# Patient Record
Sex: FEMALE | Race: WHITE | Employment: FULL TIME | URBAN - METROPOLITAN AREA
[De-identification: names, ages, dates, MRNs, and addresses within clinical notes are randomized per-mention and may not be internally consistent; named-entity substitution may affect disease eponyms.]

---

## 2018-04-10 ENCOUNTER — OFFICE VISIT (OUTPATIENT)
Dept: FAMILY MEDICINE CLINIC | Age: 33
End: 2018-04-10
Payer: COMMERCIAL

## 2018-04-10 ENCOUNTER — HOSPITAL ENCOUNTER (OUTPATIENT)
Age: 33
Discharge: HOME | End: 2018-04-12
Payer: COMMERCIAL

## 2018-04-10 VITALS
HEIGHT: 67 IN | DIASTOLIC BLOOD PRESSURE: 82 MMHG | SYSTOLIC BLOOD PRESSURE: 126 MMHG | BODY MASS INDEX: 45.99 KG/M2 | RESPIRATION RATE: 18 BRPM | OXYGEN SATURATION: 97 % | HEART RATE: 88 BPM | WEIGHT: 293 LBS

## 2018-04-10 DIAGNOSIS — Z00.00 PREVENTATIVE HEALTH CARE: ICD-10-CM

## 2018-04-10 DIAGNOSIS — R53.82 CHRONIC FATIGUE: ICD-10-CM

## 2018-04-10 DIAGNOSIS — Z00.00 PREVENTATIVE HEALTH CARE: Primary | ICD-10-CM

## 2018-04-10 DIAGNOSIS — E66.01 MORBID OBESITY (HCC): ICD-10-CM

## 2018-04-10 LAB
ALBUMIN SERPL-MCNC: 3.8 G/DL (ref 3.5–5.2)
ALP BLD-CCNC: 73 U/L (ref 35–104)
ALT SERPL-CCNC: 10 U/L (ref 0–32)
ANION GAP SERPL CALCULATED.3IONS-SCNC: 15 MMOL/L (ref 7–16)
AST SERPL-CCNC: 11 U/L (ref 0–31)
BILIRUB SERPL-MCNC: 0.2 MG/DL (ref 0–1.2)
BUN BLDV-MCNC: 11 MG/DL (ref 6–20)
CALCIUM SERPL-MCNC: 9 MG/DL (ref 8.6–10.2)
CHLORIDE BLD-SCNC: 98 MMOL/L (ref 98–107)
CO2: 26 MMOL/L (ref 22–29)
CREAT SERPL-MCNC: 0.7 MG/DL (ref 0.5–1)
FOLATE: 15.8 NG/ML (ref 4.8–24.2)
GFR AFRICAN AMERICAN: >60
GFR NON-AFRICAN AMERICAN: >60 ML/MIN/1.73
GLUCOSE BLD-MCNC: 86 MG/DL (ref 74–109)
HBA1C MFR BLD: 5.5 % (ref 4.8–5.9)
HCT VFR BLD CALC: 38.1 % (ref 34–48)
HEMOGLOBIN: 11.5 G/DL (ref 11.5–15.5)
MCH RBC QN AUTO: 25.1 PG (ref 26–35)
MCHC RBC AUTO-ENTMCNC: 30.2 % (ref 32–34.5)
MCV RBC AUTO: 83 FL (ref 80–99.9)
PDW BLD-RTO: 16.8 FL (ref 11.5–15)
PLATELET # BLD: 297 E9/L (ref 130–450)
PMV BLD AUTO: 12.1 FL (ref 7–12)
POTASSIUM SERPL-SCNC: 4 MMOL/L (ref 3.5–5)
RBC # BLD: 4.59 E12/L (ref 3.5–5.5)
SODIUM BLD-SCNC: 139 MMOL/L (ref 132–146)
TOTAL PROTEIN: 6.8 G/DL (ref 6.4–8.3)
TSH SERPL DL<=0.05 MIU/L-ACNC: 2.88 UIU/ML (ref 0.27–4.2)
VITAMIN B-12: 196 PG/ML (ref 211–946)
VITAMIN D 25-HYDROXY: 26 NG/ML (ref 30–100)
WBC # BLD: 9.1 E9/L (ref 4.5–11.5)

## 2018-04-10 PROCEDURE — 84443 ASSAY THYROID STIM HORMONE: CPT

## 2018-04-10 PROCEDURE — 83036 HEMOGLOBIN GLYCOSYLATED A1C: CPT

## 2018-04-10 PROCEDURE — 36415 COLL VENOUS BLD VENIPUNCTURE: CPT

## 2018-04-10 PROCEDURE — 82306 VITAMIN D 25 HYDROXY: CPT

## 2018-04-10 PROCEDURE — 82607 VITAMIN B-12: CPT

## 2018-04-10 PROCEDURE — 99385 PREV VISIT NEW AGE 18-39: CPT | Performed by: FAMILY MEDICINE

## 2018-04-10 PROCEDURE — 82746 ASSAY OF FOLIC ACID SERUM: CPT

## 2018-04-10 PROCEDURE — 85027 COMPLETE CBC AUTOMATED: CPT

## 2018-04-10 PROCEDURE — 80053 COMPREHEN METABOLIC PANEL: CPT

## 2018-04-10 ASSESSMENT — ENCOUNTER SYMPTOMS
SORE THROAT: 0
SHORTNESS OF BREATH: 0
ABDOMINAL PAIN: 0
EYE REDNESS: 0
VOMITING: 0
COUGH: 0
DIARRHEA: 0
CONSTIPATION: 0
RHINORRHEA: 0
COLOR CHANGE: 0
EYE PAIN: 0
NAUSEA: 0
WHEEZING: 0

## 2018-04-10 ASSESSMENT — PATIENT HEALTH QUESTIONNAIRE - PHQ9
SUM OF ALL RESPONSES TO PHQ9 QUESTIONS 1 & 2: 0
1. LITTLE INTEREST OR PLEASURE IN DOING THINGS: 0
SUM OF ALL RESPONSES TO PHQ QUESTIONS 1-9: 0
2. FEELING DOWN, DEPRESSED OR HOPELESS: 0

## 2018-04-12 ENCOUNTER — PREP FOR PROCEDURE (OUTPATIENT)
Dept: BARIATRICS/WEIGHT MGMT | Age: 33
End: 2018-04-12

## 2018-04-12 ENCOUNTER — INITIAL CONSULT (OUTPATIENT)
Dept: BARIATRICS/WEIGHT MGMT | Age: 33
End: 2018-04-12
Payer: COMMERCIAL

## 2018-04-12 VITALS
RESPIRATION RATE: 20 BRPM | TEMPERATURE: 97.2 F | HEIGHT: 68 IN | BODY MASS INDEX: 44.41 KG/M2 | DIASTOLIC BLOOD PRESSURE: 80 MMHG | WEIGHT: 293 LBS | HEART RATE: 78 BPM | SYSTOLIC BLOOD PRESSURE: 140 MMHG

## 2018-04-12 DIAGNOSIS — E46 MALNUTRITION, UNSPECIFIED TYPE (HCC): ICD-10-CM

## 2018-04-12 DIAGNOSIS — K21.9 GASTROESOPHAGEAL REFLUX DISEASE WITHOUT ESOPHAGITIS: ICD-10-CM

## 2018-04-12 DIAGNOSIS — E66.01 MORBID OBESITY DUE TO EXCESS CALORIES (HCC): Primary | ICD-10-CM

## 2018-04-12 PROCEDURE — 99203 OFFICE O/P NEW LOW 30 MIN: CPT

## 2018-04-12 PROCEDURE — G8417 CALC BMI ABV UP PARAM F/U: HCPCS | Performed by: SURGERY

## 2018-04-12 PROCEDURE — 99244 OFF/OP CNSLTJ NEW/EST MOD 40: CPT | Performed by: SURGERY

## 2018-04-12 PROCEDURE — G8427 DOCREV CUR MEDS BY ELIG CLIN: HCPCS | Performed by: SURGERY

## 2018-04-12 RX ORDER — SODIUM CHLORIDE, SODIUM LACTATE, POTASSIUM CHLORIDE, CALCIUM CHLORIDE 600; 310; 30; 20 MG/100ML; MG/100ML; MG/100ML; MG/100ML
INJECTION, SOLUTION INTRAVENOUS CONTINUOUS
Status: CANCELLED | OUTPATIENT
Start: 2018-04-12

## 2018-04-12 RX ORDER — M-VIT,TX,IRON,MINS/CALC/FOLIC 27MG-0.4MG
1 TABLET ORAL DAILY
COMMUNITY

## 2018-04-27 ENCOUNTER — TELEPHONE (OUTPATIENT)
Dept: FAMILY MEDICINE CLINIC | Age: 33
End: 2018-04-27

## 2018-04-27 RX ORDER — CHOLECALCIFEROL (VITAMIN D3) 50 MCG
2000 TABLET ORAL DAILY
Qty: 30 TABLET | Refills: 3 | Status: SHIPPED | OUTPATIENT
Start: 2018-04-27 | End: 2018-06-18 | Stop reason: SDUPTHER

## 2018-04-27 RX ORDER — LANOLIN ALCOHOL/MO/W.PET/CERES
1000 CREAM (GRAM) TOPICAL DAILY
Qty: 30 TABLET | Refills: 3 | Status: SHIPPED | OUTPATIENT
Start: 2018-04-27 | End: 2018-06-18 | Stop reason: SDUPTHER

## 2018-05-02 ENCOUNTER — ANESTHESIA EVENT (OUTPATIENT)
Dept: ENDOSCOPY | Age: 33
End: 2018-05-02
Payer: COMMERCIAL

## 2018-05-02 ENCOUNTER — HOSPITAL ENCOUNTER (OUTPATIENT)
Age: 33
Setting detail: OUTPATIENT SURGERY
Discharge: HOME | End: 2018-05-02
Attending: SURGERY | Admitting: SURGERY
Payer: COMMERCIAL

## 2018-05-02 ENCOUNTER — ANESTHESIA (OUTPATIENT)
Dept: ENDOSCOPY | Age: 33
End: 2018-05-02
Payer: COMMERCIAL

## 2018-05-02 VITALS
DIASTOLIC BLOOD PRESSURE: 70 MMHG | BODY MASS INDEX: 44.41 KG/M2 | OXYGEN SATURATION: 98 % | SYSTOLIC BLOOD PRESSURE: 140 MMHG | HEIGHT: 68 IN | HEART RATE: 87 BPM | TEMPERATURE: 97.3 F | WEIGHT: 293 LBS | RESPIRATION RATE: 16 BRPM

## 2018-05-02 VITALS
DIASTOLIC BLOOD PRESSURE: 101 MMHG | SYSTOLIC BLOOD PRESSURE: 152 MMHG | OXYGEN SATURATION: 100 % | RESPIRATION RATE: 15 BRPM

## 2018-05-02 DIAGNOSIS — E46 MALNUTRITION, UNSPECIFIED TYPE (HCC): ICD-10-CM

## 2018-05-02 DIAGNOSIS — E66.01 MORBID OBESITY DUE TO EXCESS CALORIES (HCC): ICD-10-CM

## 2018-05-02 LAB
CHOLESTEROL, TOTAL: 178 MG/DL (ref 0–199)
FERRITIN: 16 NG/ML
FOLATE: 15.8 NG/ML (ref 4.8–24.2)
HCG(URINE) PREGNANCY TEST: NEGATIVE
TRIGL SERPL-MCNC: 120 MG/DL (ref 0–149)
VITAMIN D 25-HYDROXY: 24 NG/ML (ref 30–100)

## 2018-05-02 PROCEDURE — C1773 RET DEV, INSERTABLE: HCPCS | Performed by: SURGERY

## 2018-05-02 PROCEDURE — 36415 COLL VENOUS BLD VENIPUNCTURE: CPT

## 2018-05-02 PROCEDURE — 3700000000 HC ANESTHESIA ATTENDED CARE: Performed by: SURGERY

## 2018-05-02 PROCEDURE — 2709999900 HC NON-CHARGEABLE SUPPLY: Performed by: SURGERY

## 2018-05-02 PROCEDURE — 7100000011 HC PHASE II RECOVERY - ADDTL 15 MIN: Performed by: SURGERY

## 2018-05-02 PROCEDURE — 88342 IMHCHEM/IMCYTCHM 1ST ANTB: CPT

## 2018-05-02 PROCEDURE — 2580000003 HC RX 258: Performed by: SURGERY

## 2018-05-02 PROCEDURE — 3609012400 HC EGD TRANSORAL BIOPSY SINGLE/MULTIPLE: Performed by: SURGERY

## 2018-05-02 PROCEDURE — 82728 ASSAY OF FERRITIN: CPT

## 2018-05-02 PROCEDURE — 82746 ASSAY OF FOLIC ACID SERUM: CPT

## 2018-05-02 PROCEDURE — 43239 EGD BIOPSY SINGLE/MULTIPLE: CPT | Performed by: SURGERY

## 2018-05-02 PROCEDURE — 84425 ASSAY OF VITAMIN B-1: CPT

## 2018-05-02 PROCEDURE — 3700000001 HC ADD 15 MINUTES (ANESTHESIA): Performed by: SURGERY

## 2018-05-02 PROCEDURE — 84478 ASSAY OF TRIGLYCERIDES: CPT

## 2018-05-02 PROCEDURE — 82465 ASSAY BLD/SERUM CHOLESTEROL: CPT

## 2018-05-02 PROCEDURE — 7100000010 HC PHASE II RECOVERY - FIRST 15 MIN: Performed by: SURGERY

## 2018-05-02 PROCEDURE — 88305 TISSUE EXAM BY PATHOLOGIST: CPT

## 2018-05-02 PROCEDURE — 81025 URINE PREGNANCY TEST: CPT

## 2018-05-02 PROCEDURE — 6360000002 HC RX W HCPCS: Performed by: NURSE ANESTHETIST, CERTIFIED REGISTERED

## 2018-05-02 PROCEDURE — 82306 VITAMIN D 25 HYDROXY: CPT

## 2018-05-02 RX ORDER — MIDAZOLAM HYDROCHLORIDE 1 MG/ML
INJECTION INTRAMUSCULAR; INTRAVENOUS PRN
Status: DISCONTINUED | OUTPATIENT
Start: 2018-05-02 | End: 2018-05-02 | Stop reason: SDUPTHER

## 2018-05-02 RX ORDER — PROPOFOL 10 MG/ML
INJECTION, EMULSION INTRAVENOUS PRN
Status: DISCONTINUED | OUTPATIENT
Start: 2018-05-02 | End: 2018-05-02 | Stop reason: SDUPTHER

## 2018-05-02 RX ORDER — SODIUM CHLORIDE, SODIUM LACTATE, POTASSIUM CHLORIDE, CALCIUM CHLORIDE 600; 310; 30; 20 MG/100ML; MG/100ML; MG/100ML; MG/100ML
INJECTION, SOLUTION INTRAVENOUS CONTINUOUS
Status: DISCONTINUED | OUTPATIENT
Start: 2018-05-02 | End: 2018-05-02 | Stop reason: HOSPADM

## 2018-05-02 RX ADMIN — PROPOFOL 100 MG: 10 INJECTION, EMULSION INTRAVENOUS at 14:29

## 2018-05-02 RX ADMIN — MIDAZOLAM HYDROCHLORIDE 2 MG: 1 INJECTION INTRAMUSCULAR; INTRAVENOUS at 14:28

## 2018-05-02 RX ADMIN — SODIUM CHLORIDE, POTASSIUM CHLORIDE, SODIUM LACTATE AND CALCIUM CHLORIDE: 600; 310; 30; 20 INJECTION, SOLUTION INTRAVENOUS at 12:43

## 2018-05-02 RX ADMIN — PROPOFOL 50 MG: 10 INJECTION, EMULSION INTRAVENOUS at 14:30

## 2018-05-02 RX ADMIN — SODIUM CHLORIDE, POTASSIUM CHLORIDE, SODIUM LACTATE AND CALCIUM CHLORIDE: 600; 310; 30; 20 INJECTION, SOLUTION INTRAVENOUS at 14:23

## 2018-05-02 ASSESSMENT — PAIN - FUNCTIONAL ASSESSMENT: PAIN_FUNCTIONAL_ASSESSMENT: 0-10

## 2018-05-07 LAB — VITAMIN B1 WHOLE BLOOD: 58 NMOL/L (ref 70–180)

## 2018-05-10 ENCOUNTER — OFFICE VISIT (OUTPATIENT)
Dept: BARIATRICS/WEIGHT MGMT | Age: 33
End: 2018-05-10
Payer: COMMERCIAL

## 2018-05-10 VITALS
TEMPERATURE: 97.8 F | BODY MASS INDEX: 44.41 KG/M2 | HEIGHT: 68 IN | WEIGHT: 293 LBS | DIASTOLIC BLOOD PRESSURE: 83 MMHG | SYSTOLIC BLOOD PRESSURE: 173 MMHG | HEART RATE: 79 BPM

## 2018-05-10 DIAGNOSIS — K21.9 GASTROESOPHAGEAL REFLUX DISEASE WITHOUT ESOPHAGITIS: ICD-10-CM

## 2018-05-10 DIAGNOSIS — E66.01 MORBID OBESITY (HCC): Primary | ICD-10-CM

## 2018-05-10 PROCEDURE — 4004F PT TOBACCO SCREEN RCVD TLK: CPT | Performed by: SURGERY

## 2018-05-10 PROCEDURE — 99213 OFFICE O/P EST LOW 20 MIN: CPT | Performed by: SURGERY

## 2018-05-10 PROCEDURE — G8427 DOCREV CUR MEDS BY ELIG CLIN: HCPCS | Performed by: SURGERY

## 2018-05-10 PROCEDURE — 99211 OFF/OP EST MAY X REQ PHY/QHP: CPT

## 2018-05-10 PROCEDURE — G8417 CALC BMI ABV UP PARAM F/U: HCPCS | Performed by: SURGERY

## 2018-05-16 ENCOUNTER — TELEPHONE (OUTPATIENT)
Dept: BARIATRICS/WEIGHT MGMT | Age: 33
End: 2018-05-16

## 2018-05-16 ENCOUNTER — INITIAL CONSULT (OUTPATIENT)
Dept: BARIATRICS/WEIGHT MGMT | Age: 33
End: 2018-05-16
Payer: COMMERCIAL

## 2018-05-16 VITALS — WEIGHT: 293 LBS | BODY MASS INDEX: 44.41 KG/M2 | HEIGHT: 68 IN

## 2018-05-16 DIAGNOSIS — E66.01 MORBID OBESITY WITH BMI OF 50.0-59.9, ADULT (HCC): ICD-10-CM

## 2018-05-16 DIAGNOSIS — Z71.3 DIETARY COUNSELING: Primary | ICD-10-CM

## 2018-05-16 DIAGNOSIS — E53.8 VITAMIN B 12 DEFICIENCY: ICD-10-CM

## 2018-05-16 DIAGNOSIS — E55.9 VITAMIN D DEFICIENCY: ICD-10-CM

## 2018-05-16 PROCEDURE — 99999 PR OFFICE/OUTPT VISIT,PROCEDURE ONLY: CPT | Performed by: SURGERY

## 2018-05-30 ENCOUNTER — OFFICE VISIT (OUTPATIENT)
Dept: FAMILY MEDICINE CLINIC | Age: 33
End: 2018-05-30
Payer: COMMERCIAL

## 2018-05-30 ENCOUNTER — HOSPITAL ENCOUNTER (OUTPATIENT)
Age: 33
Discharge: HOME | End: 2018-06-01
Payer: COMMERCIAL

## 2018-05-30 VITALS
TEMPERATURE: 97.9 F | WEIGHT: 293 LBS | DIASTOLIC BLOOD PRESSURE: 70 MMHG | HEIGHT: 68 IN | OXYGEN SATURATION: 98 % | BODY MASS INDEX: 44.41 KG/M2 | HEART RATE: 86 BPM | SYSTOLIC BLOOD PRESSURE: 130 MMHG

## 2018-05-30 DIAGNOSIS — J20.9 ACUTE BRONCHITIS WITH ASTHMA WITH ACUTE EXACERBATION: Primary | ICD-10-CM

## 2018-05-30 DIAGNOSIS — Z71.3 DIETARY COUNSELING: ICD-10-CM

## 2018-05-30 DIAGNOSIS — J45.901 ACUTE BRONCHITIS WITH ASTHMA WITH ACUTE EXACERBATION: Primary | ICD-10-CM

## 2018-05-30 DIAGNOSIS — E66.01 MORBID OBESITY WITH BMI OF 50.0-59.9, ADULT (HCC): ICD-10-CM

## 2018-05-30 PROCEDURE — 84630 ASSAY OF ZINC: CPT

## 2018-05-30 PROCEDURE — G8417 CALC BMI ABV UP PARAM F/U: HCPCS | Performed by: PHYSICIAN ASSISTANT

## 2018-05-30 PROCEDURE — 99213 OFFICE O/P EST LOW 20 MIN: CPT | Performed by: PHYSICIAN ASSISTANT

## 2018-05-30 PROCEDURE — G8427 DOCREV CUR MEDS BY ELIG CLIN: HCPCS | Performed by: PHYSICIAN ASSISTANT

## 2018-05-30 PROCEDURE — 4004F PT TOBACCO SCREEN RCVD TLK: CPT | Performed by: PHYSICIAN ASSISTANT

## 2018-05-30 RX ORDER — FLUTICASONE PROPIONATE 50 MCG
2 SPRAY, SUSPENSION (ML) NASAL DAILY
Qty: 1 BOTTLE | Refills: 0 | Status: SHIPPED | OUTPATIENT
Start: 2018-05-30 | End: 2018-12-20 | Stop reason: ALTCHOICE

## 2018-05-30 RX ORDER — DOXYCYCLINE HYCLATE 100 MG
100 TABLET ORAL 2 TIMES DAILY
Qty: 20 TABLET | Refills: 0 | Status: SHIPPED | OUTPATIENT
Start: 2018-05-30 | End: 2018-06-09

## 2018-05-30 RX ORDER — PREDNISONE 20 MG/1
20 TABLET ORAL 2 TIMES DAILY
Qty: 10 TABLET | Refills: 0 | Status: SHIPPED | OUTPATIENT
Start: 2018-05-30 | End: 2018-06-04

## 2018-05-30 RX ORDER — ALBUTEROL SULFATE 90 UG/1
2 AEROSOL, METERED RESPIRATORY (INHALATION) EVERY 6 HOURS PRN
Qty: 1 INHALER | Refills: 1 | Status: SHIPPED | OUTPATIENT
Start: 2018-05-30 | End: 2018-12-20 | Stop reason: SDUPTHER

## 2018-05-30 RX ORDER — BENZONATATE 100 MG/1
100 CAPSULE ORAL 3 TIMES DAILY PRN
Qty: 30 CAPSULE | Refills: 0 | Status: SHIPPED | OUTPATIENT
Start: 2018-05-30 | End: 2018-06-09

## 2018-06-02 LAB — ZINC: 59 UG/DL (ref 60–120)

## 2018-06-18 ENCOUNTER — OFFICE VISIT (OUTPATIENT)
Dept: FAMILY MEDICINE CLINIC | Age: 33
End: 2018-06-18
Payer: COMMERCIAL

## 2018-06-18 VITALS
DIASTOLIC BLOOD PRESSURE: 82 MMHG | HEART RATE: 88 BPM | HEIGHT: 68 IN | SYSTOLIC BLOOD PRESSURE: 130 MMHG | WEIGHT: 293 LBS | BODY MASS INDEX: 44.41 KG/M2 | OXYGEN SATURATION: 97 % | RESPIRATION RATE: 18 BRPM

## 2018-06-18 DIAGNOSIS — R10.11 RUQ PAIN: ICD-10-CM

## 2018-06-18 DIAGNOSIS — E51.9 VITAMIN B1 DEFICIENCY: ICD-10-CM

## 2018-06-18 DIAGNOSIS — F41.9 ANXIETY: Primary | ICD-10-CM

## 2018-06-18 DIAGNOSIS — E66.01 MORBID OBESITY (HCC): ICD-10-CM

## 2018-06-18 PROCEDURE — G8427 DOCREV CUR MEDS BY ELIG CLIN: HCPCS | Performed by: FAMILY MEDICINE

## 2018-06-18 PROCEDURE — G8417 CALC BMI ABV UP PARAM F/U: HCPCS | Performed by: FAMILY MEDICINE

## 2018-06-18 PROCEDURE — 99214 OFFICE O/P EST MOD 30 MIN: CPT | Performed by: FAMILY MEDICINE

## 2018-06-18 PROCEDURE — 4004F PT TOBACCO SCREEN RCVD TLK: CPT | Performed by: FAMILY MEDICINE

## 2018-06-18 RX ORDER — LANOLIN ALCOHOL/MO/W.PET/CERES
1000 CREAM (GRAM) TOPICAL DAILY
Qty: 30 TABLET | Refills: 3 | Status: SHIPPED | OUTPATIENT
Start: 2018-06-18 | End: 2018-12-21 | Stop reason: SDUPTHER

## 2018-06-18 RX ORDER — THIAMINE MONONITRATE (VIT B1) 100 MG
100 TABLET ORAL DAILY
Qty: 30 TABLET | Refills: 3 | Status: SHIPPED | OUTPATIENT
Start: 2018-06-18 | End: 2018-12-21 | Stop reason: SDUPTHER

## 2018-06-18 RX ORDER — CHOLECALCIFEROL (VITAMIN D3) 50 MCG
2000 TABLET ORAL DAILY
Qty: 30 TABLET | Refills: 3 | Status: SHIPPED | OUTPATIENT
Start: 2018-06-18 | End: 2018-12-21 | Stop reason: DRUGHIGH

## 2018-06-18 ASSESSMENT — ENCOUNTER SYMPTOMS
WHEEZING: 0
CONSTIPATION: 0
SHORTNESS OF BREATH: 0
NAUSEA: 0
DIARRHEA: 0
VOMITING: 0
ABDOMINAL PAIN: 0
COUGH: 1

## 2018-06-19 ENCOUNTER — INITIAL CONSULT (OUTPATIENT)
Dept: BARIATRICS/WEIGHT MGMT | Age: 33
End: 2018-06-19
Payer: COMMERCIAL

## 2018-06-19 DIAGNOSIS — Z71.3 DIETARY COUNSELING: Primary | ICD-10-CM

## 2018-06-19 PROCEDURE — 99999 PR OFFICE/OUTPT VISIT,PROCEDURE ONLY: CPT | Performed by: SURGERY

## 2018-06-22 VITALS — BODY MASS INDEX: 53.37 KG/M2 | WEIGHT: 293 LBS

## 2018-07-20 ENCOUNTER — TELEPHONE (OUTPATIENT)
Dept: BARIATRICS/WEIGHT MGMT | Age: 33
End: 2018-07-20

## 2018-07-31 ENCOUNTER — INITIAL CONSULT (OUTPATIENT)
Dept: BARIATRICS/WEIGHT MGMT | Age: 33
End: 2018-07-31
Payer: COMMERCIAL

## 2018-07-31 DIAGNOSIS — Z71.3 NUTRITIONAL COUNSELING: Primary | ICD-10-CM

## 2018-07-31 PROCEDURE — 99999 PR OFFICE/OUTPT VISIT,PROCEDURE ONLY: CPT | Performed by: SURGERY

## 2018-08-06 VITALS — WEIGHT: 293 LBS | BODY MASS INDEX: 52.15 KG/M2

## 2018-08-17 ENCOUNTER — OFFICE VISIT (OUTPATIENT)
Dept: BARIATRICS/WEIGHT MGMT | Age: 33
End: 2018-08-17
Payer: COMMERCIAL

## 2018-08-17 DIAGNOSIS — E55.9 VITAMIN D DEFICIENCY: ICD-10-CM

## 2018-08-17 DIAGNOSIS — E60 ZINC DEFICIENCY: Primary | ICD-10-CM

## 2018-08-17 DIAGNOSIS — E51.9 VITAMIN B1 DEFICIENCY: ICD-10-CM

## 2018-08-17 DIAGNOSIS — E53.8 VITAMIN B12 DEFICIENCY: ICD-10-CM

## 2018-08-17 PROCEDURE — 99201 PR OFFICE OUTPATIENT NEW 10 MINUTES: CPT | Performed by: INTERNAL MEDICINE

## 2018-08-17 PROCEDURE — G8417 CALC BMI ABV UP PARAM F/U: HCPCS | Performed by: INTERNAL MEDICINE

## 2018-08-17 PROCEDURE — 4004F PT TOBACCO SCREEN RCVD TLK: CPT | Performed by: INTERNAL MEDICINE

## 2018-08-17 PROCEDURE — G8428 CUR MEDS NOT DOCUMENT: HCPCS | Performed by: INTERNAL MEDICINE

## 2018-08-17 PROCEDURE — 99211 OFF/OP EST MAY X REQ PHY/QHP: CPT

## 2018-08-17 RX ORDER — CHOLECALCIFEROL (VITAMIN D3) 1250 MCG
CAPSULE ORAL
Qty: 8 CAPSULE | Refills: 0 | Status: SHIPPED | OUTPATIENT
Start: 2018-08-17 | End: 2018-12-21 | Stop reason: DRUGHIGH

## 2018-08-28 ENCOUNTER — INITIAL CONSULT (OUTPATIENT)
Dept: BARIATRICS/WEIGHT MGMT | Age: 33
End: 2018-08-28
Payer: COMMERCIAL

## 2018-08-28 VITALS — BODY MASS INDEX: 51.85 KG/M2 | WEIGHT: 293 LBS

## 2018-08-28 DIAGNOSIS — Z71.3 NUTRITIONAL COUNSELING: Primary | ICD-10-CM

## 2018-08-28 PROCEDURE — 99999 PR OFFICE/OUTPT VISIT,PROCEDURE ONLY: CPT | Performed by: SURGERY

## 2018-08-28 NOTE — PROGRESS NOTES
WEIGHT:  Weight: (!) 341 lb (154.7 kg)      Pt was in th office for his/her 4th weight Loss appointment. Pt is aware he/she must meet his/her pre-op weight loss goal in order to proceed with weight loss surgery. James / Aleksandr faxed a copy of what was reviewed with the pt to her PCP. Pt verbalized understanding. Rd// Ld enc the following at today's visit for successful pre/post surgical weight loss. Education:  Pt has been educated on the importance of weighing and measuring food for adequate portion control and to avoid extra calorie consumption from eating large portions. James / Ld instructed the pt on the use and the importance of using a scale and measuring cups in order to achieve adequate measurements of common portion sizes both pre-op and post-op. James Brandon used actual food model replica's to show what an actual portion and serving size would look like 3 month's post-surgical after weight loss sx. James Brandon completed an exercise in which they had to weigh and measure foods for adequate portion control. James / Aleksandr also reviewed the use of a portion controlled plate after weight loss surgery. 1. Weigh yourself daily and record it. 2. Keep documented food records daily. 3. Just be more active in day to day routine. 4. Higher protein intake and a higher fiber intake. Not a high protein or a high fiber diet     just a higher intake. 5.Eliminate empty calorie consumption. James Brandon addressed the following with the pt:  - James Brandon encouraged pt to comply with nutrition recommendations.  - James / Aleksandr encouraged participation in support group meetings.  - James Brandon encouraged pt to go back to maintenance of food records and weight monitoring   records. - James Brandon reviewed the importance of adequate sleep and stress management.  - James Brandon reviewed non-food strategies to cope with emotions and stress.  - James Brandno encouraged pt to practice the following: Mindful eating: Eating slowly:  Focusing     on the eating experience without distraction.  - James Brandon encouraged pt to pay attention to hunger and fullness cues. - Rd / Ld encouraged meal planning.  - Rd / Ld  encouraged pt to chose nutrient dense whole foods instead of soft, high     calorie foods.  - Rd / Ld encouraged not drinking large amounts of fluids with or immediately after      meals and avoiding high caloric empty beverage consumption. Portion control ,meal planning and avoiding empty calorie consumption was reviewed. Pt was encouraged to practice this daily for weight loss success. James / Aleksandr reviewed insurance company weight loss requirement on 8/28/18. Pt verbalized understanding. Please be aware at each visit you have been instructed that in order for your insurance company to approve your surgery you must show a consistent weight loss of 2 lbs or greater at each visit. We can not guarantee an approval by your insurance company we can only provide the information given to us it is up to you the patient to show compliancy to your insurance company. If you do gain weight during your supervised weight loss counseling sessions insurance companies starting in 2018 are denying patients for not showing consistent weight loss results when part of a supervised weight loss counseling program.     Pt spent 120 minutes with the James Brandon today preparing the patient for pre/post surgical dietary changes.

## 2018-09-14 ENCOUNTER — HOSPITAL ENCOUNTER (OUTPATIENT)
Age: 33
Discharge: HOME | End: 2018-09-16
Payer: COMMERCIAL

## 2018-09-14 DIAGNOSIS — E55.9 VITAMIN D DEFICIENCY: ICD-10-CM

## 2018-09-14 DIAGNOSIS — E60 ZINC DEFICIENCY: ICD-10-CM

## 2018-09-14 DIAGNOSIS — E51.9 VITAMIN B1 DEFICIENCY: ICD-10-CM

## 2018-09-14 DIAGNOSIS — E53.8 VITAMIN B12 DEFICIENCY: ICD-10-CM

## 2018-09-14 LAB
VITAMIN B-12: 219 PG/ML (ref 211–946)
VITAMIN D 25-HYDROXY: 35 NG/ML (ref 30–100)

## 2018-09-14 PROCEDURE — 84630 ASSAY OF ZINC: CPT

## 2018-09-14 PROCEDURE — 84425 ASSAY OF VITAMIN B-1: CPT

## 2018-09-14 PROCEDURE — 82607 VITAMIN B-12: CPT

## 2018-09-14 PROCEDURE — 82306 VITAMIN D 25 HYDROXY: CPT

## 2018-09-18 LAB — ZINC: 78 UG/DL (ref 60–120)

## 2018-09-20 LAB — VITAMIN B1 WHOLE BLOOD: 83 NMOL/L (ref 70–180)

## 2018-09-21 ENCOUNTER — OFFICE VISIT (OUTPATIENT)
Dept: BARIATRICS/WEIGHT MGMT | Age: 33
End: 2018-09-21
Payer: COMMERCIAL

## 2018-09-21 DIAGNOSIS — E60 ZINC DEFICIENCY: ICD-10-CM

## 2018-09-21 DIAGNOSIS — E53.8 B12 DEFICIENCY: ICD-10-CM

## 2018-09-21 DIAGNOSIS — E51.9 THIAMINE DEFICIENCY: ICD-10-CM

## 2018-09-21 DIAGNOSIS — E55.9 VITAMIN D DEFICIENCY: Primary | ICD-10-CM

## 2018-09-21 PROCEDURE — G8417 CALC BMI ABV UP PARAM F/U: HCPCS | Performed by: INTERNAL MEDICINE

## 2018-09-21 PROCEDURE — 99211 OFF/OP EST MAY X REQ PHY/QHP: CPT

## 2018-09-21 PROCEDURE — 99213 OFFICE O/P EST LOW 20 MIN: CPT | Performed by: INTERNAL MEDICINE

## 2018-09-21 PROCEDURE — G8427 DOCREV CUR MEDS BY ELIG CLIN: HCPCS | Performed by: INTERNAL MEDICINE

## 2018-09-21 PROCEDURE — 4004F PT TOBACCO SCREEN RCVD TLK: CPT | Performed by: INTERNAL MEDICINE

## 2018-11-13 ENCOUNTER — INITIAL CONSULT (OUTPATIENT)
Dept: BARIATRICS/WEIGHT MGMT | Age: 33
End: 2018-11-13
Payer: COMMERCIAL

## 2018-11-13 VITALS — BODY MASS INDEX: 53.98 KG/M2 | WEIGHT: 293 LBS

## 2018-11-13 DIAGNOSIS — Z71.3 DIETARY COUNSELING: Primary | ICD-10-CM

## 2018-11-13 PROCEDURE — 99999 PR OFFICE/OUTPT VISIT,PROCEDURE ONLY: CPT

## 2018-12-14 ENCOUNTER — HOSPITAL ENCOUNTER (OUTPATIENT)
Age: 33
Discharge: HOME | End: 2018-12-14
Payer: COMMERCIAL

## 2018-12-14 DIAGNOSIS — E53.8 B12 DEFICIENCY: ICD-10-CM

## 2018-12-14 DIAGNOSIS — E60 ZINC DEFICIENCY: ICD-10-CM

## 2018-12-14 DIAGNOSIS — E55.9 VITAMIN D DEFICIENCY: ICD-10-CM

## 2018-12-14 DIAGNOSIS — E51.9 THIAMINE DEFICIENCY: ICD-10-CM

## 2018-12-14 LAB
VITAMIN B-12: 288 PG/ML (ref 211–946)
VITAMIN D 25-HYDROXY: 33 NG/ML (ref 30–100)

## 2018-12-14 PROCEDURE — 82607 VITAMIN B-12: CPT

## 2018-12-14 PROCEDURE — 36415 COLL VENOUS BLD VENIPUNCTURE: CPT

## 2018-12-14 PROCEDURE — 82306 VITAMIN D 25 HYDROXY: CPT

## 2018-12-14 PROCEDURE — 84425 ASSAY OF VITAMIN B-1: CPT

## 2018-12-14 PROCEDURE — 84630 ASSAY OF ZINC: CPT

## 2018-12-17 LAB — ZINC: 66 UG/DL (ref 60–120)

## 2018-12-20 ENCOUNTER — OFFICE VISIT (OUTPATIENT)
Dept: FAMILY MEDICINE CLINIC | Age: 33
End: 2018-12-20
Payer: COMMERCIAL

## 2018-12-20 VITALS
TEMPERATURE: 98.3 F | SYSTOLIC BLOOD PRESSURE: 124 MMHG | HEART RATE: 81 BPM | OXYGEN SATURATION: 94 % | BODY MASS INDEX: 44.41 KG/M2 | RESPIRATION RATE: 18 BRPM | DIASTOLIC BLOOD PRESSURE: 80 MMHG | WEIGHT: 293 LBS | HEIGHT: 68 IN

## 2018-12-20 DIAGNOSIS — J18.9 COMMUNITY ACQUIRED PNEUMONIA OF RIGHT LOWER LOBE OF LUNG: Primary | ICD-10-CM

## 2018-12-20 DIAGNOSIS — E66.01 MORBID OBESITY (HCC): ICD-10-CM

## 2018-12-20 DIAGNOSIS — J45.20 MILD INTERMITTENT ASTHMA, UNSPECIFIED WHETHER COMPLICATED: ICD-10-CM

## 2018-12-20 DIAGNOSIS — F41.9 ANXIETY: ICD-10-CM

## 2018-12-20 LAB — VITAMIN B1 WHOLE BLOOD: 113 NMOL/L (ref 70–180)

## 2018-12-20 PROCEDURE — 99213 OFFICE O/P EST LOW 20 MIN: CPT | Performed by: FAMILY MEDICINE

## 2018-12-20 PROCEDURE — G8427 DOCREV CUR MEDS BY ELIG CLIN: HCPCS | Performed by: FAMILY MEDICINE

## 2018-12-20 PROCEDURE — 4004F PT TOBACCO SCREEN RCVD TLK: CPT | Performed by: FAMILY MEDICINE

## 2018-12-20 PROCEDURE — G8417 CALC BMI ABV UP PARAM F/U: HCPCS | Performed by: FAMILY MEDICINE

## 2018-12-20 PROCEDURE — G8484 FLU IMMUNIZE NO ADMIN: HCPCS | Performed by: FAMILY MEDICINE

## 2018-12-20 RX ORDER — ALBUTEROL SULFATE 90 UG/1
2 AEROSOL, METERED RESPIRATORY (INHALATION) EVERY 6 HOURS PRN
Qty: 1 INHALER | Refills: 1 | Status: SHIPPED
Start: 2018-12-20 | End: 2021-08-06 | Stop reason: SDUPTHER

## 2018-12-20 RX ORDER — AZITHROMYCIN 250 MG/1
TABLET, FILM COATED ORAL
Qty: 6 TABLET | Refills: 0 | Status: SHIPPED | OUTPATIENT
Start: 2018-12-20 | End: 2019-06-24 | Stop reason: CLARIF

## 2018-12-20 ASSESSMENT — PATIENT HEALTH QUESTIONNAIRE - PHQ9
SUM OF ALL RESPONSES TO PHQ QUESTIONS 1-9: 0
SUM OF ALL RESPONSES TO PHQ9 QUESTIONS 1 & 2: 0
1. LITTLE INTEREST OR PLEASURE IN DOING THINGS: 0
SUM OF ALL RESPONSES TO PHQ QUESTIONS 1-9: 0
2. FEELING DOWN, DEPRESSED OR HOPELESS: 0

## 2018-12-20 ASSESSMENT — ENCOUNTER SYMPTOMS
COUGH: 1
SORE THROAT: 0
DIARRHEA: 0
VOMITING: 0
NAUSEA: 0
CONSTIPATION: 0
SINUS PRESSURE: 0
SINUS PAIN: 0
WHEEZING: 0
ABDOMINAL PAIN: 0
SHORTNESS OF BREATH: 1

## 2018-12-20 NOTE — PATIENT INSTRUCTIONS
Get your xray done   Take the azithromycin 2 tablets on day 1 and and one tablet days 2-5. Follow up in 6 months or sooner as needed. Follow up if not improved in 1 weeks.

## 2018-12-20 NOTE — PROGRESS NOTES
tablet Take 1 tablet by mouth daily 30 tablet 3    vitamin B-12 (CYANOCOBALAMIN) 1000 MCG tablet Take 1 tablet by mouth daily 30 tablet 3    Cholecalciferol (VITAMIN D) 2000 units TABS tablet Take 1 tablet by mouth daily 30 tablet 3    Aspirin-Acetaminophen-Caffeine (EXCEDRIN MIGRAINE PO) Take by mouth daily      Multiple Vitamins-Minerals (THERAPEUTIC MULTIVITAMIN-MINERALS) tablet Take 1 tablet by mouth daily       No current facility-administered medications on file prior to visit. No Known Allergies    Past medical, surgical, socialand/or family history reviewed, updated as needed, and are non-contributory (unless otherwise stated). Medications, allergies, and problem list also reviewed and updated as needed in patient's record. Wt Readings from Last 3 Encounters:   12/20/18 (!) 362 lb (164.2 kg)   11/13/18 (!) 355 lb (161 kg)   08/28/18 (!) 341 lb (154.7 kg)                   /80 (Site: Left Upper Arm, Position: Sitting)   Pulse 81   Temp 98.3 °F (36.8 °C) (Oral)   Resp 18   Ht 5' 8\" (1.727 m)   Wt (!) 362 lb (164.2 kg)   SpO2 94%   BMI 55.04 kg/m²       Physical Exam   Constitutional: She appears well-developed and well-nourished. No distress. HENT:   Head: Normocephalic and atraumatic. Cardiovascular: Normal rate, regular rhythm, normal heart sounds and intact distal pulses. Exam reveals no friction rub. No murmur heard. Pulmonary/Chest: Effort normal. No respiratory distress. She has no wheezes. Decreased breath sounds in rll otherwise clear    Abdominal: Soft. Bowel sounds are normal. She exhibits no distension and no mass. There is no tenderness. There is no guarding. Musculoskeletal: Normal range of motion. She exhibits edema (trace pitting edema bilaterally ). Skin: She is not diaphoretic.      Results for orders placed or performed during the hospital encounter of 12/14/18   VITAMIN B12   Result Value Ref Range    Vitamin B-12 288 211 - 946 pg/mL   ZINC   Result

## 2018-12-21 ENCOUNTER — OFFICE VISIT (OUTPATIENT)
Dept: BARIATRICS/WEIGHT MGMT | Age: 33
End: 2018-12-21
Payer: COMMERCIAL

## 2018-12-21 DIAGNOSIS — E60 ZINC DEFICIENCY: Primary | ICD-10-CM

## 2018-12-21 DIAGNOSIS — E51.9 VITAMIN B1 DEFICIENCY: ICD-10-CM

## 2018-12-21 DIAGNOSIS — E53.8 B12 DEFICIENCY: ICD-10-CM

## 2018-12-21 DIAGNOSIS — E55.9 VITAMIN D DEFICIENCY: ICD-10-CM

## 2018-12-21 PROCEDURE — G8428 CUR MEDS NOT DOCUMENT: HCPCS | Performed by: INTERNAL MEDICINE

## 2018-12-21 PROCEDURE — G8484 FLU IMMUNIZE NO ADMIN: HCPCS | Performed by: INTERNAL MEDICINE

## 2018-12-21 PROCEDURE — 4004F PT TOBACCO SCREEN RCVD TLK: CPT | Performed by: INTERNAL MEDICINE

## 2018-12-21 PROCEDURE — 99213 OFFICE O/P EST LOW 20 MIN: CPT | Performed by: INTERNAL MEDICINE

## 2018-12-21 PROCEDURE — 99211 OFF/OP EST MAY X REQ PHY/QHP: CPT

## 2018-12-21 PROCEDURE — G8417 CALC BMI ABV UP PARAM F/U: HCPCS | Performed by: INTERNAL MEDICINE

## 2018-12-21 RX ORDER — THIAMINE MONONITRATE (VIT B1) 100 MG
100 TABLET ORAL DAILY
Qty: 30 TABLET | Refills: 3 | Status: SHIPPED | OUTPATIENT
Start: 2018-12-21 | End: 2019-04-01

## 2019-01-14 ENCOUNTER — TELEPHONE (OUTPATIENT)
Dept: BARIATRICS/WEIGHT MGMT | Age: 34
End: 2019-01-14

## 2019-02-19 ENCOUNTER — INITIAL CONSULT (OUTPATIENT)
Dept: BARIATRICS/WEIGHT MGMT | Age: 34
End: 2019-02-19
Payer: COMMERCIAL

## 2019-02-19 ENCOUNTER — TELEPHONE (OUTPATIENT)
Dept: BARIATRICS/WEIGHT MGMT | Age: 34
End: 2019-02-19

## 2019-02-19 VITALS — WEIGHT: 293 LBS | BODY MASS INDEX: 44.41 KG/M2 | HEIGHT: 68 IN

## 2019-02-19 DIAGNOSIS — Z71.3 DIETARY COUNSELING: Primary | ICD-10-CM

## 2019-02-19 PROCEDURE — 99999 PR OFFICE/OUTPT VISIT,PROCEDURE ONLY: CPT | Performed by: SURGERY

## 2019-02-20 ENCOUNTER — TELEPHONE (OUTPATIENT)
Dept: BARIATRICS/WEIGHT MGMT | Age: 34
End: 2019-02-20

## 2019-03-01 ENCOUNTER — TELEPHONE (OUTPATIENT)
Dept: FAMILY MEDICINE CLINIC | Age: 34
End: 2019-03-01

## 2019-03-19 ENCOUNTER — TELEPHONE (OUTPATIENT)
Dept: BARIATRICS/WEIGHT MGMT | Age: 34
End: 2019-03-19

## 2019-03-25 ENCOUNTER — TELEPHONE (OUTPATIENT)
Dept: BARIATRICS/WEIGHT MGMT | Age: 34
End: 2019-03-25

## 2019-03-25 ENCOUNTER — HOSPITAL ENCOUNTER (OUTPATIENT)
Age: 34
Discharge: HOME | End: 2019-03-27
Payer: COMMERCIAL

## 2019-03-25 DIAGNOSIS — E53.8 B12 DEFICIENCY: ICD-10-CM

## 2019-03-25 DIAGNOSIS — E51.9 VITAMIN B1 DEFICIENCY: ICD-10-CM

## 2019-03-25 DIAGNOSIS — E55.9 VITAMIN D DEFICIENCY: ICD-10-CM

## 2019-03-25 DIAGNOSIS — E60 ZINC DEFICIENCY: ICD-10-CM

## 2019-03-25 LAB
FOLATE: 11 NG/ML (ref 4.8–24.2)
VITAMIN B-12: 275 PG/ML (ref 211–946)
VITAMIN D 25-HYDROXY: 26 NG/ML (ref 30–100)

## 2019-03-25 PROCEDURE — 82746 ASSAY OF FOLIC ACID SERUM: CPT

## 2019-03-25 PROCEDURE — 82306 VITAMIN D 25 HYDROXY: CPT

## 2019-03-25 PROCEDURE — 36415 COLL VENOUS BLD VENIPUNCTURE: CPT

## 2019-03-25 PROCEDURE — 84630 ASSAY OF ZINC: CPT

## 2019-03-25 PROCEDURE — 84425 ASSAY OF VITAMIN B-1: CPT

## 2019-03-25 PROCEDURE — 82607 VITAMIN B-12: CPT

## 2019-03-26 ENCOUNTER — TELEPHONE (OUTPATIENT)
Dept: BARIATRICS/WEIGHT MGMT | Age: 34
End: 2019-03-26

## 2019-03-27 ENCOUNTER — TELEPHONE (OUTPATIENT)
Dept: BARIATRICS/WEIGHT MGMT | Age: 34
End: 2019-03-27

## 2019-03-29 ENCOUNTER — INITIAL CONSULT (OUTPATIENT)
Dept: BARIATRICS/WEIGHT MGMT | Age: 34
End: 2019-03-29
Payer: COMMERCIAL

## 2019-03-29 ENCOUNTER — OFFICE VISIT (OUTPATIENT)
Dept: BARIATRICS/WEIGHT MGMT | Age: 34
End: 2019-03-29
Payer: COMMERCIAL

## 2019-03-29 VITALS — HEIGHT: 68 IN | BODY MASS INDEX: 44.41 KG/M2 | WEIGHT: 293 LBS

## 2019-03-29 DIAGNOSIS — E53.8 B12 DEFICIENCY: ICD-10-CM

## 2019-03-29 DIAGNOSIS — Z71.3 NUTRITIONAL COUNSELING: Primary | ICD-10-CM

## 2019-03-29 DIAGNOSIS — E55.9 VITAMIN D DEFICIENCY: Primary | ICD-10-CM

## 2019-03-29 LAB — ZINC: 66 UG/DL (ref 60–120)

## 2019-03-29 PROCEDURE — G8417 CALC BMI ABV UP PARAM F/U: HCPCS | Performed by: INTERNAL MEDICINE

## 2019-03-29 PROCEDURE — 4004F PT TOBACCO SCREEN RCVD TLK: CPT | Performed by: INTERNAL MEDICINE

## 2019-03-29 PROCEDURE — 99212 OFFICE O/P EST SF 10 MIN: CPT

## 2019-03-29 PROCEDURE — G8428 CUR MEDS NOT DOCUMENT: HCPCS | Performed by: INTERNAL MEDICINE

## 2019-03-29 PROCEDURE — 99999 PR OFFICE/OUTPT VISIT,PROCEDURE ONLY: CPT | Performed by: SURGERY

## 2019-03-29 PROCEDURE — G8484 FLU IMMUNIZE NO ADMIN: HCPCS | Performed by: INTERNAL MEDICINE

## 2019-03-29 PROCEDURE — 99213 OFFICE O/P EST LOW 20 MIN: CPT | Performed by: INTERNAL MEDICINE

## 2019-03-29 RX ORDER — CHOLECALCIFEROL (VITAMIN D3) 1250 MCG
CAPSULE ORAL
Qty: 12 CAPSULE | Refills: 0 | Status: SHIPPED | OUTPATIENT
Start: 2019-03-29 | End: 2019-07-05 | Stop reason: DRUGHIGH

## 2019-03-30 DIAGNOSIS — E51.9 VITAMIN B1 DEFICIENCY: ICD-10-CM

## 2019-04-01 ENCOUNTER — HOSPITAL ENCOUNTER (EMERGENCY)
Age: 34
Discharge: HOME | End: 2019-04-01
Payer: COMMERCIAL

## 2019-04-01 ENCOUNTER — TELEPHONE (OUTPATIENT)
Dept: FAMILY MEDICINE CLINIC | Age: 34
End: 2019-04-01

## 2019-04-01 VITALS
DIASTOLIC BLOOD PRESSURE: 98 MMHG | WEIGHT: 293 LBS | TEMPERATURE: 98 F | SYSTOLIC BLOOD PRESSURE: 149 MMHG | OXYGEN SATURATION: 97 % | RESPIRATION RATE: 20 BRPM | BODY MASS INDEX: 54.74 KG/M2 | HEART RATE: 79 BPM

## 2019-04-01 DIAGNOSIS — J01.90 ACUTE SINUSITIS, RECURRENCE NOT SPECIFIED, UNSPECIFIED LOCATION: Primary | ICD-10-CM

## 2019-04-01 DIAGNOSIS — R93.89 ABNORMAL CHEST X-RAY: Primary | ICD-10-CM

## 2019-04-01 LAB — VITAMIN B1 WHOLE BLOOD: 61 NMOL/L (ref 70–180)

## 2019-04-01 PROCEDURE — 99212 OFFICE O/P EST SF 10 MIN: CPT

## 2019-04-01 RX ORDER — GUAIFENESIN, PSEUDOEPHEDRINE HYDROCHLORIDE 600; 60 MG/1; MG/1
1 TABLET, EXTENDED RELEASE ORAL EVERY 12 HOURS
Qty: 20 TABLET | Refills: 1 | Status: SHIPPED | OUTPATIENT
Start: 2019-04-01 | End: 2019-04-11

## 2019-04-01 RX ORDER — AMOXICILLIN AND CLAVULANATE POTASSIUM 875; 125 MG/1; MG/1
1 TABLET, FILM COATED ORAL 2 TIMES DAILY
Qty: 20 TABLET | Refills: 0 | Status: SHIPPED | OUTPATIENT
Start: 2019-04-01 | End: 2019-04-11

## 2019-04-01 RX ORDER — THIAMINE MONONITRATE (VIT B1) 100 MG
100 TABLET ORAL DAILY
Qty: 30 TABLET | Refills: 0 | Status: SHIPPED
Start: 2019-04-01 | End: 2020-10-14 | Stop reason: CLARIF

## 2019-04-01 NOTE — ED PROVIDER NOTES
Department of Emergency Medicine   [facility]  Provider Note  Admit Date/RoomTime: 4/1/2019 11:10 AM  Room: 04/04  Chief Complaint   URI (started  saturday  with sinus  pressure   sore throat earache    nasal  congestion    prod  cough)    History of Present Illness   Source of history provided by:  patient. History/Exam Limitations: none. Henrique Menjivar is a 29 y.o. old female who has a past medical history of:   Past Medical History:   Diagnosis Date    Asthma     B12 deficiency 9/21/2018    GERD without esophagitis     Migraine     Morbid obesity due to excess calories ([address]     Vitamin D deficiency 9/21/2018    Zinc deficiency 9/21/2018   presents to the urgent care center by private vehicle, for runny nose, congestion, sore throat and sinus pain , which began a few day(s) prior to arrival.  Since onset the symptoms have been persistent and mild in severity. She has pressure in her sinuses over her cheeks of her forehead she said that she has a lot of thick drainage she has a sore throat does not have any chest pain or shortness of breath her systolic her sinuses. Not taken anything for her symptoms and something for the headache such as Tylenol or Advil. ROS    Pertinent positives and negatives are stated within HPI, all other systems reviewed and are negative. Past Surgical History:   Procedure Laterality Date    CHOLECYSTECTOMY, LAPAROSCOPIC  3/2014 Alejandra Comfort    with repair umbilical hernia    LEEP  3/2008    OTHER SURGICAL HISTORY  2011    coils to fallopian tubes    OTHER SURGICAL HISTORY      Tubal essure     TONSILLECTOMY AND ADENOIDECTOMY      TYMPANOSTOMY TUBE PLACEMENT      UMBILICAL HERNIA REPAIR  3/2014 Carmella Wiggins UPPER GASTROINTESTINAL ENDOSCOPY N/A 5/2/2018    EGD BIOPSY performed by Darien Pyle MD at [address] [place] [address] History:  reports that she has quit smoking. She has a 7.50 pack-year smoking history.  She has never used smokeless tobacco. She reports that she drinks about 1.2 - 1.8 oz of alcohol per week. She reports that she does not use drugs. Family History: family history includes Brain Cancer in her maternal grandmother; Cancer in her paternal grandmother; Diabetes in her father and maternal grandfather; Heart Attack in her father; Heart Disease in her maternal grandfather; Hypertension in her father and mother; Other in her father; Scoliosis in her sister; Stroke in her father. Allergies: Patient has no known allergies. Physical Exam            ED Triage Vitals [04/01/19 1113]   BP Temp Temp Source Pulse Resp SpO2 Height Weight   (!) 149/98 98 °F (36.7 °C) Oral 79 20 97 % -- (!) 360 lb (163.3 kg)      Oxygen Saturation Interpretation: Normal.    Constitutional:  Alert, development consistent with age. Ears:  External Ears: Bilateral normal.               TM's & External Canals: normal appearance, normal TMs bilaterally. Nose:   There is no discharge, swelling or lesions noted. Sinuses: mild Bilateral maxillary sinus tenderness. no Bilateral frontal sinus tenderness. Mouth:  normal tongue and buccal mucosa. Throat: no erythema or exudates noted. Teeth and gums normal..  Airway Patent. Neck/Lymphatics:  Neck Supple. There is no  preauricular, anterior cervical and posterior cervical node tenderness. Respiratory:   Breath sounds: Bilateral normal.  Lung sounds: normal.   CV:  Regular rate and rhythm, normal heart sounds, without pathological murmurs, ectopy, gallops, or rubs. GI:  Abdomen Soft, nontender, good bowel sounds. No firm or pulsatile mass. Integument:  Normal turgor. Warm, dry, without visible rash. Neurological:  Oriented. Motor functions intact. Lab / Imaging Results   (All laboratory and radiology results have been personally reviewed by myself)  Labs:  No results found for this visit on 04/01/19. Imaging:   All Radiology results interpreted by Radiologist unless otherwise noted. No orders to display     ED Course / Medical Decision Making   Medications - No data to display       MDM:    With sinusitis has been sick for a few days now pain pressure especially over the cheeks bilaterally. I did start her on Augmentin and also Mucinex D she should follow-up with her doctor she does not improve or worsens she should get reevaluated Patient is well appearing, non toxic and appropriate for outpatient management. Plan of Care: Normal progression of disease discussed. All questions answered. Explained the rationale for symptomatic treatment  Instruction provided in the use of fluids, vaporizer, acetaminophen, and other OTC medication for symptom control. Extra fluids  Analgesics as needed, dose reviewed. Follow up as needed should symptoms fail to improve. Counseling:   I have  spoken with the patient and discussed todays results, in addition to providing specific details for the plan of care and counseling regarding the diagnosis and prognosis. Questions are answered at this time and they are agreeable with the plan. Assessment      1. Acute sinusitis, recurrence not specified, unspecified location      Plan   Discharge to home and advised to contact Dajuan Obrien, [address] [place] [address] [phone]      As needed   Patient condition is good    New Medications     New Prescriptions    AMOXICILLIN-CLAVULANATE (AUGMENTIN) 875-125 MG PER TABLET    Take 1 tablet by mouth 2 times daily for 10 days    PSEUDOEPHEDRINE-GUAIFENESIN (MUCINEX D)  MG PER EXTENDED RELEASE TABLET    Take 1 tablet by mouth every 12 hours for 20 doses PRN/ cough or congestion     Electronically signed by SHARI Bhat CNP   DD: 4/1/19  **This report was transcribed using voice recognition software. Every effort was made to ensure accuracy; however, inadvertent computerized transcription errors may be present.   END OF ED PROVIDER NOTE     Pia Rodriguez SHARI - CNP  04/01/19 3136

## 2019-04-05 ENCOUNTER — INITIAL CONSULT (OUTPATIENT)
Dept: BARIATRICS/WEIGHT MGMT | Age: 34
End: 2019-04-05
Payer: COMMERCIAL

## 2019-04-05 VITALS — BODY MASS INDEX: 44.41 KG/M2 | HEIGHT: 68 IN | WEIGHT: 293 LBS

## 2019-04-05 DIAGNOSIS — Z71.3 NUTRITIONAL COUNSELING: Primary | ICD-10-CM

## 2019-04-05 PROCEDURE — 99999 PR OFFICE/OUTPT VISIT,PROCEDURE ONLY: CPT | Performed by: SURGERY

## 2019-04-05 NOTE — PROGRESS NOTES
Weight Loss Assessment: Completed by Nutrition Services RD/LD Certified in Adult Weight Management:  Phone Number:  [phone]  Fax Number:   [phone]    Charly Green   4/5/19  Weight Loss Appointment: 8th Weight Loss Appointment      Wt Readings from Last 3 Encounters:   04/05/19 (!) 378 lb (171.5 kg)   04/01/19 (!) 360 lb (163.3 kg)   03/29/19 (!) 378 lb (171.5 kg)        (!) 378 lb (171.5 kg)  IBW: 155 lbs         % IBW: 244%       % EBWL: 0%           ABW: 211 lbs  % ABW: 179%       BMI: Body mass index is 57.47 kg/m². Patient's 24 Hour Recall:  Breakfast: Hard Boiled Egg and String Cheese  Snack: UNS CIB mixed with [employer] Milk  Lunch: Grilled chicken with corn and cheese burrito  Snack: None  Dinner: Soup - Wedding Soup - 1/2 can  Snack: [place] Intake: 2 - 2 Litre's // 3  - 16.9 oz bottles  Other Beverages: UNS CIB  Exercise: ADL's  Does the patient feel he/she achieved last week's weight loss goals:No   Pt does not feel well. Pt was seen in urgent care on Monday for a sinus infection. So James Ivory recommends diet as tolerated once pt feels better enc pt to go back to the low fat, low calorie diet, portion controlled diet with daily exercise and physical activity. Pt currently is not following the recommended diet plan at this time. Pt is aware in order to proceed with weight loss sx she has to achieve her pre-op weight loss goal.  Pts goal weight is 343 lbs. James ivory also enc pt to keep daily food records. Education:   1. Weigh yourself daily and record it. 2. Keep documented food records daily   3. 175 minutes a week of physical activity   4. Just be more active in day to day routine   5. Higher protein intake and a higher fiber intake. Not a high protein or a high fiber diet just a higher intake. Weight loss goal for next follow-up appointment: 2 to 3 lbs    Patient has established the following three goals for the next follow-up appointment.   1.Pt wants to eliminate

## 2019-04-15 ENCOUNTER — INITIAL CONSULT (OUTPATIENT)
Dept: BARIATRICS/WEIGHT MGMT | Age: 34
End: 2019-04-15
Payer: COMMERCIAL

## 2019-04-15 VITALS — HEIGHT: 68 IN | BODY MASS INDEX: 44.41 KG/M2 | WEIGHT: 293 LBS

## 2019-04-15 DIAGNOSIS — Z71.3 NUTRITIONAL COUNSELING: Primary | ICD-10-CM

## 2019-04-15 PROCEDURE — 99999 PR OFFICE/OUTPT VISIT,PROCEDURE ONLY: CPT | Performed by: SURGERY

## 2019-04-16 ENCOUNTER — TELEPHONE (OUTPATIENT)
Dept: ENDOCRINOLOGY | Age: 34
End: 2019-04-16

## 2019-04-16 DIAGNOSIS — E66.01 MORBID OBESITY (HCC): Primary | ICD-10-CM

## 2019-05-20 ENCOUNTER — INITIAL CONSULT (OUTPATIENT)
Dept: BARIATRICS/WEIGHT MGMT | Age: 34
End: 2019-05-20
Payer: COMMERCIAL

## 2019-05-20 VITALS — WEIGHT: 293 LBS | HEIGHT: 68 IN | BODY MASS INDEX: 44.41 KG/M2

## 2019-05-20 DIAGNOSIS — Z71.3 NUTRITIONAL COUNSELING: Primary | ICD-10-CM

## 2019-05-20 PROCEDURE — 99999 PR OFFICE/OUTPT VISIT,PROCEDURE ONLY: CPT | Performed by: SURGERY

## 2019-05-20 NOTE — PROGRESS NOTES
Eating slowly: Focusing on the eating experience without distraction  - James Ld enc. pt to pay attention to hunger and fullness  - Rd / Ld enc meal planning  - James / Evaristo Anderson pt to chose nutrient dense whole foods instead of soft, high calorie foods  - James / Ld enc not dr Braden Messing large amounts of fluids with or immediately after meals    Portion control ,meal planning and avoiding empty calorie consumption. Weight loss goal for next follow-up appointment: 3 - 5 lbs    Patient has established the following three goals for the next follow-up appointment. 1. Pt wants to start to follow the all liquid protein fast.   2. Pt is going to use Slim Fast in place of UNS CIB. 3. Pt is going to plan small frequent meal consumption. Patient has established the following exercise goal for next follow-up appointment:  ADL's - GYM 2 times a week - Duration:60 minutes // [employer]    Did the patient keep food records: No    Pt. is aware if they do not comply with The [address] and [facility] Guidelines that this can lead to the patient being dismissed from the program.    The registered dietitian spent the following time 30 minutes educating the patient and providing the patient with nutritional handouts to follow. __________________________________________________________________________________  Primary Care Physician Follow-up:  Pt. was seen by Mark Coreas RD/THEO regarding weight loss education and follow-up on 5/20/19. This was the patients 10th appointment with the registered dietitian. The registered dietitian spent the following amount of time with the patient 30 minutes. Please [place] the following: The Primary Care Physician reviewed the above nutrition assessment and patient education and agrees with current diet plan.     The Primary Care Physician wants the current diet plan changed to the

## 2019-06-21 ENCOUNTER — INITIAL CONSULT (OUTPATIENT)
Dept: BARIATRICS/WEIGHT MGMT | Age: 34
End: 2019-06-21
Payer: COMMERCIAL

## 2019-06-21 VITALS — HEIGHT: 68 IN | WEIGHT: 293 LBS | BODY MASS INDEX: 44.41 KG/M2

## 2019-06-21 DIAGNOSIS — Z71.3 NUTRITIONAL COUNSELING: Primary | ICD-10-CM

## 2019-06-21 PROCEDURE — 99999 PR OFFICE/OUTPT VISIT,PROCEDURE ONLY: CPT | Performed by: SURGERY

## 2019-06-21 NOTE — PROGRESS NOTES
Weight Loss Assessment: Completed by Nutrition Services RD/LD Certified in Adult Weight Management:  Phone Number:  [phone]  Fax Number:   [phone]    Manuel Austin   6/21/19  Weight Loss Appointment: 11th Weight Loss Appointment - Pt is also scheduled with Dr. Annalise Herrera to help achieve pre-op weight loss goal      Wt Readings from Last 3 Encounters:   06/21/19 (!) 390 lb (176.9 kg)   05/20/19 (!) 388 lb (176 kg)   04/15/19 (!) 380 lb (172.4 kg)        (!) 390 lb (176.9 kg)  IBW: 155 lbs         % IBW: 252%       % EBWL: 0%          ABW: 252 lbs  % ABW: 155%       BMI: Body mass index is 59.3 kg/m². Patient's 24 Hour Recall:  Breakfast: Slim-Fast Shake  Snack: None  Lunch: Chicken Salad Wrapped In A Piece of Lettuce  Snack: Slim Fast [employer]: None  Snack: None  Water Intake: 1 Litre  Other Beverages: Sometimes Gatorade Zero  Exercise: ADL's -   Does the patient feel he/she achieved last week's weight loss goals:No     Education:   Rd// Ld enc the following at today's visit for successful post-surgical Weight Maintenance    1. Weigh yourself daily and record it. 2. Keep documented food records daily   3. 220-225 minutes a week of moderate physical activity   4. Just be more active in day to day routine   5. Higher protein intake and a higher fiber intake. Not a high protein or a high fiber diet just a higher intake.     James Brandon addressed the following with the pt:  - James Brandon enc pt to comply with nutrition recommendations  - James Brandon enc to go back maintenance of regular physical activity  - Periodic assessment to prevent and treat eating or other psychiatric disorders   - James / Aleksandr enc Participation in support group meetings  - James Brandon enc pt to go back to maintenance of food records and weight monitoring records   - James Brandon reviewed the importance of adequate sleep and stress management  - James Brandon reviewed nonfood strategies to cope with emotions and stress  - James Brandon encouraged pt to practice the following: Mindful eating: Eating slowly: Focusing on the eating experience without distraction  - James Brandon enc. pt to pay attention to hunger and fullness  - James / Theo enc meal planning  - Jamse / Evaristo Anderson pt to chose nutrient dense whole foods instead of soft, high calorie foods  - James / Ld enc not dr Braden Messing large amounts of fluids with or immediately after meals    Portion control, meal planning and avoiding empty calorie consumption. Weight loss goal for next follow-up appointment: 5 lbs    Patient has established the following three goals for the next follow-up appointment. 1. Pt needs to consume 6 small meals a day. 2. Pt needs to increase water intake. Patient was instructed on the importance of increasing water intake to 48 - 64 oz. of water total daily. Pt. was also instructed he / she is allowed an additional 30 oz. of sugar free caffeine free clear liquid beverages for a total of 90 oz. of fluid total daily. Pt. was able to verbalize how he / she can get more water and fluids within the diet. Pt. verbalized understanding. 3. Pt wants to get the entire family on the entire healthy meal plan. Pt wants to stop having cheat days. Patient has established the following exercise goal for next follow-up appointment:  ADL's - Gym - 3 day's a week - Duration: 90 Minutes working out at [facility]. Did the patient keep food records:No    Pt. is aware if they do not comply with The [facility] Guidelines that this can lead to the patient being dismissed from the program.    The registered dietitian spent the following time 45 minutes educating the patient and providing the patient with nutritional handouts to follow. __________________________________________________________________________________  Primary Care Physician Follow-up:  Pt. was seen by Mark Coreas RD/THEO regarding weight loss education and follow-up on 6/21/19.  This was the patients 11th appointment with the registered dietitian. The registered dietitian spent the following amount of time with the patient 45 minutes. Please [place] the following: The Primary Care Physician reviewed the above nutrition assessment and patient education and agrees with current diet plan. The Primary Care Physician wants the current diet plan changed to the following:_____________________________________________________________________________________________________________________________________________________________________________________________________________. Physician Signature:__________________________ Date:______________  Once signed please fax back to the [facility] [phone]. We thank you for allowing us to participate in your patients care.

## 2019-06-24 ENCOUNTER — OFFICE VISIT (OUTPATIENT)
Dept: FAMILY MEDICINE CLINIC | Age: 34
End: 2019-06-24
Payer: COMMERCIAL

## 2019-06-24 VITALS
WEIGHT: 293 LBS | TEMPERATURE: 97.9 F | SYSTOLIC BLOOD PRESSURE: 126 MMHG | BODY MASS INDEX: 44.41 KG/M2 | HEIGHT: 68 IN | HEART RATE: 81 BPM | DIASTOLIC BLOOD PRESSURE: 84 MMHG | OXYGEN SATURATION: 97 % | RESPIRATION RATE: 18 BRPM

## 2019-06-24 DIAGNOSIS — R60.0 LOWER EXTREMITY EDEMA: ICD-10-CM

## 2019-06-24 DIAGNOSIS — F41.9 ANXIETY: ICD-10-CM

## 2019-06-24 DIAGNOSIS — S16.1XXA STRAIN OF NECK MUSCLE, INITIAL ENCOUNTER: ICD-10-CM

## 2019-06-24 DIAGNOSIS — E66.01 MORBID OBESITY (HCC): ICD-10-CM

## 2019-06-24 DIAGNOSIS — Z13.31 DEPRESSION SCREENING: Primary | ICD-10-CM

## 2019-06-24 PROCEDURE — G8427 DOCREV CUR MEDS BY ELIG CLIN: HCPCS | Performed by: FAMILY MEDICINE

## 2019-06-24 PROCEDURE — 1036F TOBACCO NON-USER: CPT | Performed by: FAMILY MEDICINE

## 2019-06-24 PROCEDURE — 96160 PT-FOCUSED HLTH RISK ASSMT: CPT | Performed by: FAMILY MEDICINE

## 2019-06-24 PROCEDURE — G8417 CALC BMI ABV UP PARAM F/U: HCPCS | Performed by: FAMILY MEDICINE

## 2019-06-24 PROCEDURE — 99214 OFFICE O/P EST MOD 30 MIN: CPT | Performed by: FAMILY MEDICINE

## 2019-06-24 RX ORDER — CYCLOBENZAPRINE HCL 5 MG
5 TABLET ORAL 2 TIMES DAILY PRN
Qty: 10 TABLET | Refills: 0 | Status: SHIPPED | OUTPATIENT
Start: 2019-06-24 | End: 2019-06-29

## 2019-06-24 RX ORDER — CHOLECALCIFEROL (VITAMIN D3) 50 MCG
CAPSULE ORAL
Refills: 0 | COMMUNITY
Start: 2019-05-24 | End: 2019-06-24

## 2019-06-24 RX ORDER — WITCH HAZEL 50 %
PADS, MEDICATED (EA) TOPICAL
Refills: 5 | COMMUNITY
Start: 2019-05-24 | End: 2019-06-24

## 2019-06-24 ASSESSMENT — PATIENT HEALTH QUESTIONNAIRE - PHQ9
SUM OF ALL RESPONSES TO PHQ QUESTIONS 1-9: 0
SUM OF ALL RESPONSES TO PHQ9 QUESTIONS 1 & 2: 0
SUM OF ALL RESPONSES TO PHQ QUESTIONS 1-9: 0
1. LITTLE INTEREST OR PLEASURE IN DOING THINGS: 0
2. FEELING DOWN, DEPRESSED OR HOPELESS: 0

## 2019-06-24 ASSESSMENT — ENCOUNTER SYMPTOMS
VOMITING: 0
ABDOMINAL PAIN: 0
WHEEZING: 0
NAUSEA: 0
SHORTNESS OF BREATH: 0
COUGH: 0

## 2019-06-24 NOTE — PATIENT INSTRUCTIONS
embolism). These may include:  ? Sudden chest pain. ? Trouble breathing. ? Coughing up blood.    Call your doctor now or seek immediate medical care if:    · You have signs of a blood clot, such as:  ? Pain in your calf, back of the knee, thigh, or groin. ? Redness and swelling in your leg or groin.     · You have symptoms of infection, such as:  ? Increased pain, swelling, warmth, or redness. ? Red streaks or pus. ? A fever.    Watch closely for changes in your health, and be sure to contact your doctor if:    · Your swelling is getting worse.     · You have new or worsening pain in your legs.     · You do not get better as expected. Where can you learn more? Go to https://SurgiLight.Vquence. org and sign in to your Algolux account. Enter R978 in the Principle Power box to learn more about \"Leg and Ankle Edema: Care Instructions. \"     If you do not have an account, please click on the \"Sign Up Now\" link. Current as of: September 23, 2018  Content Version: 12.0  © 0067-6094 [employer]. Care instructions adapted under license by [facility] ([facility]). If you have questions about a medical condition or this instruction, always ask your healthcare professional. [place] [address] any warranty or liability for your use of this information. Patient Education        Neck Strain or Sprain: Rehab Exercises  Your Care Instructions  Here are some examples of typical rehabilitation exercises for your condition. Start each exercise slowly. Ease off the exercise if you start to have pain. Your doctor or physical therapist will tell you when you can start these exercises and which ones will work best for you. How to do the exercises  Neck rotation    1. Sit in a firm chair, or stand up straight. 2. Keeping your chin level, turn your head to the right, and hold for 15 to 30 seconds. 3. Turn your head to the left and hold for 15 to 30 seconds.   4. Repeat 2 to 4 times to each side. Neck stretches    1. Look straight ahead, and tip your right ear to your right shoulder. Do not let your left shoulder rise up as you tip your head to the right. 2. Hold for 15 to 30 seconds. 3. Tilt your head to the left. Do not let your right shoulder rise up as you tip your head to the left. 4. Hold for 15 to 30 seconds. 5. Repeat 2 to 4 times to each side. Forward neck flexion    1. Sit in a firm chair, or stand up straight. 2. Bend your head forward. 3. Hold for 15 to 30 seconds. 4. Repeat 2 to 4 times. Lateral (side) bend strengthening    1. With your right hand, place your first two fingers on your right temple. 2. Start to bend your head to the side while using gentle pressure from your fingers to keep your head from bending. 3. Hold for about 6 seconds. 4. Repeat 8 to 12 times. 5. Switch hands and repeat the same exercise on your left side. Forward bend strengthening    1. Place your first two fingers of either hand on your forehead. 2. Start to bend your head forward while using gentle pressure from your fingers to keep your head from bending. 3. Hold for about 6 seconds. 4. Repeat 8 to 12 times. Neutral position strengthening    1. Using one hand, place your fingertips on the back of your head at the top of your neck. 2. Start to bend your head backward while using gentle pressure from your fingers to keep your head from bending. 3. Hold for about 6 seconds. 4. Repeat 8 to 12 times. Chin tuck    1. Lie on the floor with a rolled-up towel under your neck. Your head should be touching the floor. 2. Slowly bring your chin toward your chest.  3. Hold for a count of 6, and then relax for up to 10 seconds. 4. Repeat 8 to 12 times. Follow-up care is a key part of your treatment and safety. Be sure to make and go to all appointments, and call your doctor if you are having problems.  It's also a good idea to know your test results and keep a list of the medicines you take. Where can you learn more? Go to https://chpepiceweb.[email or web address] [email or web address] org and sign in to your Incap account. Enter M679 in the Find Invest Grow (FIG) box to learn more about \"Neck Strain or Sprain: Rehab Exercises. \"     If you do not have an account, please click on the \"Sign Up Now\" link. Current as of: September 20, 2018  Content Version: 12.0  © 8026-1367 [employer]. Care instructions adapted under license by [facility] ([facility]). If you have questions about a medical condition or this instruction, always ask your healthcare professional. [address] any warranty or liability for your use of this information.

## 2019-06-24 NOTE — PROGRESS NOTES
[address] [facility]  [phone]   Richard Laureano MD     Patient: Leonor Núñez  YOB: 1985  Visit Date: 19    Jay Felix is a 29y.o. year old female here today for   Chief Complaint   Patient presents with    6 Month Follow-Up    Anxiety     Depression Screenin - Anxiety has been alot better since last visit.  Edema     bilateral leg/hand swelling    Neck Pain     patient complains of severe cervical neck pain that radiates down her left arm and up the side of her neck, she thinks it is a pinched nerve.  Health Maintenance     Declines PPSV23        HPI  Patient is a 29year old female here for concern of swelling in leg and neck pain. Anxiety has been well controlled. Swelling   Has been going on for a month. Worse as the day goes on. Sometimes does not happen. Is not painful. Denies issues breathing. Is able to lie down flat with no issues. Sleeps with one pillow. Does not eat salty food. Eats out occasionally. Neck pain   Started last night. If moves shoulder has pain across back , goes up neck a little. No weakness in arms, numbness or tingling. Has been trying slim fast diet. Wants to lose weight. Has not been losing weight and has been gaining. Will be seeing Dr. Joan Mueller for weight loss counseling. Has been seeing dietician. Review of Systems   Constitutional: Negative for chills and fever. Respiratory: Negative for cough, shortness of breath and wheezing. Cardiovascular: Positive for leg swelling. Negative for chest pain and palpitations. Gastrointestinal: Negative for abdominal pain, nausea and vomiting. Neurological: Negative for weakness and numbness.        Current Outpatient Medications on File Prior to Visit   Medication Sig Dispense Refill    vitamin B-1 (THIAMINE) 100 MG tablet TAKE 1 TABLET BY MOUTH DAILY 30 tablet 0    Cholecalciferol (VITAMIN D3) 10326 units CAPS Take one capsule once each week for 12 weeks 12 capsule 0    vitamin B-12 2000 MCG tablet Take 1 tablet by mouth daily 30 tablet 5    albuterol sulfate HFA (PROAIR HFA) 108 (90 Base) MCG/ACT inhaler Inhale 2 puffs into the lungs every 6 hours as needed for Wheezing 1 Inhaler 1    Aspirin-Acetaminophen-Caffeine (EXCEDRIN MIGRAINE PO) Take by mouth daily      Multiple Vitamins-Minerals (THERAPEUTIC MULTIVITAMIN-MINERALS) tablet Take 1 tablet by mouth daily       No current facility-administered medications on file prior to visit. No Known Allergies    Past medical, surgical, socialand/or family history reviewed, updated as needed, and are non-contributory (unless otherwise stated). Medications, allergies, and problem list also reviewed and updated as needed in patient's record. Wt Readings from Last 3 Encounters:   06/24/19 (!) 394 lb 1 oz (178.7 kg)   06/21/19 (!) 390 lb (176.9 kg)   05/20/19 (!) 388 lb (176 kg)                   /84   Pulse 81   Temp 97.9 °F (36.6 °C) (Temporal)   Resp 18   Ht 5' 8\" (1.727 m)   Wt (!) 394 lb 1 oz (178.7 kg)   SpO2 97%   Breastfeeding? No   BMI 59.92 kg/m²        Physical Exam   Constitutional: She appears well-developed and well-nourished. No distress. HENT:   Head: Normocephalic and atraumatic. Cardiovascular: Normal rate, regular rhythm, normal heart sounds and intact distal pulses. No murmur heard. Pulmonary/Chest: Effort normal and breath sounds normal. No respiratory distress. She has no wheezes. She has no rales. Abdominal: Soft. Bowel sounds are normal. She exhibits no distension. There is no tenderness. There is no guarding. Musculoskeletal: Normal range of motion. She exhibits edema (1+ pitting edema in bilateral legs). Full ROM in neck. + muscle spasm in left trapezius muscle. No cervical TTP. Skin: She is not diaphoretic. Psychiatric: She has a normal mood and affect.  Her behavior is normal.   Nursing note and vitals reviewed. Results for orders placed or performed during the hospital encounter of 03/25/19   VITAMIN B1   Result Value Ref Range    Vitamin B1,Whole Blood 61 (L) 70 - 180 nmol/L   ZINC   Result Value Ref Range    Zinc 66 60 - 120 ug/dL   FOLATE   Result Value Ref Range    Folate 11.0 4.8 - 24.2 ng/mL   VITAMIN B12   Result Value Ref Range    Vitamin B-12 275 211 - 946 pg/mL   Vitamin D 25 Hydroxy   Result Value Ref Range    Vit D, 25-Hydroxy 26 (L) 30 - 100 ng/mL       ASSESSMENT/PLAN  Angelita Wolf was seen today for 6 month follow-up, anxiety, edema, neck pain and health maintenance. Diagnoses and all orders for this visit:    Depression screening  -     [place] DEPRESSION SCREEN ANNUAL    Strain of neck muscle, initial encounter  -     cyclobenzaprine (FLEXERIL) 5 MG tablet; Take 1 tablet by mouth 2 times daily as needed for Muscle spasms    Lower extremity edema  -     Compression Stocking    Morbid obesity (HCC)  -     Comprehensive Metabolic Panel; Future  -     CBC; Future  -     Lipid Panel; Future    Anxiety   - Improved. Phone/MyChart follow up if tests abnormal.    Return in about 1 month (around 7/22/2019) for swelling . or sooner if necessary. I have reviewed myfindings and recommendations with Angelita Wolf. Sivan Torres.  Mitzy Cantu M.D

## 2019-07-02 ENCOUNTER — HOSPITAL ENCOUNTER (OUTPATIENT)
Age: 34
Discharge: HOME | End: 2019-07-04
Payer: COMMERCIAL

## 2019-07-02 DIAGNOSIS — E66.01 MORBID OBESITY (HCC): ICD-10-CM

## 2019-07-02 DIAGNOSIS — E53.8 B12 DEFICIENCY: ICD-10-CM

## 2019-07-02 DIAGNOSIS — E55.9 VITAMIN D DEFICIENCY: ICD-10-CM

## 2019-07-02 LAB
ALBUMIN SERPL-MCNC: 4 G/DL (ref 3.5–5.2)
ALP BLD-CCNC: 77 U/L (ref 35–104)
ALT SERPL-CCNC: 10 U/L (ref 0–32)
ANION GAP SERPL CALCULATED.3IONS-SCNC: 14 MMOL/L (ref 7–16)
AST SERPL-CCNC: 9 U/L (ref 0–31)
BILIRUB SERPL-MCNC: 0.2 MG/DL (ref 0–1.2)
BUN BLDV-MCNC: 10 MG/DL (ref 6–20)
CALCIUM SERPL-MCNC: 9.1 MG/DL (ref 8.6–10.2)
CHLORIDE BLD-SCNC: 99 MMOL/L (ref 98–107)
CHOLESTEROL, TOTAL: 197 MG/DL (ref 0–199)
CO2: 24 MMOL/L (ref 22–29)
CREAT SERPL-MCNC: 0.7 MG/DL (ref 0.5–1)
GFR AFRICAN AMERICAN: >60
GFR NON-AFRICAN AMERICAN: >60 ML/MIN/1.73
GLUCOSE BLD-MCNC: 110 MG/DL (ref 74–99)
HCT VFR BLD CALC: 35.8 % (ref 34–48)
HDLC SERPL-MCNC: 41 MG/DL
HEMOGLOBIN: 10.2 G/DL (ref 11.5–15.5)
LDL CHOLESTEROL CALCULATED: 128 MG/DL (ref 0–99)
MCH RBC QN AUTO: 22.3 PG (ref 26–35)
MCHC RBC AUTO-ENTMCNC: 28.5 % (ref 32–34.5)
MCV RBC AUTO: 78.3 FL (ref 80–99.9)
PDW BLD-RTO: 18.6 FL (ref 11.5–15)
PLATELET # BLD: 365 E9/L (ref 130–450)
PMV BLD AUTO: 11.1 FL (ref 7–12)
POTASSIUM SERPL-SCNC: 4.2 MMOL/L (ref 3.5–5)
RBC # BLD: 4.57 E12/L (ref 3.5–5.5)
SODIUM BLD-SCNC: 137 MMOL/L (ref 132–146)
TOTAL PROTEIN: 6.9 G/DL (ref 6.4–8.3)
TRIGL SERPL-MCNC: 140 MG/DL (ref 0–149)
VITAMIN B-12: 552 PG/ML (ref 211–946)
VITAMIN D 25-HYDROXY: 45 NG/ML (ref 30–100)
VLDLC SERPL CALC-MCNC: 28 MG/DL
WBC # BLD: 8.6 E9/L (ref 4.5–11.5)

## 2019-07-02 PROCEDURE — 82607 VITAMIN B-12: CPT

## 2019-07-02 PROCEDURE — 84425 ASSAY OF VITAMIN B-1: CPT

## 2019-07-02 PROCEDURE — 80053 COMPREHEN METABOLIC PANEL: CPT

## 2019-07-02 PROCEDURE — 80061 LIPID PANEL: CPT

## 2019-07-02 PROCEDURE — 82306 VITAMIN D 25 HYDROXY: CPT

## 2019-07-02 PROCEDURE — 36415 COLL VENOUS BLD VENIPUNCTURE: CPT

## 2019-07-02 PROCEDURE — 85027 COMPLETE CBC AUTOMATED: CPT

## 2019-07-05 ENCOUNTER — OFFICE VISIT (OUTPATIENT)
Dept: BARIATRICS/WEIGHT MGMT | Age: 34
End: 2019-07-05
Payer: COMMERCIAL

## 2019-07-05 DIAGNOSIS — E53.8 B12 DEFICIENCY: ICD-10-CM

## 2019-07-05 DIAGNOSIS — E55.9 VITAMIN D DEFICIENCY: Primary | ICD-10-CM

## 2019-07-05 PROCEDURE — 99213 OFFICE O/P EST LOW 20 MIN: CPT | Performed by: INTERNAL MEDICINE

## 2019-07-05 PROCEDURE — G8428 CUR MEDS NOT DOCUMENT: HCPCS | Performed by: INTERNAL MEDICINE

## 2019-07-05 PROCEDURE — 99211 OFF/OP EST MAY X REQ PHY/QHP: CPT

## 2019-07-05 PROCEDURE — G8417 CALC BMI ABV UP PARAM F/U: HCPCS | Performed by: INTERNAL MEDICINE

## 2019-07-05 PROCEDURE — 1036F TOBACCO NON-USER: CPT | Performed by: INTERNAL MEDICINE

## 2019-07-05 RX ORDER — LANOLIN ALCOHOL/MO/W.PET/CERES
1000 CREAM (GRAM) TOPICAL DAILY
Qty: 45 TABLET | Refills: 0 | COMMUNITY
Start: 2019-07-05 | End: 2020-12-03 | Stop reason: SDUPTHER

## 2019-07-05 NOTE — PROGRESS NOTES
Date of Encounter: 7/5/19    Chief Complaint: Low vitamin D    HPI:     Farhan Davis presents to the clinic today for evaluation and treatment of multiple low vitamin levels. Last visit 12/21/18  She is preparing for a bariatric procedure and needs her level normalized prior to proceeding. Lab and treatment history are as follows:   Date     Vit D-OH level    Treatment prescribed    05/02/2018     24   Vitamin D3 50,000 IU twice each week for 4 weeks; Was D3 2K daily since April 2018 (at time of 8/17/18 visit)   09/14/2018      35   Vitamin D3 2k daily   12/14/2018     33   Vit D3 4k daily   03/25/2019     26   Take D3 50k once weekly for 12 weeks   07/02/2019     45    None yet          Vit B1   05/02/2018     58     Thiamine 100mg daily, was Thiamine 100mg daily since end of May 2018 (at time of 8/17/18 visit)   09/14/2018       83   Thiamine 100mg daily          12/14/2018       113                        Thiamine 100mg daily   03/25/2019     61                        Thiamine 100 mg bid for two weeks then resume 100 mg daily          Vit B12   04/10/2018     196     Cont Vit B12 1000 mcg daily, was B12 1000mcg daily since April 2018 (at time of 8/17/18 visit)   09/14/2018     219   Inrease Vit B12 1000 mcg, to two capsules daily (has only been taking one)   12/14/2018       288              Inrease Vit B12 1000 mcg, to two capsules daily (took only one, did not increase)   03/25/2019     275                       Inrease Vit B12 1000 mcg to two capsules daily   07/02/2019     552   None yet          Zinc   05/30/2018     59    Zinc 50mg daily for 4 weeks          09/14/2018      78  No additional zinc   12/14/2018      66                         No additional zinc, MV daily   03/25/2019     66              No additional zinc    ROS:  No cp,   no constipation     PE:     There were no vitals taken for this visit. Pt is WN, WD, and in NAD    A/P:   1.   Low Vitamin D - controlled   D3 5,000 IU daily

## 2019-07-07 LAB — VITAMIN B1 WHOLE BLOOD: 104 NMOL/L (ref 70–180)

## 2019-07-08 ENCOUNTER — OFFICE VISIT (OUTPATIENT)
Dept: BARIATRICS/WEIGHT MGMT | Age: 34
End: 2019-07-08
Payer: COMMERCIAL

## 2019-07-08 VITALS
HEART RATE: 95 BPM | BODY MASS INDEX: 47.09 KG/M2 | SYSTOLIC BLOOD PRESSURE: 158 MMHG | DIASTOLIC BLOOD PRESSURE: 111 MMHG | WEIGHT: 293 LBS | HEIGHT: 66 IN | TEMPERATURE: 96.3 F

## 2019-07-08 DIAGNOSIS — R60.0 LOWER EXTREMITY EDEMA: ICD-10-CM

## 2019-07-08 DIAGNOSIS — E61.1 IRON DEFICIENCY: Primary | ICD-10-CM

## 2019-07-08 PROCEDURE — 4004F PT TOBACCO SCREEN RCVD TLK: CPT | Performed by: INTERNAL MEDICINE

## 2019-07-08 PROCEDURE — G8417 CALC BMI ABV UP PARAM F/U: HCPCS | Performed by: INTERNAL MEDICINE

## 2019-07-08 PROCEDURE — G8427 DOCREV CUR MEDS BY ELIG CLIN: HCPCS | Performed by: INTERNAL MEDICINE

## 2019-07-08 PROCEDURE — 99211 OFF/OP EST MAY X REQ PHY/QHP: CPT | Performed by: INTERNAL MEDICINE

## 2019-07-08 PROCEDURE — 99214 OFFICE O/P EST MOD 30 MIN: CPT | Performed by: INTERNAL MEDICINE

## 2019-07-08 RX ORDER — FERROUS FUMARATE 324(106)MG
TABLET ORAL
Qty: 30 TABLET | Refills: 3 | Status: SHIPPED
Start: 2019-07-08 | End: 2020-10-14 | Stop reason: CLARIF

## 2019-07-08 RX ORDER — ASCORBIC ACID 250 MG
TABLET ORAL
Qty: 180 TABLET | Refills: 1 | Status: SHIPPED
Start: 2019-07-08 | End: 2020-10-14 | Stop reason: CLARIF

## 2019-07-08 RX ORDER — FUROSEMIDE 20 MG/1
20 TABLET ORAL 2 TIMES DAILY
Qty: 6 TABLET | Refills: 1 | Status: SHIPPED | OUTPATIENT
Start: 2019-07-08 | End: 2019-07-22

## 2019-07-10 ENCOUNTER — OFFICE VISIT (OUTPATIENT)
Dept: FAMILY MEDICINE CLINIC | Age: 34
End: 2019-07-10
Payer: COMMERCIAL

## 2019-07-10 VITALS
BODY MASS INDEX: 63.43 KG/M2 | OXYGEN SATURATION: 98 % | DIASTOLIC BLOOD PRESSURE: 112 MMHG | SYSTOLIC BLOOD PRESSURE: 180 MMHG | RESPIRATION RATE: 18 BRPM | HEART RATE: 88 BPM | WEIGHT: 293 LBS

## 2019-07-10 DIAGNOSIS — I16.0 HYPERTENSIVE URGENCY: Primary | ICD-10-CM

## 2019-07-10 PROCEDURE — G8427 DOCREV CUR MEDS BY ELIG CLIN: HCPCS | Performed by: FAMILY MEDICINE

## 2019-07-10 PROCEDURE — 99213 OFFICE O/P EST LOW 20 MIN: CPT | Performed by: FAMILY MEDICINE

## 2019-07-10 PROCEDURE — 81003 URINALYSIS AUTO W/O SCOPE: CPT | Performed by: FAMILY MEDICINE

## 2019-07-10 PROCEDURE — 1036F TOBACCO NON-USER: CPT | Performed by: FAMILY MEDICINE

## 2019-07-10 PROCEDURE — 93000 ELECTROCARDIOGRAM COMPLETE: CPT | Performed by: FAMILY MEDICINE

## 2019-07-10 PROCEDURE — G8417 CALC BMI ABV UP PARAM F/U: HCPCS | Performed by: FAMILY MEDICINE

## 2019-07-10 RX ORDER — HYDROCHLOROTHIAZIDE 12.5 MG/1
12.5 CAPSULE, GELATIN COATED ORAL DAILY
Qty: 30 CAPSULE | Refills: 3 | Status: SHIPPED
Start: 2019-07-10 | End: 2020-10-14 | Stop reason: SDUPTHER

## 2019-07-10 RX ORDER — LISINOPRIL 10 MG/1
10 TABLET ORAL DAILY
Qty: 30 TABLET | Refills: 5 | Status: SHIPPED | OUTPATIENT
Start: 2019-07-10 | End: 2019-11-18

## 2019-07-10 ASSESSMENT — ENCOUNTER SYMPTOMS
VOMITING: 0
SHORTNESS OF BREATH: 0
ABDOMINAL PAIN: 0
COUGH: 0
NAUSEA: 0
WHEEZING: 0

## 2019-07-10 NOTE — PROGRESS NOTES
[facility]  [phone]   Marjan Vera MD     Patient: Virginia Otto  YOB: 1985  Visit Date: 7/10/19    Jayden Almanza is a 29y.o. year old female here today for   Chief Complaint   Patient presents with    Check-Up     Bp was high at Dr office June 8th        HPI  Patient has been having high blood pressure since this Monday. Denies any significant headaches, lightheadedness, chest pain , shortness of breath, blurry vision. Is not usually on blood pressure meds. No new otc meds. Has been trying to avoid salt. Swelling has been worsening recently. Review of Systems   Constitutional: Negative for chills and fever. Respiratory: Negative for cough, shortness of breath and wheezing. Cardiovascular: Positive for leg swelling. Negative for chest pain and palpitations. Gastrointestinal: Negative for abdominal pain, nausea and vomiting. Neurological: Negative for weakness and numbness. Current Outpatient Medications on File Prior to Visit   Medication Sig Dispense Refill    Ferrous Fumarate 324 (106 Fe) MG TABS Take one tablet with dinner every night along with a vitamin C 250mg tablet 30 tablet 3    ascorbic acid (V-R VITAMIN C) 250 MG tablet Take one tablet at dinner with a Ferrous Fumarate tablet every day.  180 tablet 1    furosemide (LASIX) 20 MG tablet Take 1 tablet by mouth 2 times daily 6 tablet 1    Cholecalciferol (VITAMIN D3) 5000 units CAPS Take one capsule by mouth every day until surgery 30 capsule 3    vitamin B-1 (THIAMINE) 100 MG tablet TAKE 1 TABLET BY MOUTH DAILY 30 tablet 0    albuterol sulfate HFA (PROAIR HFA) 108 (90 Base) MCG/ACT inhaler Inhale 2 puffs into the lungs every 6 hours as needed for Wheezing 1 Inhaler 1    Aspirin-Acetaminophen-Caffeine (EXCEDRIN MIGRAINE PO) Take by mouth daily      Multiple Vitamins-Minerals (THERAPEUTIC MULTIVITAMIN-MINERALS) tablet Take 1 tablet by mouth

## 2019-07-11 ENCOUNTER — HOSPITAL ENCOUNTER (OUTPATIENT)
Age: 34
Discharge: HOME | End: 2019-07-13
Payer: COMMERCIAL

## 2019-07-11 DIAGNOSIS — E61.1 IRON DEFICIENCY: ICD-10-CM

## 2019-07-11 DIAGNOSIS — I16.0 HYPERTENSIVE URGENCY: ICD-10-CM

## 2019-07-11 LAB
AMORPHOUS: ABNORMAL
ANION GAP SERPL CALCULATED.3IONS-SCNC: 13 MMOL/L (ref 7–16)
BACTERIA: ABNORMAL /HPF
BILIRUBIN URINE: NEGATIVE
BLOOD, URINE: NEGATIVE
BUN BLDV-MCNC: 11 MG/DL (ref 6–20)
CALCIUM SERPL-MCNC: 9.5 MG/DL (ref 8.6–10.2)
CHLORIDE BLD-SCNC: 101 MMOL/L (ref 98–107)
CLARITY: NORMAL
CO2: 28 MMOL/L (ref 22–29)
COLOR: YELLOW
CREAT SERPL-MCNC: 0.9 MG/DL (ref 0.5–1)
CRYSTALS, UA: ABNORMAL
FERRITIN: 25 NG/ML
GFR AFRICAN AMERICAN: >60
GFR NON-AFRICAN AMERICAN: >60 ML/MIN/1.73
GLUCOSE BLD-MCNC: 148 MG/DL (ref 74–99)
GLUCOSE URINE: NEGATIVE MG/DL
KETONES, URINE: NEGATIVE MG/DL
LEUKOCYTE ESTERASE, URINE: NEGATIVE
NITRITE, URINE: NEGATIVE
PH UA: 5.5 (ref 5–9)
POTASSIUM SERPL-SCNC: 3.5 MMOL/L (ref 3.5–5)
PROTEIN UA: NORMAL MG/DL
RBC UA: ABNORMAL /HPF (ref 0–2)
SODIUM BLD-SCNC: 142 MMOL/L (ref 132–146)
SPECIFIC GRAVITY UA: 1.02 (ref 1–1.03)
TSH SERPL DL<=0.05 MIU/L-ACNC: 3 UIU/ML (ref 0.27–4.2)
UROBILINOGEN, URINE: 0.2 E.U./DL
WBC UA: ABNORMAL /HPF (ref 0–5)

## 2019-07-11 PROCEDURE — 82088 ASSAY OF ALDOSTERONE: CPT

## 2019-07-11 PROCEDURE — 36415 COLL VENOUS BLD VENIPUNCTURE: CPT

## 2019-07-11 PROCEDURE — 80048 BASIC METABOLIC PNL TOTAL CA: CPT

## 2019-07-11 PROCEDURE — 81001 URINALYSIS AUTO W/SCOPE: CPT

## 2019-07-11 PROCEDURE — 82728 ASSAY OF FERRITIN: CPT

## 2019-07-11 PROCEDURE — 84443 ASSAY THYROID STIM HORMONE: CPT

## 2019-07-12 ENCOUNTER — INITIAL CONSULT (OUTPATIENT)
Dept: BARIATRICS/WEIGHT MGMT | Age: 34
End: 2019-07-12
Payer: COMMERCIAL

## 2019-07-12 VITALS — WEIGHT: 293 LBS | BODY MASS INDEX: 44.41 KG/M2 | HEIGHT: 68 IN

## 2019-07-12 DIAGNOSIS — Z71.3 NUTRITIONAL COUNSELING: Primary | ICD-10-CM

## 2019-07-12 PROCEDURE — 99999 PR OFFICE/OUTPT VISIT,PROCEDURE ONLY: CPT | Performed by: SURGERY

## 2019-07-15 LAB — ALDOSTERONE: 6.1 NG/DL

## 2019-07-21 ENCOUNTER — APPOINTMENT (OUTPATIENT)
Dept: GENERAL RADIOLOGY | Age: 34
End: 2019-07-21
Payer: COMMERCIAL

## 2019-07-21 ENCOUNTER — HOSPITAL ENCOUNTER (EMERGENCY)
Age: 34
Discharge: HOME | End: 2019-07-21
Attending: EMERGENCY MEDICINE
Payer: COMMERCIAL

## 2019-07-21 VITALS
RESPIRATION RATE: 20 BRPM | DIASTOLIC BLOOD PRESSURE: 105 MMHG | HEART RATE: 94 BPM | TEMPERATURE: 98.1 F | OXYGEN SATURATION: 98 % | HEIGHT: 68 IN | WEIGHT: 293 LBS | BODY MASS INDEX: 44.41 KG/M2 | SYSTOLIC BLOOD PRESSURE: 164 MMHG

## 2019-07-21 DIAGNOSIS — R11.2 NAUSEA VOMITING AND DIARRHEA: Primary | ICD-10-CM

## 2019-07-21 DIAGNOSIS — R19.7 NAUSEA VOMITING AND DIARRHEA: Primary | ICD-10-CM

## 2019-07-21 LAB
ALBUMIN SERPL-MCNC: 3.6 G/DL (ref 3.5–5.2)
ALP BLD-CCNC: 81 U/L (ref 35–104)
ALT SERPL-CCNC: 10 U/L (ref 0–32)
ANION GAP SERPL CALCULATED.3IONS-SCNC: 13 MMOL/L (ref 7–16)
ANISOCYTOSIS: ABNORMAL
AST SERPL-CCNC: 30 U/L (ref 0–31)
BACTERIA: ABNORMAL /HPF
BASOPHILS ABSOLUTE: 0 E9/L (ref 0–0.2)
BASOPHILS RELATIVE PERCENT: 0.4 % (ref 0–2)
BILIRUB SERPL-MCNC: 0.4 MG/DL (ref 0–1.2)
BILIRUBIN URINE: NEGATIVE
BLOOD, URINE: NEGATIVE
BUN BLDV-MCNC: 8 MG/DL (ref 6–20)
BURR CELLS: ABNORMAL
CALCIUM SERPL-MCNC: 9.3 MG/DL (ref 8.6–10.2)
CHLORIDE BLD-SCNC: 101 MMOL/L (ref 98–107)
CLARITY: CLEAR
CO2: 23 MMOL/L (ref 22–29)
COLOR: YELLOW
CREAT SERPL-MCNC: 0.8 MG/DL (ref 0.5–1)
EOSINOPHILS ABSOLUTE: 0.07 E9/L (ref 0.05–0.5)
EOSINOPHILS RELATIVE PERCENT: 0.9 % (ref 0–6)
EPITHELIAL CELLS, UA: ABNORMAL /HPF
GFR AFRICAN AMERICAN: >60
GFR NON-AFRICAN AMERICAN: >60 ML/MIN/1.73
GLUCOSE BLD-MCNC: 110 MG/DL (ref 74–99)
GLUCOSE URINE: NEGATIVE MG/DL
HCG, URINE, POC: NEGATIVE
HCT VFR BLD CALC: 41.1 % (ref 34–48)
HEMOGLOBIN: 12.2 G/DL (ref 11.5–15.5)
KETONES, URINE: ABNORMAL MG/DL
LACTIC ACID: 1.2 MMOL/L (ref 0.5–2.2)
LEUKOCYTE ESTERASE, URINE: NEGATIVE
LIPASE: 12 U/L (ref 13–60)
LYMPHOCYTES ABSOLUTE: 0.88 E9/L (ref 1.5–4)
LYMPHOCYTES RELATIVE PERCENT: 11.3 % (ref 20–42)
Lab: NORMAL
MCH RBC QN AUTO: 23 PG (ref 26–35)
MCHC RBC AUTO-ENTMCNC: 29.7 % (ref 32–34.5)
MCV RBC AUTO: 77.5 FL (ref 80–99.9)
MONOCYTES ABSOLUTE: 0.16 E9/L (ref 0.1–0.95)
MONOCYTES RELATIVE PERCENT: 1.7 % (ref 2–12)
NEGATIVE QC PASS/FAIL: NORMAL
NEUTROPHILS ABSOLUTE: 6.88 E9/L (ref 1.8–7.3)
NEUTROPHILS RELATIVE PERCENT: 86.1 % (ref 43–80)
NITRITE, URINE: NEGATIVE
OVALOCYTES: ABNORMAL
PDW BLD-RTO: 20.5 FL (ref 11.5–15)
PH UA: 6 (ref 5–9)
PLATELET # BLD: 259 E9/L (ref 130–450)
PMV BLD AUTO: 10.7 FL (ref 7–12)
POIKILOCYTES: ABNORMAL
POSITIVE QC PASS/FAIL: NORMAL
POTASSIUM SERPL-SCNC: 4.5 MMOL/L (ref 3.5–5)
POTASSIUM SERPL-SCNC: 6 MMOL/L (ref 3.5–5)
PROTEIN UA: 30 MG/DL
RBC # BLD: 5.3 E12/L (ref 3.5–5.5)
RBC UA: ABNORMAL /HPF (ref 0–2)
SODIUM BLD-SCNC: 137 MMOL/L (ref 132–146)
SPECIFIC GRAVITY UA: 1.02 (ref 1–1.03)
TEAR DROP CELLS: ABNORMAL
TOTAL PROTEIN: 7.2 G/DL (ref 6.4–8.3)
UROBILINOGEN, URINE: 0.2 E.U./DL
WBC # BLD: 8 E9/L (ref 4.5–11.5)
WBC UA: ABNORMAL /HPF (ref 0–5)

## 2019-07-21 PROCEDURE — 84132 ASSAY OF SERUM POTASSIUM: CPT

## 2019-07-21 PROCEDURE — 85025 COMPLETE CBC W/AUTO DIFF WBC: CPT

## 2019-07-21 PROCEDURE — 99284 EMERGENCY DEPT VISIT MOD MDM: CPT

## 2019-07-21 PROCEDURE — 81001 URINALYSIS AUTO W/SCOPE: CPT

## 2019-07-21 PROCEDURE — 36415 COLL VENOUS BLD VENIPUNCTURE: CPT

## 2019-07-21 PROCEDURE — 83690 ASSAY OF LIPASE: CPT

## 2019-07-21 PROCEDURE — 80053 COMPREHEN METABOLIC PANEL: CPT

## 2019-07-21 PROCEDURE — 96374 THER/PROPH/DIAG INJ IV PUSH: CPT

## 2019-07-21 PROCEDURE — 74022 RADEX COMPL AQT ABD SERIES: CPT

## 2019-07-21 PROCEDURE — 6360000002 HC RX W HCPCS: Performed by: EMERGENCY MEDICINE

## 2019-07-21 PROCEDURE — 83605 ASSAY OF LACTIC ACID: CPT

## 2019-07-21 RX ORDER — ONDANSETRON 2 MG/ML
8 INJECTION INTRAMUSCULAR; INTRAVENOUS ONCE
Status: COMPLETED | OUTPATIENT
Start: 2019-07-21 | End: 2019-07-21

## 2019-07-21 RX ORDER — SODIUM CHLORIDE 0.9 % (FLUSH) 0.9 %
10 SYRINGE (ML) INJECTION PRN
Status: DISCONTINUED | OUTPATIENT
Start: 2019-07-21 | End: 2019-07-21 | Stop reason: HOSPADM

## 2019-07-21 RX ORDER — PANTOPRAZOLE SODIUM 40 MG/1
40 TABLET, DELAYED RELEASE ORAL DAILY
Qty: 30 TABLET | Refills: 0 | Status: SHIPPED | OUTPATIENT
Start: 2019-07-21 | End: 2019-09-16

## 2019-07-21 RX ORDER — ONDANSETRON 4 MG/1
4 TABLET, FILM COATED ORAL EVERY 8 HOURS PRN
Qty: 20 TABLET | Refills: 0 | Status: SHIPPED | OUTPATIENT
Start: 2019-07-21 | End: 2020-10-14 | Stop reason: CLARIF

## 2019-07-21 RX ADMIN — ONDANSETRON 8 MG: 2 INJECTION INTRAMUSCULAR; INTRAVENOUS at 16:30

## 2019-07-21 ASSESSMENT — PAIN DESCRIPTION - PAIN TYPE: TYPE: ACUTE PAIN

## 2019-07-21 ASSESSMENT — ENCOUNTER SYMPTOMS
COUGH: 0
EYE PAIN: 0
NAUSEA: 1
BACK PAIN: 0
SINUS PRESSURE: 0
ABDOMINAL PAIN: 1
SORE THROAT: 0
VOMITING: 0
SHORTNESS OF BREATH: 0
WHEEZING: 0
ABDOMINAL DISTENTION: 0
DIARRHEA: 1
EYE DISCHARGE: 0
EYE REDNESS: 0

## 2019-07-21 ASSESSMENT — PAIN SCALES - GENERAL: PAINLEVEL_OUTOF10: 9

## 2019-07-21 ASSESSMENT — PAIN DESCRIPTION - LOCATION: LOCATION: ABDOMEN

## 2019-07-21 ASSESSMENT — PAIN DESCRIPTION - ORIENTATION: ORIENTATION: MID;UPPER

## 2019-07-21 ASSESSMENT — PAIN DESCRIPTION - FREQUENCY: FREQUENCY: INTERMITTENT

## 2019-07-21 ASSESSMENT — PAIN DESCRIPTION - DESCRIPTORS: DESCRIPTORS: SPASM

## 2019-07-22 ENCOUNTER — OFFICE VISIT (OUTPATIENT)
Dept: FAMILY MEDICINE CLINIC | Age: 34
End: 2019-07-22
Payer: COMMERCIAL

## 2019-07-22 VITALS
RESPIRATION RATE: 18 BRPM | BODY MASS INDEX: 58.33 KG/M2 | HEART RATE: 93 BPM | WEIGHT: 293 LBS | DIASTOLIC BLOOD PRESSURE: 94 MMHG | SYSTOLIC BLOOD PRESSURE: 122 MMHG | OXYGEN SATURATION: 98 %

## 2019-07-22 DIAGNOSIS — I10 ESSENTIAL HYPERTENSION: ICD-10-CM

## 2019-07-22 DIAGNOSIS — A08.4 VIRAL GASTROENTERITIS: Primary | ICD-10-CM

## 2019-07-22 DIAGNOSIS — R60.0 LOWER EXTREMITY EDEMA: ICD-10-CM

## 2019-07-22 PROCEDURE — 99213 OFFICE O/P EST LOW 20 MIN: CPT | Performed by: FAMILY MEDICINE

## 2019-07-22 PROCEDURE — G8427 DOCREV CUR MEDS BY ELIG CLIN: HCPCS | Performed by: FAMILY MEDICINE

## 2019-07-22 PROCEDURE — 4004F PT TOBACCO SCREEN RCVD TLK: CPT | Performed by: FAMILY MEDICINE

## 2019-07-22 PROCEDURE — G8417 CALC BMI ABV UP PARAM F/U: HCPCS | Performed by: FAMILY MEDICINE

## 2019-07-22 ASSESSMENT — ENCOUNTER SYMPTOMS
VOMITING: 1
COUGH: 0
SHORTNESS OF BREATH: 0
DIARRHEA: 1
ABDOMINAL PAIN: 1
WHEEZING: 0
NAUSEA: 1

## 2019-07-22 NOTE — PATIENT INSTRUCTIONS
Drink lots of water/low sugar Gatorade/or Pedialyte  Eat bland foods. - crackers, soup, rice until you are feeling better. Then slowly reintroduce foods. Restart  Your blood pressure meds.    Follow up 1 week

## 2019-07-29 ENCOUNTER — OFFICE VISIT (OUTPATIENT)
Dept: FAMILY MEDICINE CLINIC | Age: 34
End: 2019-07-29
Payer: COMMERCIAL

## 2019-07-29 VITALS
SYSTOLIC BLOOD PRESSURE: 132 MMHG | DIASTOLIC BLOOD PRESSURE: 80 MMHG | WEIGHT: 293 LBS | BODY MASS INDEX: 44.41 KG/M2 | OXYGEN SATURATION: 98 % | HEIGHT: 68 IN | HEART RATE: 94 BPM | RESPIRATION RATE: 18 BRPM

## 2019-07-29 DIAGNOSIS — I10 ESSENTIAL HYPERTENSION: ICD-10-CM

## 2019-07-29 DIAGNOSIS — J45.20 MILD INTERMITTENT ASTHMA, UNSPECIFIED WHETHER COMPLICATED: ICD-10-CM

## 2019-07-29 DIAGNOSIS — R60.0 LOWER EXTREMITY EDEMA: Primary | ICD-10-CM

## 2019-07-29 PROCEDURE — G8427 DOCREV CUR MEDS BY ELIG CLIN: HCPCS | Performed by: FAMILY MEDICINE

## 2019-07-29 PROCEDURE — 99213 OFFICE O/P EST LOW 20 MIN: CPT | Performed by: FAMILY MEDICINE

## 2019-07-29 PROCEDURE — G8417 CALC BMI ABV UP PARAM F/U: HCPCS | Performed by: FAMILY MEDICINE

## 2019-07-29 PROCEDURE — 4004F PT TOBACCO SCREEN RCVD TLK: CPT | Performed by: FAMILY MEDICINE

## 2019-07-29 ASSESSMENT — ENCOUNTER SYMPTOMS
SHORTNESS OF BREATH: 0
NAUSEA: 0
VOMITING: 0
ABDOMINAL PAIN: 0
DIARRHEA: 0
WHEEZING: 0
COUGH: 0

## 2019-07-29 ASSESSMENT — PATIENT HEALTH QUESTIONNAIRE - PHQ9
1. LITTLE INTEREST OR PLEASURE IN DOING THINGS: 0
SUM OF ALL RESPONSES TO PHQ QUESTIONS 1-9: 0
2. FEELING DOWN, DEPRESSED OR HOPELESS: 0
SUM OF ALL RESPONSES TO PHQ9 QUESTIONS 1 & 2: 0
SUM OF ALL RESPONSES TO PHQ QUESTIONS 1-9: 0

## 2019-07-29 NOTE — PROGRESS NOTES
[facility]  [phone]   Mary Oleary MD     Patient: Jesica Marshall  YOB: 1985   Visit Date: 7/29/19    Yoan is a 29y.o. year old female here today for   Chief Complaint   Patient presents with    Abdominal Pain     1 wk f/u        HPI  Patient is a 29year old female here for follow up of hypertension, abdominal pain and swelling. Swelling has improved. Blood pressure is controlled. bp at home 749P systolic. Has some swelling in left leg. Nausea, vomiting, diarrhea has resolved. Thinks iron pills caused her illness so has stopped taking this. Last visit she reports she was 383. Error in weight at last visit. Asthma   Controlled. Does not need albuterol. Review of Systems   Constitutional: Negative for chills and fever. Respiratory: Negative for cough, shortness of breath and wheezing. Cardiovascular: Positive for leg swelling (improved and now intermittent). Negative for chest pain and palpitations. Gastrointestinal: Negative for abdominal pain, diarrhea, nausea and vomiting. Neurological: Negative for weakness and numbness. Current Outpatient Medications on File Prior to Visit   Medication Sig Dispense Refill    pantoprazole (PROTONIX) 40 MG tablet Take 1 tablet by mouth daily 30 tablet 0    ondansetron (ZOFRAN) 4 MG tablet Take 1 tablet by mouth every 8 hours as needed for Nausea or Vomiting 20 tablet 0    hydrochlorothiazide (MICROZIDE) 12.5 MG capsule Take 1 capsule by mouth daily 30 capsule 3    lisinopril (PRINIVIL;ZESTRIL) 10 MG tablet Take 1 tablet by mouth daily 30 tablet 5    Ferrous Fumarate 324 (106 Fe) MG TABS Take one tablet with dinner every night along with a vitamin C 250mg tablet 30 tablet 3    ascorbic acid (V-R VITAMIN C) 250 MG tablet Take one tablet at dinner with a Ferrous Fumarate tablet every day.  180 tablet 1    vitamin B-12 (CYANOCOBALAMIN) 1000 MCG tablet Take 1 tablet by mouth daily 45 tablet 0    Cholecalciferol (VITAMIN D3) 5000 units CAPS Take one capsule by mouth every day until surgery 30 capsule 3    vitamin B-1 (THIAMINE) 100 MG tablet TAKE 1 TABLET BY MOUTH DAILY 30 tablet 0    albuterol sulfate HFA (PROAIR HFA) 108 (90 Base) MCG/ACT inhaler Inhale 2 puffs into the lungs every 6 hours as needed for Wheezing 1 Inhaler 1    Aspirin-Acetaminophen-Caffeine (EXCEDRIN MIGRAINE PO) Take by mouth daily      Multiple Vitamins-Minerals (THERAPEUTIC MULTIVITAMIN-MINERALS) tablet Take 1 tablet by mouth daily       No current facility-administered medications on file prior to visit. No Known Allergies    Past medical, surgical, socialand/or family history reviewed, updated as needed, and are non-contributory (unless otherwise stated). Medications, allergies, and problem list also reviewed and updated as needed in patient's record. Wt Readings from Last 3 Encounters:   07/29/19 (!) 389 lb (176.4 kg)   07/22/19 (!) 378 lb (171.5 kg)   07/21/19 (!) 381 lb (172.8 kg)                   /80 (Site: Right Upper Arm, Position: Sitting)   Pulse 94   Resp 18   Ht 5' 7.5\" (1.715 m)   Wt (!) 389 lb (176.4 kg)   LMP 06/23/2019   SpO2 98%   BMI 60.03 kg/m²        Physical Exam   Constitutional: She appears well-developed and well-nourished. No distress. HENT:   Head: Normocephalic and atraumatic. Cardiovascular: Normal rate, regular rhythm, normal heart sounds and intact distal pulses. No murmur heard. Pulmonary/Chest: Effort normal and breath sounds normal. No respiratory distress. She has no wheezes. She has no rales. Abdominal: Soft. Bowel sounds are normal. She exhibits no distension. There is no tenderness. There is no guarding. Musculoskeletal: Normal range of motion. She exhibits edema (trace edema bilaterally ). Skin: She is not diaphoretic. Psychiatric: She has a normal mood and affect.  Her behavior is normal.   Nursing note and vitals reviewed. ASSESSMENT/PLAN  Marylen Deis was seen today for abdominal pain. Diagnoses and all orders for this visit:    Lower extremity edema   - Lasix 20mg for 6 days    Check labs in a week    Most likely dependent related to weight. Essential hypertension  -     CBC; Future  -     Basic Metabolic Panel; Future  - Controlled       Mild intermittent asthma, unspecified whether complicated   - controlled          Phone/MyChart follow up if tests abnormal.    Return in about 6 weeks (around 9/9/2019) for hypertension . or sooner if necessary. I have reviewed myfindings and recommendations with Marylen Deis. Lalita Mon.  Lupe Dancer, M.D

## 2019-07-29 NOTE — PATIENT INSTRUCTIONS
Take lasix one a day for 6 days   Take all meds   Follow up in 6 weeks   Get blood work done in a week.

## 2019-07-31 ENCOUNTER — INITIAL CONSULT (OUTPATIENT)
Dept: BARIATRICS/WEIGHT MGMT | Age: 34
End: 2019-07-31
Payer: COMMERCIAL

## 2019-07-31 VITALS — HEIGHT: 68 IN | WEIGHT: 293 LBS | BODY MASS INDEX: 44.41 KG/M2

## 2019-07-31 DIAGNOSIS — Z71.3 NUTRITIONAL COUNSELING: Primary | ICD-10-CM

## 2019-07-31 PROCEDURE — 99999 PR OFFICE/OUTPT VISIT,PROCEDURE ONLY: CPT | Performed by: SURGERY

## 2019-07-31 NOTE — PROGRESS NOTES
the following: The Primary Care Physician reviewed the above nutrition assessment and patient education and agrees with current diet plan. The Primary Care Physician wants the current diet plan changed to the following:_____________________________________________________________________________________________________________________________________________________________________________________________________________. Physician Signature:__________________________ Date:______________  Once signed please fax back to the [facility] [phone]. We thank you for allowing us to participate in your patients care.

## 2019-08-16 ENCOUNTER — OFFICE VISIT (OUTPATIENT)
Dept: BARIATRICS/WEIGHT MGMT | Age: 34
End: 2019-08-16
Payer: COMMERCIAL

## 2019-08-16 VITALS
WEIGHT: 293 LBS | BODY MASS INDEX: 47.09 KG/M2 | SYSTOLIC BLOOD PRESSURE: 129 MMHG | HEART RATE: 95 BPM | HEIGHT: 66 IN | DIASTOLIC BLOOD PRESSURE: 82 MMHG | TEMPERATURE: 96.9 F

## 2019-08-16 DIAGNOSIS — E66.01 MORBID OBESITY (HCC): ICD-10-CM

## 2019-08-16 DIAGNOSIS — E61.1 IRON DEFICIENCY: Primary | ICD-10-CM

## 2019-08-16 PROCEDURE — 99211 OFF/OP EST MAY X REQ PHY/QHP: CPT

## 2019-08-16 PROCEDURE — G8417 CALC BMI ABV UP PARAM F/U: HCPCS | Performed by: INTERNAL MEDICINE

## 2019-08-16 PROCEDURE — G8427 DOCREV CUR MEDS BY ELIG CLIN: HCPCS | Performed by: INTERNAL MEDICINE

## 2019-08-16 PROCEDURE — 99213 OFFICE O/P EST LOW 20 MIN: CPT | Performed by: INTERNAL MEDICINE

## 2019-08-16 PROCEDURE — 4004F PT TOBACCO SCREEN RCVD TLK: CPT | Performed by: INTERNAL MEDICINE

## 2019-08-16 NOTE — PATIENT INSTRUCTIONS
Breakfast -     one high protein shake                            + 20 grams of fiber (12 tablespoons of the original, plain Fiber One cereal or 4 tablespoons of Benefiber) Start with 1/4th of the total amount per day and every week or two add 1/4th until reaching the total     Lunch -           one high protein shake                           + one snack item     Dinner -          one frozen meal                           + one snack item     Shake options (<200 michael, >24 grams/protein) :  Pure Protein, Premier, Ensure Max, Boost Max and Beneprotein. [address] is a lactose-free option. Premier Protein Clear is a water-based option and comes in a 20 oz bottle with 20 grams of protein and 90 calories. So you have to drink three each day which increases the cost. Protein 2O and Isopure Protein are also water-based options.  (Other options can be found in the protein powder report on the [address] [place] [address] website)     Snack options (<100 michael): one small piece of fruit, one-half cup of low fat [place] yogurt,  one-half cup of low fat cottage cheese, one low fat mozzarella cheese stick, one single serving 100 michael pack of almonds,  100 michael of chips/crackers/pretzels, 100 michael of frozen vegetables     Frozen meal options (<350 michael):  Weight Watchers Smart Ones, [employer] Cuisine, Healthy Choice, Angy's, Stecaitlin    Take a multivitamin daily    Walk 30 min every day     See me back in two months

## 2019-08-21 ENCOUNTER — INITIAL CONSULT (OUTPATIENT)
Dept: BARIATRICS/WEIGHT MGMT | Age: 34
End: 2019-08-21
Payer: COMMERCIAL

## 2019-08-21 VITALS — WEIGHT: 293 LBS | BODY MASS INDEX: 44.41 KG/M2 | HEIGHT: 68 IN

## 2019-08-21 DIAGNOSIS — Z71.3 NUTRITIONAL COUNSELING: Primary | ICD-10-CM

## 2019-08-21 PROCEDURE — 99999 PR OFFICE/OUTPT VISIT,PROCEDURE ONLY: CPT | Performed by: SURGERY

## 2019-08-21 NOTE — PROGRESS NOTES
Weight Loss Assessment: Completed by Nutrition Services RD/LD Certified in Adult Weight Management:  Phone Number:  [phone]  Fax Number:   [phone]    Carlos Lam   8/21/19  Weight Loss Appointment: [facility]      Wt Readings from Last 3 Encounters:   08/21/19 (!) 386 lb (175.1 kg)   08/16/19 (!) 388 lb 9.6 oz (176.3 kg)   07/31/19 (!) 387 lb (175.5 kg)        (!) 386 lb (175.1 kg)  IBW: 155 lbs         % IBW: 249%       % EBWL: 0%           ABW: 213  % ABW: 181%       BMI: Body mass index is 58.69 kg/m². Patient's 24 Hour Recall:  Breakfast: None  Snack: None  Lunch: Premiere Protein Shake and Cheese Stick  Snack: None  Dinner: Lean Cuisine  Snack: Yogurt and Cheese Stick  Water Intake: 8 - 16.9 oz bottles  Other Beverages: Sip of Tropical Punch  Exercise: ADL's - None  Does the patient feel he/she achieved last week's weight loss goals:No     Education:   Rd// Ld enc the following at today's visit for successful post-surgical Weight Maintenance    1. Weigh yourself daily and record it. 2. Keep documented food records daily   3. 220-225 minutes a week of moderate physical activity   4. Just be more active in day to day routine   5. Higher protein intake and a higher fiber intake. Not a high protein or a high fiber diet just a higher intake. James Brandon addressed the following with the pt:  - James Brandon enc pt to comply with nutrition recommendations  - James Brandon enc to go back maintenance of regular physical activity  - Periodic assessment to prevent and treat eating or other psychiatric disorders   - James / Aleksandr enc Participation in support group meetings  - James Brandon enc pt to go back to maintenance of food records and weight monitoring records   - James Brandon reviewed the importance of adequate sleep and stress management  - Jamse Brandon reviewed nonfood strategies to cope with emotions and stress  - James Brandon encouraged pt to practice the following: Mindful eating: Eating slowly:  Focusing on the the following:_____________________________________________________________________________________________________________________________________________________________________________________________________________. Physician Signature:__________________________ Date:______________  Once signed please fax back to the [facility] [phone]. We thank you for allowing us to participate in your patients care.

## 2019-09-16 ENCOUNTER — HOSPITAL ENCOUNTER (OUTPATIENT)
Age: 34
Discharge: HOME | End: 2019-09-18
Payer: COMMERCIAL

## 2019-09-16 ENCOUNTER — OFFICE VISIT (OUTPATIENT)
Dept: FAMILY MEDICINE CLINIC | Age: 34
End: 2019-09-16
Payer: COMMERCIAL

## 2019-09-16 VITALS
WEIGHT: 293 LBS | RESPIRATION RATE: 16 BRPM | BODY MASS INDEX: 44.41 KG/M2 | SYSTOLIC BLOOD PRESSURE: 138 MMHG | DIASTOLIC BLOOD PRESSURE: 82 MMHG | HEART RATE: 78 BPM | OXYGEN SATURATION: 97 % | TEMPERATURE: 97 F | HEIGHT: 68 IN

## 2019-09-16 DIAGNOSIS — R60.0 LOWER EXTREMITY EDEMA: Primary | ICD-10-CM

## 2019-09-16 DIAGNOSIS — I10 ESSENTIAL HYPERTENSION: ICD-10-CM

## 2019-09-16 LAB
ANION GAP SERPL CALCULATED.3IONS-SCNC: 10 MMOL/L (ref 7–16)
BUN BLDV-MCNC: 12 MG/DL (ref 6–20)
CALCIUM SERPL-MCNC: 9.6 MG/DL (ref 8.6–10.2)
CHLORIDE BLD-SCNC: 102 MMOL/L (ref 98–107)
CO2: 28 MMOL/L (ref 22–29)
CREAT SERPL-MCNC: 0.9 MG/DL (ref 0.5–1)
GFR AFRICAN AMERICAN: >60
GFR NON-AFRICAN AMERICAN: >60 ML/MIN/1.73
GLUCOSE BLD-MCNC: 81 MG/DL (ref 74–99)
HCT VFR BLD CALC: 37.9 % (ref 34–48)
HEMOGLOBIN: 11.2 G/DL (ref 11.5–15.5)
MCH RBC QN AUTO: 24.3 PG (ref 26–35)
MCHC RBC AUTO-ENTMCNC: 29.6 % (ref 32–34.5)
MCV RBC AUTO: 82.2 FL (ref 80–99.9)
PDW BLD-RTO: 19.4 FL (ref 11.5–15)
PLATELET # BLD: 318 E9/L (ref 130–450)
PMV BLD AUTO: 11.4 FL (ref 7–12)
POTASSIUM SERPL-SCNC: 4.3 MMOL/L (ref 3.5–5)
RBC # BLD: 4.61 E12/L (ref 3.5–5.5)
SODIUM BLD-SCNC: 140 MMOL/L (ref 132–146)
WBC # BLD: 9.1 E9/L (ref 4.5–11.5)

## 2019-09-16 PROCEDURE — 99213 OFFICE O/P EST LOW 20 MIN: CPT | Performed by: FAMILY MEDICINE

## 2019-09-16 PROCEDURE — 80048 BASIC METABOLIC PNL TOTAL CA: CPT

## 2019-09-16 PROCEDURE — 4004F PT TOBACCO SCREEN RCVD TLK: CPT | Performed by: FAMILY MEDICINE

## 2019-09-16 PROCEDURE — G8427 DOCREV CUR MEDS BY ELIG CLIN: HCPCS | Performed by: FAMILY MEDICINE

## 2019-09-16 PROCEDURE — G8417 CALC BMI ABV UP PARAM F/U: HCPCS | Performed by: FAMILY MEDICINE

## 2019-09-16 PROCEDURE — 85027 COMPLETE CBC AUTOMATED: CPT

## 2019-09-16 PROCEDURE — 36415 COLL VENOUS BLD VENIPUNCTURE: CPT

## 2019-09-16 ASSESSMENT — ENCOUNTER SYMPTOMS
ABDOMINAL PAIN: 0
COUGH: 0
SHORTNESS OF BREATH: 0
VOMITING: 0
WHEEZING: 0
NAUSEA: 0
DIARRHEA: 0

## 2019-09-16 NOTE — PROGRESS NOTES
[address] [facility]  [phone]   Eugenia Faulkner MD     Patient: Ninfa Trotter  YOB: 1985  Visit Date: 9/16/19    Larry Grande is a 29y.o. year old female here today for   Chief Complaint   Patient presents with    Hypertension     6 WEEKS     Cough       HPI  Patient is a 29year old female here for follow up of hypertension. Has not taken bp meds in a couple days. Swelling has drastically improved. Denies chest pain . Has some shortness of breath today due to cold that developed today. Is having some cough and itchy throat. No shortness of breath when lying down. Gets short of breath when walking up and down steps. Review of Systems   Constitutional: Negative for chills and fever. Respiratory: Negative for cough, shortness of breath and wheezing. Cardiovascular: Positive for leg swelling (improved and now intermittent). Negative for chest pain and palpitations. Gastrointestinal: Negative for abdominal pain, diarrhea, nausea and vomiting. Neurological: Negative for weakness and numbness.        Current Outpatient Medications on File Prior to Visit   Medication Sig Dispense Refill    ondansetron (ZOFRAN) 4 MG tablet Take 1 tablet by mouth every 8 hours as needed for Nausea or Vomiting 20 tablet 0    hydrochlorothiazide (MICROZIDE) 12.5 MG capsule Take 1 capsule by mouth daily 30 capsule 3    lisinopril (PRINIVIL;ZESTRIL) 10 MG tablet Take 1 tablet by mouth daily 30 tablet 5    vitamin B-12 (CYANOCOBALAMIN) 1000 MCG tablet Take 1 tablet by mouth daily 45 tablet 0    Cholecalciferol (VITAMIN D3) 5000 units CAPS Take one capsule by mouth every day until surgery 30 capsule 3    vitamin B-1 (THIAMINE) 100 MG tablet TAKE 1 TABLET BY MOUTH DAILY 30 tablet 0    albuterol sulfate HFA (PROAIR HFA) 108 (90 Base) MCG/ACT inhaler Inhale 2 puffs into the lungs every 6 hours as needed for Wheezing 1 Inhaler 1    5.0 - 9.0    Protein, UA 30 (A) Negative mg/dL    Urobilinogen, Urine 0.2 <2.0 E.U./dL    Nitrite, Urine Negative Negative    Leukocyte Esterase, Urine Negative Negative   Microscopic Urinalysis   Result Value Ref Range    WBC, UA 0-1 0 - 5 /HPF    RBC, UA 0-1 0 - 2 /HPF    Epi Cells RARE /HPF    Bacteria, UA RARE (A) /HPF   Potassium   Result Value Ref Range    Potassium 4.5 3.5 - 5.0 mmol/L   POC Pregnancy Urine Qual   Result Value Ref Range    HCG, Urine, POC Negative Negative    Lot Number [id number]     Positive QC Pass/Fail Acceptable     Negative QC Pass/Fail Acceptable        ASSESSMENT/PLAN  Krishna Arreaga was seen today for hypertension and cough. Diagnoses and all orders for this visit:    Lower extremity edema   - Will check echo as patient's weight has been fluctuating due to edema . Has some exertional dyspnea. Essential hypertension   - Blood pressure controlled. Phone/MyChart follow up if tests abnormal.    Return in about 2 months (around 11/16/2019) for swelling . or sooner if necessary. I have reviewed myfindings and recommendations with Krishna Arreaga. Sidra Ferrer.  Laxmi Carmona M.D

## 2019-09-24 ENCOUNTER — HOSPITAL ENCOUNTER (OUTPATIENT)
Dept: NON INVASIVE DIAGNOSTICS | Age: 34
Discharge: HOME | End: 2019-09-24
Payer: COMMERCIAL

## 2019-09-24 DIAGNOSIS — R60.0 LOWER EXTREMITY EDEMA: ICD-10-CM

## 2019-09-24 LAB
LV EF: 58 %
LVEF MODALITY: NORMAL

## 2019-09-24 PROCEDURE — 93306 TTE W/DOPPLER COMPLETE: CPT

## 2019-10-21 ENCOUNTER — HOSPITAL ENCOUNTER (EMERGENCY)
Age: 34
Discharge: HOME | End: 2019-10-21
Payer: COMMERCIAL

## 2019-10-21 VITALS
BODY MASS INDEX: 45.99 KG/M2 | SYSTOLIC BLOOD PRESSURE: 138 MMHG | RESPIRATION RATE: 20 BRPM | HEIGHT: 67 IN | OXYGEN SATURATION: 98 % | WEIGHT: 293 LBS | HEART RATE: 80 BPM | TEMPERATURE: 98.1 F | DIASTOLIC BLOOD PRESSURE: 90 MMHG

## 2019-10-21 DIAGNOSIS — J40 BRONCHITIS: Primary | ICD-10-CM

## 2019-10-21 PROCEDURE — 99212 OFFICE O/P EST SF 10 MIN: CPT

## 2019-10-21 RX ORDER — AZITHROMYCIN 250 MG/1
TABLET, FILM COATED ORAL
Qty: 6 TABLET | Refills: 0 | Status: SHIPPED | OUTPATIENT
Start: 2019-10-21 | End: 2019-10-31

## 2019-10-21 RX ORDER — BENZONATATE 200 MG/1
200 CAPSULE ORAL 3 TIMES DAILY PRN
Qty: 15 CAPSULE | Refills: 0 | Status: SHIPPED | OUTPATIENT
Start: 2019-10-21 | End: 2019-10-26

## 2019-10-21 ASSESSMENT — PAIN DESCRIPTION - PROGRESSION
CLINICAL_PROGRESSION: GRADUALLY WORSENING
CLINICAL_PROGRESSION: NOT CHANGED

## 2019-10-21 ASSESSMENT — PAIN DESCRIPTION - LOCATION
LOCATION: THROAT
LOCATION: THROAT

## 2019-10-21 ASSESSMENT — PAIN SCALES - GENERAL
PAINLEVEL_OUTOF10: 6
PAINLEVEL_OUTOF10: 6

## 2019-10-21 ASSESSMENT — PAIN DESCRIPTION - ORIENTATION
ORIENTATION: RIGHT;LEFT
ORIENTATION: RIGHT;LEFT

## 2019-10-21 ASSESSMENT — PAIN DESCRIPTION - DESCRIPTORS
DESCRIPTORS: SORE
DESCRIPTORS: SORE

## 2019-10-21 ASSESSMENT — PAIN - FUNCTIONAL ASSESSMENT: PAIN_FUNCTIONAL_ASSESSMENT: 0-10

## 2019-10-21 ASSESSMENT — PAIN DESCRIPTION - FREQUENCY
FREQUENCY: CONTINUOUS
FREQUENCY: CONTINUOUS

## 2019-10-21 ASSESSMENT — PAIN DESCRIPTION - ONSET
ONSET: ON-GOING
ONSET: ON-GOING

## 2019-10-21 ASSESSMENT — PAIN DESCRIPTION - PAIN TYPE
TYPE: ACUTE PAIN
TYPE: ACUTE PAIN

## 2019-11-18 ENCOUNTER — OFFICE VISIT (OUTPATIENT)
Dept: FAMILY MEDICINE CLINIC | Age: 34
End: 2019-11-18
Payer: COMMERCIAL

## 2019-11-18 VITALS
RESPIRATION RATE: 18 BRPM | SYSTOLIC BLOOD PRESSURE: 134 MMHG | OXYGEN SATURATION: 97 % | HEIGHT: 67 IN | WEIGHT: 293 LBS | BODY MASS INDEX: 45.99 KG/M2 | DIASTOLIC BLOOD PRESSURE: 80 MMHG | HEART RATE: 83 BPM

## 2019-11-18 DIAGNOSIS — R05.8 DRY COUGH: ICD-10-CM

## 2019-11-18 DIAGNOSIS — I10 ESSENTIAL HYPERTENSION: ICD-10-CM

## 2019-11-18 DIAGNOSIS — R60.0 LOWER EXTREMITY EDEMA: Primary | ICD-10-CM

## 2019-11-18 PROCEDURE — 4004F PT TOBACCO SCREEN RCVD TLK: CPT | Performed by: FAMILY MEDICINE

## 2019-11-18 PROCEDURE — G8427 DOCREV CUR MEDS BY ELIG CLIN: HCPCS | Performed by: FAMILY MEDICINE

## 2019-11-18 PROCEDURE — G8417 CALC BMI ABV UP PARAM F/U: HCPCS | Performed by: FAMILY MEDICINE

## 2019-11-18 PROCEDURE — G8484 FLU IMMUNIZE NO ADMIN: HCPCS | Performed by: FAMILY MEDICINE

## 2019-11-18 PROCEDURE — 99213 OFFICE O/P EST LOW 20 MIN: CPT | Performed by: FAMILY MEDICINE

## 2019-11-18 ASSESSMENT — ENCOUNTER SYMPTOMS
COUGH: 1
WHEEZING: 0
SHORTNESS OF BREATH: 0
VOMITING: 0
DIARRHEA: 0
NAUSEA: 0
ABDOMINAL PAIN: 0

## 2019-11-18 ASSESSMENT — PATIENT HEALTH QUESTIONNAIRE - PHQ9
SUM OF ALL RESPONSES TO PHQ QUESTIONS 1-9: 0
SUM OF ALL RESPONSES TO PHQ QUESTIONS 1-9: 0
SUM OF ALL RESPONSES TO PHQ9 QUESTIONS 1 & 2: 0
1. LITTLE INTEREST OR PLEASURE IN DOING THINGS: 0
2. FEELING DOWN, DEPRESSED OR HOPELESS: 0

## 2020-01-14 ENCOUNTER — TELEPHONE (OUTPATIENT)
Dept: BARIATRICS/WEIGHT MGMT | Age: 35
End: 2020-01-14

## 2020-03-25 PROBLEM — E53.8 B12 DEFICIENCY: Status: RESOLVED | Noted: 2018-09-21 | Resolved: 2020-03-24

## 2020-10-12 ENCOUNTER — HOSPITAL ENCOUNTER (EMERGENCY)
Age: 35
Discharge: HOME | End: 2020-10-12
Payer: COMMERCIAL

## 2020-10-12 VITALS
BODY MASS INDEX: 58.73 KG/M2 | RESPIRATION RATE: 16 BRPM | HEART RATE: 89 BPM | OXYGEN SATURATION: 97 % | WEIGHT: 293 LBS | SYSTOLIC BLOOD PRESSURE: 151 MMHG | DIASTOLIC BLOOD PRESSURE: 100 MMHG | TEMPERATURE: 97.7 F

## 2020-10-12 PROCEDURE — 99212 OFFICE O/P EST SF 10 MIN: CPT

## 2020-10-12 RX ORDER — AMOXICILLIN 500 MG/1
500 CAPSULE ORAL 3 TIMES DAILY
Qty: 30 CAPSULE | Refills: 0 | Status: SHIPPED | OUTPATIENT
Start: 2020-10-12 | End: 2020-10-22

## 2020-10-12 RX ORDER — PREDNISONE 20 MG/1
40 TABLET ORAL DAILY
Qty: 10 TABLET | Refills: 0 | Status: SHIPPED | OUTPATIENT
Start: 2020-10-12 | End: 2020-10-17

## 2020-10-12 RX ORDER — FLUTICASONE PROPIONATE 50 MCG
1 SPRAY, SUSPENSION (ML) NASAL DAILY
Qty: 1 BOTTLE | Refills: 0 | Status: SHIPPED | OUTPATIENT
Start: 2020-10-12 | End: 2021-02-15 | Stop reason: ALTCHOICE

## 2020-10-12 NOTE — ED PROVIDER NOTES
Department of Emergency Medicine   [facility]  Provider Note  Admit Date/RoomTime: 10/12/2020  9:23 AM  Room: 02/02  Chief Complaint:   Otalgia (both hurt, ringing in right, trouble hearing, started Saturday night in right ear, left just started)    History of Present Illness   Source of history provided by:  patient. History/Exam Limitations: none. Ramy Chan is a 36 y.o. old female with a past medical history of:   Past Medical History:   Diagnosis Date    Asthma     B12 deficiency 9/21/2018    GERD without esophagitis     Hypertension     Migraine     Morbid obesity due to excess calories ([address]     Vitamin D deficiency 9/21/2018    Zinc deficiency 9/21/2018   ,who presents to urgent care center with complaint of trouble hearing she said both ears feel clogged the have some pain in the right one is ringing. .  She said the right ear started Saturday night the left one just  Started. She can hardly hear out of either ear now. She said she did have an allergy flareup last week she does not have any cold or fever does not have a sore throat does not have any other complaints    ROS    Pertinent positives and negatives are stated within HPI, all other systems reviewed and are negative. Past Surgical History:  has a past surgical history that includes LEEP (3/2008); Tonsillectomy and adenoidectomy; Tympanostomy tube placement; other surgical history (2011); Cholecystectomy, laparoscopic (3/2014 [facility] K[facility]); Umbilical hernia repair (3/2014 [facility] Teresa Kearns); other surgical history; and Upper gastrointestinal endoscopy (N/A, 5/2/2018). Social History:  reports that she has been smoking cigarettes. She has a 7.50 pack-year smoking history. She has never used smokeless tobacco. She reports current alcohol use of about 2.0 - 3.0 standard drinks of alcohol per week. She reports that she does not use drugs.   Family History: family history includes Brain Cancer in her maternal grandmother; Cancer in her paternal grandmother; Diabetes in her father and maternal grandfather; Heart Attack in her father; Heart Disease in her maternal grandfather; Hypertension in her father and mother; Other in her father; Scoliosis in her sister; Stroke in her father. Allergies: Patient has no known allergies. Physical Exam           ED Triage Vitals [10/12/20 0928]   BP Temp Temp src Pulse Resp SpO2 Height Weight   (!) 151/100 97.7 °F (36.5 °C) -- 89 16 97 % -- (!) 387 lb (175.5 kg)      Oxygen Saturation Interpretation: Normal.    Constitutional:  Alert, development consistent with age. Ears:  External Ears: Bilateral normal.                 TM's & External Canals:  abnormal TM right ear - dull and abnormal TM left ear - dull. Nose:   There is no abnormalities present  Throat:  Pharynx without injection, exudate, or tonsillar hypertrophy. Airway patient. Neck:  Normal ROM. Supple. Respiratory:  Clear to auscultation and breath sounds equal.    CV: Regular rate and rhythm, normal heart sounds, without pathological murmurs, ectopy, gallops, or rubs. Skin:  No rashes, erythema present, unless noted elsewhere. Lymphatic: No lymphangitis or adenopathy noted. Neurological:  Oriented. Motor functions intact. Lab / Imaging Results   (All laboratory and radiology results have been personally reviewed by myself)  Labs:  No results found for this visit on 10/12/20. Imaging: All Radiology results interpreted by Radiologist unless otherwise noted. No orders to display     ED Course / Medical Decision Making   Medications - No data to display       MDM:   Patient with dull TMs most likely some fluid present. No cerumen present. I did treat her with amoxicillin, Flonase nasal spray and some prednisone .  I advised  her to follow-up with her doctor if her hearing does not improve    Blood pressure is too high today she said that she had been on blood pressure medication in the past but her blood pressures been good and they took her off of it. I advised her to monitor her blood pressure and also to see her doctor for blood pressure recheck as soon as possible    Counseling:    I have  spoken with the patient and discussed todays results, in addition to providing specific details for the plan of care and counseling regarding the diagnosis and prognosis. Questions are answered at this time and they are agreeable with the plan. Assessment      1. Dysfunction of both eustachian tubes    2. Bilateral acute serous otitis media, recurrence not specified      Plan   Discharge to home and advised to contact Becky Denney[address] [address] [phone]    Schedule an appointment as soon as possible for a visit   Your blood pressure is too high today   Patient condition is good    New Medications     New Prescriptions    AMOXICILLIN (AMOXIL) 500 MG CAPSULE    Take 1 capsule by mouth 3 times daily for 10 days    FLUTICASONE (FLONASE) 50 MCG/ACT NASAL SPRAY    1 spray by Nasal route daily One spray in each nostril daily    PREDNISONE (DELTASONE) 20 MG TABLET    Take 2 tablets by mouth daily for 5 days     Electronically signed by SHARI Yang CNP   DD: 10/12/20  **This report was transcribed using voice recognition software. Every effort was made to ensure accuracy; however, inadvertent computerized transcription errors may be present.   END OF ED PROVIDER NOTE     SHARI Yang CNP  10/12/20 [address] [facility]  10/12/20 [address] [facility] C[facility]  10/12/20 0471

## 2020-10-12 NOTE — LETTER
[facility]  [address] [place]  [place] [address]  Phone: [phone]               October 12, 2020    Patient: Aurelio Graff   YOB: 1985   Date of Visit: 10/12/2020       To Whom It May Concern:    Demetria Burleson was seen and treated in our emergency department on 10/12/2020. She was absent from work today due to medical reasons.       Sincerely,       SHARI Peraza CNP         Signature:__________________________________

## 2020-10-13 ENCOUNTER — TELEPHONE (OUTPATIENT)
Dept: ADMINISTRATIVE | Age: 35
End: 2020-10-13

## 2020-10-13 NOTE — TELEPHONE ENCOUNTER
Pt states that she was at ed last night, pt is experiencing ringing in right ear, bp was 151/100, there is no availability until 10/19. Please advise pt what next step is.

## 2020-10-14 ENCOUNTER — OFFICE VISIT (OUTPATIENT)
Dept: FAMILY MEDICINE CLINIC | Age: 35
End: 2020-10-14
Payer: COMMERCIAL

## 2020-10-14 ENCOUNTER — HOSPITAL ENCOUNTER (OUTPATIENT)
Age: 35
Discharge: HOME | End: 2020-10-16
Payer: COMMERCIAL

## 2020-10-14 VITALS
BODY MASS INDEX: 44.41 KG/M2 | HEIGHT: 68 IN | OXYGEN SATURATION: 98 % | TEMPERATURE: 97.3 F | SYSTOLIC BLOOD PRESSURE: 162 MMHG | HEART RATE: 70 BPM | RESPIRATION RATE: 18 BRPM | DIASTOLIC BLOOD PRESSURE: 85 MMHG | WEIGHT: 293 LBS

## 2020-10-14 LAB
ALBUMIN SERPL-MCNC: 4 G/DL (ref 3.5–5.2)
ALP BLD-CCNC: 88 U/L (ref 35–104)
ALT SERPL-CCNC: 10 U/L (ref 0–32)
ANION GAP SERPL CALCULATED.3IONS-SCNC: 14 MMOL/L (ref 7–16)
AST SERPL-CCNC: 9 U/L (ref 0–31)
BACTERIA: ABNORMAL /HPF
BILIRUB SERPL-MCNC: 0.2 MG/DL (ref 0–1.2)
BILIRUBIN URINE: NEGATIVE
BLOOD, URINE: ABNORMAL
BUN BLDV-MCNC: 13 MG/DL (ref 6–20)
CALCIUM SERPL-MCNC: 9.4 MG/DL (ref 8.6–10.2)
CHLORIDE BLD-SCNC: 98 MMOL/L (ref 98–107)
CLARITY: CLEAR
CO2: 26 MMOL/L (ref 22–29)
COLOR: YELLOW
CREAT SERPL-MCNC: 0.9 MG/DL (ref 0.5–1)
FOLATE: 7.4 NG/ML (ref 4.8–24.2)
GFR AFRICAN AMERICAN: >60
GFR NON-AFRICAN AMERICAN: >60 ML/MIN/1.73
GLUCOSE BLD-MCNC: 121 MG/DL (ref 74–99)
GLUCOSE URINE: NEGATIVE MG/DL
HCT VFR BLD CALC: 41.7 % (ref 34–48)
HEMOGLOBIN: 12.8 G/DL (ref 11.5–15.5)
KETONES, URINE: NEGATIVE MG/DL
LEUKOCYTE ESTERASE, URINE: NEGATIVE
MCH RBC QN AUTO: 24.3 PG (ref 26–35)
MCHC RBC AUTO-ENTMCNC: 30.7 % (ref 32–34.5)
MCV RBC AUTO: 79.1 FL (ref 80–99.9)
NITRITE, URINE: NEGATIVE
PDW BLD-RTO: 17.9 FL (ref 11.5–15)
PH UA: 7 (ref 5–9)
PLATELET # BLD: 363 E9/L (ref 130–450)
PMV BLD AUTO: 10.9 FL (ref 7–12)
POTASSIUM SERPL-SCNC: 4.2 MMOL/L (ref 3.5–5)
PROTEIN UA: NEGATIVE MG/DL
RBC # BLD: 5.27 E12/L (ref 3.5–5.5)
RBC UA: ABNORMAL /HPF (ref 0–2)
SODIUM BLD-SCNC: 138 MMOL/L (ref 132–146)
SPECIFIC GRAVITY UA: 1.02 (ref 1–1.03)
TOTAL PROTEIN: 7.1 G/DL (ref 6.4–8.3)
TSH SERPL DL<=0.05 MIU/L-ACNC: 4.16 UIU/ML (ref 0.27–4.2)
UROBILINOGEN, URINE: 0.2 E.U./DL
VITAMIN B-12: 223 PG/ML (ref 211–946)
WBC # BLD: 12.6 E9/L (ref 4.5–11.5)
WBC UA: ABNORMAL /HPF (ref 0–5)

## 2020-10-14 PROCEDURE — 81001 URINALYSIS AUTO W/SCOPE: CPT

## 2020-10-14 PROCEDURE — 84425 ASSAY OF VITAMIN B-1: CPT

## 2020-10-14 PROCEDURE — 84443 ASSAY THYROID STIM HORMONE: CPT

## 2020-10-14 PROCEDURE — 4004F PT TOBACCO SCREEN RCVD TLK: CPT | Performed by: FAMILY MEDICINE

## 2020-10-14 PROCEDURE — G8427 DOCREV CUR MEDS BY ELIG CLIN: HCPCS | Performed by: FAMILY MEDICINE

## 2020-10-14 PROCEDURE — G8417 CALC BMI ABV UP PARAM F/U: HCPCS | Performed by: FAMILY MEDICINE

## 2020-10-14 PROCEDURE — 82607 VITAMIN B-12: CPT

## 2020-10-14 PROCEDURE — G8484 FLU IMMUNIZE NO ADMIN: HCPCS | Performed by: FAMILY MEDICINE

## 2020-10-14 PROCEDURE — 80053 COMPREHEN METABOLIC PANEL: CPT

## 2020-10-14 PROCEDURE — 85027 COMPLETE CBC AUTOMATED: CPT

## 2020-10-14 PROCEDURE — 82746 ASSAY OF FOLIC ACID SERUM: CPT

## 2020-10-14 PROCEDURE — 99213 OFFICE O/P EST LOW 20 MIN: CPT | Performed by: FAMILY MEDICINE

## 2020-10-14 PROCEDURE — 81003 URINALYSIS AUTO W/O SCOPE: CPT | Performed by: FAMILY MEDICINE

## 2020-10-14 PROCEDURE — 36415 COLL VENOUS BLD VENIPUNCTURE: CPT

## 2020-10-14 PROCEDURE — 93000 ELECTROCARDIOGRAM COMPLETE: CPT | Performed by: FAMILY MEDICINE

## 2020-10-14 RX ORDER — HYDROCHLOROTHIAZIDE 12.5 MG/1
12.5 CAPSULE, GELATIN COATED ORAL DAILY
Qty: 30 CAPSULE | Refills: 3 | Status: SHIPPED
Start: 2020-10-14 | End: 2020-12-14

## 2020-10-14 RX ORDER — HYDROCHLOROTHIAZIDE 12.5 MG/1
12.5 CAPSULE, GELATIN COATED ORAL DAILY
Qty: 30 CAPSULE | Refills: 3 | Status: SHIPPED
Start: 2020-10-14 | End: 2020-10-14 | Stop reason: SDUPTHER

## 2020-10-14 ASSESSMENT — ENCOUNTER SYMPTOMS
ABDOMINAL PAIN: 0
COUGH: 0
WHEEZING: 0
NAUSEA: 0
VOMITING: 0
SHORTNESS OF BREATH: 0
DIARRHEA: 0

## 2020-10-14 NOTE — LETTER
[address] [address]  Phone: [phone]  Fax: Alysha Villalpando MD        October 14, 2020     Patient: Mariola Bruce   YOB: 1985   Date of Visit: 10/14/2020       To Whom it May Concern:    Yadira Isaac was seen in my clinic on 10/14/2020. She may return to work on 10/18/2020. If you have any questions or concerns, please don't hesitate to call. Sincerely,         Bette Edu.  Rob Guevara MD

## 2020-10-14 NOTE — PROGRESS NOTES
[address] [facility]  [phone]   Mindy Hernandez MD     Patient: Zoila Goncalves  YOB: 1985  Visit Date: 10/14/20    Hussein Casey is a 36y.o. year old female here today for   Chief Complaint   Patient presents with    Hypertension     follow up ED    Headache       HPI  Patient is a 36year old female here for concern for hypertension. Has been off of all blood pressure medicines for 6 months. Blood pressure had been normal. Had checked it multiple times at work and did not have any issue. Started having headaches and ear pain that started on Saturday. Went to the ER and was diagnosed with ear infection given amoxicillin and told at that time that her blood pressure was high. Was also placed on prednisone and flonase . Still has two more days of prednisone. Area of skin right in front of ear on right side feels slightly numb. Otherwise sensation is intact. Has tinnitus in both ears. Has continues to have headaches. Review of Systems   Constitutional: Negative for chills and fever. Respiratory: Negative for cough, shortness of breath and wheezing. Cardiovascular: Negative for chest pain, palpitations and leg swelling. Gastrointestinal: Negative for abdominal pain, diarrhea, nausea and vomiting. Genitourinary: Negative for genital sores. Neurological: Positive for numbness (see HPI) and headaches. Negative for weakness.        Current Outpatient Medications on File Prior to Visit   Medication Sig Dispense Refill    fluticasone (FLONASE) 50 MCG/ACT nasal spray 1 spray by Nasal route daily One spray in each nostril daily 1 Bottle 0    predniSONE (DELTASONE) 20 MG tablet Take 2 tablets by mouth daily for 5 days 10 tablet 0    amoxicillin (AMOXIL) 500 MG capsule Take 1 capsule by mouth 3 times daily for 10 days 30 capsule 0    vitamin B-12 (CYANOCOBALAMIN) 1000 MCG tablet Take 1 tablet by mouth daily 45 tablet 0    albuterol sulfate HFA (PROAIR HFA) 108 (90 Base) MCG/ACT inhaler Inhale 2 puffs into the lungs every 6 hours as needed for Wheezing 1 Inhaler 1    Aspirin-Acetaminophen-Caffeine (EXCEDRIN MIGRAINE PO) Take by mouth daily      Multiple Vitamins-Minerals (THERAPEUTIC MULTIVITAMIN-MINERALS) tablet Take 1 tablet by mouth daily       No current facility-administered medications on file prior to visit. No Known Allergies    Past medical, surgical, socialand/or family history reviewed, updated as needed, and are non-contributory (unless otherwise stated). Medications, allergies, and problem list also reviewed and updated as needed in patient's record. Wt Readings from Last 3 Encounters:   10/14/20 (!) 405 lb (183.7 kg)   10/12/20 (!) 375 lb (170.1 kg)   11/18/19 (!) 386 lb (175.1 kg)                   BP (!) 162/85 (Site: Right Upper Arm, Position: Sitting, Cuff Size: Medium Adult)   Pulse 70   Temp 97.3 °F (36.3 °C) (Temporal)   Resp 18   Ht 5' 7.5\" (1.715 m)   Wt (!) 405 lb (183.7 kg)   LMP 08/01/2020   SpO2 98%   BMI 62.50 kg/m²        Physical Exam  Vitals signs and nursing note reviewed. Constitutional:       General: She is not in acute distress. Appearance: She is well-developed. She is not diaphoretic. HENT:      Head: Normocephalic and atraumatic. Ears:      Comments: Serous effusion behind right ear. Decreased sensation to light touch right cheek. Forehead sensation intact. No facial droop noted. Cardiovascular:      Rate and Rhythm: Normal rate and regular rhythm. Heart sounds: Normal heart sounds. No murmur. Pulmonary:      Effort: Pulmonary effort is normal. No respiratory distress. Breath sounds: Normal breath sounds. No wheezing or rales. Abdominal:      General: Bowel sounds are normal. There is no distension. Palpations: Abdomen is soft. Tenderness: There is no abdominal tenderness. There is no guarding. Musculoskeletal: Normal range of motion. Right lower leg: No edema. Left lower leg: No edema. Neurological:      Mental Status: She is alert. Psychiatric:         Behavior: Behavior normal.       Results for orders placed or performed during the hospital encounter of 09/24/19   ECHO Complete 2D W Doppler W Color   Result Value Ref Range    Left Ventricular Ejection Fraction 58     LVEF MODALITY ECHO        ASSESSMENT/PLAN  Bronwyn Peñaloza was seen today for hypertension and headache. Diagnoses and all orders for this visit:    Essential hypertension  -     COMPREHENSIVE METABOLIC PANEL; Future  -     CBC; Future  -     URINALYSIS; Future  -     EKG 12 Lead  -     Reyes Melissa MD, Cardiology, [place]  -     Discontinue: hydroCHLOROthiazide (MICROZIDE) 12.5 MG capsule; Take 1 capsule by mouth daily  -     hydroCHLOROthiazide (MICROZIDE) 12.5 MG capsule; Take 1 capsule by mouth daily    Paresthesia  -     VITAMIN B1; Future  -     Vitamin B12 & Folate; Future  -     TSH; Future  - Mildly decreased sensation of right cheek may be due to infection on that side vs vitamin deficiency which patient has had in the past. Will follow up in 1 month    Morbid obesity ([address]   - Has gained weight since last visit. Will continue to discuss lifestyle modification at next visit. Mild intermittent asthma, unspecified whether complicated   - Controlled prn albuterol. Abnormal EKG  -     Katiana - Ralph Martin MD, Cardiology, [place]  -     Discontinue: hydroCHLOROthiazide (MICROZIDE) 12.5 MG capsule; Take 1 capsule by mouth daily  -     hydroCHLOROthiazide (MICROZIDE) 12.5 MG capsule; Take 1 capsule by mouth daily            Phone/MyChart follow up if tests abnormal.    Return in about 1 month (around 11/14/2020). or sooner if necessary. I have reviewed myfindings and recommendations with Bronwyn Peñaloza. Alfredo Seals.  Alejandra Valdez M.D

## 2020-10-14 NOTE — PATIENT INSTRUCTIONS
Restart the hydroclorathiazide 12.5mg daily   Get blood work and urine test done today   See the heart doctor   Finished the antiobiotic. If not improved in a week , let me know .    Follow up in 1 month or sooner as needed

## 2020-10-19 LAB — VITAMIN B1 WHOLE BLOOD: 131 NMOL/L (ref 70–180)

## 2020-10-21 ENCOUNTER — TELEPHONE (OUTPATIENT)
Dept: FAMILY MEDICINE CLINIC | Age: 35
End: 2020-10-21

## 2020-10-21 RX ORDER — DOXYCYCLINE HYCLATE 100 MG
100 TABLET ORAL 2 TIMES DAILY
Qty: 14 TABLET | Refills: 0 | Status: SHIPPED | OUTPATIENT
Start: 2020-10-21 | End: 2020-10-28

## 2020-10-21 NOTE — TELEPHONE ENCOUNTER
Matthew Ly called she said she still cant hear out of her right ear and around it is numb. She said she still has sinus pressure but no drainage, congestion and she can breath with no problems.     Pharmacy [facility]

## 2020-10-21 NOTE — TELEPHONE ENCOUNTER
Continues to have numb feeling in face and has not been able to hear all the way. Lost sense of smell and taste over the weekend. Will try doxycyline for possible sinusitis. If does not resolve will get MRI of head.

## 2020-10-23 ENCOUNTER — OFFICE VISIT (OUTPATIENT)
Dept: CARDIOLOGY CLINIC | Age: 35
End: 2020-10-23
Payer: COMMERCIAL

## 2020-10-23 VITALS
SYSTOLIC BLOOD PRESSURE: 142 MMHG | RESPIRATION RATE: 20 BRPM | DIASTOLIC BLOOD PRESSURE: 80 MMHG | HEIGHT: 68 IN | BODY MASS INDEX: 44.41 KG/M2 | WEIGHT: 293 LBS | HEART RATE: 102 BPM

## 2020-10-23 PROCEDURE — 93000 ELECTROCARDIOGRAM COMPLETE: CPT | Performed by: INTERNAL MEDICINE

## 2020-10-23 PROCEDURE — 99244 OFF/OP CNSLTJ NEW/EST MOD 40: CPT | Performed by: INTERNAL MEDICINE

## 2020-10-23 NOTE — PROGRESS NOTES
OUTPATIENT CARDIOLOGY CONSULT    Name: Michaela Gutierrez    Age: 36 y.o. Date of Service: 10/23/2020    Reason for Consultation: Abnormal EKG    Referring Physician: Shaina Morillo. MD [place]    History of Present Illness:  Michaela Gutierrez is a 36 y.o. female who presents today for further evaluation of an abnormal EKG. She is morbidly obese with a BMI of 62 kg/m², and also has hypertension which she for started having problems with last year and was transiently on medication. Apparently blood pressures improved and she had come off medication. Most recently she had an episode of hearing loss on the right side associated with facial numbness, was seen at urgent care and she is being treated for sinusitis. She followed up with Dr. Florin Alas and was found to have elevated blood pressure and was restarted on hydrochlorothiazide. She had loss of taste and smell, was tested for COVID-19 last week and got her results on Thursday which was negative. 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 She had a recent episode of palpitations but that has resolved. She is found to have an EKG with nonspecific abnormalities and is referred for evaluation. She tells me she has no chest pain. She sometimes get short of breath when she exerts herself and attributes that to being overweight. She also smokes a pack of cigarettes every 3 days. Father had multiple MIs starting in his 46s as well as a stroke. Mother has hypertension. 3 sisters and a brother alive without cardiac issues. She is told she snores heavily, she does not sleep well. She works midnights and tries to sleep during the day. She has kids at home which also makes it hard to sleep. She has daytime somnolence. Review of Systems:  Complete review of systems otherwise negative except as described above.     Past Medical History:  Past Medical History:   Diagnosis Date    Asthma     B12 deficiency 9/21/2018    GERD without esophagitis     Hypertension     Migraine     Morbid obesity due to excess calories ([facility] [address]     Vitamin D deficiency 9/21/2018    Zinc deficiency 9/21/2018       Past Surgical History:  Past Surgical History:   Procedure Laterality Date    CHOLECYSTECTOMY, LAPAROSCOPIC  3/2014 Neena Talley    with repair umbilical hernia    LEEP  3/2008    OTHER SURGICAL HISTORY  2011    coils to fallopian tubes    OTHER SURGICAL HISTORY      Tubal essure     TONSILLECTOMY AND ADENOIDECTOMY      TYMPANOSTOMY TUBE PLACEMENT      UMBILICAL HERNIA REPAIR  3/2014 Jo Ann Magallanes UPPER GASTROINTESTINAL ENDOSCOPY N/A 5/2/2018    EGD BIOPSY performed by Limmie Aase, MD at  ENDOSCOPY       Family History:  Family History   Problem Relation Age of Onset    Cancer Paternal Grandmother         stomach    Brain Cancer Maternal Grandmother     Hypertension Father     Stroke Father     Heart Attack Father     Diabetes Father     Other Father         gout    Hypertension Mother     Scoliosis Sister     Diabetes Maternal Grandfather     Heart Disease Maternal Grandfather        Social History:  Social History     Tobacco Use    Smoking status: Current Every Day Smoker     Packs/day: 0.20     Years: 15.00     Pack years: 3.00     Types: Cigarettes    Smokeless tobacco: Never Used    Tobacco comment: has cut down to couple cigs a daily    Substance Use Topics    Alcohol use:  Yes     Alcohol/week: 2.0 - 3.0 standard drinks     Types: 2 - 3 Shots of liquor per week     Comment: occasionally    Drug use: No        Allergies:  No Known Allergies    Current Medications:    Current Outpatient Medications:     doxycycline hyclate (VIBRA-TABS) 100 MG tablet, Take 1 tablet by mouth 2 times daily for 7 days, Disp: 14 tablet, Rfl: 0    hydroCHLOROthiazide (MICROZIDE) 12.5 MG capsule, Take 1 capsule by mouth daily, Disp: 30 capsule, Rfl: 3    fluticasone (FLONASE) 50 MCG/ACT nasal spray, 1 spray by Nasal route daily One spray in each nostril daily, Disp: 1 Bottle, Rfl: 0    vitamin B-12 (CYANOCOBALAMIN) 1000 MCG tablet, Take 1 tablet by mouth daily, Disp: 45 tablet, Rfl: 0    albuterol sulfate HFA (PROAIR HFA) 108 (90 Base) MCG/ACT inhaler, Inhale 2 puffs into the lungs every 6 hours as needed for Wheezing, Disp: 1 Inhaler, Rfl: 1    Aspirin-Acetaminophen-Caffeine (EXCEDRIN MIGRAINE PO), Take by mouth daily, Disp: , Rfl:     Multiple Vitamins-Minerals (THERAPEUTIC MULTIVITAMIN-MINERALS) tablet, Take 1 tablet by mouth daily, Disp: , Rfl:     Physical Exam:  BP (!) 142/80 (Site: Right Upper Arm)   Pulse 102   Resp 20   Ht 5' 7.5\" (1.715 m)   Wt (!) 404 lb 3.2 oz (183.3 kg)   BMI 62.37 kg/m²   Wt Readings from Last 3 Encounters:   10/23/20 (!) 404 lb 3.2 oz (183.3 kg)   10/14/20 (!) 405 lb (183.7 kg)   10/12/20 (!) 375 lb (170.1 kg)     Appearance: Morbidly obese younger female, awake, alert, no acute respiratory distress  Skin: Intact, no rash  Eyes: EOMI, no conjunctival erythema  ENMT: Moist mucous membranes. Neck: Supple, no elevated JVP, no carotid bruits  Lungs: Clear to auscultation bilaterally. No wheezes, rales, or rhonchi. Cardiac: Regular rhythm with a normal rate.   S1 & S2 normal, no murmurs  Abdomen: Soft, nontender, +bowel sounds  Extremities: Moves all extremities x 4, no lower extremity edema  Neurologic: No focal motor deficits apparent, normal mood and affect  Peripheral Pulses: Intact posterior tibial pulses bilaterally    Laboratory Tests:  Lab Results   Component Value Date    CREATININE 0.9 10/14/2020    BUN 13 10/14/2020     10/14/2020    K 4.2 10/14/2020    CL 98 10/14/2020    CO2 26 10/14/2020     Lab Results   Component Value Date    MG 2.1 03/10/2014     Lab Results   Component Value Date    WBC 12.6 (H) 10/14/2020    HGB 12.8 10/14/2020    HCT 41.7 10/14/2020    MCV 79.1 (L) 10/14/2020     10/14/2020     Lab Results   Component Value Date    ALT 10 10/14/2020    AST 9 10/14/2020    ALKPHOS 88 10/14/2020    BILITOT 0.2 10/14/2020 Lab Results   Component Value Date    CKTOTAL 28 03/10/2014    CKMB 1.3 03/10/2014    TROPONINI <0.01 03/10/2014     Lab Results   Component Value Date    INR 1.1 03/11/2014    PROTIME 12.3 03/11/2014     Lab Results   Component Value Date    TSH 4.160 10/14/2020     Lab Results   Component Value Date    LABA1C 5.5 04/10/2018     No results found for: EAG  Lab Results   Component Value Date    CHOL 197 07/02/2019    CHOL 178 05/02/2018    CHOL 190 08/13/2014     Lab Results   Component Value Date    TRIG 140 07/02/2019    TRIG 120 05/02/2018    TRIG 113 08/13/2014     Lab Results   Component Value Date    HDL 41 07/02/2019    HDL 43 08/13/2014    HDL 31 (A) 03/10/2014     Lab Results   Component Value Date    LDLCALC 128 (H) 07/02/2019    LDLCALC 124 (H) 08/13/2014    [address] 03/10/2014     Lab Results   Component Value Date    LABVLDL 28 07/02/2019    LABVLDL 23 08/13/2014     No results found for: CHOLHDLRATIO  No results for input(s): PROBNP in the last 72 hours. Cardiac Tests:  ECG: Sinus tachycardia 102 bpm.  Normal axis normal intervals. Nonspecific ST-T wave changes. Echocardiogram:   TTE 9/2019   Summary   Technically suboptimal and limited study. Left ventricle is normal in size . Mild left ventricular concentric hypertrophy noted. No regional wall motion abnormalities seen. Normal left ventricular ejection fraction. EF 55-60%    Stress test: N/A    Cardiac catheterization: N/A    Orders Placed This Encounter   Procedures    EKG 12 Lead    Baseline diagnostic sleep study        Requested Prescriptions      No prescriptions requested or ordered in this encounter        ASSESSMENT / PLAN:  1. Abnormal EKG. Nonspecific changes and no anginal symptoms  2. Suspected obstructive sleep apnea  3. Hypertension, running slightly high  4. Morbid obesity, BMI 62.5 kg/m²  5. Tobacco abuse  6. Asthma  7.  Hearing loss/paresthesias; transient loss of taste and smell which has recovered    Recommendations:  She has nonspecific EKG changes that in the absence of anginal symptoms, I do not believe requires any further evaluation. She has an echo from last year that was unremarkable. I am suspicious that she may have underlying obstructive sleep apnea, which could certainly be contributing to her hypertension. Her morbid obesity certainly complicates everything as well. · Sleep study to evaluate for PRIYA  · Encouraged to quit smoking  · Aggressive risk factor modification  · Recommended to increase physical activity and try to lose more weight, dietary modifications also recommended  · Further recommendations pending above  · If develops chest pain or anginal equivalent symptoms, she certainly has risk factors and ischemic evaluation could be pursued at that time    Thank you for allowing me to participate in your patient's care. Please feel free to contact me if you have any questions or concerns.     Jesus García MD  [facility]) Cardiology

## 2020-11-02 ENCOUNTER — HOSPITAL ENCOUNTER (OUTPATIENT)
Age: 35
Discharge: HOME | End: 2020-11-04
Payer: COMMERCIAL

## 2020-11-02 PROCEDURE — U0003 INFECTIOUS AGENT DETECTION BY NUCLEIC ACID (DNA OR RNA); SEVERE ACUTE RESPIRATORY SYNDROME CORONAVIRUS 2 (SARS-COV-2) (CORONAVIRUS DISEASE [COVID-19]), AMPLIFIED PROBE TECHNIQUE, MAKING USE OF HIGH THROUGHPUT TECHNOLOGIES AS DESCRIBED BY CMS-2020-01-R: HCPCS

## 2020-11-04 LAB
SARS-COV-2: NOT DETECTED
SOURCE: NORMAL

## 2020-11-08 ENCOUNTER — TELEPHONE (OUTPATIENT)
Dept: SLEEP CENTER | Age: 35
End: 2020-11-08

## 2020-11-09 ENCOUNTER — HOSPITAL ENCOUNTER (OUTPATIENT)
Dept: SLEEP CENTER | Age: 35
Discharge: HOME | End: 2020-11-09
Payer: COMMERCIAL

## 2020-11-09 PROCEDURE — 95810 POLYSOM 6/> YRS 4/> PARAM: CPT | Performed by: STUDENT IN AN ORGANIZED HEALTH CARE EDUCATION/TRAINING PROGRAM

## 2020-11-09 PROCEDURE — 95810 POLYSOM 6/> YRS 4/> PARAM: CPT

## 2020-11-18 LAB — STATUS: NORMAL

## 2020-11-30 ENCOUNTER — VIRTUAL VISIT (OUTPATIENT)
Dept: FAMILY MEDICINE CLINIC | Age: 35
End: 2020-11-30
Payer: COMMERCIAL

## 2020-11-30 PROCEDURE — G8427 DOCREV CUR MEDS BY ELIG CLIN: HCPCS | Performed by: FAMILY MEDICINE

## 2020-11-30 PROCEDURE — 99213 OFFICE O/P EST LOW 20 MIN: CPT | Performed by: FAMILY MEDICINE

## 2020-11-30 RX ORDER — CYCLOBENZAPRINE HCL 5 MG
5 TABLET ORAL 2 TIMES DAILY PRN
Qty: 10 TABLET | Refills: 0 | Status: SHIPPED | OUTPATIENT
Start: 2020-11-30 | End: 2020-12-05

## 2020-11-30 ASSESSMENT — PATIENT HEALTH QUESTIONNAIRE - PHQ9
SUM OF ALL RESPONSES TO PHQ9 QUESTIONS 1 & 2: 0
SUM OF ALL RESPONSES TO PHQ QUESTIONS 1-9: 0
1. LITTLE INTEREST OR PLEASURE IN DOING THINGS: 0
2. FEELING DOWN, DEPRESSED OR HOPELESS: 0

## 2020-11-30 NOTE — PROGRESS NOTES
TeleMedicine Patient Consent    This visit was performed as a virtual video visit using a synchronous, two-way, audio-video telehealth technology platform. Patient identification was verified at the start of the visit, including the patient's telephone number and physical location. I discussed with the patient the nature of our telehealth visits, that:     1. Due to the nature of an audio- video modality, the only components of a physical exam that could be done are the elements supported by direct observation. 2. I would evaluate the patient and recommend diagnostics and treatments based on my assessment. 3. If it was felt that the patient should be evaluated in clinic or an emergency room setting, then they would be directed there. 4. Our sessions are not being recorded and that personal health information is protected. 5. Our team would provide follow up care in person if/when the patient needs it. Patient does agree to proceed with telemedicine consultation. Patient's location: home address in [facility]  Physician  location home address in [facility] other people involved in call none        Time spent: Greater than Not billed by time    This visit was completed virtually using Doxy. me      This visit was performed during the 1903 public health crisis and COVID-19 pandemic. *Add 95 modifier to all Video Visits*    CC:    Chief Complaint   Patient presents with    Hypertension     1 mo f/u          HPI:  36 y.o. female presents for follow up of hypertension. Has not been sexually active in almost a year. Has essure coils. hctz - has been taking medicine. bp have been 120-130s/70-80. Got sleep study done. Has appointment to see sleep doctor on dec 3. No chest pain or shortness of breath. Leg swelling is  Coming and going. Gets worse when she is on her period. Has been limiting salt intake. Has not been taking vit b12.      Ok to see specialist for low b12    Numbness round her ear has resolved. .     Sciatic nerve pain. For 2 weeks. Pain in lower back and shoots down left side. And left knee hurts. Get sbetter as she moves. Standing up or changing positions. Better with movement no numbness or tingling in legs . No weakness in legs. No incontinence. No saddle anesthesia. Sometimes wakes her up at night if she rolls over. Patient Active Problem List    Diagnosis Date Noted    Vitamin D deficiency 09/21/2018    Zinc deficiency 09/21/2018    B12 deficiency 09/21/2018    Morbid obesity ([facility] [address] 02/07/2011    Current smoker 02/07/2011    Asthma        Current Outpatient Medications on File Prior to Visit   Medication Sig Dispense Refill    hydroCHLOROthiazide (MICROZIDE) 12.5 MG capsule Take 1 capsule by mouth daily 30 capsule 3    fluticasone (FLONASE) 50 MCG/ACT nasal spray 1 spray by Nasal route daily One spray in each nostril daily (Patient not taking: Reported on 12/3/2020) 1 Bottle 0    albuterol sulfate HFA (PROAIR HFA) 108 (90 Base) MCG/ACT inhaler Inhale 2 puffs into the lungs every 6 hours as needed for Wheezing 1 Inhaler 1    Aspirin-Acetaminophen-Caffeine (EXCEDRIN MIGRAINE PO) Take by mouth daily      Multiple Vitamins-Minerals (THERAPEUTIC MULTIVITAMIN-MINERALS) tablet Take 1 tablet by mouth daily       No current facility-administered medications on file prior to visit. No Known Allergies    Social History     Tobacco Use    Smoking status: Current Every Day Smoker     Packs/day: 0.20     Years: 15.00     Pack years: 3.00     Types: Cigarettes    Smokeless tobacco: Never Used    Tobacco comment: has cut down to couple cigs a daily    Substance Use Topics    Alcohol use:  Yes     Alcohol/week: 2.0 - 3.0 standard drinks     Types: 2 - 3 Shots of liquor per week     Comment: occasionally    Drug use: No       ROS:   Review of Systems - General ROS: negative  Respiratory ROS: no cough, shortness of breath, or wheezing  Cardiovascular ROS: no chest pain or dyspnea on exertion  Gastrointestinal ROS: no abdominal pain, change in bowel habits, or black or bloody stools    Physical Exam:    General: well appearing, NAD  Skin: no rashes noted   Psych: mood and affect wnl       Assessments:     Robin Castorena was seen today for hypertension. Diagnoses and all orders for this visit:    Essential hypertension    B12 deficiency  -     vitamin B-12 (CYANOCOBALAMIN) 1000 MCG tablet; Take 1 tablet by mouth daily  -     Carlita Strickland MD, Gastroenterology, [facility] ([facility])    Other orders  -     cyclobenzaprine (FLEXERIL) 5 MG tablet; Take 1 tablet by mouth 2 times daily as needed for Muscle spasms          Plans:  Continues current meds   Keep appointment with sleep specialist  Restart vit b12   Refer to gi for vit b12 deficiency   Orders as above. RTO 4-6 weeks or sooner prn. Advised to please be adherent to the treatment plans discussed today, and please call with any questions or concerns, letting the office know of any reasons that the plans may not be followed. The risks of untreated conditions include worsening illness, injury, disability, and possibly, death. Please call if symptoms change in any way, worsen, or fail to completely resolve, as this could necessitate a change to treatment plans. Patient and/or caregiver expressed understanding. Indications and proper use of medication(s) reviewed. Potential side-effects and risks of medication(s) also explained. Patient and/or caregiver was instructed to call if any new symptoms develop prior to next visit. This visit was performed during the 4566 public health crisis and COVID-19 pandemic.   *Add 95 modifier to all Video Visits*

## 2020-12-03 ENCOUNTER — APPOINTMENT (OUTPATIENT)
Dept: GENERAL RADIOLOGY | Age: 35
End: 2020-12-03
Payer: COMMERCIAL

## 2020-12-03 ENCOUNTER — HOSPITAL ENCOUNTER (EMERGENCY)
Age: 35
Discharge: ANOTHER HOSPITAL | End: 2020-12-03
Attending: EMERGENCY MEDICINE
Payer: COMMERCIAL

## 2020-12-03 ENCOUNTER — OFFICE VISIT (OUTPATIENT)
Dept: SLEEP MEDICINE | Age: 35
End: 2020-12-03
Payer: COMMERCIAL

## 2020-12-03 ENCOUNTER — APPOINTMENT (OUTPATIENT)
Dept: ULTRASOUND IMAGING | Age: 35
End: 2020-12-03
Payer: COMMERCIAL

## 2020-12-03 ENCOUNTER — TELEPHONE (OUTPATIENT)
Dept: SLEEP MEDICINE | Age: 35
End: 2020-12-03

## 2020-12-03 VITALS
DIASTOLIC BLOOD PRESSURE: 84 MMHG | SYSTOLIC BLOOD PRESSURE: 150 MMHG | OXYGEN SATURATION: 98 % | TEMPERATURE: 97.5 F | HEART RATE: 84 BPM | RESPIRATION RATE: 14 BRPM

## 2020-12-03 VITALS
RESPIRATION RATE: 20 BRPM | OXYGEN SATURATION: 97 % | HEART RATE: 100 BPM | HEIGHT: 68 IN | BODY MASS INDEX: 44.41 KG/M2 | SYSTOLIC BLOOD PRESSURE: 202 MMHG | DIASTOLIC BLOOD PRESSURE: 143 MMHG | WEIGHT: 293 LBS

## 2020-12-03 LAB
ALBUMIN SERPL-MCNC: 3.8 G/DL (ref 3.5–5.2)
ALP BLD-CCNC: 90 U/L (ref 35–104)
ALT SERPL-CCNC: 11 U/L (ref 0–32)
ANION GAP SERPL CALCULATED.3IONS-SCNC: 10 MMOL/L (ref 7–16)
AST SERPL-CCNC: 11 U/L (ref 0–31)
BASOPHILS ABSOLUTE: 0.02 E9/L (ref 0–0.2)
BASOPHILS RELATIVE PERCENT: 0.2 % (ref 0–2)
BILIRUB SERPL-MCNC: 0.3 MG/DL (ref 0–1.2)
BUN BLDV-MCNC: 15 MG/DL (ref 6–20)
CALCIUM SERPL-MCNC: 9.3 MG/DL (ref 8.6–10.2)
CHLORIDE BLD-SCNC: 99 MMOL/L (ref 98–107)
CO2: 29 MMOL/L (ref 22–29)
CREAT SERPL-MCNC: 0.9 MG/DL (ref 0.5–1)
EKG ATRIAL RATE: 91 BPM
EKG P AXIS: 33 DEGREES
EKG P-R INTERVAL: 186 MS
EKG Q-T INTERVAL: 374 MS
EKG QRS DURATION: 84 MS
EKG QTC CALCULATION (BAZETT): 460 MS
EKG R AXIS: 32 DEGREES
EKG T AXIS: 21 DEGREES
EKG VENTRICULAR RATE: 91 BPM
EOSINOPHILS ABSOLUTE: 0.14 E9/L (ref 0.05–0.5)
EOSINOPHILS RELATIVE PERCENT: 1.5 % (ref 0–6)
GFR AFRICAN AMERICAN: >60
GFR NON-AFRICAN AMERICAN: >60 ML/MIN/1.73
GLUCOSE BLD-MCNC: 96 MG/DL (ref 74–99)
HCG, URINE, POC: NEGATIVE
HCT VFR BLD CALC: 37.4 % (ref 34–48)
HEMOGLOBIN: 11.7 G/DL (ref 11.5–15.5)
IMMATURE GRANULOCYTES #: 0.03 E9/L
IMMATURE GRANULOCYTES %: 0.3 % (ref 0–5)
LYMPHOCYTES ABSOLUTE: 2.17 E9/L (ref 1.5–4)
LYMPHOCYTES RELATIVE PERCENT: 23.7 % (ref 20–42)
Lab: NORMAL
MCH RBC QN AUTO: 24.7 PG (ref 26–35)
MCHC RBC AUTO-ENTMCNC: 31.3 % (ref 32–34.5)
MCV RBC AUTO: 79.1 FL (ref 80–99.9)
MONOCYTES ABSOLUTE: 0.56 E9/L (ref 0.1–0.95)
MONOCYTES RELATIVE PERCENT: 6.1 % (ref 2–12)
NEGATIVE QC PASS/FAIL: NORMAL
NEUTROPHILS ABSOLUTE: 6.24 E9/L (ref 1.8–7.3)
NEUTROPHILS RELATIVE PERCENT: 68.2 % (ref 43–80)
PDW BLD-RTO: 17 FL (ref 11.5–15)
PLATELET # BLD: 312 E9/L (ref 130–450)
PMV BLD AUTO: 10.8 FL (ref 7–12)
POSITIVE QC PASS/FAIL: NORMAL
POTASSIUM REFLEX MAGNESIUM: 3.8 MMOL/L (ref 3.5–5)
RBC # BLD: 4.73 E12/L (ref 3.5–5.5)
SODIUM BLD-SCNC: 138 MMOL/L (ref 132–146)
TOTAL PROTEIN: 6.8 G/DL (ref 6.4–8.3)
TROPONIN: <0.01 NG/ML (ref 0–0.03)
WBC # BLD: 9.2 E9/L (ref 4.5–11.5)

## 2020-12-03 PROCEDURE — 73560 X-RAY EXAM OF KNEE 1 OR 2: CPT

## 2020-12-03 PROCEDURE — 84484 ASSAY OF TROPONIN QUANT: CPT

## 2020-12-03 PROCEDURE — G8417 CALC BMI ABV UP PARAM F/U: HCPCS | Performed by: STUDENT IN AN ORGANIZED HEALTH CARE EDUCATION/TRAINING PROGRAM

## 2020-12-03 PROCEDURE — 2500000003 HC RX 250 WO HCPCS: Performed by: EMERGENCY MEDICINE

## 2020-12-03 PROCEDURE — 96374 THER/PROPH/DIAG INJ IV PUSH: CPT

## 2020-12-03 PROCEDURE — 99284 EMERGENCY DEPT VISIT MOD MDM: CPT

## 2020-12-03 PROCEDURE — 93005 ELECTROCARDIOGRAM TRACING: CPT | Performed by: EMERGENCY MEDICINE

## 2020-12-03 PROCEDURE — 85025 COMPLETE CBC W/AUTO DIFF WBC: CPT

## 2020-12-03 PROCEDURE — 93010 ELECTROCARDIOGRAM REPORT: CPT | Performed by: INTERNAL MEDICINE

## 2020-12-03 PROCEDURE — 6360000002 HC RX W HCPCS: Performed by: EMERGENCY MEDICINE

## 2020-12-03 PROCEDURE — 80053 COMPREHEN METABOLIC PANEL: CPT

## 2020-12-03 PROCEDURE — G8427 DOCREV CUR MEDS BY ELIG CLIN: HCPCS | Performed by: STUDENT IN AN ORGANIZED HEALTH CARE EDUCATION/TRAINING PROGRAM

## 2020-12-03 PROCEDURE — 36415 COLL VENOUS BLD VENIPUNCTURE: CPT

## 2020-12-03 PROCEDURE — 93971 EXTREMITY STUDY: CPT

## 2020-12-03 PROCEDURE — G8484 FLU IMMUNIZE NO ADMIN: HCPCS | Performed by: STUDENT IN AN ORGANIZED HEALTH CARE EDUCATION/TRAINING PROGRAM

## 2020-12-03 PROCEDURE — 96375 TX/PRO/DX INJ NEW DRUG ADDON: CPT

## 2020-12-03 PROCEDURE — 99244 OFF/OP CNSLTJ NEW/EST MOD 40: CPT | Performed by: STUDENT IN AN ORGANIZED HEALTH CARE EDUCATION/TRAINING PROGRAM

## 2020-12-03 RX ORDER — NAPROXEN 250 MG/1
250 TABLET ORAL 2 TIMES DAILY WITH MEALS
Qty: 14 TABLET | Refills: 0 | Status: SHIPPED | OUTPATIENT
Start: 2020-12-03 | End: 2020-12-14 | Stop reason: ALTCHOICE

## 2020-12-03 RX ORDER — LABETALOL HYDROCHLORIDE 5 MG/ML
10 INJECTION, SOLUTION INTRAVENOUS ONCE
Status: COMPLETED | OUTPATIENT
Start: 2020-12-03 | End: 2020-12-03

## 2020-12-03 RX ORDER — LANOLIN ALCOHOL/MO/W.PET/CERES
1000 CREAM (GRAM) TOPICAL DAILY
Qty: 45 TABLET | Refills: 0 | Status: SHIPPED
Start: 2020-12-03 | End: 2020-12-14 | Stop reason: SDUPTHER

## 2020-12-03 RX ORDER — MORPHINE SULFATE 4 MG/ML
4 INJECTION, SOLUTION INTRAMUSCULAR; INTRAVENOUS ONCE
Status: COMPLETED | OUTPATIENT
Start: 2020-12-03 | End: 2020-12-03

## 2020-12-03 RX ADMIN — MORPHINE SULFATE 4 MG: 4 INJECTION, SOLUTION INTRAMUSCULAR; INTRAVENOUS at 14:17

## 2020-12-03 RX ADMIN — LABETALOL HYDROCHLORIDE 10 MG: 5 INJECTION INTRAVENOUS at 15:58

## 2020-12-03 ASSESSMENT — SLEEP AND FATIGUE QUESTIONNAIRES
HOW LIKELY ARE YOU TO NOD OFF OR FALL ASLEEP WHILE WATCHING TV: 2
HOW LIKELY ARE YOU TO NOD OFF OR FALL ASLEEP WHEN YOU ARE A PASSENGER IN A CAR FOR AN HOUR WITHOUT A BREAK: 0
HOW LIKELY ARE YOU TO NOD OFF OR FALL ASLEEP WHILE SITTING AND READING: 3
HOW LIKELY ARE YOU TO NOD OFF OR FALL ASLEEP WHILE LYING DOWN TO REST IN THE AFTERNOON WHEN CIRCUMSTANCES PERMIT: 3
HOW LIKELY ARE YOU TO NOD OFF OR FALL ASLEEP WHILE SITTING QUIETLY AFTER LUNCH WITHOUT ALCOHOL: 2
ESS TOTAL SCORE: 10
HOW LIKELY ARE YOU TO NOD OFF OR FALL ASLEEP WHILE SITTING AND TALKING TO SOMEONE: 0
HOW LIKELY ARE YOU TO NOD OFF OR FALL ASLEEP IN A CAR, WHILE STOPPED FOR A FEW MINUTES IN TRAFFIC: 0
HOW LIKELY ARE YOU TO NOD OFF OR FALL ASLEEP WHILE SITTING INACTIVE IN A PUBLIC PLACE: 0

## 2020-12-03 ASSESSMENT — PAIN SCALES - GENERAL: PAINLEVEL_OUTOF10: 5

## 2020-12-03 NOTE — PATIENT INSTRUCTIONS
---------------------------------------------------  blood pressure       Your blood pressure was high today. Please discuss this with your primary care doctor as soon as possible. Elevated BP needs to be addressed. Please go to the Emergency room if you have any discomfort, new or worsening symptoms, or if any of the below symptoms present: It is advised that you have your BP assessed today. You elected not to go to the ER via ambulance and present after this visit to an urgent care. Please note that if high blood pressure is not treated it can lead to cardviovascular diease, including stroke. Please go to urgent care after this appointment. It is advised that you not drive until your blood pressure is under control. Dizziness  Blurry vision  Confusion  Headache  Shortness of Breath  Chest Pain   weakness   slurred speech  facial droop    ---------------------------------------------------      It was a pleasure seeing you today Alesia Gonzales! Summary of Today's Visit     Try blackout curtains in the AM to help you sleep. Try switching to day shift. 1. Start APAP 4-15 cwp  New PAP Therapy instructions to be compliant with most insurance coverage:  1. Use PAP device for atleast 4 hours nightly. 2. PAP therapy must be used for 70% of nights (5/7 nights per week)  3. Patient must follow up in the clinic within 30-90 days from getting the PAP device. 2. Contact your [employer] company about supplies or issues with your machine. As a general guideline, please replace your:  -CPAP mask every 3-6 months  -CPAP hose  every 3-6 months  -Filter every 2-4 weeks  -CPAP/BiPAP/ASV replacements every 5-7+ years     3. Continue healthy weight loss/stabilization, as this is recommended as a long-term intervention. 4. Please do not drive or operate machinery when you feel fatigued/sleepy/drowsy      5. Please try to sleep between 6-8 hours nightly with the CPAP mask    6.  Please avoid sedatives, alcohol and caffeinated drinks at/near bed time. 7. Please call your supply (DME) company with any issues with your PAP device. Please call our office with any issues pertaining to the therapy.   ----Please note that complications of PRIYA if not treated can increase risk for: systemic hypertension, pulmonary hypertension, cardiovascular morbidities (heart attack and stroke), car accidents and all cause mortality. For general questions or scheduling issues, call my nurse at [phone] option 23537 [facility], [address] [place], [place] [phone] option 2  f- [phone]        ----------------------------------------------------------  Common Issues and Solutions     Pillow is dislodging mask - Consider getting a CPAP pillow or switching to a mask with the hose at the top. Dry Mouth - Adjust Humidity and/or try Biotene Gel. (Excessive leak can also cause this)     Leak - Please call your equipment provider  as they may need to adjust your mask. Difficulty tolerating the PAP mask - Contact your equipment company to try a different mask, we can order a \"mini sleep study\"with the sleep tech to try different masks/settings of pressure, also we have a sleep psychologist to help with anxiety related to wearing the PAP mask      ----------------------------------------------------------     For Questions and Concerns: In case of difficulty with your PAP device or mask interface, please FIRST contact your [address] (The company who provides you the CPAP/BiPAP/ASV machine/supplies). If you need the number for any other DME company, please call my Nurse at [phone] option 2     For questions concerning your [place] appointment: Call [phone] option 2  SLEEP LAB SCHEDULING:        ----------------------------------------------------------     Helpful Web Sites:   For patients with ALL SLEEP DISORDERS: American Academy of Sleep Medicine http://sleepeducation. org; or [employer]: https://sleepfoundation. org  For patients with PRIYA: American Sleep Apnea Association: http://gusman.org/. org  For patients with RLS: RLS Foundation: Nicko  For patients with INSOMNIA: https://www.bean.org/. org/articles/sleep/insomnia-causes-and-cures. htm  For patients with DEPRESSION: Quixey.com.ee. com/depression            Learning About CPAP for Sleep Apnea  What is CPAP? CPAP/Bi-PAP is a small machine that you use at home every night while you sleep. It increases air pressure in your throat to keep your airway open. When you have sleep apnea, this can help you sleep better so you feel much better. CPAP stands for \"continuous positive airway pressure. \" Bi-PAP is a different PAP setting that allows for different pressures when you breathe in and out. The CPAP/Bi-PAP machine will have one of the following:  · A mask that covers your nose and mouth  · Prongs that fit into your nose  · A mask that covers your nose only, the most common type. This type is called NCPAP. The N stands for \"nasal.\"  Why is it done? CPAP is a common/effective treatment for obstructive sleep apnea. How does it help? · CPAP can help you have more normal sleep, so you feel less sleepy and more alert during the daytime through the treatment of sleep apnea. · CPAP may help keep heart failure or other heart problems from getting worse. · CPAP may help lower your blood pressure. · If you use CPAP, your bed partner may also sleep better because you are snoring less. What are the side effects? Some people who use CPAP have:  · A dry or stuffy nose and a sore throat. · Irritated skin on the face. · Sore eyes. · Bloating. If you have any of these problems, work with your doctor to fix them. Here are some things you can try:  · Be sure the mask or nasal prongs fit well. · See if your doctor can adjust the pressure of your CPAP.   · If your nose is dry, try a humidifier. If these things do not help, you might try a different type of machine. Some machines have air pressure that adjusts on its own. Others have air pressures that are different when you breathe in than when you breathe out. This may reduce discomfort caused by too much pressure in your nose. Where can you learn more? Go to https://chpepiceweb.STinser. org and sign in to your [employer] account. Enter K748 in the [employer] box to learn more about \"Learning About CPAP for Sleep Apnea. \"     If you do not have an account, please click on the \"Sign Up Now\" link. Current as of: February 24, 2020               Content Version: 12.5  © 2006-2020 Healthwise, Incorporated. Care instructions adapted under license by [facility] ([facility]). If you have questions about a medical condition or this instruction, always ask your healthcare professional. [address] any warranty or liability for your use of this information.

## 2020-12-03 NOTE — PROGRESS NOTES
[facility] Sleep Medicine    Patient Name: Buddy Rachel  Age: 36 y.o.   : 1985    Date of Visit: 12/3/20      HPI   Buddy Rachel is a 36 y.o. female with  has a past medical history of Asthma, B12 deficiency (2018), GERD without esophagitis, Hypertension, Migraine, Morbid obesity due to excess calories ([address] Vitamin D deficiency (2018), and Zinc deficiency (2018). who presents as a new patient to Sleep Clinic, referred by Dr. Rom Rivera, for Sleep Apnea      Patient was advised to get a sleep study by her cardiologist due to persistent hypertension. Patient states that she does not have difficulty with her sleep, but does note feeling tired upon waking up. 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  Outside of recent PSG she has no history of establishing with sleep medicine or previous sleep studies. Sleep Study History: PSG 2020. AHI 8.2.  SPO2 janey 80%. Bed time:  work days - 10:30A- Noon and 2:30p-6pm, 9p-10:15p.  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 N  Tonsillectomy? y  Have you lost or gained weight recently? Gained- 45 lbs       PARASOMNIAS/ NARCOLEPSY:  Hypnogogic/Hynopompic Hallucinations: N   Sleep paralysis: Y, Q3-4 months. Lasts for up to 5 min  Cataplexy: N   REM behavior symptoms: N  Nightmare: N   Sleep Walking: N  Sleep Talking: N  RLS Symptoms: N           PMH:  Past Medical History:   Diagnosis Date    Asthma     B12 deficiency 9/21/2018    GERD without esophagitis     Hypertension     Migraine     Morbid obesity due to excess calories ([address]     Vitamin D deficiency 9/21/2018    Zinc deficiency 9/21/2018        PSH:  Past Surgical History:   Procedure Laterality Date    CHOLECYSTECTOMY, LAPAROSCOPIC  3/2014 Conner Orellana    with repair umbilical hernia    LEEP  3/2008    OTHER SURGICAL HISTORY  2011    coils to fallopian tubes    OTHER SURGICAL HISTORY      Tubal essure     TONSILLECTOMY AND ADENOIDECTOMY      TYMPANOSTOMY TUBE PLACEMENT      UMBILICAL HERNIA REPAIR  3/2014 [facility] UPPER GASTROINTESTINAL ENDOSCOPY N/A 5/2/2018    EGD BIOPSY performed by Nadir Hand MD at [address] Hx:  Social History     Tobacco Use    Smoking status: Current Every Day Smoker     Packs/day: 0.20     Years: 15.00     Pack years: 3.00     Types: Cigarettes    Smokeless tobacco: Never Used    Tobacco comment: has cut down to couple cigs a daily    Substance Use Topics    Alcohol use:  Yes     Alcohol/week: 2.0 - 3.0 standard drinks     Types: 2 - 3 Shots of liquor per week     Comment: occasionally    Drug use: No        Fam Hx:  Family History   Problem Relation Age of Onset    Cancer Paternal Grandmother         stomach    Brain Cancer Maternal Grandmother     Hypertension Father     Stroke Father     Heart Attack Father     Diabetes Father     Other Father         gout    Hypertension Mother     Scoliosis Sister     Diabetes Maternal Grandfather     Heart Disease Maternal Grandfather         Current Outpatient Medications   Medication Sig Dispense Refill  cyclobenzaprine (FLEXERIL) 5 MG tablet Take 1 tablet by mouth 2 times daily as needed for Muscle spasms 10 tablet 0    hydroCHLOROthiazide (MICROZIDE) 12.5 MG capsule Take 1 capsule by mouth daily 30 capsule 3    albuterol sulfate HFA (PROAIR HFA) 108 (90 Base) MCG/ACT inhaler Inhale 2 puffs into the lungs every 6 hours as needed for Wheezing 1 Inhaler 1    Aspirin-Acetaminophen-Caffeine (EXCEDRIN MIGRAINE PO) Take by mouth daily      Multiple Vitamins-Minerals (THERAPEUTIC MULTIVITAMIN-MINERALS) tablet Take 1 tablet by mouth daily      fluticasone (FLONASE) 50 MCG/ACT nasal spray 1 spray by Nasal route daily One spray in each nostril daily (Patient not taking: Reported on 12/3/2020) 1 Bottle 0    vitamin B-12 (CYANOCOBALAMIN) 1000 MCG tablet Take 1 tablet by mouth daily (Patient not taking: Reported on 12/3/2020) 45 tablet 0     No current facility-administered medications for this visit. Review of Systems  Constitutional: no chills, no fever and no night sweats. Eyes: no blurred vision and no eyesight problems. ENT: no hearing loss, no nasal congestion, no nasal discharge, no hoarseness and no sore throat. Cardiovascular: no chest pain, no lower extremity edema. Respiratory: no cough, no shortness of breath at rest and no wheezing. Gastrointestinal: no abdominal pain, no nausea and no vomiting. Musculoskeletal: no arthralgias, no back pain and no myalgias. Integumentary: no rashes. Neurological: no difficulty walking, no diplopia, no dizziness, no dysarthria, no facial weakness, no headache, no limb weakness, no memory changes, no numbness, no speech difficulties and no tingling. Endocrine: no changes in appetite, no feelings of weakness and no recent abnormal weight gain/loss.    Hematologic/Lymphatic: no tendency for easy bruising or bleeding      Objective:   Physical Exam  BP (!) 202/143   Pulse 100   Resp 20   Ht 5' 7.5\" (1.715 m)   Wt (!) 408 lb 6.4 oz (185.2 kg)   LMP 11/29/2020 (Exact Date)   SpO2 97%   BMI 63.02 kg/m²      Additional Measurements    12/03/20 1011   Neck circumference: 20          General: No acute distress. HEENT: Normocephalic, atraumatic. No oropharyngeal lesions. Mallampati class- 3       Neck: Trachea midline  Lungs: Clear to auscultation bilaterally. No wheezes or crackles  Cardiac: Regular rate and rhythm. No murmurs appreciated. Neurologic: Normal gait. Balance intact. Psychiatric: Alert and oriented. Appropriate affect. Extremities: No clubbing or no peripheral edema  Skin: No lesions or rashes  Hematologic/lymphatic/immunologic: No bruises or bleeding    Sleep Medicine 12/3/2020   Sitting and reading 3   Watching TV 2   Sitting, inactive in a public place (e.g. a theatre or a meeting) 0   As a passenger in a car for an hour without a break 0   Lying down to rest in the afternoon when circumstances permit 3   Sitting and talking to someone 0   Sitting quietly after a lunch without alcohol 2   In a car, while stopped for a few minutes in traffic 0   Total score 10   Neck circumference 20         SLEEP STUDY HISTORY      PSG 11/9/2020. AHI 8.2.  SPO2 janey 80%. PERTINENT LAB RESULTS  Ferritin   Date Value Ref Range Status   07/11/2019 25 ng/mL Final     Comment:     FERRITIN Reference Ranges:  Adult Males   20 - 60 yrars:    30 - 400 ng/mL  Adult females 16 - 60 years:    13 - 150 ng/mL  Adults greater than 60 years:   no established reference range  Pediatrics:                     no established reference range            Assessment:      Gloria Hicks was seen today for sleep apnea. Diagnoses and all orders for this visit:    Obstructive sleep apnea  -     DME Order for CPAP as OP    Obesity, unspecified classification, unspecified obesity type, unspecified whether serious comorbidity present  -     Ambulatory referral to Bariatrics    Shift work sleep disorder    Elevated blood pressure reading    ·    Plan:      1. Mild PRIYA.   Treatment options were discussed with the patient including weight loss, oral appliance evaluation, trial of APAP, PAP titration, and possible surgical interventions. After discussion, the patient elected to proceed with APAP trial.  We will start APAP 4-15 and follow-up in 2 months. 2. Obesity. Body mass index is 63.02 kg/m². Hugh Carcamo Patient was previously seen by Dr. Bobbi Keller in 2019. Due to family commitments and COVID-23 the patient was unable to follow-up for several months. Patient is interested in proceeding with weight loss therapy. New referral to Dr. Bobbi Keller placed.  -Healthy Weight loss advised    3. Shiftwork sleep disorder. Patient is currently sleeping during several periods during her sleep time/daytime to get sufficient 7 8 hours of sleep. Patient does note symptoms of sleepiness despite this strategy. Precautions provided. Patient advised to use blackout curtains when trying to sleep during the day. Patient was ultimately advised that switching to day shift if possible would be helpful with her symptoms. Follow-up at next visit. 4.  Elevated blood pressure reading. Patient has a history of hypertension is currently on hydrochlorothiazide, which the patient states she has not taken yet today. She normally takes it after her night shift prior to sleep and she has not been home yet as she currently works night shift. Blood pressures today were in the 170s to 190s over 130-140s. Readings may not have been accurate due to patient weight. Manual blood pressure was attempted and unclear. Patient denies any symptoms currently, including but not limited to chest pain shortness of breath dizziness blurry vision headaches or confusion. Patient was advised that her blood pressure was significantly elevated, and did put her at risk for cardiovascular complications including stroke and that urgent intervention was recommended. .  Patient acknowledged this.   Patient was offered to be transported to the ER via EMS for evaluation, however she declined and would rather proceed following up in the local urgent care. She states she will go to urgent care after this visit. She was advised not to drive while her blood pressure was this high. Nurse Rea Healy and Jaycee Mortensen were present for this conversation. Sleep nurses will notify patient's PCP.     Patient will follow up Return in about 2 months (around 2/3/2021) for Follow up After PAP Trial.    Leola Bruno, [address] [place] [address] -[phone] [address]

## 2020-12-04 ASSESSMENT — ENCOUNTER SYMPTOMS
EYE REDNESS: 0
VOMITING: 0
ABDOMINAL PAIN: 0
NAUSEA: 0
SHORTNESS OF BREATH: 0

## 2020-12-04 NOTE — ED PROVIDER NOTES
Chief complaint: Hypertension and leg pain      HPI:  12/4/20, Time: 6:42 AM EST    HPI             Estrella Green is a 36 y.o. female presenting to the ED for hypertension leg pain. The history is obtained from patient as well as the patient's medical record. Patient does have a history of hypertension. The patient does take 12.5 mg of hydrochlorothiazide. The patient states that she did take those medications today. The patient was at the [address] [place] [address] Road. \"She was noted to be hypertensive so was sent to the emergency department. The patient denies any headache, blurred vision, numbness, weakness or paresthesias of the extremities. The patient does endorse left leg pain. The pain has been present for 1 month and gradually worsened. The pain is located in the posterior aspect of the knee. Is described as a pressure sensation. Pain is currently rated 5 out of 10. Worse with bearing weight and attempted ambulation. No alleviating factors. No treatment prior to arrival.  Patient denies any no known injury. ROS:   Review of Systems   Constitutional: Negative for chills and fatigue. HENT: Negative for congestion. Eyes: Negative for redness. Respiratory: Negative for shortness of breath. Cardiovascular: Negative for chest pain. Gastrointestinal: Negative for abdominal pain, nausea and vomiting. Genitourinary: Negative for dysuria. Musculoskeletal: Positive for arthralgias and myalgias. Skin: Negative for rash. Neurological: Negative for light-headedness. Psychiatric/Behavioral: Negative for confusion. All other systems reviewed and are negative.      --------------------------------------------- PAST HISTORY ---------------------------------------------  Past Medical History:  has a past medical history of Asthma, B12 deficiency, GERD without esophagitis, Hypertension, Migraine, Morbid obesity due to excess calories ([address] Vitamin D deficiency, and Zinc deficiency.     Past Surgical History:  has a past surgical history that includes LEEP (3/2008); Tonsillectomy and adenoidectomy; Tympanostomy tube placement; other surgical history (2011); Cholecystectomy, laparoscopic (3/2014 Harika Del Toro); Umbilical hernia repair (3/2014 Harika Del Toro); other surgical history; and Upper gastrointestinal endoscopy (N/A, 5/2/2018). Social History:  reports that she has been smoking cigarettes. She has a 3.00 pack-year smoking history. She has never used smokeless tobacco. She reports current alcohol use of about 2.0 - 3.0 standard drinks of alcohol per week. She reports that she does not use drugs. Family History: family history includes Brain Cancer in her maternal grandmother; Cancer in her paternal grandmother; Diabetes in her father and maternal grandfather; Heart Attack in her father; Heart Disease in her maternal grandfather; Hypertension in her father and mother; Other in her father; Scoliosis in her sister; Stroke in her father. The patients home medications have been reviewed. Allergies: Patient has no known allergies. ---------------------------------------------------PHYSICAL EXAM--------------------------------------    Constitutional/General: Alert and oriented x3, well appearing, non toxic in NAD, obese  Head: Normocephalic and atraumatic  Mouth: Oropharynx clear, handling secretions, no trismus  Neck: Supple, full ROM,  Pulmonary: Lungs clear to auscultation bilaterally, no wheezes, rales, or rhonchi. Not in respiratory distress  Cardiovascular:  Regular rate. Regular rhythm. No murmurs  Chest: no chest wall tenderness  Abdomen: Soft. Non tender. Non distended. No rebound, guarding, or rigidity. No pulsatile masses appreciated. Musculoskeletal: Moves all extremities x 4. Warm and well perfused, no clubbing, cyanosis, or edema. Capillary refill <3 seconds, there is mild tenderness to palpation in the left popliteal fossa.   There is no erythema, warmth to touch or cellulitic Bilirubin 0.3 0.0 - 1.2 mg/dL    Alkaline Phosphatase 90 35 - 104 U/L    ALT 11 0 - 32 U/L    AST 11 0 - 31 U/L   Troponin   Result Value Ref Range    Troponin <0.01 0.00 - 0.03 ng/mL   POC Pregnancy Urine   Result Value Ref Range    HCG, Urine, POC Negative Negative    Lot Number [id number]     Positive QC Pass/Fail Pass     Negative QC Pass/Fail Pass    EKG 12 Lead   Result Value Ref Range    Ventricular Rate 91 BPM    Atrial Rate 91 BPM    P-R Interval 186 ms    QRS Duration 84 ms    Q-T Interval 374 ms    QTc Calculation (Bazett) 460 ms    P Axis 33 degrees    R Axis 32 degrees    T Axis 21 degrees       RADIOLOGY:  Interpreted by Radiologist.  XR KNEE LEFT (1-2 VIEWS)   Final Result   1. Tricompartmental osteoarthritis. 2.  No fracture or dislocation. US DUP LOWER EXTREMITY LEFT RENÉ   Final Result   No evidence of DVT in the left lower extremity. EKG:  This EKG is signed and interpreted by me. Normal sinus rhythm, rate of 91, no ST segment elevation or depression, CA interval 186 MS, QRS 84 MS,  MS  Interpreted by me      ------------------------- NURSING NOTES AND VITALS REVIEWED ---------------------------   The nursing notes within the ED encounter and vital signs as below have been reviewed by myself. BP (!) 150/84   Pulse 84   Temp 97.5 °F (36.4 °C) (Temporal)   Resp 14   LMP 11/29/2020 (Exact Date)   SpO2 98%   Oxygen Saturation Interpretation: Normal    The patients available past medical records and past encounters were reviewed. ------------------------------ ED COURSE/MEDICAL DECISION MAKING----------------------  Medications   morphine injection 4 mg (4 mg Intravenous Given 12/3/20 7133)   labetalol (NORMODYNE;TRANDATE) injection 10 mg (10 mg Intravenous Given 12/3/20 0291)             Medical Decision Making:   I, Dr. Dong Carey am the primary physician of record. Austyn Schroeder is a 36 y.o. female who presents to the ED for hypertension and leg pain. using voice recognition software.  Every effort was made to ensure accuracy; however, inadvertent computerized transcription errors may be present         Karl Finely, DO  12/04/20 9225

## 2020-12-08 ENCOUNTER — PATIENT MESSAGE (OUTPATIENT)
Dept: FAMILY MEDICINE CLINIC | Age: 35
End: 2020-12-08

## 2020-12-08 NOTE — TELEPHONE ENCOUNTER
Reports she was having severe pain. Knee was getting worse and worse and thinks that is why her blood pressure was high. Would like to see an orthopedic surgeon for this. Will check her bp tonight and let me know . If continues to be high will increase hctz to 25mg.    Please add to my schedule next Monday at 1

## 2020-12-14 ENCOUNTER — OFFICE VISIT (OUTPATIENT)
Dept: FAMILY MEDICINE CLINIC | Age: 35
End: 2020-12-14
Payer: COMMERCIAL

## 2020-12-14 VITALS
HEART RATE: 86 BPM | DIASTOLIC BLOOD PRESSURE: 95 MMHG | WEIGHT: 293 LBS | BODY MASS INDEX: 44.41 KG/M2 | OXYGEN SATURATION: 96 % | HEIGHT: 68 IN | RESPIRATION RATE: 18 BRPM | TEMPERATURE: 96.9 F | SYSTOLIC BLOOD PRESSURE: 165 MMHG

## 2020-12-14 PROCEDURE — G8484 FLU IMMUNIZE NO ADMIN: HCPCS | Performed by: FAMILY MEDICINE

## 2020-12-14 PROCEDURE — G8427 DOCREV CUR MEDS BY ELIG CLIN: HCPCS | Performed by: FAMILY MEDICINE

## 2020-12-14 PROCEDURE — 99214 OFFICE O/P EST MOD 30 MIN: CPT | Performed by: FAMILY MEDICINE

## 2020-12-14 PROCEDURE — G8417 CALC BMI ABV UP PARAM F/U: HCPCS | Performed by: FAMILY MEDICINE

## 2020-12-14 PROCEDURE — 4004F PT TOBACCO SCREEN RCVD TLK: CPT | Performed by: FAMILY MEDICINE

## 2020-12-14 RX ORDER — HYDROCHLOROTHIAZIDE 12.5 MG/1
12.5 CAPSULE, GELATIN COATED ORAL DAILY
Qty: 30 CAPSULE | Refills: 3 | Status: CANCELLED | OUTPATIENT
Start: 2020-12-14

## 2020-12-14 RX ORDER — SERTRALINE HYDROCHLORIDE 25 MG/1
25 TABLET, FILM COATED ORAL DAILY
Qty: 30 TABLET | Refills: 5 | Status: SHIPPED
Start: 2020-12-14 | End: 2021-04-21 | Stop reason: SDUPTHER

## 2020-12-14 RX ORDER — HYDROCHLOROTHIAZIDE 25 MG/1
25 TABLET ORAL NIGHTLY
Qty: 30 TABLET | Refills: 3 | Status: SHIPPED
Start: 2020-12-14 | End: 2021-04-06

## 2020-12-14 RX ORDER — LANOLIN ALCOHOL/MO/W.PET/CERES
1000 CREAM (GRAM) TOPICAL DAILY
Qty: 30 TABLET | Refills: 3 | Status: SHIPPED
Start: 2020-12-14 | End: 2021-04-21 | Stop reason: SDUPTHER

## 2020-12-14 ASSESSMENT — ENCOUNTER SYMPTOMS
COUGH: 0
ABDOMINAL PAIN: 0
SHORTNESS OF BREATH: 0
DIARRHEA: 0
WHEEZING: 0
NAUSEA: 0
VOMITING: 0

## 2020-12-14 NOTE — PATIENT INSTRUCTIONS
Avoid ibuprofen or naproxen when you use the topical cream   Use the knee brace   Increase hctz to 25mg daily   Take the Vit b12  Start zoloft 25mg daily   Follow up in 1 month or sooner as needed.

## 2020-12-14 NOTE — PROGRESS NOTES
[facility]  [phone]   Bere Carrillo MD     Patient: Austyn Schroeder  YOB: 1985  Visit Date: 12/14/20    Maralee Litten is a 36y.o. year old female here today for   Chief Complaint   Patient presents with    Hypertension     patient refused flu shot       HPI  Patient is a 36year old female here for hypertension. Did not increase hctz as she did not get message. Has an appointment with sleep doctor. Has been using CPAP since Thursday. Does not like it. Uses it at least 5 hours a night. Has been getting better as she has been using it. Denies chest pain, shortness of breath. Does not have worsening leg swelling. Having knee pain in left knee. Would like to see ortho. Knee gave out and tripped down last two steps. Has been taking otc naproxen and ibuprofen. Feeling tired. No lightheadedness or dizziness. Has had a lot of anxiety. Worries a lot about everything. Has never been on anything for anxiety/depression in the past. Low concentration, gets easily frustrated. Has seen cardiologist.     Review of Systems   Constitutional: Positive for fatigue. Negative for chills and fever. Respiratory: Negative for cough, shortness of breath and wheezing. Cardiovascular: Negative for chest pain, palpitations and leg swelling. Gastrointestinal: Negative for abdominal pain, diarrhea, nausea and vomiting. Genitourinary: Negative for genital sores. Neurological: Negative for weakness, numbness and headaches.        Current Outpatient Medications on File Prior to Visit   Medication Sig Dispense Refill    fluticasone (FLONASE) 50 MCG/ACT nasal spray 1 spray by Nasal route daily One spray in each nostril daily 1 Bottle 0    albuterol sulfate HFA (PROAIR HFA) 108 (90 Base) MCG/ACT inhaler Inhale 2 puffs into the lungs every 6 hours as needed for Wheezing 1 Inhaler 1    Aspirin-Acetaminophen-Caffeine (EXCEDRIN MIGRAINE PO) Take by mouth daily      Multiple Vitamins-Minerals (THERAPEUTIC MULTIVITAMIN-MINERALS) tablet Take 1 tablet by mouth daily       No current facility-administered medications on file prior to visit. No Known Allergies    Past medical, surgical, socialand/or family history reviewed, updated as needed, and are non-contributory (unless otherwise stated). Medications, allergies, and problem list also reviewed and updated as needed in patient's record. Wt Readings from Last 3 Encounters:   12/14/20 (!) 414 lb (187.8 kg)   12/03/20 (!) 408 lb 6.4 oz (185.2 kg)   10/23/20 (!) 404 lb 3.2 oz (183.3 kg)                   BP (!) 165/95 (Site: Right Lower Arm, Position: Sitting, Cuff Size: Medium Adult)   Pulse 86   Temp 96.9 °F (36.1 °C) (Temporal)   Resp 18   Ht 5' 7.5\" (1.715 m)   Wt (!) 414 lb (187.8 kg)   LMP 11/29/2020 (Exact Date)   SpO2 96%   BMI 63.88 kg/m²        Physical Exam  Vitals signs and nursing note reviewed. Constitutional:       General: She is not in acute distress. Appearance: She is well-developed. She is not diaphoretic. HENT:      Head: Normocephalic and atraumatic. Cardiovascular:      Rate and Rhythm: Normal rate and regular rhythm. Heart sounds: Normal heart sounds. No murmur. Pulmonary:      Effort: Pulmonary effort is normal. No respiratory distress. Breath sounds: Normal breath sounds. No wheezing or rales. Abdominal:      General: Bowel sounds are normal. There is no distension. Palpations: Abdomen is soft. Tenderness: There is no abdominal tenderness. There is no guarding. Musculoskeletal: Normal range of motion. General: Swelling (trace edema) present.    Psychiatric:         Behavior: Behavior normal.       Results for orders placed or performed during the hospital encounter of 12/03/20   CBC Auto Differential   Result Value Ref Range    WBC 9.2 4.5 - 11.5 E9/L    RBC 4.73 3.50 - 5.50 E12/L    Hemoglobin 11.7 11.5 - 15.5 g/dL Hematocrit 37.4 34.0 - 48.0 %    MCV 79.1 (L) 80.0 - 99.9 fL    MCH 24.7 (L) 26.0 - 35.0 pg    MCHC 31.3 (L) 32.0 - 34.5 %    RDW 17.0 (H) 11.5 - 15.0 fL    Platelets 430 003 - 637 E9/L    MPV 10.8 7.0 - 12.0 fL    Neutrophils % 68.2 43.0 - 80.0 %    Immature Granulocytes % 0.3 0.0 - 5.0 %    Lymphocytes % 23.7 20.0 - 42.0 %    Monocytes % 6.1 2.0 - 12.0 %    Eosinophils % 1.5 0.0 - 6.0 %    Basophils % 0.2 0.0 - 2.0 %    Neutrophils Absolute 6.24 1.80 - 7.30 E9/L    Immature Granulocytes # 0.03 E9/L    Lymphocytes Absolute 2.17 1.50 - 4.00 E9/L    Monocytes Absolute 0.56 0.10 - 0.95 E9/L    Eosinophils Absolute 0.14 0.05 - 0.50 E9/L    Basophils Absolute 0.02 0.00 - 0.20 E9/L   Comprehensive Metabolic Panel w/ Reflex to MG   Result Value Ref Range    Sodium 138 132 - 146 mmol/L    Potassium reflex Magnesium 3.8 3.5 - 5.0 mmol/L    Chloride 99 98 - 107 mmol/L    CO2 29 22 - 29 mmol/L    Anion Gap 10 7 - 16 mmol/L    Glucose 96 74 - 99 mg/dL    BUN 15 6 - 20 mg/dL    CREATININE 0.9 0.5 - 1.0 mg/dL    GFR Non-African American >60 >=60 mL/min/1.73    GFR African American >60     Calcium 9.3 8.6 - 10.2 mg/dL    Total Protein 6.8 6.4 - 8.3 g/dL    Alb 3.8 3.5 - 5.2 g/dL    Total Bilirubin 0.3 0.0 - 1.2 mg/dL    Alkaline Phosphatase 90 35 - 104 U/L    ALT 11 0 - 32 U/L    AST 11 0 - 31 U/L   Troponin   Result Value Ref Range    Troponin <0.01 0.00 - 0.03 ng/mL   POC Pregnancy Urine   Result Value Ref Range    HCG, Urine, POC Negative Negative    Lot Number [id number]     Positive QC Pass/Fail Pass     Negative QC Pass/Fail Pass    EKG 12 Lead   Result Value Ref Range    Ventricular Rate 91 BPM    Atrial Rate 91 BPM    P-R Interval 186 ms    QRS Duration 84 ms    Q-T Interval 374 ms    QTc Calculation (Bazett) 460 ms    P Axis 33 degrees    R Axis 32 degrees    T Axis 21 degrees       ASSESSMENT/PLAN  Curtischa Fonseca was seen today for hypertension.     Diagnoses and all orders for this visit:    Chronic pain of left knee  - Jose Elias Molina DO, Orthopaedics and Sports Medicine, [facility]  -     diclofenac sodium (VOLTAREN) 1 % GEL; Apply 4 g topically 4 times daily as needed for Pain  -     Elastic Bandages & Supports (KNEE BRACE/CUSHION/L-XL) MISC; Wear daily for knee pain    B12 deficiency  -     vitamin B-12 (CYANOCOBALAMIN) 1000 MCG tablet; Take 1 tablet by mouth daily    Essential hypertension  -     hydroCHLOROthiazide (HYDRODIURIL) 25 MG tablet; Take 1 tablet by mouth nightly    Current moderate episode of major depressive disorder without prior episode (HCC)  -     sertraline (ZOLOFT) 25 MG tablet; Take 1 tablet by mouth daily    Other orders  -     Discontinue: Elastic Bandages & Supports (KNEE BRACE/CUSHION/L-XL) MISC; Wear daily for knee pain            Phone/MyChart follow up if tests abnormal.    Return in about 1 month (around 1/14/2021). or sooner if necessary. I have reviewed myfindings and recommendations with Rosalie Boo. Chanel Rivers.  Dinorah Humphrey M.D

## 2020-12-17 ENCOUNTER — OFFICE VISIT (OUTPATIENT)
Dept: ORTHOPEDIC SURGERY | Age: 35
End: 2020-12-17
Payer: COMMERCIAL

## 2020-12-17 VITALS — HEIGHT: 68 IN | TEMPERATURE: 98.6 F | WEIGHT: 293 LBS | BODY MASS INDEX: 44.41 KG/M2

## 2020-12-17 PROCEDURE — G8417 CALC BMI ABV UP PARAM F/U: HCPCS | Performed by: ORTHOPAEDIC SURGERY

## 2020-12-17 PROCEDURE — 4004F PT TOBACCO SCREEN RCVD TLK: CPT | Performed by: ORTHOPAEDIC SURGERY

## 2020-12-17 PROCEDURE — 99203 OFFICE O/P NEW LOW 30 MIN: CPT | Performed by: ORTHOPAEDIC SURGERY

## 2020-12-17 PROCEDURE — G8427 DOCREV CUR MEDS BY ELIG CLIN: HCPCS | Performed by: ORTHOPAEDIC SURGERY

## 2020-12-17 PROCEDURE — G8484 FLU IMMUNIZE NO ADMIN: HCPCS | Performed by: ORTHOPAEDIC SURGERY

## 2020-12-17 NOTE — PROGRESS NOTES
Chief Complaint   Patient presents with    Knee Pain     Left knee pain. Has been hurting over the last few weeks. PCP gave her voltaren. Was recommend by PCP for knee brace as well. Was given a perscription for brace but it was for a soft brace and wanted to see what was recommended today because of insurance reasons. HPI:    Patient is 30 y.o. female complaining chronic, atraumatic, insidious onset left knee pain for several months but worse over the last 2 weeks. She admits to stiffness, deep, aching pain, swelling, difficulty with stairs and ambulating far distances. She admits to gross instability specifically in her Left knee. Previous treatments include rest, ice, and anti-inflammatory medication and HEP without much relief. ROS:    Skin: (-) rash,(-) psoriasis,(-) eczema, (-)skin cancer. Neurologic: (-)numbness, (-)tingling, (-)headaches, (-) LOC. Cardiovascular: (-) Chest pain, (-) swelling in legs/feet, (-) SOB, (-) cramping in legs/feet with walking.     All other review of systems negative except stated above or in HPI      Past Medical History:   Diagnosis Date    Asthma     B12 deficiency 9/21/2018    GERD without esophagitis     Hypertension     Migraine     Morbid obesity due to excess calories ([address]     Vitamin D deficiency 9/21/2018    Zinc deficiency 9/21/2018     Past Surgical History:   Procedure Laterality Date    CHOLECYSTECTOMY, LAPAROSCOPIC  3/2014 Tessie Tram    with repair umbilical hernia    LEEP  3/2008    OTHER SURGICAL HISTORY  2011    coils to fallopian tubes    OTHER SURGICAL HISTORY      Tubal essure     TONSILLECTOMY AND ADENOIDECTOMY      TYMPANOSTOMY TUBE PLACEMENT      UMBILICAL HERNIA REPAIR  3/2014 Neo Butler UPPER GASTROINTESTINAL ENDOSCOPY N/A 5/2/2018    EGD BIOPSY performed by Bessie Gilliland MD at [facility] ENDOSCOPY       Current Outpatient Medications:   vitamin B-12 (CYANOCOBALAMIN) 1000 MCG tablet, Take 1 tablet by mouth daily, Disp: 30 tablet, Rfl: 3    hydroCHLOROthiazide (HYDRODIURIL) 25 MG tablet, Take 1 tablet by mouth nightly, Disp: 30 tablet, Rfl: 3    diclofenac sodium (VOLTAREN) 1 % GEL, Apply 4 g topically 4 times daily as needed for Pain, Disp: 350 g, Rfl: 0    sertraline (ZOLOFT) 25 MG tablet, Take 1 tablet by mouth daily, Disp: 30 tablet, Rfl: 5    Elastic Bandages & Supports (KNEE BRACE/CUSHION/L-XL) MISC, Wear daily for knee pain, Disp: 1 each, Rfl: 0    fluticasone (FLONASE) 50 MCG/ACT nasal spray, 1 spray by Nasal route daily One spray in each nostril daily, Disp: 1 Bottle, Rfl: 0    albuterol sulfate HFA (PROAIR HFA) 108 (90 Base) MCG/ACT inhaler, Inhale 2 puffs into the lungs every 6 hours as needed for Wheezing, Disp: 1 Inhaler, Rfl: 1    Aspirin-Acetaminophen-Caffeine (EXCEDRIN MIGRAINE PO), Take by mouth daily, Disp: , Rfl:     Multiple Vitamins-Minerals (THERAPEUTIC MULTIVITAMIN-MINERALS) tablet, Take 1 tablet by mouth daily, Disp: , Rfl:   No Known Allergies  Social History     Socioeconomic History    Marital status: Single     Spouse name: Not on file    Number of children: Not on file    Years of education: Not on file    Highest education level: Not on file   Occupational History    Not on file   Social Needs    Financial resource strain: Not on file    Food insecurity     Worry: Not on file     Inability: Not on file    Transportation needs     Medical: Not on file     Non-medical: Not on file   Tobacco Use    Smoking status: Current Every Day Smoker     Packs/day: 0.20     Years: 15.00     Pack years: 3.00     Types: Cigarettes    Smokeless tobacco: Never Used    Tobacco comment: has cut down to couple cigs a daily    Substance and Sexual Activity    Alcohol use: Yes     Alcohol/week: 2.0 - 3.0 standard drinks     Types: 2 - 3 Shots of liquor per week     Comment: occasionally    Drug use:  No  Sexual activity: Yes     Partners: Male   Lifestyle    Physical activity     Days per week: Not on file     Minutes per session: Not on file    Stress: Not on file   Relationships    Social connections     Talks on phone: Not on file     Gets together: Not on file     Attends [employer] service: Not on file     Active member of club or organization: Not on file     Attends meetings of clubs or organizations: Not on file     Relationship status: Not on file    Intimate partner violence     Fear of current or ex partner: Not on file     Emotionally abused: Not on file     Physically abused: Not on file     Forced sexual activity: Not on file   Other Topics Concern    Not on file   Social History Narrative    Not on file     Family History   Problem Relation Age of Onset    Cancer Paternal Grandmother         stomach    Brain Cancer Maternal Grandmother     Hypertension Father     Stroke Father     Heart Attack Father     Diabetes Father     Other Father         gout    Hypertension Mother     Scoliosis Sister     Diabetes Maternal Grandfather     Heart Disease Maternal Grandfather            Physical Exam:    Temp 98.6 °F (37 °C)   Ht 5' 7.5\" (1.715 m)   Wt (!) 414 lb (187.8 kg)   LMP 11/29/2020 (Exact Date)   BMI 63.88 kg/m²     GENERAL: alert, appears stated age, cooperative, no acute distress    HEENT: Head is normocephalic, atraumatic. PERRLA. SKIN: Clean, dry, intact. There is not any cellulitis or cutaneous lesions noted in the lower extremities except noted below in MSK    PULMONARY: breathing is regular and unlabored, no acute distress    CV: The bilateral upper and lower extremities are warm and well-perfused with brisk capillary refill.  2+ pulses UE and LE bilateral.     PSYCHIATRY: Pleasant mood, appropriate behavior, follows commands NEURO: Sensation is intact distally with light touch with no alteration. Motor exam of the lower extremities show quadriceps, hamstrings, foot dorsiflexion and plantarflexion grossly intact 5/5. LYMPH: No lymphedema present distally in upper or lower extremity. MUSCULOSKELETAL:    Left knee Exam:    mild effusion noted. No erythema/induration/fluctuance. Posterior medial joint line TTP. Stable to varus and valgus at 0 and 30 degrees of flexion. Negative Lachman's and posterior drawer. Negative patellar grind test and J sign. Compartments soft and compressible throughout leg. Active range of motion 0-120 with pain. Positive Maribel's positive Apley's, gait is antalgic        Imaging:  Xr Knee Left (1-2 Views)    Result Date: 12/3/2020  EXAMINATION: TWO XRAY VIEWS OF THE LEFT KNEE 12/3/2020 3:25 pm COMPARISON: December 1, 2013 HISTORY: ORDERING SYSTEM PROVIDED HISTORY: pain TECHNOLOGIST PROVIDED HISTORY: Reason for exam:->pain FINDINGS: Moderate narrowing of medial compartment with mild marginal osteophytosis. Mild osteophytosis is seen along margin of the lateral compartment. Osteophytosis is seen along superior and inferior articular margin of the patella. No synovial effusion. No fracture or dislocation. 1.  Tricompartmental osteoarthritis. 2.  No fracture or dislocation. Us Dup Lower Extremity Left Shashank    Result Date: 12/3/2020  EXAMINATION: DUPLEX VENOUS ULTRASOUND OF THE LEFT LOWER EXTREMITY 12/3/2020 1:38 pm TECHNIQUE: Duplex ultrasound and Doppler images were obtained of the left lower extremity. COMPARISON: 29 April 2009 HISTORY: ORDERING SYSTEM PROVIDED HISTORY: Discomfort TECHNOLOGIST PROVIDED HISTORY: Reason for exam:->Discomfort What reading provider will be dictating this exam?->CRC FINDINGS: The visualized veins of the left lower extremity are patent and free of echogenic thrombus. The veins are normally compressible and have normal phasic flow. No evidence of DVT in the left lower extremity. Hussein Casey was seen today for knee pain. Diagnoses and all orders for this visit:    Tear of meniscus of left knee, unspecified meniscus, unspecified tear type, unspecified whether old or current tear  -     MRI KNEE LEFT WO CONTRAST; Future        Patient seen and examined. X-rays reviewed. Patient has little to no arthritis noted on x-rays today. However patient's main complaint is instability of symptomatic knee. Exam and history is consistent with possible meniscus tear. MRI recommended for further evaluation management.   Follow-up after MRI        Talita Lewis DO  12/17/20

## 2021-01-11 ENCOUNTER — OFFICE VISIT (OUTPATIENT)
Dept: FAMILY MEDICINE CLINIC | Age: 36
End: 2021-01-11
Payer: COMMERCIAL

## 2021-01-11 VITALS
BODY MASS INDEX: 44.41 KG/M2 | HEIGHT: 68 IN | OXYGEN SATURATION: 99 % | DIASTOLIC BLOOD PRESSURE: 80 MMHG | WEIGHT: 293 LBS | SYSTOLIC BLOOD PRESSURE: 126 MMHG | RESPIRATION RATE: 18 BRPM | HEART RATE: 92 BPM

## 2021-01-11 DIAGNOSIS — I10 ESSENTIAL HYPERTENSION: ICD-10-CM

## 2021-01-11 DIAGNOSIS — Z11.4 ENCOUNTER FOR SCREENING FOR HIV: ICD-10-CM

## 2021-01-11 DIAGNOSIS — Z11.59 NEED FOR HEPATITIS C SCREENING TEST: ICD-10-CM

## 2021-01-11 DIAGNOSIS — E53.8 VITAMIN B12 DEFICIENCY: Primary | ICD-10-CM

## 2021-01-11 DIAGNOSIS — M25.562 CHRONIC PAIN OF LEFT KNEE: ICD-10-CM

## 2021-01-11 DIAGNOSIS — G89.29 CHRONIC PAIN OF LEFT KNEE: ICD-10-CM

## 2021-01-11 PROCEDURE — 4004F PT TOBACCO SCREEN RCVD TLK: CPT | Performed by: FAMILY MEDICINE

## 2021-01-11 PROCEDURE — G8484 FLU IMMUNIZE NO ADMIN: HCPCS | Performed by: FAMILY MEDICINE

## 2021-01-11 PROCEDURE — 99213 OFFICE O/P EST LOW 20 MIN: CPT | Performed by: FAMILY MEDICINE

## 2021-01-11 PROCEDURE — G8417 CALC BMI ABV UP PARAM F/U: HCPCS | Performed by: FAMILY MEDICINE

## 2021-01-11 PROCEDURE — G8427 DOCREV CUR MEDS BY ELIG CLIN: HCPCS | Performed by: FAMILY MEDICINE

## 2021-01-11 ASSESSMENT — ENCOUNTER SYMPTOMS
NAUSEA: 0
DIARRHEA: 0
SHORTNESS OF BREATH: 0
COUGH: 0
VOMITING: 0
ABDOMINAL PAIN: 0
WHEEZING: 0

## 2021-01-11 ASSESSMENT — PATIENT HEALTH QUESTIONNAIRE - PHQ9
SUM OF ALL RESPONSES TO PHQ9 QUESTIONS 1 & 2: 0
SUM OF ALL RESPONSES TO PHQ QUESTIONS 1-9: 0
SUM OF ALL RESPONSES TO PHQ QUESTIONS 1-9: 0
2. FEELING DOWN, DEPRESSED OR HOPELESS: 0

## 2021-01-11 NOTE — PROGRESS NOTES
 albuterol sulfate HFA (PROAIR HFA) 108 (90 Base) MCG/ACT inhaler Inhale 2 puffs into the lungs every 6 hours as needed for Wheezing 1 Inhaler 1    Aspirin-Acetaminophen-Caffeine (EXCEDRIN MIGRAINE PO) Take by mouth daily      Multiple Vitamins-Minerals (THERAPEUTIC MULTIVITAMIN-MINERALS) tablet Take 1 tablet by mouth daily       No current facility-administered medications on file prior to visit. No Known Allergies    Past medical, surgical, socialand/or family history reviewed, updated as needed, and are non-contributory (unless otherwise stated). Medications, allergies, and problem list also reviewed and updated as needed in patient's record. Wt Readings from Last 3 Encounters:   01/11/21 (!) 419 lb (190.1 kg)   12/17/20 (!) 414 lb (187.8 kg)   12/14/20 (!) 414 lb (187.8 kg)                   /80 (Site: Left Upper Arm, Position: Sitting)   Pulse 92   Resp 18   Ht 5' 7.5\" (1.715 m)   Wt (!) 419 lb (190.1 kg)   SpO2 99%   BMI 64.66 kg/m²        Physical Exam  Vitals signs and nursing note reviewed. Constitutional:       General: She is not in acute distress. Appearance: She is well-developed. She is not diaphoretic. HENT:      Head: Normocephalic and atraumatic. Cardiovascular:      Rate and Rhythm: Normal rate and regular rhythm. Heart sounds: Normal heart sounds. No murmur. Pulmonary:      Effort: Pulmonary effort is normal. No respiratory distress. Breath sounds: Normal breath sounds. No wheezing or rales. Abdominal:      General: Bowel sounds are normal. There is no distension. Palpations: Abdomen is soft. Tenderness: There is no abdominal tenderness. There is no guarding. Musculoskeletal: Normal range of motion. General: Swelling (trace edema) present.    Psychiatric:         Behavior: Behavior normal.       Results for orders placed or performed during the hospital encounter of 12/03/20   CBC Auto Differential   Result Value Ref Range WBC 9.2 4.5 - 11.5 E9/L    RBC 4.73 3.50 - 5.50 E12/L    Hemoglobin 11.7 11.5 - 15.5 g/dL    Hematocrit 37.4 34.0 - 48.0 %    MCV 79.1 (L) 80.0 - 99.9 fL    MCH 24.7 (L) 26.0 - 35.0 pg    MCHC 31.3 (L) 32.0 - 34.5 %    RDW 17.0 (H) 11.5 - 15.0 fL    Platelets 878 713 - 405 E9/L    MPV 10.8 7.0 - 12.0 fL    Neutrophils % 68.2 43.0 - 80.0 %    Immature Granulocytes % 0.3 0.0 - 5.0 %    Lymphocytes % 23.7 20.0 - 42.0 %    Monocytes % 6.1 2.0 - 12.0 %    Eosinophils % 1.5 0.0 - 6.0 %    Basophils % 0.2 0.0 - 2.0 %    Neutrophils Absolute 6.24 1.80 - 7.30 E9/L    Immature Granulocytes # 0.03 E9/L    Lymphocytes Absolute 2.17 1.50 - 4.00 E9/L    Monocytes Absolute 0.56 0.10 - 0.95 E9/L    Eosinophils Absolute 0.14 0.05 - 0.50 E9/L    Basophils Absolute 0.02 0.00 - 0.20 E9/L   Comprehensive Metabolic Panel w/ Reflex to MG   Result Value Ref Range    Sodium 138 132 - 146 mmol/L    Potassium reflex Magnesium 3.8 3.5 - 5.0 mmol/L    Chloride 99 98 - 107 mmol/L    CO2 29 22 - 29 mmol/L    Anion Gap 10 7 - 16 mmol/L    Glucose 96 74 - 99 mg/dL    BUN 15 6 - 20 mg/dL    CREATININE 0.9 0.5 - 1.0 mg/dL    GFR Non-African American >60 >=60 mL/min/1.73    GFR African American >60     Calcium 9.3 8.6 - 10.2 mg/dL    Total Protein 6.8 6.4 - 8.3 g/dL    Alb 3.8 3.5 - 5.2 g/dL    Total Bilirubin 0.3 0.0 - 1.2 mg/dL    Alkaline Phosphatase 90 35 - 104 U/L    ALT 11 0 - 32 U/L    AST 11 0 - 31 U/L   Troponin   Result Value Ref Range    Troponin <0.01 0.00 - 0.03 ng/mL   POC Pregnancy Urine   Result Value Ref Range    HCG, Urine, POC Negative Negative    Lot Number [id number]     Positive QC Pass/Fail Pass     Negative QC Pass/Fail Pass    EKG 12 Lead   Result Value Ref Range    Ventricular Rate 91 BPM    Atrial Rate 91 BPM    P-R Interval 186 ms    QRS Duration 84 ms    Q-T Interval 374 ms    QTc Calculation (Bazett) 460 ms    P Axis 33 degrees    R Axis 32 degrees    T Axis 21 degrees       ASSESSMENT/PLAN Daniel Aguilar was seen today for knee pain. Diagnoses and all orders for this visit:    Vitamin B12 deficiency  -     VITAMIN B12; Future    Encounter for screening for HIV  -     HIV-1 AND HIV-2 ANTIBODIES; Future    Need for hepatitis C screening test  -     HEPATITIS C ANTIBODY; Future    Essential hypertension   - Blood pressure controlled. Chronic pain of left knee   - Seeing Dr. Roxi Varela           Phone/MyChart follow up if tests abnormal.    Return in about 3 months (around 4/11/2021). or sooner if necessary. I have reviewed myfindings and recommendations with Daniel Aguilar. Leah Pierce.  Tom Skinner M.D

## 2021-01-12 ENCOUNTER — OFFICE VISIT (OUTPATIENT)
Dept: ORTHOPEDIC SURGERY | Age: 36
End: 2021-01-12
Payer: COMMERCIAL

## 2021-01-12 VITALS — HEIGHT: 68 IN | BODY MASS INDEX: 44.41 KG/M2 | WEIGHT: 293 LBS | TEMPERATURE: 98.6 F

## 2021-01-12 DIAGNOSIS — M17.12 PRIMARY OSTEOARTHRITIS OF LEFT KNEE: ICD-10-CM

## 2021-01-12 DIAGNOSIS — Z77.22 TOBACCO SMOKE EXPOSURE: ICD-10-CM

## 2021-01-12 DIAGNOSIS — M22.2X2 PATELLOFEMORAL DISORDER OF LEFT KNEE: Primary | ICD-10-CM

## 2021-01-12 DIAGNOSIS — Z71.82 EXERCISE COUNSELING: ICD-10-CM

## 2021-01-12 PROCEDURE — G8417 CALC BMI ABV UP PARAM F/U: HCPCS | Performed by: ORTHOPAEDIC SURGERY

## 2021-01-12 PROCEDURE — 99214 OFFICE O/P EST MOD 30 MIN: CPT | Performed by: ORTHOPAEDIC SURGERY

## 2021-01-12 PROCEDURE — 4004F PT TOBACCO SCREEN RCVD TLK: CPT | Performed by: ORTHOPAEDIC SURGERY

## 2021-01-12 PROCEDURE — G8484 FLU IMMUNIZE NO ADMIN: HCPCS | Performed by: ORTHOPAEDIC SURGERY

## 2021-01-12 PROCEDURE — G8427 DOCREV CUR MEDS BY ELIG CLIN: HCPCS | Performed by: ORTHOPAEDIC SURGERY

## 2021-01-12 RX ORDER — MELOXICAM 15 MG/1
15 TABLET ORAL DAILY
Qty: 30 TABLET | Refills: 2 | Status: SHIPPED
Start: 2021-01-12 | End: 2021-02-15 | Stop reason: ALTCHOICE

## 2021-01-12 NOTE — PROGRESS NOTES
Chief Complaint   Patient presents with    Knee Pain     Left knee MRI follow up. Knee is still popping and same pain as last visit. HPI:    Patient is 30 y.o. female complaining chronic, atraumatic, insidious onset left knee pain for several months but worse over the last 2 weeks. She admits to stiffness, deep, aching pain, swelling, difficulty with stairs and ambulating far distances. She admits to gross instability specifically in her Left knee. Previous treatments include rest, ice, and anti-inflammatory medication and HEP without much relief. Follows up after MRI. ROS:    Skin: (-) rash,(-) psoriasis,(-) eczema, (-)skin cancer. Neurologic: (-)numbness, (-)tingling, (-)headaches, (-) LOC. Cardiovascular: (-) Chest pain, (-) swelling in legs/feet, (-) SOB, (-) cramping in legs/feet with walking. All other review of systems negative except stated above or in HPI      Past Medical History:   Diagnosis Date    Asthma     B12 deficiency 09/21/2018    Class 3 severe obesity due to excess calories with body mass index (BMI) of 60.0 to 69.9 in adult ([facility] [address]     GERD without esophagitis     Hypertension     Migraine     PRIYA (obstructive sleep apnea)     Vitamin D deficiency 09/21/2018    Zinc deficiency 09/21/2018     Past Surgical History:   Procedure Laterality Date    CHOLECYSTECTOMY, LAPAROSCOPIC  3/2014 Timmy Arias    with repair umbilical hernia    LEEP  3/2008    OTHER SURGICAL HISTORY  2011    coils to fallopian tubes    OTHER SURGICAL HISTORY      Tubal essure     TONSILLECTOMY AND ADENOIDECTOMY      TYMPANOSTOMY TUBE PLACEMENT      UMBILICAL HERNIA REPAIR  3/2014 Mary Grace Adkins UPPER GASTROINTESTINAL ENDOSCOPY N/A 5/2/2018    EGD BIOPSY performed by Donna Maynard MD at [facility] ENDOSCOPY       Current Outpatient Medications:     meloxicam (MOBIC) 15 MG tablet, Take 1 tablet by mouth daily Take with food. , Disp: 30 tablet, Rfl: 2   metoprolol succinate (TOPROL XL) 25 MG extended release tablet, Take 1 tablet by mouth daily, Disp: 30 tablet, Rfl: 1    vitamin B-12 (CYANOCOBALAMIN) 1000 MCG tablet, Take 1 tablet by mouth daily, Disp: 30 tablet, Rfl: 3    hydroCHLOROthiazide (HYDRODIURIL) 25 MG tablet, Take 1 tablet by mouth nightly, Disp: 30 tablet, Rfl: 3    diclofenac sodium (VOLTAREN) 1 % GEL, Apply 4 g topically 4 times daily as needed for Pain, Disp: 350 g, Rfl: 0    sertraline (ZOLOFT) 25 MG tablet, Take 1 tablet by mouth daily, Disp: 30 tablet, Rfl: 5    Elastic Bandages & Supports (KNEE BRACE/CUSHION/L-XL) MISC, Wear daily for knee pain, Disp: 1 each, Rfl: 0    fluticasone (FLONASE) 50 MCG/ACT nasal spray, 1 spray by Nasal route daily One spray in each nostril daily, Disp: 1 Bottle, Rfl: 0    albuterol sulfate HFA (PROAIR HFA) 108 (90 Base) MCG/ACT inhaler, Inhale 2 puffs into the lungs every 6 hours as needed for Wheezing, Disp: 1 Inhaler, Rfl: 1    Aspirin-Acetaminophen-Caffeine (EXCEDRIN MIGRAINE PO), Take by mouth daily, Disp: , Rfl:     Multiple Vitamins-Minerals (THERAPEUTIC MULTIVITAMIN-MINERALS) tablet, Take 1 tablet by mouth daily, Disp: , Rfl:   No Known Allergies  Social History     Socioeconomic History    Marital status: Single     Spouse name: Not on file    Number of children: Not on file    Years of education: Not on file    Highest education level: Not on file   Occupational History    Not on file   Social Needs    Financial resource strain: Not on file    Food insecurity     Worry: Not on file     Inability: Not on file    Transportation needs     Medical: Not on file     Non-medical: Not on file   Tobacco Use    Smoking status: Current Every Day Smoker     Packs/day: 0.20     Years: 15.00     Pack years: 3.00     Types: Cigarettes    Smokeless tobacco: Never Used    Tobacco comment: has cut down to couple cigs a daily    Substance and Sexual Activity    Alcohol use:  Yes Alcohol/week: 2.0 - 3.0 standard drinks     Types: 2 - 3 Shots of liquor per week     Comment: occasionally    Drug use: No    Sexual activity: Yes     Partners: Male   Lifestyle    Physical activity     Days per week: Not on file     Minutes per session: Not on file    Stress: Not on file   Relationships    Social connections     Talks on phone: Not on file     Gets together: Not on file     Attends [employer] service: Not on file     Active member of club or organization: Not on file     Attends meetings of clubs or organizations: Not on file     Relationship status: Not on file    Intimate partner violence     Fear of current or ex partner: Not on file     Emotionally abused: Not on file     Physically abused: Not on file     Forced sexual activity: Not on file   Other Topics Concern    Not on file   Social History Narrative    Not on file     Family History   Problem Relation Age of Onset    Cancer Paternal Grandmother         stomach    Brain Cancer Maternal Grandmother     Hypertension Father     Stroke Father     Heart Attack Father     Diabetes Father     Other Father         gout    Hypertension Mother     Scoliosis Sister     Diabetes Maternal Grandfather     Heart Disease Maternal Grandfather            Physical Exam:    Temp 98.6 °F (37 °C)   Ht 5' 7.5\" (1.715 m)   Wt (!) 419 lb (190.1 kg)   BMI 64.66 kg/m²     GENERAL: alert, appears stated age, cooperative, no acute distress    HEENT: Head is normocephalic, atraumatic. PERRLA. SKIN: Clean, dry, intact. There is not any cellulitis or cutaneous lesions noted in the lower extremities except noted below in MSK    PULMONARY: breathing is regular and unlabored, no acute distress    CV: The bilateral upper and lower extremities are warm and well-perfused with brisk capillary refill.  2+ pulses UE and LE bilateral.     PSYCHIATRY: Pleasant mood, appropriate behavior, follows commands NEURO: Sensation is intact distally with light touch with no alteration. Motor exam of the lower extremities show quadriceps, hamstrings, foot dorsiflexion and plantarflexion grossly intact 5/5. LYMPH: No lymphedema present distally in upper or lower extremity. MUSCULOSKELETAL:    Left knee Exam:    mild effusion noted. No erythema/induration/fluctuance. Posterior medial joint line TTP. Stable to varus and valgus at 0 and 30 degrees of flexion. Negative Lachman's and posterior drawer. Negative patellar grind test and J sign. Compartments soft and compressible throughout leg. Active range of motion 0-120 with pain. Positive Maribel's positive Apley's, gait is antalgic        Imaging:  Xr Knee Left (1-2 Views)    Result Date: 12/3/2020  EXAMINATION: TWO XRAY VIEWS OF THE LEFT KNEE 12/3/2020 3:25 pm COMPARISON: December 1, 2013 HISTORY: ORDERING SYSTEM PROVIDED HISTORY: pain TECHNOLOGIST PROVIDED HISTORY: Reason for exam:->pain FINDINGS: Moderate narrowing of medial compartment with mild marginal osteophytosis. Mild osteophytosis is seen along margin of the lateral compartment. Osteophytosis is seen along superior and inferior articular margin of the patella. No synovial effusion. No fracture or dislocation. 1.  Tricompartmental osteoarthritis. 2.  No fracture or dislocation. Us Dup Lower Extremity Left Shashank    Result Date: 12/3/2020  EXAMINATION: DUPLEX VENOUS ULTRASOUND OF THE LEFT LOWER EXTREMITY 12/3/2020 1:38 pm TECHNIQUE: Duplex ultrasound and Doppler images were obtained of the left lower extremity. COMPARISON: 29 April 2009 HISTORY: ORDERING SYSTEM PROVIDED HISTORY: Discomfort TECHNOLOGIST PROVIDED HISTORY: Reason for exam:->Discomfort What reading provider will be dictating this exam?->CRC FINDINGS: The visualized veins of the left lower extremity are patent and free of echogenic thrombus. The veins are normally compressible and have normal phasic flow. No evidence of DVT in the left lower extremity.     Mri Knee Left Wo Contrast    Result Date: 1/7/2021 EXAMINATION: MRI OF THE LEFT KNEE WITHOUT CONTRAST, 1/7/2021 3:50 pm TECHNIQUE: Multiplanar multisequence MRI of the left knee was performed without the administration of intravenous contrast. COMPARISON: Left knee plain radiographs from 12/03/2020 HISTORY: ORDERING SYSTEM PROVIDED HISTORY: Tear of meniscus of left knee, unspecified meniscus, unspecified tear type, unspecified whether old or current tear 81-year-old female with tear of medial meniscus left knee FINDINGS: Exam limited due to patient motion and artifact. MENISCI: Both the medial and lateral menisci demonstrate normal morphology and signal characteristics. No MR evidence that meets the criteria for a tear within the medial or lateral meniscus. CRUCIATE LIGAMENTS: Anterior and posterior cruciate ligaments appear intact. EXTENSOR MECHANISM: Mild multifocal patellar tendinosis. Distal quadriceps tendon and patellar retinacula appear intact. LATERAL COLLATERAL LIGAMENT COMPLEX: Popliteus muscle/tendon, iliotibial band, lateral collateral ligament, and biceps femoris appear intact. MEDIAL COLLATERAL LIGAMENT COMPLEX: Medial collateral ligament complex appears grossly intact. KNEE JOINT: Small joint effusion. Osseous alignment is normal. No acute fracture or dislocation. Grade 2-3 medial compartment chondromalacia. Grade 2 lateral compartment chondromalacia with areas of partial and full-thickness fissuring and undersurface delamination. Grade 2-3 patellofemoral chondromalacia with underlying subcortical cystic changes in the patellar apex and lateral patellar facet. Mild tricompartmental osteophyte spurring. BONE MARROW: Intermediate signal in the distal femur, proximal fibula, and proximal tibia likely related to red marrow reconversion. SOFT TISSUES: Moderate edema and fluid in the subcutaneous fat along the anterior knee. No sizable Baker's cyst.  Visualized popliteal neurovascular bundle grossly unremarkable. Patient educated about the healing rates with this condition. Patient does smoke and does have secondhand smoke exposure. In this discussion of approximately 5 minutes, patient was counseled about decrease in smoking exposure regards to healing and overall good health. Patient seems reluctant but is willing to listen to the discussion in detail. She states that she will try to cut down as much as possible and states that she will try to quit completely by the next visit. Monserrat Coelho DO        25 minutes was spent with patient. 50% or greater was spent counseling the patient.

## 2021-01-13 ENCOUNTER — OFFICE VISIT (OUTPATIENT)
Dept: BARIATRICS/WEIGHT MGMT | Age: 36
End: 2021-01-13
Payer: COMMERCIAL

## 2021-01-13 VITALS
WEIGHT: 293 LBS | DIASTOLIC BLOOD PRESSURE: 119 MMHG | HEART RATE: 76 BPM | SYSTOLIC BLOOD PRESSURE: 204 MMHG | HEIGHT: 66 IN | TEMPERATURE: 97.5 F | BODY MASS INDEX: 47.09 KG/M2

## 2021-01-13 DIAGNOSIS — I10 ESSENTIAL HYPERTENSION: ICD-10-CM

## 2021-01-13 DIAGNOSIS — E66.01 CLASS 3 SEVERE OBESITY DUE TO EXCESS CALORIES WITHOUT SERIOUS COMORBIDITY WITH BODY MASS INDEX (BMI) OF 60.0 TO 69.9 IN ADULT (HCC): ICD-10-CM

## 2021-01-13 DIAGNOSIS — G47.33 OSA (OBSTRUCTIVE SLEEP APNEA): Primary | ICD-10-CM

## 2021-01-13 PROCEDURE — 4004F PT TOBACCO SCREEN RCVD TLK: CPT | Performed by: INTERNAL MEDICINE

## 2021-01-13 PROCEDURE — G8428 CUR MEDS NOT DOCUMENT: HCPCS | Performed by: INTERNAL MEDICINE

## 2021-01-13 PROCEDURE — 99215 OFFICE O/P EST HI 40 MIN: CPT | Performed by: INTERNAL MEDICINE

## 2021-01-13 PROCEDURE — G8484 FLU IMMUNIZE NO ADMIN: HCPCS | Performed by: INTERNAL MEDICINE

## 2021-01-13 PROCEDURE — 99202 OFFICE O/P NEW SF 15 MIN: CPT

## 2021-01-13 PROCEDURE — G8417 CALC BMI ABV UP PARAM F/U: HCPCS | Performed by: INTERNAL MEDICINE

## 2021-01-13 RX ORDER — METOPROLOL SUCCINATE 25 MG/1
25 TABLET, EXTENDED RELEASE ORAL DAILY
Qty: 30 TABLET | Refills: 1 | Status: SHIPPED
Start: 2021-01-13 | End: 2021-03-08 | Stop reason: SDUPTHER

## 2021-01-13 NOTE — PROGRESS NOTES
CC -   PRIYA, Obesity    Background -   First visit: 1/13/21 (previously followed with me for pre-bariatric surgery weight loss)    · PRIYA  Diagnosed 11/09/20  Mild  Followed by Dr. Brayan Kaplan  Using APAP 7d/wk, uses <3 hrs for 3d/wk  Excess weight is worsening her underlying airway obstruction    · Obesity   Began in late teens  Initial BMI 67.3, Wt 417.2 lbs  HS Grad wt 150 lbs  Lowest   wt  150 lbs  Highest  wt  417 lbs  Pattern of wt gain: grad with rapid increases in preg  Wt change past yr: +30 lbs  Most wt lost: 20 lbs (pre-Bariatric surgery diet at our clinic)  Other diets attempted: Fad diets, ESTEBAN, Keto, Calorie counting    Initial Diet:    Number of meals per day - 1-2    Number of snacks per day - 1    Meal volume - 12\" plate, sometimes seconds    Fast food/convenience store - 2x/week    Restaurants (not fast food) - 0x/week   Sweets - 1d/week   Chips - 0d/week   Crackers/pretzels - 0d/week   Nuts - 0d/week   Peanut Butter - 1-2d/week   Popcorn - 0d/week   Dried fruit - 0d/week   Whole fruit - 5-6d/week (2 serving)   Breakfast cereal - 0-1d/week   Granola/Protein/Energy bar - 0d/week   Sugar sweetened beverages - 4 liters/day, 1 large DD isabel Iced coffee with extra cream and sugar 4x/wk (350 michael each)    Protein - No supplements   Fiber - No supplements     Exercise:    Gym membership - [employer]    Walking - none    Running - none    Resistance - none    Aerobic class - none    ______________________    [address] [place] [address] -  Past Medical History:   Diagnosis Date    Asthma     B12 deficiency 9/21/2018    GERD without esophagitis     Hypertension     Migraine     Morbid obesity due to excess calories (HCC)     Vitamin D deficiency 9/21/2018    Zinc deficiency 9/21/2018     Current Outpatient Medications   Medication Sig Dispense Refill    meloxicam (MOBIC) 15 MG tablet Take 1 tablet by mouth daily Take with food.  30 tablet 2  vitamin B-12 (CYANOCOBALAMIN) 1000 MCG tablet Take 1 tablet by mouth daily 30 tablet 3    hydroCHLOROthiazide (HYDRODIURIL) 25 MG tablet Take 1 tablet by mouth nightly 30 tablet 3    diclofenac sodium (VOLTAREN) 1 % GEL Apply 4 g topically 4 times daily as needed for Pain 350 g 0    sertraline (ZOLOFT) 25 MG tablet Take 1 tablet by mouth daily 30 tablet 5    Elastic Bandages & Supports (KNEE BRACE/CUSHION/L-XL) MISC Wear daily for knee pain 1 each 0    fluticasone (FLONASE) 50 MCG/ACT nasal spray 1 spray by Nasal route daily One spray in each nostril daily 1 Bottle 0    albuterol sulfate HFA (PROAIR HFA) 108 (90 Base) MCG/ACT inhaler Inhale 2 puffs into the lungs every 6 hours as needed for Wheezing 1 Inhaler 1    Aspirin-Acetaminophen-Caffeine (EXCEDRIN MIGRAINE PO) Take by mouth daily      Multiple Vitamins-Minerals (THERAPEUTIC MULTIVITAMIN-MINERALS) tablet Take 1 tablet by mouth daily       No current facility-administered medications for this visit. ROS -  Card - no CP  GI - no N/V/D    PE -  Gen : BP (!) 162/98 (Site: Left Lower Arm, Position: Sitting, Cuff Size: Medium Adult)   Pulse 80   Temp 97.5 °F (36.4 °C) (Temporal)   Ht 5' 6\" (1.676 m)   Wt (!) 417 lb 3.2 oz (189.2 kg)   BMI 67.34 kg/m²     Repeat 204/119   WN, WD, NAD  Lung: Nml resp effort  Psych: Normal mood   Full affect  Neuro:  Moves all ext well  ______________________      HPI & A/P -     Problem 1  - PRIYA  HPI   - See above Background for description      Needs to reduce her underlying airway obstruction by decreasing her excess weight  Assessment  - uncontrolled  Plan   - Increase use of autopap      Proceed with wt reduction per the plan below    Problem 2  - Obesity   HPI   - See above Background for description    Weight  Date    417.2 lbs 01/13/21                DEN = 2350 michael/d = 16,450 michael/wk Wt effect of trouble foods = Restaurant 300 michael/wk + Sweets 200 + SSBs 12,300 = 12,800 michael/wk = 78% DEN = 1825 michael/day = 182 lbs/yr    No weight loss will occur without giving up the SSB's    Will do this first and add additional interventions after I see the rate of weight loss this produces  Assessment  - Uncontrolled   Plan   -   Eliminate all liquid calories (wean off caffeine over two weeks)    Problem 3 - Hypertensive urgency  HPI  - /199 at end of encounter      Did not take her HCTZ yet today      States she has been episodes of severe hypertensive episodes       Will monitor for flushing, sweating and/or palpitations. If these occur, will work up for pheo      I spoke with Dr. Eliane Gambino her PCP.  She is okay with me starting metoprolol  Assessment - Uncontrolled  Plan  - Start Metoprolol Succinate 25 mg one tablet daily      Cont HCTZ 25 mg daily    Follow up  Return to see me in 4 weeks  Call me with problems: [phone]    Total time spent on encounter: 45 min    Marylene Reels, MD  Endocrinology/Obesity  1/13/21

## 2021-01-13 NOTE — PATIENT INSTRUCTIONS
Eliminate all liquid calories (wean off caffeine over two weeks)  See me back in four weeks    Start Metoprolol Succinate 25 mg one tablet daily    Call me with problems: [phone]

## 2021-01-14 ENCOUNTER — TELEPHONE (OUTPATIENT)
Dept: FAMILY MEDICINE CLINIC | Age: 36
End: 2021-01-14

## 2021-01-14 NOTE — TELEPHONE ENCOUNTER
----- Message from Nisha Palomino MD sent at 1/13/2021  3:36 PM EST -----  Regarding: appointment. Please schedule patient appointment with me in 4 weeks.

## 2021-02-10 ENCOUNTER — APPOINTMENT (OUTPATIENT)
Dept: GENERAL RADIOLOGY | Age: 36
End: 2021-02-10
Payer: COMMERCIAL

## 2021-02-10 ENCOUNTER — OFFICE VISIT (OUTPATIENT)
Dept: FAMILY MEDICINE CLINIC | Age: 36
End: 2021-02-10
Payer: COMMERCIAL

## 2021-02-10 ENCOUNTER — APPOINTMENT (OUTPATIENT)
Dept: ULTRASOUND IMAGING | Age: 36
End: 2021-02-10
Payer: COMMERCIAL

## 2021-02-10 ENCOUNTER — HOSPITAL ENCOUNTER (EMERGENCY)
Age: 36
Discharge: HOME | End: 2021-02-10
Attending: EMERGENCY MEDICINE
Payer: COMMERCIAL

## 2021-02-10 ENCOUNTER — OFFICE VISIT (OUTPATIENT)
Dept: BARIATRICS/WEIGHT MGMT | Age: 36
End: 2021-02-10
Payer: COMMERCIAL

## 2021-02-10 VITALS
HEART RATE: 80 BPM | DIASTOLIC BLOOD PRESSURE: 117 MMHG | SYSTOLIC BLOOD PRESSURE: 190 MMHG | BODY MASS INDEX: 47.09 KG/M2 | HEIGHT: 66 IN | TEMPERATURE: 97.9 F | WEIGHT: 293 LBS

## 2021-02-10 VITALS
RESPIRATION RATE: 20 BRPM | DIASTOLIC BLOOD PRESSURE: 118 MMHG | SYSTOLIC BLOOD PRESSURE: 240 MMHG | WEIGHT: 293 LBS | HEART RATE: 86 BPM | BODY MASS INDEX: 47.09 KG/M2 | TEMPERATURE: 98.2 F | HEIGHT: 66 IN | OXYGEN SATURATION: 99 %

## 2021-02-10 VITALS
RESPIRATION RATE: 20 BRPM | OXYGEN SATURATION: 98 % | WEIGHT: 293 LBS | SYSTOLIC BLOOD PRESSURE: 181 MMHG | HEART RATE: 81 BPM | TEMPERATURE: 97.6 F | BODY MASS INDEX: 45.99 KG/M2 | DIASTOLIC BLOOD PRESSURE: 102 MMHG | HEIGHT: 67 IN

## 2021-02-10 DIAGNOSIS — I16.0 HYPERTENSIVE URGENCY: Primary | ICD-10-CM

## 2021-02-10 DIAGNOSIS — G47.33 OSA (OBSTRUCTIVE SLEEP APNEA): Primary | ICD-10-CM

## 2021-02-10 DIAGNOSIS — I10 ESSENTIAL HYPERTENSION: ICD-10-CM

## 2021-02-10 DIAGNOSIS — E66.01 CLASS 3 SEVERE OBESITY DUE TO EXCESS CALORIES WITHOUT SERIOUS COMORBIDITY WITH BODY MASS INDEX (BMI) OF 60.0 TO 69.9 IN ADULT (HCC): ICD-10-CM

## 2021-02-10 DIAGNOSIS — I10 ESSENTIAL HYPERTENSION: Primary | ICD-10-CM

## 2021-02-10 LAB
ALBUMIN SERPL-MCNC: 3.7 G/DL (ref 3.5–5.2)
ALP BLD-CCNC: 83 U/L (ref 35–104)
ALT SERPL-CCNC: 10 U/L (ref 0–32)
ANION GAP SERPL CALCULATED.3IONS-SCNC: 9 MMOL/L (ref 7–16)
AST SERPL-CCNC: 10 U/L (ref 0–31)
BASOPHILS ABSOLUTE: 0.04 E9/L (ref 0–0.2)
BASOPHILS RELATIVE PERCENT: 0.4 % (ref 0–2)
BILIRUB SERPL-MCNC: 0.3 MG/DL (ref 0–1.2)
BUN BLDV-MCNC: 14 MG/DL (ref 6–20)
CALCIUM SERPL-MCNC: 9.2 MG/DL (ref 8.6–10.2)
CHLORIDE BLD-SCNC: 103 MMOL/L (ref 98–107)
CO2: 27 MMOL/L (ref 22–29)
CREAT SERPL-MCNC: 0.8 MG/DL (ref 0.5–1)
EOSINOPHILS ABSOLUTE: 0.23 E9/L (ref 0.05–0.5)
EOSINOPHILS RELATIVE PERCENT: 2.2 % (ref 0–6)
GFR AFRICAN AMERICAN: >60
GFR NON-AFRICAN AMERICAN: >60 ML/MIN/1.73
GLUCOSE BLD-MCNC: 110 MG/DL (ref 74–99)
HCG, URINE, POC: NEGATIVE
HCT VFR BLD CALC: 35.6 % (ref 34–48)
HEMOGLOBIN: 11 G/DL (ref 11.5–15.5)
IMMATURE GRANULOCYTES #: 0.02 E9/L
IMMATURE GRANULOCYTES %: 0.2 % (ref 0–5)
LYMPHOCYTES ABSOLUTE: 2.37 E9/L (ref 1.5–4)
LYMPHOCYTES RELATIVE PERCENT: 23 % (ref 20–42)
Lab: NORMAL
MCH RBC QN AUTO: 24.8 PG (ref 26–35)
MCHC RBC AUTO-ENTMCNC: 30.9 % (ref 32–34.5)
MCV RBC AUTO: 80.2 FL (ref 80–99.9)
MONOCYTES ABSOLUTE: 0.63 E9/L (ref 0.1–0.95)
MONOCYTES RELATIVE PERCENT: 6.1 % (ref 2–12)
NEGATIVE QC PASS/FAIL: NORMAL
NEUTROPHILS ABSOLUTE: 7.01 E9/L (ref 1.8–7.3)
NEUTROPHILS RELATIVE PERCENT: 68.1 % (ref 43–80)
PDW BLD-RTO: 16.9 FL (ref 11.5–15)
PLATELET # BLD: 302 E9/L (ref 130–450)
PMV BLD AUTO: 10.9 FL (ref 7–12)
POSITIVE QC PASS/FAIL: NORMAL
POTASSIUM SERPL-SCNC: 4.2 MMOL/L (ref 3.5–5)
PRO-BNP: 151 PG/ML (ref 0–125)
RBC # BLD: 4.44 E12/L (ref 3.5–5.5)
SODIUM BLD-SCNC: 139 MMOL/L (ref 132–146)
TOTAL PROTEIN: 6.4 G/DL (ref 6.4–8.3)
TROPONIN: <0.01 NG/ML (ref 0–0.03)
WBC # BLD: 10.3 E9/L (ref 4.5–11.5)

## 2021-02-10 PROCEDURE — 4004F PT TOBACCO SCREEN RCVD TLK: CPT | Performed by: FAMILY MEDICINE

## 2021-02-10 PROCEDURE — G8428 CUR MEDS NOT DOCUMENT: HCPCS | Performed by: INTERNAL MEDICINE

## 2021-02-10 PROCEDURE — 85025 COMPLETE CBC W/AUTO DIFF WBC: CPT

## 2021-02-10 PROCEDURE — 96374 THER/PROPH/DIAG INJ IV PUSH: CPT

## 2021-02-10 PROCEDURE — G8484 FLU IMMUNIZE NO ADMIN: HCPCS | Performed by: INTERNAL MEDICINE

## 2021-02-10 PROCEDURE — 83880 ASSAY OF NATRIURETIC PEPTIDE: CPT

## 2021-02-10 PROCEDURE — 80053 COMPREHEN METABOLIC PANEL: CPT

## 2021-02-10 PROCEDURE — 96375 TX/PRO/DX INJ NEW DRUG ADDON: CPT

## 2021-02-10 PROCEDURE — 2500000003 HC RX 250 WO HCPCS: Performed by: EMERGENCY MEDICINE

## 2021-02-10 PROCEDURE — G8484 FLU IMMUNIZE NO ADMIN: HCPCS | Performed by: FAMILY MEDICINE

## 2021-02-10 PROCEDURE — G8417 CALC BMI ABV UP PARAM F/U: HCPCS | Performed by: FAMILY MEDICINE

## 2021-02-10 PROCEDURE — 93005 ELECTROCARDIOGRAM TRACING: CPT | Performed by: EMERGENCY MEDICINE

## 2021-02-10 PROCEDURE — G8427 DOCREV CUR MEDS BY ELIG CLIN: HCPCS | Performed by: FAMILY MEDICINE

## 2021-02-10 PROCEDURE — 4004F PT TOBACCO SCREEN RCVD TLK: CPT | Performed by: INTERNAL MEDICINE

## 2021-02-10 PROCEDURE — 71046 X-RAY EXAM CHEST 2 VIEWS: CPT

## 2021-02-10 PROCEDURE — 99213 OFFICE O/P EST LOW 20 MIN: CPT | Performed by: FAMILY MEDICINE

## 2021-02-10 PROCEDURE — 84484 ASSAY OF TROPONIN QUANT: CPT

## 2021-02-10 PROCEDURE — 93000 ELECTROCARDIOGRAM COMPLETE: CPT | Performed by: FAMILY MEDICINE

## 2021-02-10 PROCEDURE — 99213 OFFICE O/P EST LOW 20 MIN: CPT | Performed by: INTERNAL MEDICINE

## 2021-02-10 PROCEDURE — 99283 EMERGENCY DEPT VISIT LOW MDM: CPT

## 2021-02-10 PROCEDURE — 76770 US EXAM ABDO BACK WALL COMP: CPT

## 2021-02-10 PROCEDURE — G8417 CALC BMI ABV UP PARAM F/U: HCPCS | Performed by: INTERNAL MEDICINE

## 2021-02-10 PROCEDURE — 99211 OFF/OP EST MAY X REQ PHY/QHP: CPT

## 2021-02-10 RX ORDER — LABETALOL HYDROCHLORIDE 5 MG/ML
10 INJECTION, SOLUTION INTRAVENOUS ONCE
Status: COMPLETED | OUTPATIENT
Start: 2021-02-10 | End: 2021-02-10

## 2021-02-10 RX ORDER — ENALAPRILAT 2.5 MG/2ML
1.25 INJECTION INTRAVENOUS ONCE
Status: COMPLETED | OUTPATIENT
Start: 2021-02-10 | End: 2021-02-10

## 2021-02-10 RX ORDER — AMLODIPINE BESYLATE 5 MG/1
5 TABLET ORAL DAILY
Qty: 30 TABLET | Refills: 0 | Status: SHIPPED
Start: 2021-02-10 | End: 2021-02-10

## 2021-02-10 RX ORDER — LISINOPRIL 20 MG/1
20 TABLET ORAL DAILY
Qty: 90 TABLET | Refills: 1 | Status: SHIPPED
Start: 2021-02-10 | End: 2021-04-21 | Stop reason: CLARIF

## 2021-02-10 RX ADMIN — LABETALOL HYDROCHLORIDE 10 MG: 5 INJECTION INTRAVENOUS at 19:05

## 2021-02-10 RX ADMIN — ENALAPRILAT 1.25 MG: 2.5 INJECTION INTRAVENOUS at 19:00

## 2021-02-10 ASSESSMENT — ENCOUNTER SYMPTOMS
VOMITING: 0
DIARRHEA: 0
SINUS PRESSURE: 0
DIARRHEA: 0
ABDOMINAL PAIN: 0
SHORTNESS OF BREATH: 0
BACK PAIN: 0
CHEST TIGHTNESS: 0
WHEEZING: 0
NAUSEA: 0
COUGH: 0
ABDOMINAL PAIN: 0
SORE THROAT: 0
VOMITING: 0
COUGH: 0
SHORTNESS OF BREATH: 0
NAUSEA: 0
WHEEZING: 0

## 2021-02-10 NOTE — PROGRESS NOTES
[address] [place] [address]  [phone]   Neil Cabot , MD     Patient: Greta Kraus  YOB: 1985  Visit Date: 2/10/21    [facility] is a 30y.o. year old female here today for   Chief Complaint   Patient presents with    Hypertension     4 week follow up       HPI  Patient is a 30year old female here for hypertension. Started having episodes in the last month of heart palpitations and shortness of breath . Did not tell heart doctor because it just started happening after her appointment. Does not currently have any chest pain , sob, lightheadedness, headache, blurry vision. Has been compliant with bp meds. Review of Systems   Constitutional: Positive for fatigue. Negative for chills and fever. Respiratory: Negative for cough, shortness of breath and wheezing. Cardiovascular: Negative for chest pain, palpitations and leg swelling. Gastrointestinal: Negative for abdominal pain, diarrhea, nausea and vomiting. Musculoskeletal: Positive for arthralgias. Neurological: Negative for weakness, numbness and headaches.        Current Outpatient Medications on File Prior to Visit   Medication Sig Dispense Refill    metoprolol succinate (TOPROL XL) 25 MG extended release tablet Take 1 tablet by mouth daily 30 tablet 1    vitamin B-12 (CYANOCOBALAMIN) 1000 MCG tablet Take 1 tablet by mouth daily 30 tablet 3    hydroCHLOROthiazide (HYDRODIURIL) 25 MG tablet Take 1 tablet by mouth nightly 30 tablet 3    diclofenac sodium (VOLTAREN) 1 % GEL Apply 4 g topically 4 times daily as needed for Pain 350 g 0    sertraline (ZOLOFT) 25 MG tablet Take 1 tablet by mouth daily 30 tablet 5    Elastic Bandages & Supports (KNEE BRACE/CUSHION/L-XL) MISC Wear daily for knee pain 1 each 0    albuterol sulfate HFA (PROAIR HFA) 108 (90 Base) MCG/ACT inhaler Inhale 2 puffs into the lungs every 6 hours as needed for Wheezing 1 Inhaler 1  Aspirin-Acetaminophen-Caffeine (EXCEDRIN MIGRAINE PO) Take by mouth daily      Multiple Vitamins-Minerals (THERAPEUTIC MULTIVITAMIN-MINERALS) tablet Take 1 tablet by mouth daily       No current facility-administered medications on file prior to visit. No Known Allergies    Past medical, surgical, socialand/or family history reviewed, updated as needed, and are non-contributory (unless otherwise stated). Medications, allergies, and problem list also reviewed and updated as needed in patient's record. Wt Readings from Last 3 Encounters:   02/10/21 (!) 423 lb (191.9 kg)   02/10/21 (!) 423 lb (191.9 kg)   02/10/21 (!) 422 lb 12.8 oz (191.8 kg)                   BP (!) 240/118 (Site: Left Wrist)   Pulse 86   Temp 98.2 °F (36.8 °C)   Resp 20   Ht 5' 6\" (1.676 m)   Wt (!) 423 lb (191.9 kg)   LMP 01/08/2021   SpO2 99%   BMI 68.27 kg/m²        Physical Exam  Vitals signs and nursing note reviewed. Constitutional:       General: She is not in acute distress. Appearance: She is well-developed. She is not diaphoretic. HENT:      Head: Normocephalic and atraumatic. Cardiovascular:      Rate and Rhythm: Normal rate and regular rhythm. Heart sounds: Normal heart sounds. No murmur. Pulmonary:      Effort: Pulmonary effort is normal. No respiratory distress. Breath sounds: Normal breath sounds. No wheezing or rales. Abdominal:      General: Bowel sounds are normal. There is no distension. Palpations: Abdomen is soft. Tenderness: There is no abdominal tenderness. There is no guarding. Musculoskeletal: Normal range of motion. General: Swelling (trace edema) present.    Psychiatric:         Behavior: Behavior normal.       Results for orders placed or performed during the hospital encounter of 12/03/20   CBC Auto Differential   Result Value Ref Range    WBC 9.2 4.5 - 11.5 E9/L    RBC 4.73 3.50 - 5.50 E12/L    Hemoglobin 11.7 11.5 - 15.5 g/dL -     US RETROPERITONEAL CADENCE; Future  -     RENIN; Future  -     METANEPHRINES PLASMA FREE; Future  -     EKG 12 Lead  -     lisinopril (PRINIVIL;ZESTRIL) 20 MG tablet; Take 1 tablet by mouth daily    Other orders  -     Discontinue: amLODIPine (NORVASC) 5 MG tablet; Take 1 tablet by mouth daily      Work up for 2/2 causes of hypertension including pheochromocytoma and CADENCE   Consider referral to nephrology   To ER for hypertensive urgency       Phone/MyChart follow up if tests abnormal.    No follow-ups on file. or sooner if necessary. I have reviewed myfindings and recommendations with Daniel Aguilar. Leah Pierce.  Tom Skinner M.D

## 2021-02-10 NOTE — PATIENT INSTRUCTIONS
Stop mobic . Go to the ER . We will get an US of your kidney   We will get the 24 hour urine collection   Continue metoprolol and hydrochloriathizide   Start lisinopril 20mg.

## 2021-02-10 NOTE — PATIENT INSTRUCTIONS
Eliminate all liquid calories (wean off caffeine over two weeks)  See me back in four weeks    Cont Metoprolol Succinate 25 mg one tablet daily and HCTZ 25 mg daily  See Dr. Lasha De La Cruz today about your BP    Check BP whenever you have the palpitations and record in a notebook      Call me with problems: [phone]

## 2021-02-10 NOTE — PROGRESS NOTES
CC -   PRIYA, Obesity    BACKGROUND -   Last visit: 1/13/21  First visit: 1/13/21 (previously followed with me for pre-bariatric surgery weight loss)    · PRIYA  Diagnosed 11/09/20  Mild  Followed by Dr. Coral Garcia  Using APAP 7d/wk, uses <3 hrs for 3d/wk  Excess weight is worsening her underlying airway obstruction    · Obesity   Began in late teens  Initial BMI 67.3, Wt 417.2 lbs  HS Grad wt 150 lbs  Lowest   wt  150 lbs  Highest  wt  417 lbs  Pattern of wt gain: grad with rapid increases in preg  Wt change past yr: +30 lbs  Most wt lost: 20 lbs (pre-Bariatric surgery diet at our clinic)  Other diets attempted: Fad diets, ESTEBAN, Keto, Calorie counting    Initial Diet:    Number of meals per day - 1-2    Number of snacks per day - 1    Meal volume - 12\" plate, sometimes seconds    Fast food/convenience store - 2x/week    Restaurants (not fast food) - 0x/week   Sweets - 1d/week   Chips - 0d/week   Crackers/pretzels - 0d/week   Nuts - 0d/week   Peanut Butter - 1-2d/week   Popcorn - 0d/week   Dried fruit - 0d/week   Whole fruit - 5-6d/week (2 serving)   Breakfast cereal - 0-1d/week   Granola/Protein/Energy bar - 0d/week   Sugar sweetened beverages - 4 liters/day, 1 large DD isabel Iced coffee with extra cream and sugar 4x/wk (350 michael each)    Protein - No supplements   Fiber - No supplements     Exercise:    Gym membership - [employer]    Walking - none    Running - none    Resistance - none    Aerobic class - none    ______________________    [facility] -  Past Medical History:   Diagnosis Date    Asthma     B12 deficiency 09/21/2018    Class 3 severe obesity due to excess calories with body mass index (BMI) of 60.0 to 69.9 in adult (HCC)     GERD without esophagitis     Hypertension     Migraine     PRIYA (obstructive sleep apnea)     Vitamin D deficiency 09/21/2018    Zinc deficiency 09/21/2018     Current Outpatient Medications   Medication Sig Dispense Refill    metoprolol succinate (TOPROL XL) 25 MG extended release tablet Take 1 tablet by mouth daily 30 tablet 1    meloxicam (MOBIC) 15 MG tablet Take 1 tablet by mouth daily Take with food. 30 tablet 2    vitamin B-12 (CYANOCOBALAMIN) 1000 MCG tablet Take 1 tablet by mouth daily 30 tablet 3    hydroCHLOROthiazide (HYDRODIURIL) 25 MG tablet Take 1 tablet by mouth nightly 30 tablet 3    diclofenac sodium (VOLTAREN) 1 % GEL Apply 4 g topically 4 times daily as needed for Pain 350 g 0    sertraline (ZOLOFT) 25 MG tablet Take 1 tablet by mouth daily 30 tablet 5    Elastic Bandages & Supports (KNEE BRACE/CUSHION/L-XL) MISC Wear daily for knee pain 1 each 0    fluticasone (FLONASE) 50 MCG/ACT nasal spray 1 spray by Nasal route daily One spray in each nostril daily 1 Bottle 0    albuterol sulfate HFA (PROAIR HFA) 108 (90 Base) MCG/ACT inhaler Inhale 2 puffs into the lungs every 6 hours as needed for Wheezing 1 Inhaler 1    Aspirin-Acetaminophen-Caffeine (EXCEDRIN MIGRAINE PO) Take by mouth daily      Multiple Vitamins-Minerals (THERAPEUTIC MULTIVITAMIN-MINERALS) tablet Take 1 tablet by mouth daily       No current facility-administered medications for this visit. ROS -  Card - no CP  GI - no N/V/D    PE -  Gen : BP (!) 224/119 (Site: Left Lower Arm, Position: Sitting, Cuff Size: Large Adult)   Pulse 83   Temp 97.9 °F (36.6 °C) (Temporal)   Ht 5' 6\" (1.676 m)   Wt (!) 422 lb 12.8 oz (191.8 kg)   BMI 68.24 kg/m²     Repeat 204/119   WN, WD, NAD  Lung: Nml resp effort  Psych: Normal mood   Full affect  Neuro:  Moves all ext well  ______________________      HISTORY & ASSESSMENT/PLAN -     Problem 1  - PRIYA  HPI   - See above Background for description      Using her autopap 7d/wk at least 4 hours/night      Due to see Dr. Priscilla Moy on 2/16/20      Needs to reduce her underlying airway obstruction by decreasing her excess weight  Assessment  - uncontrolled  Plan   - Increase use of autopap      Proceed with wt reduction per the plan below    Problem 2  - Obesity HPI   - See above Background for description    Weight  Date    417.2 lbs 01/13/21    422.8 lbs 02/10/21                DEN = 2350 michael/d = 16,450 michael/wk             Wt effect of trouble foods = Restaurant 300 michael/wk + Sweets 200 + SSBs 12,300 = 12,800 michael/wk = 78% DEN = 1825 michael/day = 182 lbs/yr    No weight loss will occur without giving up the SSB's    Still drinking soda, though it is reduced    Will do this first and add additional interventions after I see the rate of weight loss this produces  Assessment  - Uncontrolled   Plan   -   Eliminate all liquid calories (wean off caffeine over two weeks)    Problem 3 - Hypertensive urgency  HPI  - /119; states she checked it this morning at an [facility] group home - 150/100      Take her metoprolol and HCTZ at night - she took both last night      States she has been episodes of severe hypertension; however, upon further inquiry, these are episodes of palpitations that she is assuming are associated with hypertension; she has never measured her pressure during one of them       I instructed her to take her bp during an episode of palpitations; if it is high, then will check urine for metanephrines and catecholamines      She has an appt with Dr. Herminia Carlisle at 2:45 pm today (2:15 min from now)      I will defer adjustment of the antihypertension medications to her  Assessment - Uncontrolled  Plan  - Cont Metoprolol Succinate 25 mg one tablet daily      Cont HCTZ 25 mg daily      See Dr. Herminia Carlisle today about the high BP reading    Follow up  Return to see me in 6 weeks  Call me with problems: [phone]    Annie Benitez MD  Endocrinology/Obesity  2/10/21

## 2021-02-10 NOTE — ED PROVIDER NOTES
Coordination normal.          Procedures     [facility]     ED Course as of Feb 10 1940   Wed Feb 10, 2021   1939 Patient laying the bed resting comfortably no distress. Significant provement in blood pressure. She still is asymptomatic. Her doctor has already called in a third blood pressure medicine for her and arrangements for outpatient follow-up. This is all discussed with her, she is comfortable going home at this time. [NC]      ED Course User Index  [NC] Randall Emmanuel DO        EKG Interpretation    Interpreted by emergency department physician    Rhythm: normal sinus   Rate: 82  Axis: normal  Ectopy: none  Conduction: normal  ST Segments: no acute change  T Waves: no acute change  Q Waves: none    Clinical Impression: no acute changes    Randall Emmanuel      ED Course as of Feb 10 1940   Wed Feb 10, 2021   1939 Patient laying the bed resting comfortably no distress. Significant provement in blood pressure. She still is asymptomatic. Her doctor has already called in a third blood pressure medicine for her and arrangements for outpatient follow-up. This is all discussed with her, she is comfortable going home at this time. [NC]      ED Course User Index  [NC] Maria Isabel Malik DO       --------------------------------------------- PAST HISTORY ---------------------------------------------  Past Medical History:  has a past medical history of Asthma, B12 deficiency, Class 3 severe obesity due to excess calories with body mass index (BMI) of 60.0 to 69.9 in [facility]), GERD without esophagitis, Hypertension, Migraine, PRIYA (obstructive sleep apnea), Vitamin D deficiency, and Zinc deficiency. Past Surgical History:  has a past surgical history that includes LEEP (3/2008); Tonsillectomy and adenoidectomy; Tympanostomy tube placement; other surgical history (2011); Cholecystectomy, laparoscopic (3/2014 Shercasee Cancel);  Umbilical hernia repair (3/2014 Sherlie Cancel); other surgical history; and Upper mmol/L    CO2 27 22 - 29 mmol/L    Anion Gap 9 7 - 16 mmol/L    Glucose 110 (H) 74 - 99 mg/dL    BUN 14 6 - 20 mg/dL    CREATININE 0.8 0.5 - 1.0 mg/dL    GFR Non-African American >60 >=60 mL/min/1.73    GFR African American >60     Calcium 9.2 8.6 - 10.2 mg/dL    Total Protein 6.4 6.4 - 8.3 g/dL    Albumin 3.7 3.5 - 5.2 g/dL    Total Bilirubin 0.3 0.0 - 1.2 mg/dL    Alkaline Phosphatase 83 35 - 104 U/L    ALT 10 0 - 32 U/L    AST 10 0 - 31 U/L   Brain Natriuretic Peptide   Result Value Ref Range    Pro- (H) 0 - 125 pg/mL   Troponin   Result Value Ref Range    Troponin <0.01 0.00 - 0.03 ng/mL   POC Pregnancy Urine Qual   Result Value Ref Range    HCG, Urine, POC Negative Negative    Lot Number [id number]     Positive QC Pass/Fail Pass     Negative QC Pass/Fail Pass    EKG 12 Lead   Result Value Ref Range    Ventricular Rate 82 BPM    Atrial Rate 82 BPM    P-R Interval 196 ms    QRS Duration 84 ms    Q-T Interval 384 ms    QTc Calculation (Bazett) 448 ms    P Axis 28 degrees    R Axis 23 degrees    T Axis 27 degrees       Radiology:  XR CHEST (2 VW)   Final Result   Slight cardiomegaly   No radiographic evidence of definite acute pulmonary disease in the   visualized chest      US RETROPERITONEAL COMPLETE   Final Result   No sonographic evidence of acute renal pathology. Visualized portion of liver again appears echogenic which may reflect   steatosis          ------------------------- NURSING NOTES AND VITALS REVIEWED ---------------------------  Date / Time Roomed:  2/10/2021  5:21 PM  ED Bed Assignment:  06/06    The nursing notes within the ED encounter and vital signs as below have been reviewed.    BP (!) 181/102   Pulse 81   Temp 97.6 °F (36.4 °C)   Resp 20   Ht 5' 7\" (1.702 m)   Wt (!) 423 lb (191.9 kg)   SpO2 98%   BMI 66.25 kg/m²   Oxygen Saturation Interpretation: Normal      ------------------------------------------ PROGRESS NOTES ------------------------------------------  I have spoken

## 2021-02-11 ENCOUNTER — CARE COORDINATION (OUTPATIENT)
Dept: CARE COORDINATION | Age: 36
End: 2021-02-11

## 2021-02-11 LAB
EKG ATRIAL RATE: 82 BPM
EKG P AXIS: 28 DEGREES
EKG P-R INTERVAL: 196 MS
EKG Q-T INTERVAL: 384 MS
EKG QRS DURATION: 84 MS
EKG QTC CALCULATION (BAZETT): 448 MS
EKG R AXIS: 23 DEGREES
EKG T AXIS: 27 DEGREES
EKG VENTRICULAR RATE: 82 BPM

## 2021-02-11 PROCEDURE — 93010 ELECTROCARDIOGRAM REPORT: CPT | Performed by: INTERNAL MEDICINE

## 2021-02-15 ENCOUNTER — TELEPHONE (OUTPATIENT)
Dept: SLEEP MEDICINE | Age: 36
End: 2021-02-15

## 2021-02-15 NOTE — TELEPHONE ENCOUNTER
Spoke with pt re changing to doxy. me VV. Pt is able to change visit.  email was verified and hyperlink was sent to pt.

## 2021-02-15 NOTE — TELEPHONE ENCOUNTER
Spoke with pt re appt for 2/16-- has family emergency and had to cancel all appts for that day--pt was able to reschedule for 2/18

## 2021-02-16 ENCOUNTER — TELEPHONE (OUTPATIENT)
Dept: FAMILY MEDICINE CLINIC | Age: 36
End: 2021-02-16

## 2021-02-16 NOTE — TELEPHONE ENCOUNTER
Patient taking amlodipine, lisinopril , metoprolol, hctz. Had renal artery US scheduled. Will come to  urine collection kit tomorrow. Her bp is improving . This morning was 120/74     Please schedule patient appointment with me on 2/22 at 3pm. Patient aware. Please also let her now she can continue voltaren gel. Will hold off on lidocaine at this time.

## 2021-02-17 NOTE — TELEPHONE ENCOUNTER
appt made and patient notified and states she just started her period and thinks she will wait until she finishes to do 24 hour urine because its heavy and she always has large blood clots.

## 2021-02-22 ENCOUNTER — OFFICE VISIT (OUTPATIENT)
Dept: FAMILY MEDICINE CLINIC | Age: 36
End: 2021-02-22
Payer: COMMERCIAL

## 2021-02-22 VITALS
OXYGEN SATURATION: 96 % | DIASTOLIC BLOOD PRESSURE: 94 MMHG | WEIGHT: 293 LBS | RESPIRATION RATE: 20 BRPM | HEART RATE: 99 BPM | BODY MASS INDEX: 45.99 KG/M2 | HEIGHT: 67 IN | SYSTOLIC BLOOD PRESSURE: 150 MMHG | TEMPERATURE: 96.2 F

## 2021-02-22 DIAGNOSIS — I10 ESSENTIAL HYPERTENSION: Primary | ICD-10-CM

## 2021-02-22 PROCEDURE — G8417 CALC BMI ABV UP PARAM F/U: HCPCS | Performed by: FAMILY MEDICINE

## 2021-02-22 PROCEDURE — G8427 DOCREV CUR MEDS BY ELIG CLIN: HCPCS | Performed by: FAMILY MEDICINE

## 2021-02-22 PROCEDURE — 1036F TOBACCO NON-USER: CPT | Performed by: FAMILY MEDICINE

## 2021-02-22 PROCEDURE — 99213 OFFICE O/P EST LOW 20 MIN: CPT | Performed by: FAMILY MEDICINE

## 2021-02-22 PROCEDURE — G8484 FLU IMMUNIZE NO ADMIN: HCPCS | Performed by: FAMILY MEDICINE

## 2021-02-22 NOTE — PROGRESS NOTES
[address] [facility]  [phone]   Karina Gotti MD     Patient: Rolando Bruce  YOB: 1985  Visit Date: 2/22/21    Yoan is a 30y.o. year old female here today for   Chief Complaint   Patient presents with    2 Week Follow-Up       HPI  Has been taking all her bp meds. No chest pain, sob, ligthheadehess. , headaches. Blurry vision. Swelling is improved. Will do urine collection later this week   Has CADENCE scan later this week. Pain in knee is different. Burning sensation. Uses volterin gel. Sees Dr. Alisha Schwarz. Review of Systems   Constitutional: Positive for fatigue. Negative for chills and fever. Respiratory: Negative for cough, shortness of breath and wheezing. Cardiovascular: Negative for chest pain, palpitations and leg swelling. Gastrointestinal: Negative for abdominal pain, diarrhea, nausea and vomiting. Musculoskeletal: Positive for arthralgias. Neurological: Negative for weakness, numbness and headaches.        Current Outpatient Medications on File Prior to Visit   Medication Sig Dispense Refill    amLODIPine (NORVASC) 5 MG tablet Take 5 mg by mouth daily      lisinopril (PRINIVIL;ZESTRIL) 20 MG tablet Take 1 tablet by mouth daily 90 tablet 1    metoprolol succinate (TOPROL XL) 25 MG extended release tablet Take 1 tablet by mouth daily 30 tablet 1    vitamin B-12 (CYANOCOBALAMIN) 1000 MCG tablet Take 1 tablet by mouth daily 30 tablet 3    hydroCHLOROthiazide (HYDRODIURIL) 25 MG tablet Take 1 tablet by mouth nightly 30 tablet 3    diclofenac sodium (VOLTAREN) 1 % GEL Apply 4 g topically 4 times daily as needed for Pain 350 g 0    sertraline (ZOLOFT) 25 MG tablet Take 1 tablet by mouth daily 30 tablet 5    Elastic Bandages & Supports (KNEE BRACE/CUSHION/L-XL) MISC Wear daily for knee pain 1 each 0    albuterol sulfate HFA (PROAIR HFA) 108 (90 Base) MCG/ACT inhaler Inhale 2 puffs into the lungs every 6 34.0 - 48.0 %    MCV 80.2 80.0 - 99.9 fL    MCH 24.8 (L) 26.0 - 35.0 pg    MCHC 30.9 (L) 32.0 - 34.5 %    RDW 16.9 (H) 11.5 - 15.0 fL    Platelets 195 446 - 047 E9/L    MPV 10.9 7.0 - 12.0 fL    Neutrophils % 68.1 43.0 - 80.0 %    Immature Granulocytes % 0.2 0.0 - 5.0 %    Lymphocytes % 23.0 20.0 - 42.0 %    Monocytes % 6.1 2.0 - 12.0 %    Eosinophils % 2.2 0.0 - 6.0 %    Basophils % 0.4 0.0 - 2.0 %    Neutrophils Absolute 7.01 1.80 - 7.30 E9/L    Immature Granulocytes # 0.02 E9/L    Lymphocytes Absolute 2.37 1.50 - 4.00 E9/L    Monocytes Absolute 0.63 0.10 - 0.95 E9/L    Eosinophils Absolute 0.23 0.05 - 0.50 E9/L    Basophils Absolute 0.04 0.00 - 0.20 E9/L   Comprehensive Metabolic Panel   Result Value Ref Range    Sodium 139 132 - 146 mmol/L    Potassium 4.2 3.5 - 5.0 mmol/L    Chloride 103 98 - 107 mmol/L    CO2 27 22 - 29 mmol/L    Anion Gap 9 7 - 16 mmol/L    Glucose 110 (H) 74 - 99 mg/dL    BUN 14 6 - 20 mg/dL    CREATININE 0.8 0.5 - 1.0 mg/dL    GFR Non-African American >60 >=60 mL/min/1.73    GFR African American >60     Calcium 9.2 8.6 - 10.2 mg/dL    Total Protein 6.4 6.4 - 8.3 g/dL    Albumin 3.7 3.5 - 5.2 g/dL    Total Bilirubin 0.3 0.0 - 1.2 mg/dL    Alkaline Phosphatase 83 35 - 104 U/L    ALT 10 0 - 32 U/L    AST 10 0 - 31 U/L   Brain Natriuretic Peptide   Result Value Ref Range    Pro- (H) 0 - 125 pg/mL   Troponin   Result Value Ref Range    Troponin <0.01 0.00 - 0.03 ng/mL   POC Pregnancy Urine Qual   Result Value Ref Range    HCG, Urine, POC Negative Negative    Lot Number [id number]     Positive QC Pass/Fail Pass     Negative QC Pass/Fail Pass    EKG 12 Lead   Result Value Ref Range    Ventricular Rate 82 BPM    Atrial Rate 82 BPM    P-R Interval 196 ms    QRS Duration 84 ms    Q-T Interval 384 ms    QTc Calculation (Bazett) 448 ms    P Axis 28 degrees    R Axis 23 degrees    T Axis 27 degrees       ASSESSMENT/PLAN  Tavon Nix was seen today for 2 week follow-up.     Diagnoses and all orders for

## 2021-02-23 ENCOUNTER — OFFICE VISIT (OUTPATIENT)
Dept: SLEEP MEDICINE | Age: 36
End: 2021-02-23
Payer: COMMERCIAL

## 2021-02-23 VITALS
BODY MASS INDEX: 45.99 KG/M2 | DIASTOLIC BLOOD PRESSURE: 93 MMHG | HEIGHT: 67 IN | HEART RATE: 111 BPM | SYSTOLIC BLOOD PRESSURE: 171 MMHG | OXYGEN SATURATION: 98 % | WEIGHT: 293 LBS | RESPIRATION RATE: 20 BRPM

## 2021-02-23 DIAGNOSIS — I10 ESSENTIAL HYPERTENSION: ICD-10-CM

## 2021-02-23 DIAGNOSIS — E66.9 OBESITY, UNSPECIFIED CLASSIFICATION, UNSPECIFIED OBESITY TYPE, UNSPECIFIED WHETHER SERIOUS COMORBIDITY PRESENT: ICD-10-CM

## 2021-02-23 DIAGNOSIS — Z11.59 NEED FOR HEPATITIS C SCREENING TEST: ICD-10-CM

## 2021-02-23 DIAGNOSIS — E53.8 VITAMIN B12 DEFICIENCY: ICD-10-CM

## 2021-02-23 DIAGNOSIS — G47.26 SHIFT WORK SLEEP DISORDER: ICD-10-CM

## 2021-02-23 DIAGNOSIS — R03.0 ELEVATED BLOOD PRESSURE READING: ICD-10-CM

## 2021-02-23 DIAGNOSIS — G47.33 OBSTRUCTIVE SLEEP APNEA: Primary | ICD-10-CM

## 2021-02-23 DIAGNOSIS — Z11.4 ENCOUNTER FOR SCREENING FOR HIV: ICD-10-CM

## 2021-02-23 DIAGNOSIS — I16.0 HYPERTENSIVE URGENCY: ICD-10-CM

## 2021-02-23 LAB
ANION GAP SERPL CALCULATED.3IONS-SCNC: 7 MMOL/L (ref 7–16)
BUN BLDV-MCNC: 17 MG/DL (ref 6–20)
CALCIUM SERPL-MCNC: 9.5 MG/DL (ref 8.6–10.2)
CHLORIDE BLD-SCNC: 101 MMOL/L (ref 98–107)
CO2: 30 MMOL/L (ref 22–29)
CREAT SERPL-MCNC: 0.8 MG/DL (ref 0.5–1)
GFR AFRICAN AMERICAN: >60
GFR NON-AFRICAN AMERICAN: >60 ML/MIN/1.73
GLUCOSE BLD-MCNC: 131 MG/DL (ref 74–99)
POTASSIUM SERPL-SCNC: 4.4 MMOL/L (ref 3.5–5)
SODIUM BLD-SCNC: 138 MMOL/L (ref 132–146)
VITAMIN B-12: 401 PG/ML (ref 211–946)

## 2021-02-23 PROCEDURE — G8484 FLU IMMUNIZE NO ADMIN: HCPCS | Performed by: STUDENT IN AN ORGANIZED HEALTH CARE EDUCATION/TRAINING PROGRAM

## 2021-02-23 PROCEDURE — G8427 DOCREV CUR MEDS BY ELIG CLIN: HCPCS | Performed by: STUDENT IN AN ORGANIZED HEALTH CARE EDUCATION/TRAINING PROGRAM

## 2021-02-23 PROCEDURE — 99214 OFFICE O/P EST MOD 30 MIN: CPT | Performed by: STUDENT IN AN ORGANIZED HEALTH CARE EDUCATION/TRAINING PROGRAM

## 2021-02-23 PROCEDURE — 1036F TOBACCO NON-USER: CPT | Performed by: STUDENT IN AN ORGANIZED HEALTH CARE EDUCATION/TRAINING PROGRAM

## 2021-02-23 PROCEDURE — G8417 CALC BMI ABV UP PARAM F/U: HCPCS | Performed by: STUDENT IN AN ORGANIZED HEALTH CARE EDUCATION/TRAINING PROGRAM

## 2021-02-23 ASSESSMENT — SLEEP AND FATIGUE QUESTIONNAIRES
HOW LIKELY ARE YOU TO NOD OFF OR FALL ASLEEP WHILE SITTING AND READING: 2
HOW LIKELY ARE YOU TO NOD OFF OR FALL ASLEEP WHILE SITTING QUIETLY AFTER LUNCH WITHOUT ALCOHOL: 3
HOW LIKELY ARE YOU TO NOD OFF OR FALL ASLEEP WHILE SITTING AND TALKING TO SOMEONE: 0
HOW LIKELY ARE YOU TO NOD OFF OR FALL ASLEEP WHILE LYING DOWN TO REST IN THE AFTERNOON WHEN CIRCUMSTANCES PERMIT: 3
HOW LIKELY ARE YOU TO NOD OFF OR FALL ASLEEP WHEN YOU ARE A PASSENGER IN A CAR FOR AN HOUR WITHOUT A BREAK: 0

## 2021-02-23 NOTE — PATIENT INSTRUCTIONS
---------------------------------------------------  Blood Pressure Precautions    Your blood pressure Today:   BP Readings from Last 1 Encounters:   02/23/21 (!) 171/93       Your blood pressure at your last 3 visits:   BP Readings from Last 3 Encounters:   02/23/21 (!) 171/93   02/22/21 (!) 150/94   02/10/21 (!) 181/102        Your blood pressure was high today. Please discuss this with your primary care doctor as soon as possible. Elevated BP needs to be addressed. Please go to the Emergency room if you have any discomfort, new or worsening symptoms, or if any of the below symptoms present:       · Symptoms of a heart attack. These may include:  ? Chest pain or pressure, or a strange feeling in the chest.  ? Sweating. ? Shortness of breath. ? Nausea or vomiting. ? Pain, pressure, or a strange feeling in the back, neck, jaw, or upper belly or in one or both shoulders or arms. ? Lightheadedness or sudden weakness. ? A fast or irregular heartbeat. ? Dizziness   · Symptoms of a stroke. These may include:  ? Sudden numbness, tingling, weakness, or loss of movement in your face, arm, or leg, especially on only one side of your body. ? Sudden vision changes. ? Sudden trouble speaking. ? Facial droop  ? Sudden confusion or trouble understanding simple statements. ? Sudden problems with walking or balance. ? A sudden, severe headache that is different from past headaches. · You have severe back or belly pain.     ---------------------------------------------------    It was a pleasure seeing you today Horace Chan! Summary of Today's Visit       Your APAP will be lowered to 4-7 cwp.  Please come back in 2 months    Consider switching to 2nd shift, as that might help with your sleep/sleepiness    Please call our sleep nurses to schedule a follow up at - [phone] option 2              1. Continue using your machine nightly ------------Please bring your machine/Data Card to every visit. -------------     -Request all data downloads be sent to my nurse        2. Contact your DME company about supplies or issues with your machine. As a general guideline, please replace your:  -CPAP mask every 3-6 months  -CPAP hose  every 3-6 months  -Filter every 2-4 weeks  -CPAP/BiPAP/ASV replacements every 5-7+ years     3. Continue healthy weight loss/stabilization, as this is recommended as a long-term intervention. 4. Please do not drive or operate machinery when you feel fatigued/sleepy/drowsy      5. Please try to sleep between 6-8 hours nightly with the CPAP mask    6. Please avoid sedatives, alcohol and caffeinated drinks at/near bed time. 7. Please call your supply (DME) company with any issues with your PAP device. Please call our office with any issues pertaining to the therapy.   ----Please note that complications of PRIYA if not treated can increase risk for: systemic hypertension, pulmonary hypertension, cardiovascular morbidities (heart attack and stroke), car accidents and all cause mortality. For general questions or scheduling issues, call my nurse at [phone] option [id number] [phone] option 2  f- [phone]           ----------------------------------------------------------  Common Issues and Solutions     Pillow is dislodging mask - Consider getting a CPAP pillow or switching to a mask with the hose at the top. Dry Mouth - Adjust Humidity and/or try Biotene Gel. (Excessive leak can also cause this)     Leak - Please call your equipment provider  as they may need to adjust your mask. Difficulty tolerating the PAP mask - Contact your equipment company to try a different mask, we can order a \"mini sleep study\"with the sleep tech to try different masks/settings of pressure, also we have a sleep psychologist to help with anxiety related to wearing the PAP mask      ----------------------------------------------------------     For Questions and Concerns: In case of difficulty with your PAP device or mask interface, please FIRST contact your [address] (The company who provides you the CPAP/BiPAP/ASV machine/supplies). If you need the number for any other DME company, please call my Nurse at [phone] option 2     For questions concerning your [place] appointment: Call [phone] option 2  SLEEP LAB SCHEDULING:        ----------------------------------------------------------     Helpful Web Sites: For patients with ALL SLEEP DISORDERS: American Academy of Sleep Medicine http://sleepeducation. org; or Col[employer]ri Heart Valve: https://sleepfoundation. org  For patients with PRIYA: American Sleep Apnea Association: http://gusman.org/. org  For patients with RLS: RLS Foundation: Augustus.lacy  For patients with INSOMNIA: https://www.bean.org/. org/articles/sleep/insomnia-causes-and-cures. htm  For patients with DEPRESSION: QBE.com.BMP [employer]. [employer]/depression            Learning About CPAP for Sleep Apnea  What is CPAP? CPAP/Bi-PAP is a small machine that you use at home every night while you sleep. It increases air pressure in your throat to keep your airway open. When you have sleep apnea, this can help you sleep better so you feel much better. CPAP stands for \"continuous positive airway pressure. \" Bi-PAP is a different PAP setting that allows for different pressures when you breathe in and out.    The CPAP/Bi-PAP machine will have one of the following:  · A mask that covers your nose and mouth  · Prongs that fit into your nose

## 2021-02-23 NOTE — PROGRESS NOTES
[facility] Sleep Medicine    Patient Name: Cassie Peter  Age: 30 y.o.   : 1985    Date of Visit: 21        Review of Last Visit Summary:    The patient was last seen on 12/3/2020 for  Obstructive Sleep Apnea and Obesity. Interim History:     Cassie Peter is a 30 y.o. female in office for follow up. Patient is doing well. Overall patient is doing well, she does note some difficulty tolerating the CPAP at higher pressures and would like higher range to be reduced. Patient has been sleeping well recently due to attending a , but more recent Capac has been in the normal range. Patient is established with Dr. Emilie Graff for medical weight loss. She is also undergoing evaluation by nephrologist for her uncontrolled blood pressure. Benefits:   DME: [employer]    Compliance download report reviewed today by me demonstrated the following:    Dates - 2021- 2021    Settings - APAP 4-15   Days used -    >4 hours-    AHI -  0.4  Leak -  20.6      Past Medical History:  Past Medical History:   Diagnosis Date    Asthma     B12 deficiency 2018    Class 3 severe obesity due to excess calories with body mass index (BMI) of 60.0 to 69.9 in adult ([facility] [address]     GERD without esophagitis     Hypertension     Migraine     PRIYA (obstructive sleep apnea)     Vitamin D deficiency 2018    Zinc deficiency 2018       Past Surgical History:        Procedure Laterality Date    CHOLECYSTECTOMY, LAPAROSCOPIC  3/2014 Juan Jordan    with repair umbilical hernia    LEEP  3/2008    OTHER SURGICAL HISTORY      coils to fallopian tubes    OTHER SURGICAL HISTORY      Tubal essure     TONSILLECTOMY AND ADENOIDECTOMY      TYMPANOSTOMY TUBE PLACEMENT      UMBILICAL HERNIA REPAIR  3/2014 Bernardino Myers UPPER GASTROINTESTINAL ENDOSCOPY N/A 2018    EGD BIOPSY performed by Edgar Calix MD at [facility] ENDOSCOPY       Allergies:  has No Known Allergies.   Social History: Social History     Tobacco Use    Smoking status: Former Smoker     Packs/day: 0.20     Years: 15.00     Pack years: 3.00     Types: Cigarettes     Quit date: 2021     Years since quittin.0    Smokeless tobacco: Never Used    Tobacco comment: has cut down to couple cigs a daily    Substance Use Topics    Alcohol use:  Yes     Alcohol/week: 2.0 - 3.0 standard drinks     Types: 2 - 3 Shots of liquor per week     Comment: occasionally    Drug use: No        Family History:       Problem Relation Age of Onset    Cancer Paternal Grandmother         stomach    Brain Cancer Maternal Grandmother     Hypertension Father     Stroke Father     Heart Attack Father     Diabetes Father     Other Father         gout    Hypertension Mother     Scoliosis Sister     Diabetes Maternal Grandfather     Heart Disease Maternal Grandfather        Current Medications:    Current Outpatient Medications:     amLODIPine (NORVASC) 5 MG tablet, Take 5 mg by mouth daily, Disp: , Rfl:     lisinopril (PRINIVIL;ZESTRIL) 20 MG tablet, Take 1 tablet by mouth daily, Disp: 90 tablet, Rfl: 1    metoprolol succinate (TOPROL XL) 25 MG extended release tablet, Take 1 tablet by mouth daily, Disp: 30 tablet, Rfl: 1    vitamin B-12 (CYANOCOBALAMIN) 1000 MCG tablet, Take 1 tablet by mouth daily, Disp: 30 tablet, Rfl: 3    hydroCHLOROthiazide (HYDRODIURIL) 25 MG tablet, Take 1 tablet by mouth nightly, Disp: 30 tablet, Rfl: 3    diclofenac sodium (VOLTAREN) 1 % GEL, Apply 4 g topically 4 times daily as needed for Pain, Disp: 350 g, Rfl: 0    sertraline (ZOLOFT) 25 MG tablet, Take 1 tablet by mouth daily, Disp: 30 tablet, Rfl: 5    Elastic Bandages & Supports (KNEE BRACE/CUSHION/L-XL) MISC, Wear daily for knee pain, Disp: 1 each, Rfl: 0    albuterol sulfate HFA (PROAIR HFA) 108 (90 Base) MCG/ACT inhaler, Inhale 2 puffs into the lungs every 6 hours as needed for Wheezing, Disp: 1 Inhaler, Rfl: 1   Aspirin-Acetaminophen-Caffeine (EXCEDRIN MIGRAINE PO), Take by mouth daily, Disp: , Rfl:     Multiple Vitamins-Minerals (THERAPEUTIC MULTIVITAMIN-MINERALS) tablet, Take 1 tablet by mouth daily, Disp: , Rfl:           Objective:   PHYSICAL EXAM:    BP (!) 171/93 (Site: Left Lower Arm, Position: Sitting, Cuff Size: Large Adult)   Pulse 111   Resp 20   Ht 5' 7\" (1.702 m)   Wt (!) 414 lb 4.8 oz (187.9 kg)   SpO2 98%   BMI 64.89 kg/m²       (Sleep ROS above)     Constitutional: no chills, no fever   Eyes: no blurred vision and no eyesight problems. ENT: no hoarseness and no sore throat. Cardiovascular: no chest pain,   Respiratory: no cough, no shortness of breath   Gastrointestinal:  no nausea,  no vomiting, no diarrhea. Musculoskeletal: no arthralgias, no back pain and no myalgias. Integumentary: no rashes or lacerations. Neurological:  no diplopia, no dizziness,  no headache, no memory changes,  and no tingling. Endocrine: No chills      Physical exam:  Gen: No acute distress. BMI of Body mass index is 64.89 kg/m². Eyes: PERRL. No sclera icterus. No conjunctival injection. ENT: No nasal/oral discharge. Pharynx clear. Neck: Trachea midline. No obvious mass. Neck circumference Neck circumference: 18.5   Resp: No accessory muscle use. No crackles. No wheezes. No rhonchi. CV: Regular rate. Regular rhythm. No murmur or rub. Skin: Warm and dry. No bleeding observed   M/S: No cyanosis. No obvious joint deformity. Neuro: Awake. Alert. Moves all four extremities. Psych: Alert and oriented. No anxiety.        Sleep Medicine 2/23/2021 2/15/2021 12/3/2020   Sitting and reading 2 1 3   Watching TV 2 2 2   Sitting, inactive in a public place (e.g. a theatre or a meeting) 0 1 0   As a passenger in a car for an hour without a break 0 0 0   Lying down to rest in the afternoon when circumstances permit 3 1 3   Sitting and talking to someone 0 0 0   Sitting quietly after a lunch without alcohol 3 0 2 In a car, while stopped for a few minutes in traffic 0 0 0   Total score 10 5 10   Neck circumference 18.5 - 20       DATA:     PSG 11/9/2020. AHI 8.2.  SPO2 janey 80%. Assessment:      Suman Szymanski was seen today for sleep apnea. Diagnoses and all orders for this visit:    Obstructive sleep apnea  -     DME Order for CPAP as OP  -     DME Order for CPAP as OP    Obesity, unspecified classification, unspecified obesity type, unspecified whether serious comorbidity present    Elevated blood pressure reading    Shift work sleep disorder    ·    Plan:       1. Obstructive Sleep Apnea. Patient would like upper range of Pap pressure reduced. We will try APAP 4-7 CWP and follow-up in 2 months. Patient was also advised that she has a leak and to try a little bit of personal lubricant around the rim of the mask. - Continue current PAP settings    2. Obesity. Body mass index is 64.89 kg/m². Established with Dr. Alfredo Crowley for medical weight loss. Patient plans to proceed with bariatric surgery once her blood pressure has been controlled.  -Healthy weight loss advised    3. Shift Work Sleep Disorder. Patient is still working third shift. Patient does use a blanket to black out her window for sleep. Patient was advised to switch to second shift if possible. 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 4.  Elevated BP in clinic today. the patient did not have any alarm symptoms today including, but not limited to: blurry vision, dizziness, confusion, headache, weakness, chest pain, or shortness of breath. she  was advised to call his PCP today to schedule follow up and go to the ED if any discomfort, new symptoms, or alarm symptoms present. Patient will be going for lab testing today as part of her nephrology work-up for her uncontrolled hypertension.  -Follow up with PCP  -Go to the ED if discomfort or alarm symptoms present. Follow up: Return in about 2 months (around 4/23/2021) for Follow up PRIYA. IM Resident Pelon Lua PGY-3 was present and observed the encounter with the patient's consent.

## 2021-02-24 LAB
HEPATITIS C ANTIBODY INTERPRETATION: NORMAL
HIV-1 AND HIV-2 ANTIBODIES: NORMAL

## 2021-02-25 ENCOUNTER — OFFICE VISIT (OUTPATIENT)
Dept: ORTHOPEDIC SURGERY | Age: 36
End: 2021-02-25
Payer: COMMERCIAL

## 2021-02-25 VITALS — HEIGHT: 67 IN | BODY MASS INDEX: 45.99 KG/M2 | WEIGHT: 293 LBS | TEMPERATURE: 98 F

## 2021-02-25 DIAGNOSIS — M22.2X2 PATELLOFEMORAL DISORDER OF LEFT KNEE: Primary | ICD-10-CM

## 2021-02-25 DIAGNOSIS — M17.12 PRIMARY OSTEOARTHRITIS OF LEFT KNEE: ICD-10-CM

## 2021-02-25 DIAGNOSIS — Z71.82 EXERCISE COUNSELING: ICD-10-CM

## 2021-02-25 PROCEDURE — G8427 DOCREV CUR MEDS BY ELIG CLIN: HCPCS | Performed by: ORTHOPAEDIC SURGERY

## 2021-02-25 PROCEDURE — G8484 FLU IMMUNIZE NO ADMIN: HCPCS | Performed by: ORTHOPAEDIC SURGERY

## 2021-02-25 PROCEDURE — 99214 OFFICE O/P EST MOD 30 MIN: CPT | Performed by: ORTHOPAEDIC SURGERY

## 2021-02-25 PROCEDURE — G8417 CALC BMI ABV UP PARAM F/U: HCPCS | Performed by: ORTHOPAEDIC SURGERY

## 2021-02-25 PROCEDURE — 1036F TOBACCO NON-USER: CPT | Performed by: ORTHOPAEDIC SURGERY

## 2021-02-25 NOTE — PROGRESS NOTES
Chief Complaint   Patient presents with    Knee Pain     f/u left knee pain. Patient wants to discuss other options. Unable to take mobic any more due to high BP and states zbigniewsridhar isnt working. HPI:    Patient is 30 y.o. female complaining chronic, atraumatic, insidious onset left knee pain for several months but worse over the last 2 weeks. She admits to stiffness, deep, aching pain, swelling, difficulty with stairs and ambulating far distances. She admits to gross instability specifically in her Left knee. Previous treatments include rest, ice, and anti-inflammatory medication, PT and HEP without much relief. Follows up after trying Mobic and states that it did little in terms of relief but is unable to take it due to blood pressure issues. ROS:    Skin: (-) rash,(-) psoriasis,(-) eczema, (-)skin cancer. Neurologic: (-)numbness, (-)tingling, (-)headaches, (-) LOC. Cardiovascular: (-) Chest pain, (-) swelling in legs/feet, (-) SOB, (-) cramping in legs/feet with walking.     All other review of systems negative except stated above or in HPI      Past Medical History:   Diagnosis Date    Asthma     B12 deficiency 09/21/2018    Class 3 severe obesity due to excess calories with body mass index (BMI) of 60.0 to 69.9 in adult ([facility] [address]     GERD without esophagitis     Hypertension     Migraine     PRIYA (obstructive sleep apnea)     Vitamin D deficiency 09/21/2018    Zinc deficiency 09/21/2018     Past Surgical History:   Procedure Laterality Date    CHOLECYSTECTOMY, LAPAROSCOPIC  3/2014 Timmy Arias    with repair umbilical hernia    LEEP  3/2008    OTHER SURGICAL HISTORY  2011    coils to fallopian tubes    OTHER SURGICAL HISTORY      Tubal essure     TONSILLECTOMY AND ADENOIDECTOMY      TYMPANOSTOMY TUBE PLACEMENT      UMBILICAL HERNIA REPAIR  3/2014 Mary Grace Adkins UPPER GASTROINTESTINAL ENDOSCOPY N/A 5/2/2018    EGD BIOPSY performed by Donna Maynard MD at [address] behavior, follows commands    NEURO: Sensation is intact distally with light touch with no alteration. Motor exam of the lower extremities show quadriceps, hamstrings, foot dorsiflexion and plantarflexion grossly intact 5/5. LYMPH: No lymphedema present distally in upper or lower extremity. MUSCULOSKELETAL:    Left knee Exam:    mild effusion noted. No erythema/induration/fluctuance. Posterior medial joint line TTP. Stable to varus and valgus at 0 and 30 degrees of flexion. Negative Lachman's and posterior drawer. Negative patellar grind test and J sign. Compartments soft and compressible throughout leg. Active range of motion 0-120 with pain. Positive Maribel's positive Apley's, gait is antalgic        Imaging:  Xr Knee Left (1-2 Views)    Result Date: 12/3/2020  EXAMINATION: TWO XRAY VIEWS OF THE LEFT KNEE 12/3/2020 3:25 pm COMPARISON: December 1, 2013 HISTORY: ORDERING SYSTEM PROVIDED HISTORY: pain TECHNOLOGIST PROVIDED HISTORY: Reason for exam:->pain FINDINGS: Moderate narrowing of medial compartment with mild marginal osteophytosis. Mild osteophytosis is seen along margin of the lateral compartment. Osteophytosis is seen along superior and inferior articular margin of the patella. No synovial effusion. No fracture or dislocation. 1.  Tricompartmental osteoarthritis. 2.  No fracture or dislocation. Us Dup Lower Extremity Left Shashank    Result Date: 12/3/2020  EXAMINATION: DUPLEX VENOUS ULTRASOUND OF THE LEFT LOWER EXTREMITY 12/3/2020 1:38 pm TECHNIQUE: Duplex ultrasound and Doppler images were obtained of the left lower extremity. COMPARISON: 29 April 2009 HISTORY: ORDERING SYSTEM PROVIDED HISTORY: Discomfort TECHNOLOGIST PROVIDED HISTORY: Reason for exam:->Discomfort What reading provider will be dictating this exam?->CRC FINDINGS: The visualized veins of the left lower extremity are patent and free of echogenic thrombus. The veins are normally compressible and have normal phasic flow.     No evidence of DVT in the left lower extremity. Mri Knee Left Wo Contrast    Result Date: 1/7/2021  EXAMINATION: MRI OF THE LEFT KNEE WITHOUT CONTRAST, 1/7/2021 3:50 pm TECHNIQUE: Multiplanar multisequence MRI of the left knee was performed without the administration of intravenous contrast. COMPARISON: Left knee plain radiographs from 12/03/2020 HISTORY: ORDERING SYSTEM PROVIDED HISTORY: Tear of meniscus of left knee, unspecified meniscus, unspecified tear type, unspecified whether old or current tear 41-year-old female with tear of medial meniscus left knee FINDINGS: Exam limited due to patient motion and artifact. MENISCI: Both the medial and lateral menisci demonstrate normal morphology and signal characteristics. No MR evidence that meets the criteria for a tear within the medial or lateral meniscus. CRUCIATE LIGAMENTS: Anterior and posterior cruciate ligaments appear intact. EXTENSOR MECHANISM: Mild multifocal patellar tendinosis. Distal quadriceps tendon and patellar retinacula appear intact. LATERAL COLLATERAL LIGAMENT COMPLEX: Popliteus muscle/tendon, iliotibial band, lateral collateral ligament, and biceps femoris appear intact. MEDIAL COLLATERAL LIGAMENT COMPLEX: Medial collateral ligament complex appears grossly intact. KNEE JOINT: Small joint effusion. Osseous alignment is normal. No acute fracture or dislocation. Grade 2-3 medial compartment chondromalacia. Grade 2 lateral compartment chondromalacia with areas of partial and full-thickness fissuring and undersurface delamination. Grade 2-3 patellofemoral chondromalacia with underlying subcortical cystic changes in the patellar apex and lateral patellar facet. Mild tricompartmental osteophyte spurring. BONE MARROW: Intermediate signal in the distal femur, proximal fibula, and proximal tibia likely related to red marrow reconversion. SOFT TISSUES: Moderate edema and fluid in the subcutaneous fat along the anterior knee.   No sizable Baker's cyst. Visualized popliteal neurovascular bundle grossly unremarkable. 1. Tricompartmental osteoarthrosis with mild-to-moderate patellofemoral and medial compartment chondromalacia as well as mild lateral compartment chondromalacia with regions of partial and full-thickness fissuring and undersurface delamination. 2. No acute ligamentous or meniscal injury. 3. Mild multifocal patellar tendinosis. 4. Small joint effusion. 5. Red marrow reconversion. 6. Moderate edema and fluid in the subcutaneous fat of the anterior knee. Nissa Tabares was seen today for knee pain. Diagnoses and all orders for this visit:    Patellofemoral disorder of left knee    Primary osteoarthritis of left knee    BMI 60.0-69.9, adult [facility])    Exercise counseling        Patient seen and examined. X-rays reviewed. Patient has little to no arthritis noted on x-rays today. However patient's main complaint is instability of symptomatic knee. Exam and history is consistent with possible meniscus tear. MRI reviewed with patient in detail. Natural history and course discussed with patient in long discussion  Treatment options discussed with patient in detail including risks and benefits. Patient should do well with conservative management as patient would like to avoid surgery at this time. In a 15 minute assessment and discussion, patient was counseled on continued weight loss, diet, and physical activity relating to this condition. She was educated with options in detail including nutrition, joining a health club/ weight loss program, and use of cardio equipment such as the Arc Trainer and the importance of use as well as range of motion and HEP exercises for weight loss. The patient has failed conservative measures such as NSAIDS, HEP, PT, weight loss program monitored by patient's family physician, and cortisone injections. She  is an excellent candidate for viscous supplementation injections in the Left knee.    We will contact the patient's insurance company and see them back in the office once we have received approval.          Darcy Norris DO        25 minutes was spent with patient. 50% or greater was spent counseling the patient.

## 2021-02-26 ENCOUNTER — HOSPITAL ENCOUNTER (OUTPATIENT)
Dept: ULTRASOUND IMAGING | Age: 36
Discharge: HOME | End: 2021-02-28
Payer: COMMERCIAL

## 2021-02-26 DIAGNOSIS — I10 ESSENTIAL HYPERTENSION: ICD-10-CM

## 2021-02-26 DIAGNOSIS — I16.0 HYPERTENSIVE URGENCY: ICD-10-CM

## 2021-02-26 LAB — ALDOSTERONE: 4.5 NG/DL

## 2021-02-26 PROCEDURE — 93975 VASCULAR STUDY: CPT | Performed by: RADIOLOGY

## 2021-02-26 PROCEDURE — 76770 US EXAM ABDO BACK WALL COMP: CPT

## 2021-02-26 PROCEDURE — 76770 US EXAM ABDO BACK WALL COMP: CPT | Performed by: RADIOLOGY

## 2021-02-26 PROCEDURE — 93975 VASCULAR STUDY: CPT

## 2021-02-27 LAB
METANEPH/PLASMA INTERP: NORMAL
METANEPHRINE FREE PLASMA: <0.1 NMOL/L (ref 0–0.49)
NORMETANEPHRINE FREE PLASMA: 0.61 NMOL/L (ref 0–0.89)
RENIN ACTIVITY: 23.8 NG/ML/HR

## 2021-02-28 LAB
CATECHOLAMINE INTERPRETATION, PLASMA: NORMAL
DOPAMINE PLASMA: <20 PG/ML (ref 0–20)
EPINEPHRINE PLASMA: 17 PG/ML (ref 10–200)
NOREPINEPHRINE: 441 PG/ML (ref 80–520)

## 2021-03-01 ASSESSMENT — ENCOUNTER SYMPTOMS
NAUSEA: 0
VOMITING: 0
ABDOMINAL PAIN: 0
SHORTNESS OF BREATH: 0
WHEEZING: 0
DIARRHEA: 0
COUGH: 0

## 2021-03-03 DIAGNOSIS — G47.33 OSA (OBSTRUCTIVE SLEEP APNEA): ICD-10-CM

## 2021-03-03 DIAGNOSIS — I10 ESSENTIAL HYPERTENSION: ICD-10-CM

## 2021-03-05 LAB
CATE U INT: NORMAL
CREATININE 24 HOUR URINE: 1338 MG/D (ref 700–1600)
CREATININE URINE: 107 MG/DL
DOPAMINE (G CRT): 164 UG/G CRT (ref 0–250)
DOPAMINE 24 HOUR URINE: 220 UG/D (ref 71–485)
DOPAMINE, (UG/L): 176 UG/L
EPINEPHRINE (G CRT): 1 UG/G CRT (ref 0–20)
EPINEPHRINE 24 HOUR URINE: 1 UG/D (ref 1–14)
EPINEPHRINE, (UG/L): 1 UG/L
HOURS COLLECTED: 24
METANEPHRINE INTREP URINE: NORMAL
METANEPHRINE UG/G CRE: 45 UG/G CRT (ref 0–300)
METANEPHRINE, UR-PER VOL: 47 UG/L
METANEPHRINES URINE: 59 UG/D (ref 36–229)
NOREPINEPH (G CRT): 21 UG/G CRT (ref 0–45)
NOREPINEPHRINE 24 HOUR URINE: 29 UG/D (ref 14–120)
NOREPINEPHRINE, (UG/L): 23 UG/L
NORMETANEPHRINE 24 HOUR URINE: 286 UG/D (ref 95–650)
NORMETANEPHRINE, (G/CRT): 220 UG/G CRT (ref 0–400)
NORMETANEPHRINES, NMOL/L: 229 UG/L
URINE TOTAL VOLUME: 1250

## 2021-03-07 RX ORDER — METOPROLOL SUCCINATE 25 MG/1
TABLET, EXTENDED RELEASE ORAL
Qty: 30 TABLET | Refills: 1 | OUTPATIENT
Start: 2021-03-07

## 2021-03-08 RX ORDER — METOPROLOL SUCCINATE 25 MG/1
25 TABLET, EXTENDED RELEASE ORAL DAILY
Qty: 30 TABLET | Refills: 1 | Status: SHIPPED
Start: 2021-03-08 | End: 2021-04-21 | Stop reason: SDUPTHER

## 2021-03-24 ENCOUNTER — TELEPHONE (OUTPATIENT)
Dept: ORTHOPEDIC SURGERY | Age: 36
End: 2021-03-24

## 2021-03-30 ENCOUNTER — OFFICE VISIT (OUTPATIENT)
Dept: ORTHOPEDIC SURGERY | Age: 36
End: 2021-03-30
Payer: COMMERCIAL

## 2021-03-30 DIAGNOSIS — M22.2X2 PATELLOFEMORAL DISORDER OF LEFT KNEE: Primary | ICD-10-CM

## 2021-03-30 DIAGNOSIS — M17.12 PRIMARY OSTEOARTHRITIS OF LEFT KNEE: ICD-10-CM

## 2021-03-30 PROCEDURE — 20610 DRAIN/INJ JOINT/BURSA W/O US: CPT | Performed by: ORTHOPAEDIC SURGERY

## 2021-03-30 NOTE — PROGRESS NOTES
Chief Complaint   Patient presents with    Knee Pain     Left knee zilretta         I will proceed with a cortisone injection in the Left knee. Verbal and written consent was obtained for the injections. The skin was prepped with alcohol. A prepared mixture of 32 mg of Zilretta and 5mL diluent was injected to Left knee. The injection was given through the lateral side of the knee. The patient tolerated the injection well. I will see the patient back prn. Alex Collin was seen today for knee pain.     Diagnoses and all orders for this visit:    Patellofemoral disorder of left knee    Primary osteoarthritis of left knee  -     TX ARTHROCENTESIS ASPIR&/INJ MAJOR JT/BURSA W/O US    Other orders  -     triamcinolone acetonide (ZILRETTA) intra-articular injection 32 mg

## 2021-04-05 DIAGNOSIS — I10 ESSENTIAL HYPERTENSION: ICD-10-CM

## 2021-04-05 RX ORDER — MELOXICAM 15 MG/1
15 TABLET ORAL DAILY
Qty: 30 TABLET | Refills: 2 | Status: SHIPPED
Start: 2021-04-05 | End: 2021-04-21 | Stop reason: CLARIF

## 2021-04-06 RX ORDER — HYDROCHLOROTHIAZIDE 25 MG/1
TABLET ORAL
Qty: 30 TABLET | Refills: 3 | Status: SHIPPED
Start: 2021-04-06 | End: 2021-06-16 | Stop reason: CLARIF

## 2021-04-07 ENCOUNTER — OFFICE VISIT (OUTPATIENT)
Dept: BARIATRICS/WEIGHT MGMT | Age: 36
End: 2021-04-07
Payer: COMMERCIAL

## 2021-04-07 VITALS
SYSTOLIC BLOOD PRESSURE: 144 MMHG | HEIGHT: 66 IN | BODY MASS INDEX: 47.09 KG/M2 | TEMPERATURE: 98.2 F | WEIGHT: 293 LBS | DIASTOLIC BLOOD PRESSURE: 80 MMHG | HEART RATE: 82 BPM

## 2021-04-07 DIAGNOSIS — E66.01 CLASS 3 SEVERE OBESITY DUE TO EXCESS CALORIES WITHOUT SERIOUS COMORBIDITY WITH BODY MASS INDEX (BMI) OF 60.0 TO 69.9 IN ADULT (HCC): ICD-10-CM

## 2021-04-07 DIAGNOSIS — G47.33 OSA (OBSTRUCTIVE SLEEP APNEA): Primary | ICD-10-CM

## 2021-04-07 DIAGNOSIS — I10 ESSENTIAL HYPERTENSION: ICD-10-CM

## 2021-04-07 PROCEDURE — G8428 CUR MEDS NOT DOCUMENT: HCPCS | Performed by: INTERNAL MEDICINE

## 2021-04-07 PROCEDURE — G8417 CALC BMI ABV UP PARAM F/U: HCPCS | Performed by: INTERNAL MEDICINE

## 2021-04-07 PROCEDURE — 1036F TOBACCO NON-USER: CPT | Performed by: INTERNAL MEDICINE

## 2021-04-07 PROCEDURE — 99213 OFFICE O/P EST LOW 20 MIN: CPT | Performed by: INTERNAL MEDICINE

## 2021-04-07 PROCEDURE — 99211 OFF/OP EST MAY X REQ PHY/QHP: CPT

## 2021-04-07 NOTE — PROGRESS NOTES
CC -   PRIYA, Obesity    BACKGROUND -   Last visit: 2/10/21  First visit: 1/13/21 (previously followed with me for pre-bariatric surgery weight loss)    · PRIYA  Diagnosed 11/09/20  Mild  Followed by Dr. Lynne Haile  Using APAP 7d/wk, uses <3 hrs for 3d/wk  Excess weight is worsening her underlying airway obstruction    · Obesity   Began in late teens  Initial BMI 67.3, Wt 417.2 lbs  HS Grad wt 150 lbs  Lowest   wt  150 lbs  Highest  wt  417 lbs  Pattern of wt gain: grad with rapid increases in preg  Wt change past yr: +30 lbs  Most wt lost: 20 lbs (pre-Bariatric surgery diet at our clinic)  Other diets attempted: Fad diets, ESTEBAN, Keto, Calorie counting    Initial Diet:    Number of meals per day - 1-2    Number of snacks per day - 1    Meal volume - 12\" plate, sometimes seconds    Fast food/convenience store - 2x/week    Restaurants (not fast food) - 0x/week   Sweets - 1d/week   Chips - 0d/week   Crackers/pretzels - 0d/week   Nuts - 0d/week   Peanut Butter - 1-2d/week   Popcorn - 0d/week   Dried fruit - 0d/week   Whole fruit - 5-6d/week (2 serving)   Breakfast cereal - 0-1d/week   Granola/Protein/Energy bar - 0d/week   Sugar sweetened beverages - 4 liters/day, 1 large DD isabel Iced coffee with extra cream and sugar 4x/wk (350 michael each)    Protein - No supplements   Fiber - No supplements     Exercise:    Gym membership - [employer]    Walking - none    Running - none    Resistance - none    Aerobic class - none    ______________________    [facility] -  Past Medical History:   Diagnosis Date    Asthma     B12 deficiency 09/21/2018    Class 3 severe obesity due to excess calories with body mass index (BMI) of 60.0 to 69.9 in adult (HCC)     GERD without esophagitis     Hypertension     Migraine     PRIYA (obstructive sleep apnea)     Vitamin D deficiency 09/21/2018    Zinc deficiency 09/21/2018     Current Outpatient Medications   Medication Sig Dispense Refill    hydroCHLOROthiazide (HYDRODIURIL) 25 MG tablet TAKE 1 the plan below    Problem 2  - Obesity   HPI   - See above Background for description    Weight  Date    417.2 lbs 01/13/21    422.8 lbs 02/10/21    423.0 lbs 04/07/21     Total weight change to date:+ 5.8 lbs                DEN = 2350 michael/d = 16,450 michael/wk             Wt effect of trouble foods = Restaurant 300 michael/wk + Sweets 200 + SSBs 12,300 = 12,800 michael/wk = 78% DEN = 1825 michael/day = 182 lbs/yr    Drinking one glass of reg soda every 10 days    Wt loss has been inadequate with just giving up soda    We discussed the options of a counting-based regimen and a VLCD    She decided on the counting-based program  Assessment  - Uncontrolled   Plan   -   Patient Instructions   Rules:  · Count every calorie every day  · Limit sweets to one day per month  · Limit chips/crackers/pretzels/nuts to 100 michael/day  · Eliminate all sugar sweetened beverages (including fruit juice)  · Limit restaurants (including fast food and food from a convenience store) to one time every two weeks    Targets:  · Limit calorie intake to 1700 calories/day  · Walk 5 minutes daily  · Avoid eating 2 hours within bedtime. Tips:  · Do not eat outside of the dining room or the kitchen  · Do not eat while watching TV, videos, working on the computer or using a smart phone  · Do not eat food out of a multi-serving bag or container. Return to see me in 4-6 weeks    Other - BP under control after adding four more anti-hypertensive agents. Seeing Dr. Macrina Nunn now.  Work up for pheo and hyperaldo completed by PCP and was neg    Call me with problems: Lory Arellano MD  Endocrinology/Obesity  4/7/21

## 2021-04-21 ENCOUNTER — OFFICE VISIT (OUTPATIENT)
Dept: FAMILY MEDICINE CLINIC | Age: 36
End: 2021-04-21
Payer: COMMERCIAL

## 2021-04-21 VITALS
HEIGHT: 66 IN | RESPIRATION RATE: 20 BRPM | SYSTOLIC BLOOD PRESSURE: 132 MMHG | WEIGHT: 293 LBS | HEART RATE: 85 BPM | TEMPERATURE: 96.1 F | DIASTOLIC BLOOD PRESSURE: 82 MMHG | OXYGEN SATURATION: 93 % | BODY MASS INDEX: 47.09 KG/M2

## 2021-04-21 DIAGNOSIS — G47.33 OSA (OBSTRUCTIVE SLEEP APNEA): ICD-10-CM

## 2021-04-21 DIAGNOSIS — E53.8 B12 DEFICIENCY: ICD-10-CM

## 2021-04-21 DIAGNOSIS — I10 ESSENTIAL HYPERTENSION: ICD-10-CM

## 2021-04-21 DIAGNOSIS — F32.1 CURRENT MODERATE EPISODE OF MAJOR DEPRESSIVE DISORDER WITHOUT PRIOR EPISODE (HCC): ICD-10-CM

## 2021-04-21 PROCEDURE — G8427 DOCREV CUR MEDS BY ELIG CLIN: HCPCS | Performed by: FAMILY MEDICINE

## 2021-04-21 PROCEDURE — 1036F TOBACCO NON-USER: CPT | Performed by: FAMILY MEDICINE

## 2021-04-21 PROCEDURE — G8417 CALC BMI ABV UP PARAM F/U: HCPCS | Performed by: FAMILY MEDICINE

## 2021-04-21 PROCEDURE — 99213 OFFICE O/P EST LOW 20 MIN: CPT | Performed by: FAMILY MEDICINE

## 2021-04-21 RX ORDER — LOSARTAN POTASSIUM 50 MG/1
50 TABLET ORAL DAILY
Qty: 30 TABLET | Refills: 0 | Status: SHIPPED
Start: 2021-04-21 | End: 2021-05-18

## 2021-04-21 RX ORDER — METOPROLOL SUCCINATE 25 MG/1
25 TABLET, EXTENDED RELEASE ORAL DAILY
Qty: 30 TABLET | Refills: 1 | Status: SHIPPED
Start: 2021-04-21 | End: 2021-05-12 | Stop reason: SDUPTHER

## 2021-04-21 RX ORDER — LANOLIN ALCOHOL/MO/W.PET/CERES
1000 CREAM (GRAM) TOPICAL DAILY
Qty: 30 TABLET | Refills: 3 | Status: SHIPPED
Start: 2021-04-21 | End: 2021-09-10

## 2021-04-21 RX ORDER — GUANFACINE 1 MG/1
TABLET ORAL
COMMUNITY
Start: 2021-04-01 | End: 2021-09-29 | Stop reason: SDUPTHER

## 2021-04-21 RX ORDER — SERTRALINE HYDROCHLORIDE 25 MG/1
25 TABLET, FILM COATED ORAL DAILY
Qty: 30 TABLET | Refills: 5 | Status: SHIPPED
Start: 2021-04-21 | End: 2021-06-16 | Stop reason: SDUPTHER

## 2021-04-21 ASSESSMENT — ENCOUNTER SYMPTOMS
ABDOMINAL PAIN: 0
COUGH: 0
DIARRHEA: 0
VOMITING: 0
SHORTNESS OF BREATH: 0
WHEEZING: 0
NAUSEA: 0

## 2021-04-21 NOTE — PROGRESS NOTES
[address] [facility]  [phone]   Chloe Edmond MD     Patient: Reny Chauhan  YOB: 1985  Visit Date: 4/21/21    Nataliia Mccarthy is a 30y.o. year old female here today for   Chief Complaint   Patient presents with    Hypertension       HPI  Patient is a 30year old female here for follow up of hypertension. Has been taking all medications. Has a dry cough. Feels like something is stuck in her throat. Dry cough is very bothersome. Thinks its from lisinopril . Has had a lot of people die in her life. Her childrens father is currently in the hospital which is stressful. He was diagnosed with afib. Review of Systems   Constitutional: Positive for fatigue. Negative for chills and fever. Respiratory: Negative for cough, shortness of breath and wheezing. Cardiovascular: Negative for chest pain, palpitations and leg swelling. Gastrointestinal: Negative for abdominal pain, diarrhea, nausea and vomiting. Musculoskeletal: Positive for arthralgias. Neurological: Negative for weakness, numbness and headaches.        Current Outpatient Medications on File Prior to Visit   Medication Sig Dispense Refill    guanFACINE (TENEX) 1 MG tablet TAKE 1 TABLET BY MOUTH EVERY NIGHT      Raspberry Ketones 100 MG CAPS Take 1 tablet by mouth daily      hydroCHLOROthiazide (HYDRODIURIL) 25 MG tablet TAKE 1 TABLET BY MOUTH EVERY DAY AT NIGHT 30 tablet 3    amLODIPine (NORVASC) 5 MG tablet Take 5 mg by mouth daily      Elastic Bandages & Supports (KNEE BRACE/CUSHION/L-XL) MISC Wear daily for knee pain (Patient not taking: Reported on 4/27/2021) 1 each 0    albuterol sulfate HFA (PROAIR HFA) 108 (90 Base) MCG/ACT inhaler Inhale 2 puffs into the lungs every 6 hours as needed for Wheezing 1 Inhaler 1    Aspirin-Acetaminophen-Caffeine (EXCEDRIN MIGRAINE PO) Take by mouth daily      Multiple Vitamins-Minerals (THERAPEUTIC MULTIVITAMIN-MINERALS) tablet Take 1 tablet by mouth daily       No current facility-administered medications on file prior to visit. No Known Allergies    Past medical, surgical, socialand/or family history reviewed, updated as needed, and are non-contributory (unless otherwise stated). Medications, allergies, and problem list also reviewed and updated as needed in patient's record. Wt Readings from Last 3 Encounters:   04/27/21 (!) 416 lb 3.2 oz (188.8 kg)   04/21/21 (!) 424 lb (192.3 kg)   04/07/21 (!) 423 lb (191.9 kg)                   /82   Pulse 85   Temp 96.1 °F (35.6 °C)   Resp 20   Ht 5' 6\" (1.676 m)   Wt (!) 424 lb (192.3 kg)   SpO2 93%   BMI 68.44 kg/m²        Physical Exam  Vitals signs and nursing note reviewed. Constitutional:       General: She is not in acute distress. Appearance: She is well-developed. She is not diaphoretic. HENT:      Head: Normocephalic and atraumatic. Cardiovascular:      Rate and Rhythm: Normal rate and regular rhythm. Heart sounds: Normal heart sounds. No murmur. Pulmonary:      Effort: Pulmonary effort is normal. No respiratory distress. Breath sounds: Normal breath sounds. No wheezing or rales. Abdominal:      General: Bowel sounds are normal. There is no distension. Palpations: Abdomen is soft. Tenderness: There is no abdominal tenderness. There is no guarding. Musculoskeletal: Normal range of motion. General: Swelling (trace edema) present.    Psychiatric:         Behavior: Behavior normal.       Results for orders placed or performed in visit on 02/26/21   METANEPHRINES URINE   Result Value Ref Range    Metanephrine Intrep Urine See Note     Metanephrine, Urine-Per Vol 47 ug/L    Normetanephrines, nmol/L 229 ug/L    Metanephrines, Ur 59 36 - 229 ug/d    Metanephrine ug/g Cre 45 0 - 300 ug/g CRT    Normetanephrine, 24H Ur 286 95 - 650 ug/d    Normetanephrine, (g/CRT) 220 0 - 400 ug/g CRT    Creatinine, Ur 107 mg/dL    Creatinine, 24H Ur 1338 700 - 1600 mg/d    Hours Collected 24     Urine Total Volume 1250    CATECHOLAMINES FREE URINE   Result Value Ref Range    Catecholamines Urine Interp See Note     Dopamine , 24H Ur 220 71 - 485 ug/d    Norepinephrine, 24H Ur 29 14 - 120 ug/d    Epinephrine, 24H Ur 1 1 - 14 ug/d    Epinephrine, (uG/L) 1 ug/L    Norepinephrine, (ug/L) 23 ug/L    Dopamine, (ug/L) 176 ug/L    Norepinephrine 21 0 - 45 ug/g CRT    Dopamine 164 0 - 250 ug/g CRT    Epinephrine 1 0 - 20 ug/g CRT       ASSESSMENT/PLAN  Sergey Damico was seen today for hypertension. Diagnoses and all orders for this visit:    B12 deficiency  -     vitamin B-12 (CYANOCOBALAMIN) 1000 MCG tablet; Take 1 tablet by mouth daily    PRIYA (obstructive sleep apnea)  -     metoprolol succinate (TOPROL XL) 25 MG extended release tablet; Take 1 tablet by mouth daily    Essential hypertension  -     metoprolol succinate (TOPROL XL) 25 MG extended release tablet; Take 1 tablet by mouth daily  -     losartan (COZAAR) 50 MG tablet; Take 1 tablet by mouth daily  - Stop lisinopril . Start losartan for dry cough. Current moderate episode of major depressive disorder without prior episode (HCC)  -     sertraline (ZOLOFT) 25 MG tablet; Take 1 tablet by mouth daily            Phone/MyChart follow up if tests abnormal.    Return in about 1 month (around 5/21/2021). or sooner if necessary. I have reviewed myfindings and recommendations with Sergey Damico. Elle Mcallister.  Amy Jeffers M.D

## 2021-04-21 NOTE — PATIENT INSTRUCTIONS
[facility]. Get blood work done   Stop lisinopril   Start losartan 50mg   Follow up in 1 month or sooner as needed.

## 2021-04-27 ENCOUNTER — OFFICE VISIT (OUTPATIENT)
Dept: SLEEP MEDICINE | Age: 36
End: 2021-04-27
Payer: COMMERCIAL

## 2021-04-27 VITALS
OXYGEN SATURATION: 97 % | RESPIRATION RATE: 16 BRPM | HEART RATE: 93 BPM | DIASTOLIC BLOOD PRESSURE: 85 MMHG | SYSTOLIC BLOOD PRESSURE: 132 MMHG | WEIGHT: 293 LBS | HEIGHT: 67 IN | BODY MASS INDEX: 45.99 KG/M2

## 2021-04-27 DIAGNOSIS — R03.0 ELEVATED BLOOD PRESSURE READING: ICD-10-CM

## 2021-04-27 DIAGNOSIS — G47.26 SHIFT WORK SLEEP DISORDER: ICD-10-CM

## 2021-04-27 DIAGNOSIS — G47.33 OBSTRUCTIVE SLEEP APNEA: ICD-10-CM

## 2021-04-27 DIAGNOSIS — E66.9 OBESITY, UNSPECIFIED CLASSIFICATION, UNSPECIFIED OBESITY TYPE, UNSPECIFIED WHETHER SERIOUS COMORBIDITY PRESENT: Primary | ICD-10-CM

## 2021-04-27 PROCEDURE — G8427 DOCREV CUR MEDS BY ELIG CLIN: HCPCS | Performed by: STUDENT IN AN ORGANIZED HEALTH CARE EDUCATION/TRAINING PROGRAM

## 2021-04-27 PROCEDURE — 1036F TOBACCO NON-USER: CPT | Performed by: STUDENT IN AN ORGANIZED HEALTH CARE EDUCATION/TRAINING PROGRAM

## 2021-04-27 PROCEDURE — 99214 OFFICE O/P EST MOD 30 MIN: CPT | Performed by: STUDENT IN AN ORGANIZED HEALTH CARE EDUCATION/TRAINING PROGRAM

## 2021-04-27 PROCEDURE — G8417 CALC BMI ABV UP PARAM F/U: HCPCS | Performed by: STUDENT IN AN ORGANIZED HEALTH CARE EDUCATION/TRAINING PROGRAM

## 2021-04-27 ASSESSMENT — SLEEP AND FATIGUE QUESTIONNAIRES
HOW LIKELY ARE YOU TO NOD OFF OR FALL ASLEEP WHILE LYING DOWN TO REST IN THE AFTERNOON WHEN CIRCUMSTANCES PERMIT: 3
HOW LIKELY ARE YOU TO NOD OFF OR FALL ASLEEP WHEN YOU ARE A PASSENGER IN A CAR FOR AN HOUR WITHOUT A BREAK: 0
HOW LIKELY ARE YOU TO NOD OFF OR FALL ASLEEP WHILE SITTING AND READING: 1
HOW LIKELY ARE YOU TO NOD OFF OR FALL ASLEEP IN A CAR, WHILE STOPPED FOR A FEW MINUTES IN TRAFFIC: 0
ESS TOTAL SCORE: 5
HOW LIKELY ARE YOU TO NOD OFF OR FALL ASLEEP WHILE SITTING QUIETLY AFTER LUNCH WITHOUT ALCOHOL: 0
HOW LIKELY ARE YOU TO NOD OFF OR FALL ASLEEP WHILE WATCHING TV: 1

## 2021-04-27 NOTE — PROGRESS NOTES
PO), Take by mouth daily, Disp: , Rfl:           Objective:   PHYSICAL EXAM:    /85 (Site: Left Upper Arm, Position: Sitting, Cuff Size: Large Adult)   Pulse 93   Resp 16   Ht 5' 7\" (1.702 m)   Wt (!) 416 lb 3.2 oz (188.8 kg)   SpO2 97%   BMI 65.19 kg/m²       (Sleep ROS above)     Constitutional: no chills, no fever   Eyes: no blurred vision and no eyesight problems. ENT: no hoarseness and no sore throat. Cardiovascular: no chest pain,   Respiratory: no cough, no shortness of breath   Gastrointestinal:  no nausea,  no vomiting, no diarrhea. Musculoskeletal: no arthralgias, no back pain and no myalgias. Integumentary: no rashes or lacerations. Neurological:  no diplopia, no dizziness,  no headache, no memory changes,  and no tingling. Endocrine: No chills      Physical exam:  Gen: No acute distress. BMI of Body mass index is 65.19 kg/m². Eyes: PERRL. No sclera icterus. No conjunctival injection. ENT: No nasal/oral discharge. Pharynx clear. Neck: Trachea midline. No obvious mass. Neck circumference Neck circumference: 17.5   Resp: No accessory muscle use. No crackles. No wheezes. No rhonchi. CV: Regular rate. Regular rhythm. No murmur or rub. Skin: Warm and dry. No bleeding observed   M/S: No cyanosis. No obvious joint deformity. Neuro: Awake. Alert. Moves all four extremities. Psych: Alert and oriented. No anxiety. Sleep Medicine 4/27/2021 2/23/2021 2/15/2021 12/3/2020   Sitting and reading 1 2 1 3   Watching TV 1 2 2 2   Sitting, inactive in a public place (e.g. a theatre or a meeting) 0 0 1 0   As a passenger in a car for an hour without a break 0 0 0 0   Lying down to rest in the afternoon when circumstances permit 3 3 1 3   Sitting and talking to someone 0 0 0 0   Sitting quietly after a lunch without alcohol 0 3 0 2   In a car, while stopped for a few minutes in traffic 0 0 0 0   Total score 5 10 5 10   Neck circumference 17.5 18.5 - 20       DATA:     PSG 11/9/2020. AHI 8.2.  SPO2 janey 80%. Assessment:      Elyse Lemus was seen today for sleep apnea. Diagnoses and all orders for this visit:    Obesity, unspecified classification, unspecified obesity type, unspecified whether serious comorbidity present    Obstructive sleep apnea    Elevated blood pressure reading    Shift work sleep disorder    ·    Plan:       1. Obstructive Sleep Apnea. Pressure was previously reduced to 4-7 CWP. Reyes Jackson Patient was having difficulty tolerating pressure and was noted to have a cough. Patient was subsequently taken off her lisinopril recently by her primary care provider, as this was thought to be the source of the cough. The patient is still coughing however it is not as significant. Patient is tolerating current pressure without difficulty. AHI is low, and her leak is within acceptable limits. Usage greater than 4 hours is low, patient was advised to try to use her Pap device 7 to 8 hours during her sleep cycle. - Continue current PAP settings    2. Obesity. Body mass index is 65.19 kg/m². Established with Dr. Adam Riggins for medical weight loss. Patient plans to proceed with bariatric surgery once her blood pressure has been controlled.  -Healthy weight loss advised     3. Shift Work Sleep Disorder. Patient is still working third shift. Patient states that she sometimes sleeps at work in between her work activities. Patient was advised to switch to second shift if possible. 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     4.  Elevated BP in clinic today. the patient did not have any alarm symptoms today including, but not limited to: blurry vision, dizziness, confusion, headache, weakness, chest pain, or shortness of breath. she  was advised to call his PCP today to schedule follow up and go to the ED if any discomfort, new symptoms, or alarm symptoms present.     Blood pressure was improved on recheck.  -Follow up with PCP  -Go to the ED if discomfort or alarm symptoms present. Follow up: Return in about 3 months (around 7/27/2021) for Follow up PRIYA. Nurse Practioner Guy ayon was present and pre-interviewed the patient prior to the encounter and observed the encounter with the patient's consent.

## 2021-04-27 NOTE — PATIENT INSTRUCTIONS
---------------------------------------------------  Blood Pressure Precautions    Your blood pressure Today:   BP Readings from Last 1 Encounters:   04/27/21 (!) 163/99       Your blood pressure at your last 3 visits:   BP Readings from Last 3 Encounters:   04/27/21 (!) 163/99   04/21/21 132/82   04/07/21 (!) 144/80        Your blood pressure was high today. Please discuss this with your primary care doctor as soon as possible. Elevated BP needs to be addressed. Please go to the Emergency room if you have any discomfort, new or worsening symptoms, or if any of the below symptoms present:       · Symptoms of a heart attack. These may include:  ? Chest pain or pressure, or a strange feeling in the chest.  ? Sweating. ? Shortness of breath. ? Nausea or vomiting. ? Pain, pressure, or a strange feeling in the back, neck, jaw, or upper belly or in one or both shoulders or arms. ? Lightheadedness or sudden weakness. ? A fast or irregular heartbeat. ? Dizziness   · Symptoms of a stroke. These may include:  ? Sudden numbness, tingling, weakness, or loss of movement in your face, arm, or leg, especially on only one side of your body. ? Sudden vision changes. ? Sudden trouble speaking. ? Facial droop  ? Sudden confusion or trouble understanding simple statements. ? Sudden problems with walking or balance. ? A sudden, severe headache that is different from past headaches. · You have severe back or belly pain.     ---------------------------------------------------        It was a pleasure seeing you today Franco Fernandes! Summary of Today's Visit            Please call our sleep nurses to schedule a follow up at - [phone] option 2              1. Continue using your machine nightly     ------------Please bring your machine/Data Card to every visit. -------------     -Request all data downloads be sent to my nurse        2. Contact your DME company about supplies or issues with your machine.   As a general guideline, please replace your:  -CPAP mask every 3-6 months  -CPAP hose  every 3-6 months  -Filter every 2-4 weeks  -CPAP/BiPAP/ASV replacements every 5-7+ years     3. Continue healthy weight loss/stabilization, as this is recommended as a long-term intervention. 4. Please do not drive or operate machinery when you feel fatigued/sleepy/drowsy      5. Please try to sleep between 6-8 hours nightly with the CPAP mask    6. Please avoid sedatives, alcohol and caffeinated drinks at/near bed time. 7. Please call your supply (DME) company with any issues with your PAP device. Please call our office with any issues pertaining to the therapy.   ----Please note that complications of PRIYA if not treated can increase risk for: systemic hypertension, pulmonary hypertension, cardiovascular morbidities (heart attack and stroke), car accidents and all cause mortality. For general questions or scheduling issues, call my nurse at [phone] option [address] [place], [place] [phone] option 2  f- [phone]           ----------------------------------------------------------  Common Issues and Solutions     Pillow is dislodging mask - Consider getting a CPAP pillow or switching to a mask with the hose at the top. Dry Mouth - Adjust Humidity and/or try Biotene Gel. (Excessive leak can also cause this)     Leak - Please call your equipment provider  as they may need to adjust your mask. Difficulty tolerating the PAP mask - Contact your equipment company to try a different mask, we can order a \"mini sleep study\"with the sleep tech to try different masks/settings of pressure, also we have a sleep psychologist to help with anxiety related to wearing the PAP mask      ----------------------------------------------------------     For Questions and Concerns:    In case of difficulty with your PAP device or mask interface, please FIRST contact your [employer] [address] (The company who provides you the CPAP/BiPAP/ASV machine/supplies). If you need the number for any other DME company, please call my Nurse at [phone] option 2     For questions concerning your [place] appointment: Call [phone] option 2  SLEEP LAB SCHEDULING:        ----------------------------------------------------------     Helpful Web Sites: For patients with ALL SLEEP DISORDERS: American Academy of Sleep Medicine http://sleepeducation. org; or [employer]: https://sleepfoundation. org  For patients with PRIYA: American Sleep Apnea Association: http://gusman.org/. org  For patients with RLS: RLS Foundation: Augustus.es  For patients with INSOMNIA: https://www.bean.org/. org/articles/sleep/insomnia-causes-and-cures. htm  For patients with DEPRESSION: [employer].com.iHydroRun/depression            Learning About CPAP for Sleep Apnea  What is CPAP? CPAP/Bi-PAP is a small machine that you use at home every night while you sleep. It increases air pressure in your throat to keep your airway open. When you have sleep apnea, this can help you sleep better so you feel much better. CPAP stands for \"continuous positive airway pressure. \" Bi-PAP is a different PAP setting that allows for different pressures when you breathe in and out. The CPAP/Bi-PAP machine will have one of the following:  · A mask that covers your nose and mouth  · Prongs that fit into your nose  · A mask that covers your nose only, the most common type. This type is called NCPAP. The N stands for \"nasal.\"  Why is it done? CPAP is a common/effective treatment for obstructive sleep apnea. How does it help? · CPAP can help you have more normal sleep, so you feel less sleepy and more alert during the daytime through the treatment of sleep apnea. · CPAP may help keep heart failure or other heart problems from getting worse. · CPAP may help lower your blood pressure.   · If you use CPAP, your bed partner may also sleep better because you are snoring less. What are the side effects? Some people who use CPAP have:  · A dry or stuffy nose and a sore throat. · Irritated skin on the face. · Sore eyes. · Bloating. If you have any of these problems, work with your doctor to fix them. Here are some things you can try:  · Be sure the mask or nasal prongs fit well. · See if your doctor can adjust the pressure of your CPAP. · If your nose is dry, try a humidifier. If these things do not help, you might try a different type of machine. Some machines have air pressure that adjusts on its own. Others have air pressures that are different when you breathe in than when you breathe out. This may reduce discomfort caused by too much pressure in your nose. Where can you learn more? Go to https://chpezeenateweb.REHAPP. org and sign in to your [employer] account. Enter M828 in the FairSoftware box to learn more about \"Learning About CPAP for Sleep Apnea. \"     If you do not have an account, please click on the \"Sign Up Now\" link. Current as of: February 24, 2020               Content Version: 12.5  © 2006-2020 Healthwise, Incorporated. Care instructions adapted under license by [facility] [facility] ([facility]). If you have questions about a medical condition or this instruction, always ask your healthcare professional. [place] [address] any warranty or liability for your use of this information.

## 2021-05-11 DIAGNOSIS — I10 ESSENTIAL HYPERTENSION: ICD-10-CM

## 2021-05-12 RX ORDER — METOPROLOL SUCCINATE 25 MG/1
TABLET, EXTENDED RELEASE ORAL
Qty: 30 TABLET | Refills: 1 | OUTPATIENT
Start: 2021-05-12

## 2021-05-12 RX ORDER — METOPROLOL SUCCINATE 25 MG/1
25 TABLET, EXTENDED RELEASE ORAL DAILY
Qty: 30 TABLET | Refills: 1 | Status: SHIPPED
Start: 2021-05-12 | End: 2021-09-10

## 2021-05-26 ENCOUNTER — OFFICE VISIT (OUTPATIENT)
Dept: FAMILY MEDICINE CLINIC | Age: 36
End: 2021-05-26
Payer: COMMERCIAL

## 2021-05-26 VITALS
DIASTOLIC BLOOD PRESSURE: 92 MMHG | TEMPERATURE: 96.7 F | RESPIRATION RATE: 20 BRPM | SYSTOLIC BLOOD PRESSURE: 144 MMHG | HEIGHT: 67 IN | OXYGEN SATURATION: 97 % | BODY MASS INDEX: 45.99 KG/M2 | HEART RATE: 92 BPM | WEIGHT: 293 LBS

## 2021-05-26 DIAGNOSIS — I10 ESSENTIAL HYPERTENSION: ICD-10-CM

## 2021-05-26 DIAGNOSIS — J45.20 MILD INTERMITTENT ASTHMA, UNSPECIFIED WHETHER COMPLICATED: ICD-10-CM

## 2021-05-26 DIAGNOSIS — I10 ESSENTIAL HYPERTENSION: Primary | ICD-10-CM

## 2021-05-26 LAB
ANION GAP SERPL CALCULATED.3IONS-SCNC: 13 MMOL/L (ref 7–16)
BUN BLDV-MCNC: 16 MG/DL (ref 6–20)
CALCIUM SERPL-MCNC: 9.4 MG/DL (ref 8.6–10.2)
CHLORIDE BLD-SCNC: 99 MMOL/L (ref 98–107)
CO2: 25 MMOL/L (ref 22–29)
CREAT SERPL-MCNC: 0.7 MG/DL (ref 0.5–1)
GFR AFRICAN AMERICAN: >60
GFR NON-AFRICAN AMERICAN: >60 ML/MIN/1.73
GLUCOSE BLD-MCNC: 98 MG/DL (ref 74–99)
MAGNESIUM: 2.1 MG/DL (ref 1.6–2.6)
POTASSIUM SERPL-SCNC: 4.6 MMOL/L (ref 3.5–5)
SODIUM BLD-SCNC: 137 MMOL/L (ref 132–146)

## 2021-05-26 PROCEDURE — G8417 CALC BMI ABV UP PARAM F/U: HCPCS | Performed by: FAMILY MEDICINE

## 2021-05-26 PROCEDURE — 99213 OFFICE O/P EST LOW 20 MIN: CPT | Performed by: FAMILY MEDICINE

## 2021-05-26 PROCEDURE — G8427 DOCREV CUR MEDS BY ELIG CLIN: HCPCS | Performed by: FAMILY MEDICINE

## 2021-05-26 PROCEDURE — 1036F TOBACCO NON-USER: CPT | Performed by: FAMILY MEDICINE

## 2021-05-26 RX ORDER — HYDROCHLOROTHIAZIDE 50 MG/1
50 TABLET ORAL DAILY
COMMUNITY
Start: 2021-05-14 | End: 2021-12-29 | Stop reason: SDUPTHER

## 2021-05-26 RX ORDER — MAGNESIUM OXIDE 400 MG/1
1 TABLET ORAL DAILY
COMMUNITY
Start: 2021-05-14 | End: 2021-12-29 | Stop reason: SDUPTHER

## 2021-05-26 RX ORDER — MAGNESIUM OXIDE 400 MG/1
TABLET ORAL
COMMUNITY
Start: 2021-05-14 | End: 2021-07-22

## 2021-05-26 RX ORDER — MELOXICAM 15 MG/1
TABLET ORAL
COMMUNITY
Start: 2021-05-15 | End: 2021-05-26 | Stop reason: CLARIF

## 2021-05-26 SDOH — ECONOMIC STABILITY: FOOD INSECURITY: WITHIN THE PAST 12 MONTHS, YOU WORRIED THAT YOUR FOOD WOULD RUN OUT BEFORE YOU GOT MONEY TO BUY MORE.: NEVER TRUE

## 2021-05-26 SDOH — ECONOMIC STABILITY: FOOD INSECURITY: WITHIN THE PAST 12 MONTHS, THE FOOD YOU BOUGHT JUST DIDN'T LAST AND YOU DIDN'T HAVE MONEY TO GET MORE.: NEVER TRUE

## 2021-05-26 ASSESSMENT — ENCOUNTER SYMPTOMS
ABDOMINAL PAIN: 0
COUGH: 0
WHEEZING: 0
NAUSEA: 0
VOMITING: 0
SHORTNESS OF BREATH: 0
DIARRHEA: 0

## 2021-06-03 ENCOUNTER — OFFICE VISIT (OUTPATIENT)
Dept: BARIATRICS/WEIGHT MGMT | Age: 36
End: 2021-06-03
Payer: COMMERCIAL

## 2021-06-03 VITALS
BODY MASS INDEX: 47.09 KG/M2 | HEART RATE: 81 BPM | HEIGHT: 66 IN | TEMPERATURE: 97.4 F | WEIGHT: 293 LBS | DIASTOLIC BLOOD PRESSURE: 94 MMHG | SYSTOLIC BLOOD PRESSURE: 152 MMHG

## 2021-06-03 DIAGNOSIS — G47.33 OSA (OBSTRUCTIVE SLEEP APNEA): Primary | ICD-10-CM

## 2021-06-03 DIAGNOSIS — E66.01 CLASS 3 SEVERE OBESITY DUE TO EXCESS CALORIES WITHOUT SERIOUS COMORBIDITY WITH BODY MASS INDEX (BMI) OF 60.0 TO 69.9 IN ADULT (HCC): ICD-10-CM

## 2021-06-03 PROCEDURE — G8428 CUR MEDS NOT DOCUMENT: HCPCS | Performed by: INTERNAL MEDICINE

## 2021-06-03 PROCEDURE — 99213 OFFICE O/P EST LOW 20 MIN: CPT | Performed by: INTERNAL MEDICINE

## 2021-06-03 PROCEDURE — 1036F TOBACCO NON-USER: CPT | Performed by: INTERNAL MEDICINE

## 2021-06-03 PROCEDURE — 99211 OFF/OP EST MAY X REQ PHY/QHP: CPT

## 2021-06-03 PROCEDURE — G8417 CALC BMI ABV UP PARAM F/U: HCPCS | Performed by: INTERNAL MEDICINE

## 2021-06-03 NOTE — PATIENT INSTRUCTIONS
Rules:  · Count every calorie every day  · Limit sweets to one day per month  · Limit chips/crackers/pretzels/nuts to 100 michael/day  · Eliminate all sugar sweetened beverages (including fruit juice)  · Limit restaurants (including fast food and food from a convenience store) to one time every two weeks    Targets:  · Limit calorie intake to 1700 calories/day  · Walk 5 minutes daily  · Avoid eating 2 hours within bedtime. Tips:  · Do not eat outside of the dining room or the kitchen  · Do not eat while watching TV, videos, working on the computer or using a smart phone  · Do not eat food out of a multi-serving bag or container.     Return to see me in 6 weeks

## 2021-06-03 NOTE — PROGRESS NOTES
CC -   PRIYA, Obesity    BACKGROUND -   Last visit: 4/07/21  First visit: 1/13/21 (previously followed with me for pre-bariatric surgery weight loss)    · PRIYA  Diagnosed 11/09/20  Mild  Followed by Dr. Carin uDff  Using APAP 7d/wk, uses <3 hrs for 3d/wk  Excess weight is worsening her underlying airway obstruction    · Obesity   Began in late teens  Initial BMI 67.3, Wt 417.2 lbs  HS Grad wt 150 lbs  Lowest   wt  150 lbs  Highest  wt  417 lbs  Pattern of wt gain: grad with rapid increases in preg  Wt change past yr: +30 lbs  Most wt lost: 20 lbs (pre-Bariatric surgery diet at our clinic)  Other diets attempted: Fad diets, ESTEBAN, Keto, Calorie counting    Initial Diet:    Number of meals per day - 1-2    Number of snacks per day - 1    Meal volume - 12\" plate, sometimes seconds    Fast food/convenience store - 2x/week    Restaurants (not fast food) - 0x/week   Sweets - 1d/week   Chips - 0d/week   Crackers/pretzels - 0d/week   Nuts - 0d/week   Peanut Butter - 1-2d/week   Popcorn - 0d/week   Dried fruit - 0d/week   Whole fruit - 5-6d/week (2 serving)   Breakfast cereal - 0-1d/week   Granola/Protein/Energy bar - 0d/week   Sugar sweetened beverages - 4 liters/day, 1 large DD isabel Iced coffee with extra cream and sugar 4x/wk (350 michael each)    Protein - No supplements   Fiber - No supplements     Exercise:    Gym membership - [employer]    Walking - none    Running - none    Resistance - none    Aerobic class - none    ______________________    [facility] -  Past Medical History:   Diagnosis Date    Asthma     B12 deficiency 09/21/2018    Class 3 severe obesity due to excess calories with body mass index (BMI) of 60.0 to 69.9 in adult (HCC)     GERD without esophagitis     Hypertension     Migraine     PRIYA (obstructive sleep apnea)     Vitamin D deficiency 09/21/2018    Zinc deficiency 09/21/2018     Current Outpatient Medications   Medication Sig Dispense Refill    diclofenac sodium (VOLTAREN) 1 % GEL APPLY 4 GRAMS 4 sleepiness: 5/10      Needs to reduce her underlying airway obstruction by decreasing her excess weight      Her PRIYA may be contributing to her hypertension  Assessment  - uncontrolled  Plan   - Increase use of autopap      Proceed with wt reduction per the plan below    Problem 2  - Obesity   HPI   - See above Background for description    Weight  Date    417.2 lbs 01/13/21    422.8 lbs 02/10/21    423.0 lbs 04/07/21     419.6 lbs 06/03/21    Total weight change to date:+ 2.4 lbs                DEN = 2350 michael/d = 16,450 michael/wk                Avg daily energy variance:                             4/07/21-06/03/21 = 3.4 lbs (11,900 michael)/57d = 209 michael/d deficit             Wt effect of trouble foods = Restaurant 300 michael/wk + Sweets 200 + SSBs 12,300 = 12,800 michael/wk = 78% DEN = 1825 michael/day = 182 lbs/yr    Initial thought was that eliminating reg soda may produce a sufficient rate of wt loss. However, it did not. Therefore, discussed the options of a counting-based regimen and a VLCD    She decided on the counting-based program    Update:    Counting-calories 4d/wk, limiting to 1800 michael on avg (could not remember the exact amount I prescribed)  Assessment  - Improved  Plan   -   Rules:  · Count every calorie every day  · Limit sweets to one day per month  · Limit chips/crackers/pretzels/nuts to 100 michael/day  · Eliminate all sugar sweetened beverages (including fruit juice)  · Limit restaurants (including fast food and food from a convenience store) to one time every two weeks    Targets:  · Limit calorie intake to 1700 calories/day  · Walk 5 minutes daily  · Avoid eating 2 hours within bedtime. Tips:  · Do not eat outside of the dining room or the kitchen  · Do not eat while watching TV, videos, working on the computer or using a smart phone  · Do not eat food out of a multi-serving bag or container.     Return to see me in 6 weeks      Call me with problems: [phone]    Lalita [place] MD, MD  Endocrinology/Obesity  6/3/21

## 2021-06-16 ENCOUNTER — OFFICE VISIT (OUTPATIENT)
Dept: FAMILY MEDICINE CLINIC | Age: 36
End: 2021-06-16
Payer: COMMERCIAL

## 2021-06-16 VITALS
SYSTOLIC BLOOD PRESSURE: 146 MMHG | DIASTOLIC BLOOD PRESSURE: 96 MMHG | RESPIRATION RATE: 20 BRPM | TEMPERATURE: 96.7 F | HEART RATE: 93 BPM | OXYGEN SATURATION: 95 % | BODY MASS INDEX: 47.09 KG/M2 | WEIGHT: 293 LBS | HEIGHT: 66 IN

## 2021-06-16 DIAGNOSIS — J06.9 VIRAL URI: Primary | ICD-10-CM

## 2021-06-16 DIAGNOSIS — I10 ESSENTIAL HYPERTENSION: ICD-10-CM

## 2021-06-16 PROCEDURE — 99213 OFFICE O/P EST LOW 20 MIN: CPT | Performed by: FAMILY MEDICINE

## 2021-06-16 PROCEDURE — G8427 DOCREV CUR MEDS BY ELIG CLIN: HCPCS | Performed by: FAMILY MEDICINE

## 2021-06-16 PROCEDURE — G8417 CALC BMI ABV UP PARAM F/U: HCPCS | Performed by: FAMILY MEDICINE

## 2021-06-16 PROCEDURE — 1036F TOBACCO NON-USER: CPT | Performed by: FAMILY MEDICINE

## 2021-06-16 RX ORDER — SERTRALINE HYDROCHLORIDE 25 MG/1
25 TABLET, FILM COATED ORAL DAILY
Qty: 30 TABLET | Refills: 5 | Status: SHIPPED
Start: 2021-06-16 | End: 2021-12-29 | Stop reason: SDUPTHER

## 2021-06-16 RX ORDER — LOSARTAN POTASSIUM 100 MG/1
TABLET ORAL
Qty: 30 TABLET | Refills: 1 | Status: SHIPPED
Start: 2021-06-16 | End: 2021-08-13

## 2021-06-16 RX ORDER — AMLODIPINE BESYLATE 5 MG/1
5 TABLET ORAL DAILY
Qty: 30 TABLET | Refills: 2 | Status: SHIPPED
Start: 2021-06-16 | End: 2021-07-26

## 2021-06-16 ASSESSMENT — ENCOUNTER SYMPTOMS
WHEEZING: 0
ABDOMINAL PAIN: 0
COUGH: 0
VOMITING: 0
NAUSEA: 0
SHORTNESS OF BREATH: 0
DIARRHEA: 0

## 2021-06-16 NOTE — PROGRESS NOTES
[facility]  [phone]   Bette Nguyen MD     Patient: Misty Dillon  YOB: 1985  Visit Date: 6/16/21 Maryanna Friday is a 30y.o. year old female here today for   Chief Complaint   Patient presents with    Hypertension       HPI  Patient is a 30year old female here for follow up of hypertension. Is having pain in head thinks it sinus infection . Has a headache midline of forehead. Pressure . Pressure behing right eye. No fevers , chills, nasuea or vomiting. Congestion when sleeping. Blood pressure high. Has not heard from nephro     Review of Systems   Constitutional: Positive for fatigue. Negative for chills and fever. Respiratory: Negative for cough, shortness of breath and wheezing. Cardiovascular: Negative for chest pain, palpitations and leg swelling. Gastrointestinal: Negative for abdominal pain, diarrhea, nausea and vomiting. Musculoskeletal: Positive for arthralgias. Neurological: Negative for weakness, numbness and headaches.        Current Outpatient Medications on File Prior to Visit   Medication Sig Dispense Refill    diclofenac sodium (VOLTAREN) 1 % GEL APPLY 4 GRAMS 4 TIMES DAILY AS NEEDED FOR PAIN      magnesium oxide (MAG-OX) 400 MG tablet Take 1 tablet by mouth daily      magnesium oxide (MAG-OX) 400 MG tablet       hydroCHLOROthiazide (HYDRODIURIL) 50 MG tablet       metoprolol succinate (TOPROL XL) 25 MG extended release tablet Take 1 tablet by mouth daily 30 tablet 1    guanFACINE (TENEX) 1 MG tablet TAKE 1 TABLET BY MOUTH EVERY NIGHT      Raspberry Ketones 100 MG CAPS Take 1 tablet by mouth daily      vitamin B-12 (CYANOCOBALAMIN) 1000 MCG tablet Take 1 tablet by mouth daily 30 tablet 3    Elastic Bandages & Supports (KNEE BRACE/CUSHION/L-XL) MISC Wear daily for knee pain 1 each 0    albuterol sulfate HFA (PROAIR HFA) 108 (90 Base) MCG/ACT inhaler Inhale 2 puffs into the lungs every 6 hours as needed for Wheezing 1 Inhaler 1    Aspirin-Acetaminophen-Caffeine (EXCEDRIN MIGRAINE PO) Take by mouth daily      Multiple Vitamins-Minerals (THERAPEUTIC MULTIVITAMIN-MINERALS) tablet Take 1 tablet by mouth daily       No current facility-administered medications on file prior to visit. No Known Allergies    Past medical, surgical, socialand/or family history reviewed, updated as needed, and are non-contributory (unless otherwise stated). Medications, allergies, and problem list also reviewed and updated as needed in patient's record. Wt Readings from Last 3 Encounters:   06/16/21 (!) 420 lb (190.5 kg)   06/03/21 (!) 419 lb 9.6 oz (190.3 kg)   05/26/21 (!) 418 lb (189.6 kg)                   BP (!) 146/96   Pulse 93   Temp 96.7 °F (35.9 °C)   Resp 20   Ht 5' 6\" (1.676 m)   Wt (!) 420 lb (190.5 kg)   SpO2 95%   BMI 67.79 kg/m²        Physical Exam  Vitals and nursing note reviewed. Constitutional:       General: She is not in acute distress. Appearance: She is well-developed. She is not diaphoretic. HENT:      Head: Normocephalic and atraumatic. Cardiovascular:      Rate and Rhythm: Normal rate and regular rhythm. Heart sounds: Normal heart sounds. No murmur heard. Pulmonary:      Effort: Pulmonary effort is normal. No respiratory distress. Breath sounds: Normal breath sounds. No wheezing or rales. Abdominal:      General: Bowel sounds are normal. There is no distension. Palpations: Abdomen is soft. Tenderness: There is no abdominal tenderness. There is no guarding. Musculoskeletal:         General: Swelling (trace edema) present. Normal range of motion.    Psychiatric:         Behavior: Behavior normal.       Results for orders placed or performed in visit on 05/26/21   MAGNESIUM   Result Value Ref Range    Magnesium 2.1 1.6 - 2.6 mg/dL   Basic Metabolic Panel   Result Value Ref Range    Sodium 137 132 - 146 mmol/L    Potassium 4.6 3.5 - 5.0 mmol/L

## 2021-06-16 NOTE — PATIENT INSTRUCTIONS
Increase losartan to 100mg   Get bmp done in 2 weeks. Call nephrologist   Follow up in 1 month    You have a viral infection   Tea with honey is very helpful for your cough. Viral infections can last for up to 2 weeks. If this continues for more 2 weeks or gets worse --please call to make an appointment. Try  flonase , 2 sprays in each nostril once a day   Nasal irrigation is very helpful and can help with sinus drainage and pressure. I recommend mike med sinus rinse or AYR nasal irrigation. You can buy this from any pharmacy. You should do it once a day   If you use the nasal irrigation you should use distilled water. When lying down sleep slightly elevated to help with drainage.    Drink lots of water

## 2021-07-19 ENCOUNTER — OFFICE VISIT (OUTPATIENT)
Dept: FAMILY MEDICINE CLINIC | Age: 36
End: 2021-07-19
Payer: COMMERCIAL

## 2021-07-19 VITALS
SYSTOLIC BLOOD PRESSURE: 140 MMHG | BODY MASS INDEX: 47.09 KG/M2 | HEIGHT: 66 IN | DIASTOLIC BLOOD PRESSURE: 92 MMHG | WEIGHT: 293 LBS | HEART RATE: 89 BPM | OXYGEN SATURATION: 97 % | RESPIRATION RATE: 20 BRPM

## 2021-07-19 DIAGNOSIS — I10 ESSENTIAL HYPERTENSION: Primary | ICD-10-CM

## 2021-07-19 PROCEDURE — 1036F TOBACCO NON-USER: CPT | Performed by: FAMILY MEDICINE

## 2021-07-19 PROCEDURE — 99213 OFFICE O/P EST LOW 20 MIN: CPT | Performed by: FAMILY MEDICINE

## 2021-07-19 PROCEDURE — G8427 DOCREV CUR MEDS BY ELIG CLIN: HCPCS | Performed by: FAMILY MEDICINE

## 2021-07-19 PROCEDURE — G8417 CALC BMI ABV UP PARAM F/U: HCPCS | Performed by: FAMILY MEDICINE

## 2021-07-19 NOTE — PROGRESS NOTES
[address] [facility]  [phone]   Ajit Dickinson MD     Patient: Humberto Mckeon  YOB: 1985  Visit Date: 7/19/21    Ivelisse Mason is a 30y.o. year old female here today for   Chief Complaint   Patient presents with    Hypertension       HPI  Patient is a 30year old female here for follow up of hypertension. Planning on having gastric bypass. Taking all medications. No chest pain, sob, lightheadedness, nasuea. Vomiting. Review of Systems   Constitutional: Positive for fatigue. Negative for chills and fever. Respiratory: Negative for cough, shortness of breath and wheezing. Cardiovascular: Negative for chest pain, palpitations and leg swelling. Gastrointestinal: Negative for abdominal pain, diarrhea, nausea and vomiting. Musculoskeletal: Positive for arthralgias. Neurological: Negative for weakness, numbness and headaches.        Current Outpatient Medications on File Prior to Visit   Medication Sig Dispense Refill    sertraline (ZOLOFT) 25 MG tablet Take 1 tablet by mouth daily 30 tablet 5    losartan (COZAAR) 100 MG tablet TAKE 1 TABLET BY MOUTH EVERY DAY 30 tablet 1    magnesium oxide (MAG-OX) 400 MG tablet Take 1 tablet by mouth daily      hydroCHLOROthiazide (HYDRODIURIL) 50 MG tablet       metoprolol succinate (TOPROL XL) 25 MG extended release tablet Take 1 tablet by mouth daily 30 tablet 1    guanFACINE (TENEX) 1 MG tablet TAKE 1 TABLET BY MOUTH EVERY NIGHT      vitamin B-12 (CYANOCOBALAMIN) 1000 MCG tablet Take 1 tablet by mouth daily 30 tablet 3    Elastic Bandages & Supports (KNEE BRACE/CUSHION/L-XL) MISC Wear daily for knee pain 1 each 0    albuterol sulfate HFA (PROAIR HFA) 108 (90 Base) MCG/ACT inhaler Inhale 2 puffs into the lungs every 6 hours as needed for Wheezing 1 Inhaler 1    Multiple Vitamins-Minerals (THERAPEUTIC MULTIVITAMIN-MINERALS) tablet Take 1 tablet by mouth daily       No current facility-administered medications on file prior to visit. No Known Allergies    Past medical, surgical, socialand/or family history reviewed, updated as needed, and are non-contributory (unless otherwise stated). Medications, allergies, and problem list also reviewed and updated as needed in patient's record. Wt Readings from Last 3 Encounters:   07/22/21 (!) 414 lb (187.8 kg)   07/22/21 (!) 414 lb (187.8 kg)   07/19/21 (!) 412 lb (186.9 kg)                   BP (!) 140/92   Pulse 89   Resp 20   Ht 5' 6\" (1.676 m)   Wt (!) 412 lb (186.9 kg)   SpO2 97%   BMI 66.50 kg/m²        Physical Exam  Vitals and nursing note reviewed. Constitutional:       General: She is not in acute distress. Appearance: She is well-developed. She is not diaphoretic. HENT:      Head: Normocephalic and atraumatic. Cardiovascular:      Rate and Rhythm: Normal rate and regular rhythm. Heart sounds: Normal heart sounds. No murmur heard. Pulmonary:      Effort: Pulmonary effort is normal. No respiratory distress. Breath sounds: Normal breath sounds. No wheezing or rales. Abdominal:      General: Bowel sounds are normal. There is no distension. Palpations: Abdomen is soft. Tenderness: There is no abdominal tenderness. There is no guarding. Musculoskeletal:         General: Swelling (trace edema) present. Normal range of motion.    Psychiatric:         Behavior: Behavior normal.       Results for orders placed or performed in visit on 05/26/21   MAGNESIUM   Result Value Ref Range    Magnesium 2.1 1.6 - 2.6 mg/dL   Basic Metabolic Panel   Result Value Ref Range    Sodium 137 132 - 146 mmol/L    Potassium 4.6 3.5 - 5.0 mmol/L    Chloride 99 98 - 107 mmol/L    CO2 25 22 - 29 mmol/L    Anion Gap 13 7 - 16 mmol/L    Glucose 98 74 - 99 mg/dL    BUN 16 6 - 20 mg/dL    CREATININE 0.7 0.5 - 1.0 mg/dL    GFR Non-African American >60 >=60 mL/min/1.73    GFR African American >60     Calcium 9.4 8.6 - 10.2

## 2021-07-22 ENCOUNTER — OFFICE VISIT (OUTPATIENT)
Dept: BARIATRICS/WEIGHT MGMT | Age: 36
End: 2021-07-22
Payer: COMMERCIAL

## 2021-07-22 ENCOUNTER — TELEPHONE (OUTPATIENT)
Dept: BARIATRICS/WEIGHT MGMT | Age: 36
End: 2021-07-22

## 2021-07-22 VITALS
DIASTOLIC BLOOD PRESSURE: 87 MMHG | BODY MASS INDEX: 47.09 KG/M2 | HEART RATE: 91 BPM | WEIGHT: 293 LBS | TEMPERATURE: 97.5 F | HEIGHT: 66 IN | SYSTOLIC BLOOD PRESSURE: 137 MMHG

## 2021-07-22 VITALS
RESPIRATION RATE: 20 BRPM | HEIGHT: 66 IN | SYSTOLIC BLOOD PRESSURE: 159 MMHG | DIASTOLIC BLOOD PRESSURE: 88 MMHG | TEMPERATURE: 97.5 F | BODY MASS INDEX: 47.09 KG/M2 | HEART RATE: 92 BPM | WEIGHT: 293 LBS

## 2021-07-22 DIAGNOSIS — I10 ESSENTIAL HYPERTENSION: ICD-10-CM

## 2021-07-22 DIAGNOSIS — E66.01 MORBID OBESITY DUE TO EXCESS CALORIES (HCC): Primary | ICD-10-CM

## 2021-07-22 DIAGNOSIS — K21.9 GASTROESOPHAGEAL REFLUX DISEASE WITHOUT ESOPHAGITIS: ICD-10-CM

## 2021-07-22 DIAGNOSIS — E66.01 CLASS 3 SEVERE OBESITY DUE TO EXCESS CALORIES WITHOUT SERIOUS COMORBIDITY WITH BODY MASS INDEX (BMI) OF 60.0 TO 69.9 IN ADULT (HCC): ICD-10-CM

## 2021-07-22 DIAGNOSIS — G47.33 OSA (OBSTRUCTIVE SLEEP APNEA): Primary | ICD-10-CM

## 2021-07-22 PROCEDURE — G8417 CALC BMI ABV UP PARAM F/U: HCPCS | Performed by: SURGERY

## 2021-07-22 PROCEDURE — 1036F TOBACCO NON-USER: CPT | Performed by: SURGERY

## 2021-07-22 PROCEDURE — 99212 OFFICE O/P EST SF 10 MIN: CPT

## 2021-07-22 PROCEDURE — 99211 OFF/OP EST MAY X REQ PHY/QHP: CPT | Performed by: INTERNAL MEDICINE

## 2021-07-22 PROCEDURE — G8428 CUR MEDS NOT DOCUMENT: HCPCS | Performed by: INTERNAL MEDICINE

## 2021-07-22 PROCEDURE — G8417 CALC BMI ABV UP PARAM F/U: HCPCS | Performed by: INTERNAL MEDICINE

## 2021-07-22 PROCEDURE — 99214 OFFICE O/P EST MOD 30 MIN: CPT | Performed by: INTERNAL MEDICINE

## 2021-07-22 PROCEDURE — G8427 DOCREV CUR MEDS BY ELIG CLIN: HCPCS | Performed by: SURGERY

## 2021-07-22 PROCEDURE — 99214 OFFICE O/P EST MOD 30 MIN: CPT | Performed by: SURGERY

## 2021-07-22 PROCEDURE — 1036F TOBACCO NON-USER: CPT | Performed by: INTERNAL MEDICINE

## 2021-07-22 RX ORDER — PHENTERMINE HYDROCHLORIDE 37.5 MG/1
TABLET ORAL
Qty: 30 TABLET | Refills: 0 | Status: SHIPPED | OUTPATIENT
Start: 2021-07-22 | End: 2021-08-25 | Stop reason: SDUPTHER

## 2021-07-22 NOTE — PATIENT INSTRUCTIONS
What is the next step to proceed with weight loss surgery? Please be aware that any co-pays or deductibles may be requested prior to testing and / or procedures. You will need to schedule a psychological evaluation for weight loss surgery. Patients will be required to complete all psychological testing as required by the mental health provider. Patients must also follow all of the provider's recommendations before weight loss surgery can be scheduled. The evaluation must be done a standard way for weight loss surgery. We strongly recommend that you contact one of our preferred providers listed below to arrange this:      Diaz Javed, [facility]  [address] [place], [place]   [phone]    [facility] and [address]   [phone]    Dr. Candida Barajas, PhD    [facility]. [place], [place]    [phone]      You will also need to plan on attending a 2 hour nutrition class at [facility] prior to your surgery. We will schedule this for you when we schedule your surgery. Please remember to have your labs drawn 10 days prior to your first scheduled dietary appointment. Please remember, that while we will submit your case to insurance for surgery authorization, it is your responsibility to know if your plan covers weight loss surgery and keep up-to-date with changes to your insurance coverage. We will do everything possible to help you get approved for weight loss surgery, but cannot guarantee an approval.     Please note that you will not be submitted to your insurance company until all pre-operative testing requirements are met.

## 2021-07-22 NOTE — TELEPHONE ENCOUNTER
Prior Authorization Form  DEMOGRAPHICS:    Patient Name:  Amarilis Hewitt  Patient :  1985            Insurance:  Payor: Didi Chain / Plan: [employer]koo Buys / Product Type: *No Product type* /   Insurance ID Number:    Payor/Plan Subscr  Sex Relation Sub. Ins. ID Effective Group Num   1. [employer] - * CÉSAR QUINTANA 1985 Female Self [id number] [address] [facility] [address]         DIAGNOSIS & PROCEDURE:    Procedure/Operation: EGD           CPT Code: 59429    Diagnosis:  Gerd    ICD10 Code: K21.     Location:  [facility]    Surgeon:  Abebe Torres INFORMATION:    Date: 2021    Time: N/A              Anesthesia:  MAC/TIVA                                                       Status:  Outpatient        Special Comments:         Electronically signed by Lisandro Casper MA on 2021 at 12:19 PM

## 2021-07-22 NOTE — PROGRESS NOTES
tablet by mouth daily 30 tablet 5    amLODIPine (NORVASC) 5 MG tablet Take 1 tablet by mouth daily 30 tablet 2    losartan (COZAAR) 100 MG tablet TAKE 1 TABLET BY MOUTH EVERY DAY 30 tablet 1    magnesium oxide (MAG-OX) 400 MG tablet Take 1 tablet by mouth daily      hydroCHLOROthiazide (HYDRODIURIL) 50 MG tablet       metoprolol succinate (TOPROL XL) 25 MG extended release tablet Take 1 tablet by mouth daily 30 tablet 1    guanFACINE (TENEX) 1 MG tablet TAKE 1 TABLET BY MOUTH EVERY NIGHT      vitamin B-12 (CYANOCOBALAMIN) 1000 MCG tablet Take 1 tablet by mouth daily 30 tablet 3    Elastic Bandages & Supports (KNEE BRACE/CUSHION/L-XL) MISC Wear daily for knee pain 1 each 0    albuterol sulfate HFA (PROAIR HFA) 108 (90 Base) MCG/ACT inhaler Inhale 2 puffs into the lungs every 6 hours as needed for Wheezing 1 Inhaler 1    Multiple Vitamins-Minerals (THERAPEUTIC MULTIVITAMIN-MINERALS) tablet Take 1 tablet by mouth daily       No current facility-administered medications for this visit. PE -  Gen : /87 (Site: Left Lower Arm, Position: Sitting, Cuff Size: Large Adult)   Pulse 91   Temp 97.5 °F (36.4 °C) (Temporal)   Ht 5' 6\" (1.676 m)   Wt (!) 414 lb (187.8 kg)   BMI 66.82 kg/m²     Repeat 204/119   WN, WD, NAD  Heart:   RRR without MGR, +++ LE edema b/l  Lung: Nml resp effort, CTA b/l  Psych: Normal mood   Full affect  Neuro:  Moves all ext well  ______________________      HISTORY & ASSESSMENT/PLAN -     Problem 1  - PRIYA  HPI   - See above Background for description      Using her autopap 7d/wk at least 4-9 hours/night      Past wk daytime sleepiness: 2-3/10      Needs to reduce her underlying airway obstruction by decreasing her excess weight      Her PRIYA may be contributing to her hypertension  Assessment  - Controlled  Plan   - Cont use of autopap      Cont with wt reduction per the plan below    Problem 2  - Obesity   HPI   - See above Background for description    Weight  Date    417.2 lbs 01/13/21    422.8 lbs 02/10/21    423.0 lbs 04/07/21     419.6 lbs 06/03/21    414.0 lbs 07/22/21    Total weight change to date: 3.2 lbs  DEN = 2350 michael/d = 16,450 michael/wk  Avg daily energy variance:   04/07/21-06/03/21 = - 3.4 lbs (11,900 michael)/57d = - 209 michael/d deficit   06/03/21-07/22/21 = - 5.6 lbs (19,600 michael)/49d = - 400 michael/d deficit  Wt effect of trouble foods = Restaurant 300 michael/wk + Sweets 200 + SSBs 12,300 = 12,800 michael/wk = 78% DEN = 1825 michael/day = 182 lbs/yr  Initial thought was that eliminating reg soda may produce a sufficient rate of wt loss. However, it did not. Therefore, discussed the options of a counting-based regimen and a VLCD  She decided on the counting-based program  Update:    Met with Dr. Vincent Singh today and decided to have a bariatric surgery    Counting-calories 7d/wk, limiting to <1700    Ate sweets only once in past month    Not drinking SSB other than DD x2/over past 2 1/2 weeks    Would benefit from phentermine. However, she must monitor her BP closely    Has a BTL  Assessment  - Improved, start phentermine  Plan   -   Rules:  · Count every calorie every day  · Limit sweets to one day per month  · Limit chips/crackers/pretzels/nuts to 100 michael/day  · Eliminate all sugar sweetened beverages (including fruit juice)  · Limit restaurants (including fast food and food from a convenience store) to one time every two weeks    Targets:  · Limit calorie intake to 1700 calories/day  · Walk 5 minutes daily  · Avoid eating 2 hours within bedtime. Tips:  · Do not eat outside of the dining room or the kitchen  · Do not eat while watching TV, videos, working on the computer or using a smart phone  · Do not eat food out of a multi-serving bag or container. Medications: Take phentermine 37.5 mg, one-half to one tablet daily as needed for appetite suppression. Take each dose 30-90 min before effect will be needed.     While taking phentermine, check the Blood Pressure every morning and every evening. If the systolic BP is >548 mmHg, the diastolic BP is >71 mm/Hg or the heart rate is > 100 beats per minute, do not take phentermine that day. If the systolic BP is consistently >155 mmHg, the diastolic BP is consistently above 90 mm/Hg or the heart rate is consistently > 100 beats per minute, then stop taking phentermine altogether. If the systolic BP >116 mmHg or the diastolic BP is >721 mmHg (even if it is only once), then phentermine should be stopped altogether without proving that any of these are consistently elevated.       Return to see me in 1 month      Call me with problems: Joaquín Thakkar MD  Endocrinology/Obesity  7/22/21

## 2021-07-22 NOTE — PROGRESS NOTES
Taking? Authorizing Provider   sertraline (ZOLOFT) 25 MG tablet Take 1 tablet by mouth daily Yes Rosita Oro MD   amLODIPine (NORVASC) 5 MG tablet Take 1 tablet by mouth daily Yes Rosita Oro MD   losartan (COZAAR) 100 MG tablet TAKE 1 TABLET BY MOUTH EVERY DAY Yes Rosita Oro MD   magnesium oxide (MAG-OX) 400 MG tablet Take 1 tablet by mouth daily Yes Historical Provider, MD   hydroCHLOROthiazide (HYDRODIURIL) 50 MG tablet  Yes Historical Provider, MD   metoprolol succinate (TOPROL XL) 25 MG extended release tablet Take 1 tablet by mouth daily Yes Rosita Oro MD   guanFACINE (TENEX) 1 MG tablet TAKE 1 TABLET BY MOUTH EVERY NIGHT Yes Historical Provider, MD   vitamin B-12 (CYANOCOBALAMIN) 1000 MCG tablet Take 1 tablet by mouth daily Yes Rosita Oro MD   Elastic Bandages & Supports (KNEE BRACE/CUSHION/L-XL) MISC Wear daily for knee pain Yes Rosita Oro MD   albuterol sulfate HFA (PROAIR HFA) 108 (90 Base) MCG/ACT inhaler Inhale 2 puffs into the lungs every 6 hours as needed for Wheezing Yes Rosita Oro MD   Multiple Vitamins-Minerals (THERAPEUTIC MULTIVITAMIN-MINERALS) tablet Take 1 tablet by mouth daily Yes Historical Provider, MD     Social History:   TOBACCO:   reports that she quit smoking about 5 months ago. Her smoking use included cigarettes. She has a 3.00 pack-year smoking history. She has never used smokeless tobacco.  All smokers must join the free smoking cessation program and stop smoking for 3 months before having any Bariatric surgery. ETOH:    reports current alcohol use of about 2.0 - 3.0 standard drinks of alcohol per week.    Family History   Problem Relation Age of Onset    Cancer Paternal Grandmother         stomach    Brain Cancer Maternal Grandmother     Hypertension Father     Stroke Father     Heart Attack Father     Diabetes Father     Other Father         gout    Hypertension Mother     Scoliosis Sister     Diabetes Maternal Grandfather  Heart Disease Maternal Grandfather      Review of Systems:  Psychiatric:  depression and anxiety  Respiratory: negative  Cardiovascular: negative  Gastrointestinal: negative  Musculoskeletal:negative  All others reviewed, negative    Physical Exam:   VITALS: Blood pressure (!) 159/88, pulse 92, temperature 97.5 °F (36.4 °C), temperature source Temporal, resp. rate 20, height 5' 6\" (1.676 m), weight (!) 414 lb (187.8 kg), not currently breastfeeding. General appearance: alert, appears stated age and cooperative, does ambulate easily  Head: Normocephalic, without obvious abnormality, atraumatic  Eyes: PERRL  Ears/mouth/throat:  Ears clear, mouth normal, throat no redness  Neck: no adenopathy, no JVD, supple, symmetrical, trachea midline and thyroid not enlarged  Lungs: clear to auscultation bilaterally  Heart: regular rate and rhythm  Abdomen: soft, non-tender; bowel sounds normal; no masses,  no organomegaly  Extremities: extremities normal, atraumatic, no cyanosis or edema  Skin: no open wounds    Assessment:  Morbid obesity with inability to keep the weight off with diet and exercise. She is interested in Laparoscopic Gwendolyn-en- Y Gastric Bypass or Sleeve Gastrectomy. We discussed that our goal is to ameliorate the medical problems and not to obtain a specific body mass index. She understands the risks and benefits, and wishes to proceed with the evaluation. Plan:  Psych evaluation, medical clearance. These patients often have vitamin deficiencies so I will do screening labs for malnutrition. I will do an upper endoscopy to check for hiatal hernias, ulcers and H. Pylori while we wait for insurance approval for the surgery. Physician Signature: Electronically signed by Dr. Ossie Rinne MD    Send copy of H&P to PCP, Nataliia Mcpherson.  Chevy Nixon MD

## 2021-07-22 NOTE — PATIENT INSTRUCTIONS
Rules:  · Count every calorie every day  · Limit sweets to one day per month  · Limit chips/crackers/pretzels/nuts to 100 michael/day  · Eliminate all sugar sweetened beverages (including fruit juice)  · Limit restaurants (including fast food and food from a convenience store) to one time every two weeks    Targets:  · Limit calorie intake to 1700 calories/day  · Walk 5 minutes daily  · Avoid eating 2 hours within bedtime. Tips:  · Do not eat outside of the dining room or the kitchen  · Do not eat while watching TV, videos, working on the computer or using a smart phone  · Do not eat food out of a multi-serving bag or container. Medications: Take phentermine 37.5 mg, one-half to one tablet daily as needed for appetite suppression. Take each dose 30-90 min before effect will be needed. While taking phentermine, check the Blood Pressure every morning and every evening. If the systolic BP is >106 mmHg, the diastolic BP is >56 mm/Hg or the heart rate is > 100 beats per minute, do not take phentermine that day. If the systolic BP is consistently >155 mmHg, the diastolic BP is consistently above 90 mm/Hg or the heart rate is consistently > 100 beats per minute, then stop taking phentermine altogether. If the systolic BP >080 mmHg or the diastolic BP is >420 mmHg (even if it is only once), then phentermine should be stopped altogether without proving that any of these are consistently elevated.       Return to see me in 1 month

## 2021-07-26 RX ORDER — AMLODIPINE BESYLATE 10 MG/1
10 TABLET ORAL DAILY
Qty: 90 TABLET | Refills: 1 | Status: SHIPPED | OUTPATIENT
Start: 2021-07-26

## 2021-07-26 ASSESSMENT — ENCOUNTER SYMPTOMS
WHEEZING: 0
ABDOMINAL PAIN: 0
NAUSEA: 0
VOMITING: 0
COUGH: 0
SHORTNESS OF BREATH: 0
DIARRHEA: 0

## 2021-07-27 ENCOUNTER — OFFICE VISIT (OUTPATIENT)
Dept: SLEEP MEDICINE | Age: 36
End: 2021-07-27
Payer: COMMERCIAL

## 2021-07-27 VITALS
SYSTOLIC BLOOD PRESSURE: 139 MMHG | DIASTOLIC BLOOD PRESSURE: 86 MMHG | HEART RATE: 96 BPM | WEIGHT: 293 LBS | OXYGEN SATURATION: 99 % | RESPIRATION RATE: 20 BRPM | BODY MASS INDEX: 47.09 KG/M2 | HEIGHT: 66 IN

## 2021-07-27 DIAGNOSIS — E66.9 OBESITY, UNSPECIFIED CLASSIFICATION, UNSPECIFIED OBESITY TYPE, UNSPECIFIED WHETHER SERIOUS COMORBIDITY PRESENT: Primary | ICD-10-CM

## 2021-07-27 DIAGNOSIS — G47.26 SHIFT WORK SLEEP DISORDER: ICD-10-CM

## 2021-07-27 DIAGNOSIS — R03.0 ELEVATED BLOOD PRESSURE READING: ICD-10-CM

## 2021-07-27 DIAGNOSIS — G47.33 OBSTRUCTIVE SLEEP APNEA: ICD-10-CM

## 2021-07-27 PROCEDURE — 1036F TOBACCO NON-USER: CPT | Performed by: STUDENT IN AN ORGANIZED HEALTH CARE EDUCATION/TRAINING PROGRAM

## 2021-07-27 PROCEDURE — G8417 CALC BMI ABV UP PARAM F/U: HCPCS | Performed by: STUDENT IN AN ORGANIZED HEALTH CARE EDUCATION/TRAINING PROGRAM

## 2021-07-27 PROCEDURE — 99214 OFFICE O/P EST MOD 30 MIN: CPT | Performed by: STUDENT IN AN ORGANIZED HEALTH CARE EDUCATION/TRAINING PROGRAM

## 2021-07-27 PROCEDURE — G8427 DOCREV CUR MEDS BY ELIG CLIN: HCPCS | Performed by: STUDENT IN AN ORGANIZED HEALTH CARE EDUCATION/TRAINING PROGRAM

## 2021-07-27 ASSESSMENT — SLEEP AND FATIGUE QUESTIONNAIRES
HOW LIKELY ARE YOU TO NOD OFF OR FALL ASLEEP WHILE SITTING AND READING: 1
HOW LIKELY ARE YOU TO NOD OFF OR FALL ASLEEP WHILE WATCHING TV: 1
HOW LIKELY ARE YOU TO NOD OFF OR FALL ASLEEP IN A CAR, WHILE STOPPED FOR A FEW MINUTES IN TRAFFIC: 0
ESS TOTAL SCORE: 4
HOW LIKELY ARE YOU TO NOD OFF OR FALL ASLEEP WHILE SITTING QUIETLY AFTER LUNCH WITHOUT ALCOHOL: 0
HOW LIKELY ARE YOU TO NOD OFF OR FALL ASLEEP WHEN YOU ARE A PASSENGER IN A CAR FOR AN HOUR WITHOUT A BREAK: 0
HOW LIKELY ARE YOU TO NOD OFF OR FALL ASLEEP WHILE LYING DOWN TO REST IN THE AFTERNOON WHEN CIRCUMSTANCES PERMIT: 2
HOW LIKELY ARE YOU TO NOD OFF OR FALL ASLEEP WHILE SITTING INACTIVE IN A PUBLIC PLACE: 0
HOW LIKELY ARE YOU TO NOD OFF OR FALL ASLEEP WHILE SITTING AND TALKING TO SOMEONE: 0

## 2021-07-27 NOTE — PROGRESS NOTES
[facility] Sleep Medicine    Patient Name: Mariola Bruce  Age: 30 y.o.   : 1985    Date of Visit: 21        Review of Last Visit Summary:    The patient was last seen on 2021 for  Obstructive Sleep Apnea and Obesity. Interim History:     Mariola Bruce is a 30 y.o. female in office for follow up. Patient is doing well. Benefits: more rested  DME: [employer]    Compliance download report reviewed today by me demonstrated the following:    Dates 2021 till 2021    Settings -APAP 4-7 CWP  Days used -22 out of 30  >4 hours-36.7%   AHI -1.2  Leak -49 seconds          Past Medical History:  Past Medical History:   Diagnosis Date    Asthma     B12 deficiency 2018    Class 3 severe obesity due to excess calories with body mass index (BMI) of 60.0 to 69.9 in adult ([facility] [address]     GERD without esophagitis     Hypertension     Migraine     PRIYA (obstructive sleep apnea)     Vitamin D deficiency 2018    Zinc deficiency 2018       Past Surgical History:        Procedure Laterality Date    CHOLECYSTECTOMY, LAPAROSCOPIC  3/2014 Echevarria Grate    with repair umbilical hernia    LEEP  3/2008    OTHER SURGICAL HISTORY      coils to fallopian tubes    OTHER SURGICAL HISTORY      Tubal essure     TONSILLECTOMY AND ADENOIDECTOMY      TYMPANOSTOMY TUBE PLACEMENT      UMBILICAL HERNIA REPAIR  3/2014 Thierno Youngblood UPPER GASTROINTESTINAL ENDOSCOPY N/A 2018    EGD BIOPSY performed by Patric Alvarado MD at [facility] END[facility]COPY       Allergies:  has No Known Allergies. Social History:    Social History     Tobacco Use    Smoking status: Former Smoker     Packs/day: 0.20     Years: 15.00     Pack years: 3.00     Types: Cigarettes     Quit date: 2021     Years since quittin.4    Smokeless tobacco: Never Used    Tobacco comment: has cut down to couple cigs a daily    Vaping Use    Vaping Use: Never used   Substance Use Topics    Alcohol use:  Yes     Alcohol/week: 2.0 - 3.0 standard drinks     Types: 2 - 3 Shots of liquor per week     Comment: occasionally    Drug use: No        Family History:       Problem Relation Age of Onset    Cancer Paternal Grandmother         stomach    Brain Cancer Maternal Grandmother     Hypertension Father     Stroke Father     Heart Attack Father     Diabetes Father     Other Father         gout    Hypertension Mother     Scoliosis Sister     Diabetes Maternal Grandfather     Heart Disease Maternal Grandfather        Current Medications:    Current Outpatient Medications:     amLODIPine (NORVASC) 10 MG tablet, Take 1 tablet by mouth daily, Disp: 90 tablet, Rfl: 1    phentermine (ADIPEX-P) 37.5 MG tablet, Take one-half to one tablet every day, Disp: 30 tablet, Rfl: 0    sertraline (ZOLOFT) 25 MG tablet, Take 1 tablet by mouth daily, Disp: 30 tablet, Rfl: 5    losartan (COZAAR) 100 MG tablet, TAKE 1 TABLET BY MOUTH EVERY DAY, Disp: 30 tablet, Rfl: 1    magnesium oxide (MAG-OX) 400 MG tablet, Take 1 tablet by mouth daily, Disp: , Rfl:     hydroCHLOROthiazide (HYDRODIURIL) 50 MG tablet, , Disp: , Rfl:     metoprolol succinate (TOPROL XL) 25 MG extended release tablet, Take 1 tablet by mouth daily, Disp: 30 tablet, Rfl: 1    guanFACINE (TENEX) 1 MG tablet, TAKE 1 TABLET BY MOUTH EVERY NIGHT, Disp: , Rfl:     vitamin B-12 (CYANOCOBALAMIN) 1000 MCG tablet, Take 1 tablet by mouth daily, Disp: 30 tablet, Rfl: 3    Elastic Bandages & Supports (KNEE BRACE/CUSHION/L-XL) MISC, Wear daily for knee pain, Disp: 1 each, Rfl: 0    albuterol sulfate HFA (PROAIR HFA) 108 (90 Base) MCG/ACT inhaler, Inhale 2 puffs into the lungs every 6 hours as needed for Wheezing, Disp: 1 Inhaler, Rfl: 1    Multiple Vitamins-Minerals (THERAPEUTIC MULTIVITAMIN-MINERALS) tablet, Take 1 tablet by mouth daily, Disp: , Rfl:           Objective:   PHYSICAL EXAM:    /86 (Site: Left Lower Arm, Position: Sitting, Cuff Size: Large Adult)   Pulse 96   Resp 20   Ht 5' 6\" (1.676 m)   Wt (!) 412 lb 8 oz (187.1 kg)   SpO2 99%   BMI 66.58 kg/m²       (Sleep ROS above)     Constitutional: no chills, no fever   Eyes: no blurred vision and no eyesight problems. ENT: no hoarseness and no sore throat. Cardiovascular: no chest pain,   Respiratory: no cough, no shortness of breath   Gastrointestinal:  no nausea,  no vomiting, no diarrhea. Musculoskeletal: no arthralgias, no back pain and no myalgias. Integumentary: no rashes or lacerations. Neurological:  no diplopia, no dizziness,  no headache, no memory changes,  and no tingling. Endocrine: No chills      Physical exam:  Gen: No acute distress. BMI of Body mass index is 66.58 kg/m². Eyes: PERRL. No sclera icterus. No conjunctival injection. ENT: No nasal/oral discharge. Pharynx clear. Neck: Trachea midline. No obvious mass. Neck circumference     Resp: No accessory muscle use. No crackles. No wheezes. No rhonchi. CV: Regular rate. Regular rhythm. No murmur or rub. Skin: Warm and dry. No bleeding observed   M/S: No cyanosis. No obvious joint deformity. Neuro: Awake. Alert. Moves all four extremities. Psych: Alert and oriented. No anxiety. Sleep Medicine 7/27/2021 4/27/2021 2/23/2021 2/15/2021 12/3/2020   Sitting and reading 1 1 2 1 3   Watching TV 1 1 2 2 2   Sitting, inactive in a public place (e.g. a theatre or a meeting) 0 0 0 1 0   As a passenger in a car for an hour without a break 0 0 0 0 0   Lying down to rest in the afternoon when circumstances permit 2 3 3 1 3   Sitting and talking to someone 0 0 0 0 0   Sitting quietly after a lunch without alcohol 0 0 3 0 2   In a car, while stopped for a few minutes in traffic 0 0 0 0 0   Total score 4 5 10 5 10   Neck circumference - 17.5 18.5 - 20       DATA:     PSG 11/9/2020. AHI 8.2.  SPO2 janey 80%. Assessment:      Caty Ochoa was seen today for sleep apnea.     Diagnoses and all orders for this visit:    Obesity, unspecified classification, unspecified obesity type, unspecified whether serious comorbidity present  -     DME Order for CPAP as OP  -     DME Order for CPAP as OP    Obstructive sleep apnea    Elevated blood pressure reading    Shift work sleep disorder    ·    Plan:       1. Obstructive Sleep Apnea. - Continue current PAP settings  -Patient made aware of recall of [employer] device due to foam degradation risk and denies any symptoms that may be related to this. Discussed avoiding ozone cleaning device. Patient was provided resources by the [facility] with further information. Patient was informed of the theoretical risk and was told to frequently check the website on the handout for further instruction and update on device. Patient verbalizes understanding and wishes to continue with PAP therapy. Advised to contact DME company. 2. Obesity. Body mass index is 66.58 kg/m². Recently started with a local weight loss specialist. Recently prescribed Phentermine.   -Healthy weight loss advised    3. Shift Work Sleep Disorder. Dariel Azevedo is still working third shift.    Patient states that she sometimes sleeps at work in between her work activities. Patient was advised to switch to second shift if possible.  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     4. Elevated BP . Improved in clinic today. Recently started on a new medication and another was increased. She is prescribed phentermine by an outside provider. She is aware that this can elevate blood pressure.   -Follow up with PCP          Follow up: Return in about 5 months (around 12/27/2021) for Follow up PRIYA.

## 2021-07-27 NOTE — PATIENT INSTRUCTIONS
Juan Manuel issues Dreamstation CPAP recall notification- Information retrieved from the [facility] website    -As discussed in your visit, there was a recent recall on the [employer] Dreamstation 1 CPAP and bi-level devices affecting an estimated 0.03% of the machines in 2020.     -There is a possible risk that the foam used inside the device is degrading into particles and being inhaled, causing potential side effects of headache, respiratory problems, and even possible carcinogenic effects.    -Foam degradation may be exacerbated by ozone , as well as high heat/humidity environments. Please refrain from using an ozone  to clean your device. Repair and Replacement    -Juan Manuel plans to replace current foam with new material in all affected devices as quickly as possible.     -Juan Manuel is creating a registration process that will allow patients to look up their device serial number and begin a claim if the unit is affected. **Please frequently visit the website listed below for updates and new information. Please contact your DME company to notify them and if any further questions arise, please contact our office or DME company. [email or web address]    It was a pleasure seeing you today Alex Ice!      Shift Work Sleep Suggestions    - Try to avoid bright light when traveling home, such as wearing sunglasses  - Try blackout curtains in bedroom to reduce light during the day  - Try to sleep 7-8 hours during your sleep time  - Avoid caffeine 8 hours prior to intended sleep time  ---If needed, sleep can be broken into 2- 4hour naps (one upon arriving home and one prior to work)  -Sleep  studies (if ordered) can be preformed during the day (or usual sleep time)  -Do not drive or operate machinery when sleepy        Summary of Today's Visit            Please call our sleep nurses to schedule a follow up at - [phone] option 2         Try to sleep 7-8 hours daily with the CPAP    This can be broken up due to sleeping during the day. 1. Continue using your machine nightly     ------------Please bring your machine/Data Card to every visit. -------------     -Request all data downloads be sent to my nurse        2. Contact your DME company about supplies or issues with your machine. As a general guideline, please replace your:  -CPAP mask every 3-6 months  -CPAP hose  every 3-6 months  -Filter every 2-4 weeks  -CPAP/BiPAP/ASV replacements every 5-7+ years     3. Continue healthy weight loss/stabilization, as this is recommended as a long-term intervention. 4. Please do not drive or operate machinery when you feel fatigued/sleepy/drowsy      5. Please try to sleep between 6-8 hours nightly with the CPAP mask    6. Please avoid sedatives, alcohol and caffeinated drinks at/near bed time. 7. Please call your supply (DME) company with any issues with your PAP device. Please call our office with any issues pertaining to the therapy.   ----Please note that complications of PRIYA if not treated can increase risk for: systemic hypertension, pulmonary hypertension, cardiovascular morbidities (heart attack and stroke), car accidents and all cause mortality. For general questions or scheduling issues, call my nurse at [phone] option [id number] [place] [phone] option 2  F- [phone]           ----------------------------------------------------------  Common Issues and Solutions     Pillow is dislodging mask - Consider getting a CPAP pillow or switching to a mask with the hose at the top. Dry Mouth - Adjust Humidity and/or try Biotene Gel. (Excessive leak can also cause this)     Leak - Please call your equipment provider  as they may need to adjust your mask.      Difficulty tolerating the PAP mask - Contact your equipment company to try a different mask, we can order a \"mini sleep study\"with the sleep tech to try different masks/settings of pressure, also we have a sleep psychologist to help with anxiety related to wearing the PAP mask      ----------------------------------------------------------     For Questions and Concerns: In case of difficulty with your PAP device or mask interface, please FIRST contact your [address] [place] [address] (The company who provides you the CPAP/BiPAP/ASV machine/supplies). If you need the number for any other [employer] company, please call my Nurse at [phone] option 2     For questions concerning your [place] appointment: Call [phone] option 2  SLEEP LAB SCHEDULING:        ----------------------------------------------------------     Helpful Web Sites: For patients with ALL SLEEP DISORDERS: American Academy of Sleep Medicine http://sleepeducation. org; or [employer]: https://sleepfoundation. org  For patients with PRIYA: American Sleep Apnea Association: http://gusman.org/. org  For patients with RLS: RLS Foundation: [place].  For patients with INSOMNIA: https://www.bean.org/. org/articles/sleep/insomnia-causes-and-cures. htm  For patients with DEPRESSION: CastingDB.com.Secucloud. com/depression            Learning About CPAP for Sleep Apnea  What is CPAP? CPAP/Bi-PAP is a small machine that you use at home every night while you sleep. It increases air pressure in your throat to keep your airway open. When you have sleep apnea, this can help you sleep better so you feel much better. CPAP stands for \"continuous positive airway pressure. \" Bi-PAP is a different PAP setting that allows for different pressures when you breathe in and out. The CPAP/Bi-PAP machine will have one of the following:  · A mask that covers your nose and mouth  · Prongs that fit into your nose  · A mask that covers your nose only, the most common type. This type is called NCPAP.  The N stands for \"nasal.\"  Why is it done? CPAP is a common/effective treatment for obstructive sleep apnea. How does it help? · CPAP can help you have more normal sleep, so you feel less sleepy and more alert during the daytime through the treatment of sleep apnea. · CPAP may help keep heart failure or other heart problems from getting worse. · CPAP may help lower your blood pressure. · If you use CPAP, your bed partner may also sleep better because you are snoring less. What are the side effects? Some people who use CPAP have:  · A dry or stuffy nose and a sore throat. · Irritated skin on the face. · Sore eyes. · Bloating. If you have any of these problems, work with your doctor to fix them. Here are some things you can try:  · Be sure the mask or nasal prongs fit well. · See if your doctor can adjust the pressure of your CPAP. · If your nose is dry, try a humidifier. If these things do not help, you might try a different type of machine. Some machines have air pressure that adjusts on its own. Others have air pressures that are different when you breathe in than when you breathe out. This may reduce discomfort caused by too much pressure in your nose. Where can you learn more? Go to https://[employer]Loop[employer]. org and sign in to your [employer] account. Enter G920 in the [employer] box to learn more about \"Learning About CPAP for Sleep Apnea. \"     If you do not have an account, please click on the \"Sign Up Now\" link. Current as of: February 24, 2020               Content Version: 12.5  © 5850-3636 Healthwise, Incorporated. Care instructions adapted under license by [facility] ([facility]). If you have questions about a medical condition or this instruction, always ask your healthcare professional. [address] any warranty or liability for your use of this information.

## 2021-08-02 ENCOUNTER — TELEPHONE (OUTPATIENT)
Dept: BARIATRICS/WEIGHT MGMT | Age: 36
End: 2021-08-02

## 2021-08-02 NOTE — TELEPHONE ENCOUNTER
Pr Dr Dalia Cowan, moved pt egd from 9/8 to 9/7 due to him not having a block that day in sx. Spoke to pt and she agreed to this.

## 2021-08-11 ENCOUNTER — INITIAL CONSULT (OUTPATIENT)
Dept: BARIATRICS/WEIGHT MGMT | Age: 36
End: 2021-08-11
Payer: COMMERCIAL

## 2021-08-11 VITALS — BODY MASS INDEX: 47.09 KG/M2 | HEIGHT: 66 IN | WEIGHT: 293 LBS

## 2021-08-11 DIAGNOSIS — I10 ESSENTIAL HYPERTENSION: ICD-10-CM

## 2021-08-11 DIAGNOSIS — E66.01 MORBID OBESITY DUE TO EXCESS CALORIES (HCC): ICD-10-CM

## 2021-08-11 DIAGNOSIS — Z71.3 DIETARY COUNSELING: Primary | ICD-10-CM

## 2021-08-11 PROCEDURE — 99999 PR OFFICE/OUTPT VISIT,PROCEDURE ONLY: CPT

## 2021-08-11 RX ORDER — ALBUTEROL SULFATE 90 UG/1
2 AEROSOL, METERED RESPIRATORY (INHALATION) EVERY 6 HOURS PRN
Qty: 1 INHALER | Refills: 1 | Status: SHIPPED
Start: 2021-08-11 | End: 2021-10-04

## 2021-08-11 NOTE — PROGRESS NOTES
[facility] and [facility]:  Initial Nutrition Consultation    Today's Date: 8/11/2021  Patients Name:Heena Nogueira     Height: 5' 6\" (1.676 m)   Weight: (!) 402 lb (182.3 kg)   IBW: 155 lbs   %IBW: 259 %  Excess Weight: 247 lbs   BMI: Body mass index is 64.88 kg/m². Subjective Information:   Patient has tried multiple means of weight loss including diet and exercise in the past and has been unable to maintain a healthy weight. Pt states he / she has tried the following counting calories, ketogenic, Weight Watchers. Patients overall goal is to be able to lose weight with diet and exercise by changing lifestyle, habits and maintain a healthy weight for a lifetime. Are your currently Diabetic no  Last Blood Glucose Reading   New labs not available   (glucose was 98 WNL on 5/26/21)  Last HGBA1C New labs not available     Patient states the following will be the most difficult change after weight loss surgery? Six meals daily     Most successful diet in the past? Calorie counting   Length of time patient was on the diet listed above? 4 months   Weight lost on the diet listed above? 20 lbs    Patient stated he / she maintained his / her weight for the following time? 3- 4 months     Patient takes the following vitamins, minerals and dietary supplements? B-12, Magnesium, Multivitamin    Patient consumes the following beverage daily? Water      Pre-Op Labs     - Labs not drawn. Pt to have drawn 8/13/21. Patient states he / she has the following problems:  no - Eating  no - Chewing  no - Swallowing  no - Nausea  no - Vomiting  no - Diarrhea  no - Constipation  no - Hair Changes  no - Skin Changes  no - Tongue Texture     Does have dry mouth due to medication     Food Allergies: no    Food Intolerances: Yes - mild lactose intolerance     Yes - Past medically assisted weight loss history reviewed.   Yes - Provided education regarding the basic role of nutrition in junction with Bariatric Surgery. Yes - Provided overview of required dietary and behavioral changes pre and post operatively. Yes - Stated / reinforced that changes in dietary behaviors are critical to successful, long term weight Loss. Patient is aware that in order to proceed with bariatric surgery he / she will need to follow a low-fat diet prior to surgery. Patient is aware that bariatric surgery is a lifetime change that will require the patient to follow a low-fat, low-calorie, low-sugar, portion controlled diet with at least 30 minutes of physical activity daily. Patient is aware that certain foods may not be tolerated after bariatric surgery and certain foods will need avoided all together. [address] / LD feels that this patient knowledge / readiness to make appropriate diet / lifestyle changes assessed to be? - Good    RD / LD feels this patients expected adherence for post surgical diet? - Good    Patient was instructed and signed consents on a low-fat diet and required supplementation at initial consult. Comments: Patient is able to verbalize diet concepts for bariatric surgery. Patient is aware diet concepts will be reinforced at 2-hour nutrition education consult. RD / LD will monitor progress.

## 2021-08-11 NOTE — PATIENT INSTRUCTIONS
[facility]  Dietary Initial Appointment Patient Instructions    By: Lisa Gaytan RD / THEO  [phone]      At your initial dietary appointment you have received the following information packets:    Please be aware at each visit you have been instructed that in order for your insurance company to approve your surgery you must show a consistent weight loss of 2 lbs or greater at each visit. We can not guarantee an approval by your insurance company we can only provide the information given to us it is up to you the patient to show compliancy to your insurance company. If you do gain weight during your supervised weight loss counseling sessions insurance companies starting in 2018 are denying patients for not showing consistent weight loss results when part of a supervised weight loss counseling program. Pt was instructed on 8/11/21. Pt verbalized understanding. · Low-Fat Diet  - Please be sure to begin your low-fat diet from today's date until your scheduled surgery date. If you are not at goal weight by your history and physical appointment with your surgeon your surgery will be cancelled. · Goal Weight: 389 lbs (BMI of 59.1)  · Low-Fat Diet Contract - Patient Acknowledge and Signed  · Supplement List - Please be sure to be saving to purchase your 3 month supply of supplements. If you do not bring supplements to your history and physical appointment your surgery will be cancelled. · Supplement Contract - Patient Acknowledge and Signed  · Please be sure to take a daily Multi-vitamin from now until surgery to reduce your incidence of infection. · If your insurance company requires additional dietary counseling you will be scheduled to see the dietitian the following month. ·  If your psychological evaluation is completed and all your dietary requirements for your individual insurance company have been completed you will be submitted to insurance.  Please make sure to check with us to see if we have received your psychological evaluation. Please be aware that any co-pays or deductibles may be requested prior to testing and / or procedures. You will need to schedule a psychological evaluation for weight loss surgery. Patients will be required to complete all psychological testing as required by the psychologist. Patients must follow all of the psychologist's recommendations before weight loss surgery can be scheduled. The evaluation must be done a standard way for weight loss surgery. We strongly recommend that you contact one of our preferred providers listed below to arrange this:    Luci Kilpatrick, [address] [facility]                                                              [address] [place] [address]                                                                                      [phone]     Justin Serrano [address] [place] [address]                                                                                                [phone]        Dr. Leeanna Stockton, PhD                         [place], [place]                                                                                              [phone]     You will also need to plan on attending a 2 hour nutrition class at the [facility] prior to your surgery. We will schedule this for you when we schedule your surgery. Please remember to have your labs drawn prior to your scheduled appointment to review the results of your testing. Please remember, that while we will submit your case to insurance for surgery authorization, it is your responsibility to know if your plan covers weight loss surgery and keep up-to-date with changes to your insurance coverage.   We will do everything possible to help you get approved for weight loss surgery, but cannot guarantee an approval. Please note that you will not be submitted to your insurance company until all   pre-operative testing requirements are met. · Please remember you need to schedule to attend one Support Group meeting held at the [facility] before surgery. This one meeting is mandatory. You can attend as many support group meetings before and after surgery. Support group meetings are held at the [facility] from 6:00 - 8:00pm 1st, and 3rd Tuesday of every month. See dates listed below. · Each individual person has his / her own medical requirements that need fulfilled before being able to schedule bariatric surgery . Please finalize these requirements by contacting our Bariatric Nurse at [phone].

## 2021-08-13 RX ORDER — LOSARTAN POTASSIUM 100 MG/1
TABLET ORAL
Qty: 30 TABLET | Refills: 1 | Status: SHIPPED
Start: 2021-08-13 | End: 2021-10-19

## 2021-08-18 ENCOUNTER — OFFICE VISIT (OUTPATIENT)
Dept: FAMILY MEDICINE CLINIC | Age: 36
End: 2021-08-18
Payer: COMMERCIAL

## 2021-08-18 VITALS
WEIGHT: 293 LBS | TEMPERATURE: 97.3 F | OXYGEN SATURATION: 96 % | RESPIRATION RATE: 20 BRPM | HEART RATE: 101 BPM | SYSTOLIC BLOOD PRESSURE: 144 MMHG | DIASTOLIC BLOOD PRESSURE: 90 MMHG | HEIGHT: 66 IN | BODY MASS INDEX: 47.09 KG/M2

## 2021-08-18 DIAGNOSIS — J30.2 SEASONAL ALLERGIC RHINITIS, UNSPECIFIED TRIGGER: ICD-10-CM

## 2021-08-18 DIAGNOSIS — I10 ESSENTIAL HYPERTENSION: Primary | ICD-10-CM

## 2021-08-18 PROCEDURE — G8427 DOCREV CUR MEDS BY ELIG CLIN: HCPCS | Performed by: FAMILY MEDICINE

## 2021-08-18 PROCEDURE — G8417 CALC BMI ABV UP PARAM F/U: HCPCS | Performed by: FAMILY MEDICINE

## 2021-08-18 PROCEDURE — 99213 OFFICE O/P EST LOW 20 MIN: CPT | Performed by: FAMILY MEDICINE

## 2021-08-18 PROCEDURE — 1036F TOBACCO NON-USER: CPT | Performed by: FAMILY MEDICINE

## 2021-08-18 RX ORDER — PHENTERMINE HYDROCHLORIDE 37.5 MG/1
18.75 CAPSULE ORAL EVERY MORNING
COMMUNITY
End: 2021-08-18 | Stop reason: CLARIF

## 2021-08-18 RX ORDER — AMLODIPINE BESYLATE 5 MG/1
TABLET ORAL
COMMUNITY
Start: 2021-08-15 | End: 2021-08-18 | Stop reason: CLARIF

## 2021-08-18 NOTE — PROGRESS NOTES
[facility]  [phone]   Brittny Wilder MD     Patient: Alex Sibley  YOB: 1985  Visit Date: 8/18/21    Stalin Hamlin is a 30y.o. year old female here today for   Chief Complaint   Patient presents with    1 Month Follow-Up       HPI  Patient is a 30year old female with hypertension and obesity here for same. Has lost 12 pounds. Having trouble sleeping. Only takes it every 3rd day. bp at home is 130s/80-90s. Heart rate at home usually in the 90s. Has frontal sinus pressure does have history of seasonal allergies. Nothing makes it worse. Drains into her throat sometimes. Uses cpap. Was on the blood pressure monitor. Heart rate has been high. Review of Systems   Constitutional: Positive for fatigue. Negative for chills and fever. Respiratory: Negative for cough, shortness of breath and wheezing. Cardiovascular: Negative for chest pain, palpitations and leg swelling. Gastrointestinal: Negative for abdominal pain, diarrhea, nausea and vomiting. Musculoskeletal: Positive for arthralgias. Neurological: Negative for weakness, numbness and headaches.        Current Outpatient Medications on File Prior to Visit   Medication Sig Dispense Refill    losartan (COZAAR) 100 MG tablet TAKE 1 TABLET BY MOUTH EVERY DAY 30 tablet 1    albuterol sulfate HFA (PROAIR HFA) 108 (90 Base) MCG/ACT inhaler Inhale 2 puffs into the lungs every 6 hours as needed for Wheezing 1 Inhaler 1    amLODIPine (NORVASC) 10 MG tablet Take 1 tablet by mouth daily 90 tablet 1    sertraline (ZOLOFT) 25 MG tablet Take 1 tablet by mouth daily 30 tablet 5    magnesium oxide (MAG-OX) 400 MG tablet Take 1 tablet by mouth daily      hydroCHLOROthiazide (HYDRODIURIL) 50 MG tablet       metoprolol succinate (TOPROL XL) 25 MG extended release tablet Take 1 tablet by mouth daily 30 tablet 1    guanFACINE (TENEX) 1 MG tablet TAKE 1 TABLET BY WBC 11.1 4.5 - 11.5 E9/L    RBC 4.86 3.50 - 5.50 E12/L    Hemoglobin 12.7 11.5 - 15.5 g/dL    Hematocrit 40.8 34.0 - 48.0 %    MCV 84.0 80.0 - 99.9 fL    MCH 26.1 26.0 - 35.0 pg    MCHC 31.1 (L) 32.0 - 34.5 %    RDW 17.2 (H) 11.5 - 15.0 fL    Platelets 312 891 - 452 E9/L    MPV 11.8 7.0 - 12.0 fL   Cholesterol, Total   Result Value Ref Range    Cholesterol, Total 207 (H) 0 - 199 mg/dL   Comprehensive Metabolic Panel   Result Value Ref Range    Sodium 146 132 - 146 mmol/L    Potassium 4.1 3.5 - 5.0 mmol/L    Chloride 104 98 - 107 mmol/L    CO2 23 22 - 29 mmol/L    Anion Gap 19 (H) 7 - 16 mmol/L    Glucose 107 (H) 74 - 99 mg/dL    BUN 14 6 - 20 mg/dL    CREATININE 0.8 0.5 - 1.0 mg/dL    GFR Non-African American >60 >=60 mL/min/1.73    GFR African American >60     Calcium 9.5 8.6 - 10.2 mg/dL    Total Protein 6.9 6.4 - 8.3 g/dL    Albumin 4.0 3.5 - 5.2 g/dL    Total Bilirubin 0.3 0.0 - 1.2 mg/dL    Alkaline Phosphatase 77 35 - 104 U/L    ALT 15 0 - 32 U/L    AST 13 0 - 31 U/L   Ferritin   Result Value Ref Range    Ferritin 42 ng/mL   Folate   Result Value Ref Range    Folate 8.5 4.8 - 24.2 ng/mL   Hemoglobin A1C   Result Value Ref Range    Hemoglobin A1C 6.2 (H) 4.0 - 5.6 %   Zinc   Result Value Ref Range    Zinc 74.2 60.0 - 120.0 ug/dL   Vitamin B12   Result Value Ref Range    Vitamin B-12 595 211 - 946 pg/mL   Vitamin B1   Result Value Ref Range    Vitamin B1,Whole Blood 64 (L) 70 - 180 nmol/L   Triglyceride   Result Value Ref Range    Triglycerides 165 (H) 0 - 149 mg/dL   Magnesium   Result Value Ref Range    Magnesium 2.0 1.6 - 2.6 mg/dL       ASSESSMENT/PLAN  Lynn Gonsales was seen today for 1 month follow-up. Diagnoses and all orders for this visit:    Essential hypertension   - Working on losing weight. Takes adipex intermittently as vital signs allow. - bp controlled at home. High today. Seeing nephrology and had recent 24 hour bp monitor that showed controlled blood pressure.       Seasonal allergic rhinitis, unspecified trigger   - Supportive care. Phone/MyChart follow up if tests abnormal.    Return in about 6 weeks (around 9/29/2021). or sooner if necessary. I have reviewed myfindings and recommendations with Matthew Ly. Myles Blanchard.  Shakira Rowley MD, M.D

## 2021-08-25 ENCOUNTER — OFFICE VISIT (OUTPATIENT)
Dept: BARIATRICS/WEIGHT MGMT | Age: 36
End: 2021-08-25
Payer: COMMERCIAL

## 2021-08-25 VITALS
SYSTOLIC BLOOD PRESSURE: 125 MMHG | DIASTOLIC BLOOD PRESSURE: 81 MMHG | HEART RATE: 80 BPM | BODY MASS INDEX: 47.09 KG/M2 | TEMPERATURE: 98 F | WEIGHT: 293 LBS | HEIGHT: 66 IN

## 2021-08-25 DIAGNOSIS — E66.01 CLASS 3 SEVERE OBESITY DUE TO EXCESS CALORIES WITHOUT SERIOUS COMORBIDITY WITH BODY MASS INDEX (BMI) OF 60.0 TO 69.9 IN ADULT (HCC): ICD-10-CM

## 2021-08-25 DIAGNOSIS — E51.9 THIAMINE DEFICIENCY: Primary | ICD-10-CM

## 2021-08-25 PROCEDURE — 99211 OFF/OP EST MAY X REQ PHY/QHP: CPT | Performed by: INTERNAL MEDICINE

## 2021-08-25 PROCEDURE — G8428 CUR MEDS NOT DOCUMENT: HCPCS | Performed by: INTERNAL MEDICINE

## 2021-08-25 PROCEDURE — 1036F TOBACCO NON-USER: CPT | Performed by: INTERNAL MEDICINE

## 2021-08-25 PROCEDURE — G8417 CALC BMI ABV UP PARAM F/U: HCPCS | Performed by: INTERNAL MEDICINE

## 2021-08-25 PROCEDURE — 99214 OFFICE O/P EST MOD 30 MIN: CPT | Performed by: INTERNAL MEDICINE

## 2021-08-25 RX ORDER — LANOLIN ALCOHOL/MO/W.PET/CERES
100 CREAM (GRAM) TOPICAL DAILY
Qty: 30 TABLET | Refills: 0 | Status: SHIPPED
Start: 2021-08-25 | End: 2022-07-15

## 2021-08-25 RX ORDER — PHENTERMINE HYDROCHLORIDE 37.5 MG/1
TABLET ORAL
Qty: 30 TABLET | Refills: 0 | Status: SHIPPED | OUTPATIENT
Start: 2021-08-25 | End: 2021-09-28 | Stop reason: SDUPTHER

## 2021-08-25 NOTE — PROGRESS NOTES
CC -   PRIYA, Obesity    BACKGROUND -   Last visit: 7/22/21  First visit: 1/13/21 (previously followed with me for pre-bariatric surgery weight loss)    · PRIYA  Diagnosed 11/09/20  Mild  Followed by Dr. Mil Trotter  Using APAP 7d/wk, uses <3 hrs for 3d/wk  Excess weight is worsening her underlying airway obstruction    · Obesity   Began in late teens  Initial BMI 67.3, Wt 417.2 lbs  HS Grad wt 150 lbs  Lowest   wt  150 lbs  Highest  wt  417 lbs  Pattern of wt gain: grad with rapid increases in preg  Wt change past yr: +30 lbs  Most wt lost: 20 lbs (pre-Bariatric surgery diet at our clinic)  Other diets attempted: Fad diets, ESTEBAN, Keto, Calorie counting    Initial Diet:    Number of meals per day - 1-2    Number of snacks per day - 1    Meal volume - 12\" plate, sometimes seconds    Fast food/convenience store - 2x/week    Restaurants (not fast food) - 0x/week   Sweets - 1d/week   Chips - 0d/week   Crackers/pretzels - 0d/week   Nuts - 0d/week   Peanut Butter - 1-2d/week   Popcorn - 0d/week   Dried fruit - 0d/week   Whole fruit - 5-6d/week (2 serving)   Breakfast cereal - 0-1d/week   Granola/Protein/Energy bar - 0d/week   Sugar sweetened beverages - 4 liters reg soda/day, 1 large DD isabel Iced coffee with extra cream and sugar 4x/wk (350 michael each)    Protein - No supplements   Fiber - No supplements     Exercise:    Gym membership - [employer]    Walking - none    Running - none    Resistance - none    Aerobic class - none    ______________________    [facility] -  Past Medical History:   Diagnosis Date    Asthma     B12 deficiency 09/21/2018    Class 3 severe obesity due to excess calories with body mass index (BMI) of 60.0 to 69.9 in adult (HCC)     GERD without esophagitis     Hypertension     Migraine     PRIYA (obstructive sleep apnea)     Vitamin D deficiency 09/21/2018    Zinc deficiency 09/21/2018     Current Outpatient Medications   Medication Sig Dispense Refill    losartan (COZAAR) 100 MG tablet TAKE 1 TABLET BY MOUTH EVERY DAY 30 tablet 1    albuterol sulfate HFA (PROAIR HFA) 108 (90 Base) MCG/ACT inhaler Inhale 2 puffs into the lungs every 6 hours as needed for Wheezing 1 Inhaler 1    amLODIPine (NORVASC) 10 MG tablet Take 1 tablet by mouth daily 90 tablet 1    sertraline (ZOLOFT) 25 MG tablet Take 1 tablet by mouth daily 30 tablet 5    magnesium oxide (MAG-OX) 400 MG tablet Take 1 tablet by mouth daily      hydroCHLOROthiazide (HYDRODIURIL) 50 MG tablet       metoprolol succinate (TOPROL XL) 25 MG extended release tablet Take 1 tablet by mouth daily 30 tablet 1    guanFACINE (TENEX) 1 MG tablet TAKE 1 TABLET BY MOUTH EVERY NIGHT      vitamin B-12 (CYANOCOBALAMIN) 1000 MCG tablet Take 1 tablet by mouth daily 30 tablet 3    Elastic Bandages & Supports (KNEE BRACE/CUSHION/L-XL) MISC Wear daily for knee pain 1 each 0    Multiple Vitamins-Minerals (THERAPEUTIC MULTIVITAMIN-MINERALS) tablet Take 1 tablet by mouth daily       No current facility-administered medications for this visit. PE -  Gen : BP (!) 144/91 (Site: Left Lower Arm, Position: Sitting, Cuff Size: Large Adult)   Pulse 78   Temp 98 °F (36.7 °C) (Temporal)   Ht 5' 6\" (1.676 m)   Wt (!) 400 lb (181.4 kg)   BMI 64.56 kg/m²     Repeat 204/119   WN, WD, NAD  Heart:   RRR without MGR, +++ LE edema b/l  Lung: Nml resp effort, CTA b/l  Psych: Normal mood   Full affect  Neuro:  Moves all ext well  ______________________      HISTORY & ASSESSMENT/PLAN -     Problem 1  - PRIYA  HPI   - See above Background for description      Using her autopap 7d/wk at least 4-9 hours/night      Past wk daytime sleepiness: 1-2/10      Needs to reduce her underlying airway obstruction by decreasing her excess weight      Her PRIYA may be contributing to her hypertension  Assessment  - Controlled  Plan   - Cont use of autopap      Cont with wt reduction per the plan below    Problem 2  - Obesity   HPI   - See above Background for description    Weight  Date    417.2 lbs 01/13/21    422.8 lbs 02/10/21    423.0 lbs 04/07/21     419.6 lbs 06/03/21    414.0 lbs 07/22/21 Phentermine #1    400.0 lbs 08/25/21  Total weight change to date: 17.2lbs  DEN = 2350 michael/d = 16,450 michael/wk  Avg daily energy variance:   04/07/21-06/03/21 = - 3.4 lbs (11,900 michael)/57d = - 209 michael/d deficit   06/03/21-07/22/21 = - 5.6 lbs (19,600 michael)/49d = - 400 michael/d deficit   07/22/21-08/25/21 = - 14.0 lbs (49,000cal)/34d = - 1,441 michael/d deficit  Wt effect of trouble foods = Restaurant 300 michael/wk + Sweets 200 + SSBs 12,300 = 12,800 michael/wk = 78% DEN = 1825 michael/day = 182 lbs/yr  Initial thought was that eliminating reg soda may produce a sufficient rate of wt loss. However, it did not. Therefore, discussed the options of a counting-based regimen and a VLCD  She decided on the counting-based program  Update:    Met with Dr. Costa Ferguson today and decided to have a bariatric surgery    Counting-calories 7d/wk, limiting to <1500    Ate sweets only once in past month    Not drinking SSB    Taking phentermine 37.5 mg, one-tablet daily (3-4d/wk), appetite suppression 7.5/10, Side effects dry mouth and some insomnia    Has a BTL  Assessment  - Improved, Cont the same plan and phentermine  Plan   -   Rules:  · Count every calorie every day  · Limit sweets to one day per month  · Limit chips/crackers/pretzels/nuts to 100 michael/day  · Eliminate all sugar sweetened beverages (including fruit juice)  · Limit restaurants (including fast food and food from a convenience store) to one time every two weeks    Targets:  · Limit calorie intake to 1700 calories/day  · Walk 5 minutes daily  · Avoid eating 2 hours within bedtime. Tips:  · Do not eat outside of the dining room or the kitchen  · Do not eat while watching TV, videos, working on the computer or using a smart phone  · Do not eat food out of a multi-serving bag or container. Medications:   Take phentermine 37.5 mg, one-half to one tablet daily as needed for appetite suppression. Take each dose 30-90 min before effect will be needed. While taking phentermine, check the Blood Pressure every morning and every evening. If the systolic BP is >618 mmHg, the diastolic BP is >40 mm/Hg or the heart rate is > 100 beats per minute, do not take phentermine that day. If the systolic BP is consistently >155 mmHg, the diastolic BP is consistently above 90 mm/Hg or the heart rate is consistently > 100 beats per minute, then stop taking phentermine altogether. If the systolic BP >975 mmHg or the diastolic BP is >056 mmHg (even if it is only once), then phentermine should be stopped altogether without proving that any of these are consistently elevated.       Return to see me in 1 month      Call me with problems: Emanuel Shay MD  Endocrinology/Obesity  8/25/21

## 2021-08-25 NOTE — PATIENT INSTRUCTIONS
Rules:  · Count every calorie every day  · Limit sweets to one day per month  · Limit chips/crackers/pretzels/nuts to 100 michael/day  · Eliminate all sugar sweetened beverages (including fruit juice)  · Limit restaurants (including fast food and food from a convenience store) to one time every two weeks    Targets:  · Limit calorie intake to 1700 calories/day  · Walk 5 minutes daily  · Avoid eating 2 hours within bedtime. Tips:  · Do not eat outside of the dining room or the kitchen  · Do not eat while watching TV, videos, working on the computer or using a smart phone  · Do not eat food out of a multi-serving bag or container. Medications: Take phentermine 37.5 mg, one-half to one tablet daily as needed for appetite suppression. Take each dose 30-90 min before effect will be needed. While taking phentermine, check the Blood Pressure every morning and every evening. If the systolic BP is >753 mmHg, the diastolic BP is >16 mm/Hg or the heart rate is > 100 beats per minute, do not take phentermine that day. If the systolic BP is consistently >155 mmHg, the diastolic BP is consistently above 90 mm/Hg or the heart rate is consistently > 100 beats per minute, then stop taking phentermine altogether. If the systolic BP >492 mmHg or the diastolic BP is >173 mmHg (even if it is only once), then phentermine should be stopped altogether without proving that any of these are consistently elevated.       Return to see me in 1 month

## 2021-09-01 ENCOUNTER — HOSPITAL ENCOUNTER (OUTPATIENT)
Age: 36
Discharge: HOME | End: 2021-09-03
Payer: COMMERCIAL

## 2021-09-01 DIAGNOSIS — Z01.818 PREOP TESTING: ICD-10-CM

## 2021-09-01 PROCEDURE — U0003 INFECTIOUS AGENT DETECTION BY NUCLEIC ACID (DNA OR RNA); SEVERE ACUTE RESPIRATORY SYNDROME CORONAVIRUS 2 (SARS-COV-2) (CORONAVIRUS DISEASE [COVID-19]), AMPLIFIED PROBE TECHNIQUE, MAKING USE OF HIGH THROUGHPUT TECHNOLOGIES AS DESCRIBED BY CMS-2020-01-R: HCPCS

## 2021-09-01 PROCEDURE — U0005 INFEC AGEN DETEC AMPLI PROBE: HCPCS

## 2021-09-01 ASSESSMENT — ENCOUNTER SYMPTOMS
SHORTNESS OF BREATH: 0
NAUSEA: 0
ABDOMINAL PAIN: 0
COUGH: 0
DIARRHEA: 0
WHEEZING: 0
VOMITING: 0

## 2021-09-02 LAB — SARS-COV-2, PCR: NOT DETECTED

## 2021-09-03 ENCOUNTER — ANESTHESIA EVENT (OUTPATIENT)
Dept: ENDOSCOPY | Age: 36
End: 2021-09-03
Payer: COMMERCIAL

## 2021-09-03 NOTE — PROGRESS NOTES
[address] [facility]                                                                                                                    PRE OP INSTRUCTIONS FOR  Toña Dixon        Date: 9/3/2021    Date of surgery:  9/7/2021   Arrival Time: Hospital will call you between 5pm and 7pm with your final arrival time for surgery    1. Do not eat or drink anything after midnight  prior to surgery. This includes no water, chewing gum, mints or ice chips. 2. Take the following medications with a small sip of water on the morning of Surgery:  Bring inhaler   Take zoloft metoprolol and amlodipine dos     3. Diabetics may take evening dose of insulin but none after midnight. If you feel symptomatic or low blood sugar morning of surgery drink 1-2 ounces of apple juice only. 4. Aspirin, Ibuprofen, Advil, Naproxen, Vitamin E and other Anti-inflammatory products should be stopped  before surgery  as directed by your physician. Take Tylenol only unless instructed otherwise by your surgeon. 5. Check with your Doctor regarding stopping Plavix, Coumadin, Lovenox, Eliquis, Effient, or other blood thinners. 6. Do not smoke,use illicit drugs and do not drink any alcoholic beverages 24 hours prior to surgery. 7. You may brush your teeth the morning of surgery. DO NOT SWALLOW WATER    8. You MUST make arrangements for a responsible adult to take you home after your surgery. You will not be allowed to leave alone or drive yourself home. It is strongly suggested someone stay with you the first 24 hrs. Your surgery will be cancelled if you do not have a ride home. 9. PEDIATRIC PATIENTS ONLY:  A parent/legal guardian must accompany a child scheduled for surgery and plan to stay at the hospital until the child is discharged. Please do not bring other children with you.     10. Please wear simple, loose fitting clothing to the hospital.  Do not bring valuables (money, credit cards, checkbooks, etc.) Do

## 2021-09-05 DIAGNOSIS — E53.8 B12 DEFICIENCY: ICD-10-CM

## 2021-09-05 DIAGNOSIS — G47.33 OSA (OBSTRUCTIVE SLEEP APNEA): ICD-10-CM

## 2021-09-05 DIAGNOSIS — I10 ESSENTIAL HYPERTENSION: ICD-10-CM

## 2021-09-07 ENCOUNTER — ANESTHESIA (OUTPATIENT)
Dept: ENDOSCOPY | Age: 36
End: 2021-09-07
Payer: COMMERCIAL

## 2021-09-07 ENCOUNTER — HOSPITAL ENCOUNTER (OUTPATIENT)
Age: 36
Setting detail: OUTPATIENT SURGERY
Discharge: HOME | End: 2021-09-07
Attending: SURGERY | Admitting: SURGERY
Payer: COMMERCIAL

## 2021-09-07 VITALS
OXYGEN SATURATION: 96 % | DIASTOLIC BLOOD PRESSURE: 91 MMHG | HEIGHT: 66 IN | RESPIRATION RATE: 16 BRPM | BODY MASS INDEX: 47.09 KG/M2 | TEMPERATURE: 97.7 F | WEIGHT: 293 LBS | SYSTOLIC BLOOD PRESSURE: 143 MMHG | HEART RATE: 82 BPM

## 2021-09-07 VITALS — DIASTOLIC BLOOD PRESSURE: 79 MMHG | SYSTOLIC BLOOD PRESSURE: 136 MMHG | OXYGEN SATURATION: 97 %

## 2021-09-07 DIAGNOSIS — Z01.818 PREOP TESTING: Primary | ICD-10-CM

## 2021-09-07 LAB — HCG(URINE) PREGNANCY TEST: NEGATIVE

## 2021-09-07 PROCEDURE — 2500000003 HC RX 250 WO HCPCS: Performed by: NURSE ANESTHETIST, CERTIFIED REGISTERED

## 2021-09-07 PROCEDURE — 2709999900 HC NON-CHARGEABLE SUPPLY: Performed by: SURGERY

## 2021-09-07 PROCEDURE — 81025 URINE PREGNANCY TEST: CPT

## 2021-09-07 PROCEDURE — 3700000000 HC ANESTHESIA ATTENDED CARE: Performed by: SURGERY

## 2021-09-07 PROCEDURE — 7100000011 HC PHASE II RECOVERY - ADDTL 15 MIN: Performed by: SURGERY

## 2021-09-07 PROCEDURE — 3700000001 HC ADD 15 MINUTES (ANESTHESIA): Performed by: SURGERY

## 2021-09-07 PROCEDURE — 43239 EGD BIOPSY SINGLE/MULTIPLE: CPT | Performed by: SURGERY

## 2021-09-07 PROCEDURE — 87081 CULTURE SCREEN ONLY: CPT

## 2021-09-07 PROCEDURE — 3609012400 HC EGD TRANSORAL BIOPSY SINGLE/MULTIPLE: Performed by: SURGERY

## 2021-09-07 PROCEDURE — 6360000002 HC RX W HCPCS: Performed by: NURSE ANESTHETIST, CERTIFIED REGISTERED

## 2021-09-07 PROCEDURE — 7100000010 HC PHASE II RECOVERY - FIRST 15 MIN: Performed by: SURGERY

## 2021-09-07 PROCEDURE — 2580000003 HC RX 258: Performed by: SURGERY

## 2021-09-07 RX ORDER — PROPOFOL 10 MG/ML
INJECTION, EMULSION INTRAVENOUS PRN
Status: DISCONTINUED | OUTPATIENT
Start: 2021-09-07 | End: 2021-09-07 | Stop reason: SDUPTHER

## 2021-09-07 RX ORDER — SODIUM CHLORIDE, SODIUM LACTATE, POTASSIUM CHLORIDE, CALCIUM CHLORIDE 600; 310; 30; 20 MG/100ML; MG/100ML; MG/100ML; MG/100ML
INJECTION, SOLUTION INTRAVENOUS CONTINUOUS
Status: DISCONTINUED | OUTPATIENT
Start: 2021-09-07 | End: 2021-09-07 | Stop reason: HOSPADM

## 2021-09-07 RX ORDER — SODIUM CHLORIDE 0.9 % (FLUSH) 0.9 %
5-40 SYRINGE (ML) INJECTION PRN
Status: DISCONTINUED | OUTPATIENT
Start: 2021-09-07 | End: 2021-09-07 | Stop reason: HOSPADM

## 2021-09-07 RX ORDER — LIDOCAINE HYDROCHLORIDE 10 MG/ML
INJECTION, SOLUTION INFILTRATION; PERINEURAL PRN
Status: DISCONTINUED | OUTPATIENT
Start: 2021-09-07 | End: 2021-09-07 | Stop reason: SDUPTHER

## 2021-09-07 RX ADMIN — SODIUM CHLORIDE, POTASSIUM CHLORIDE, SODIUM LACTATE AND CALCIUM CHLORIDE: 600; 310; 30; 20 INJECTION, SOLUTION INTRAVENOUS at 12:22

## 2021-09-07 RX ADMIN — LIDOCAINE HYDROCHLORIDE 50 MG: 10 INJECTION, SOLUTION INFILTRATION; PERINEURAL at 13:37

## 2021-09-07 RX ADMIN — PROPOFOL 380 MG: 10 INJECTION, EMULSION INTRAVENOUS at 13:48

## 2021-09-07 ASSESSMENT — PAIN SCALES - GENERAL
PAINLEVEL_OUTOF10: 0
PAINLEVEL_OUTOF10: 0

## 2021-09-07 NOTE — H&P
Bib Collins [place]  9/7/2021  [facility]      H&P       CHIEF COMPLAINT: Morbid obesity, malnutrition, Hypertension, acid reflux on Tums, sleep apnea    HISTORY OF PRESENT ILLNESS: Leanne Freitas is a morbidly-obese 30 y.o.  female, who weighs (!) 400 lb (181.4 kg). She is 259 pounds over her ideal body weight. The Body mass index is 64.97 kg/m². she has been seen several times in the past for weight loss surgery but never continued. EGD 2018 was normal.  She has several medical problems aggravated by her obesity. She wishes to restart the process so that she can lose a large amount of weight and keep the weight off. I have met with [facility] in [facility] where we discussed the surgery in great detail and went over the risks and benefits. She has watched our informational video so she understands all of the extensive risks involved. She states that she understands all of the risks and wishes to proceed with the evaluation. Weight:  414 lb 7/22/2021   363 lb 2018 initial    Patient Active Problem List   Diagnosis Code    Asthma J45.909    Morbid obesity ([facility] [address] E66.01    Current smoker F17.200    Vitamin D deficiency E55.9    Zinc deficiency E60    B12 deficiency E53.8    PRIYA (obstructive sleep apnea) G47.33    Essential hypertension I10     Past Surgical History:   Procedure Laterality Date    CHOLECYSTECTOMY, LAPAROSCOPIC  3/2014 Ramiro Molina    with repair umbilical hernia    LEEP  3/2008    OTHER SURGICAL HISTORY  2011    coils to fallopian tubes    OTHER SURGICAL HISTORY      Tubal essure     TONSILLECTOMY AND ADENOIDECTOMY      TYMPANOSTOMY TUBE PLACEMENT      UMBILICAL HERNIA REPAIR  3/2014 Reford Oven UPPER GASTROINTESTINAL ENDOSCOPY N/A 5/2/2018    EGD BIOPSY performed by Bolivar Arteaga MD at [facility] ENDOSCOPY      Allergies: Lactose intolerance (gi)     Home Medications  Prior to Visit Medications    Medication Sig Taking?  Authorizing Provider thiamine 100 MG tablet Take 1 tablet by mouth daily Yes Shaw Condon MD   phentermine (ADIPEX-P) 37.5 MG tablet Take one-half to one tablet every day Yes Shaw Condon MD   losartan (COZAAR) 100 MG tablet TAKE 1 TABLET BY MOUTH EVERY DAY Yes Angeles Abraham MD   albuterol sulfate HFA (PROAIR HFA) 108 (90 Base) MCG/ACT inhaler Inhale 2 puffs into the lungs every 6 hours as needed for Wheezing Yes Angeles Abraham MD   amLODIPine (NORVASC) 10 MG tablet Take 1 tablet by mouth daily Yes Angeles Abraham MD   sertraline (ZOLOFT) 25 MG tablet Take 1 tablet by mouth daily Yes Angeles Abraham MD   magnesium oxide (MAG-OX) 400 MG tablet Take 1 tablet by mouth daily Yes Historical Provider, MD   hydroCHLOROthiazide (HYDRODIURIL) 50 MG tablet Take 50 mg by mouth daily  Yes Historical Provider, MD   metoprolol succinate (TOPROL XL) 25 MG extended release tablet Take 1 tablet by mouth daily Yes Angeles Abraham MD   guanFACINE (TENEX) 1 MG tablet TAKE 1 TABLET BY MOUTH EVERY NIGHT Yes Historical Provider, MD   vitamin B-12 (CYANOCOBALAMIN) 1000 MCG tablet Take 1 tablet by mouth daily Yes Angeles Abraham MD   Elastic Bandages & Supports (KNEE BRACE/CUSHION/L-XL) MISC Wear daily for knee pain Yes Angeles Abraham MD   Multiple Vitamins-Minerals (THERAPEUTIC MULTIVITAMIN-MINERALS) tablet Take 1 tablet by mouth daily Yes Historical Provider, MD     Social History:   TOBACCO:   reports that she quit smoking about 6 months ago. Her smoking use included cigarettes. She has a 3.00 pack-year smoking history. She has never used smokeless tobacco.  All smokers must join the free smoking cessation program and stop smoking for 3 months before having any Bariatric surgery. ETOH:    reports current alcohol use of about 2.0 - 3.0 standard drinks of alcohol per week.    Family History   Problem Relation Age of Onset    Cancer Paternal Grandmother         stomach    Brain Cancer Maternal Grandmother     Hypertension Father  Stroke Father     Heart Attack Father     Diabetes Father     Other Father         gout    Hypertension Mother     Scoliosis Sister     Diabetes Maternal Grandfather     Heart Disease Maternal Grandfather      Review of Systems:  Psychiatric:  depression and anxiety  Respiratory: negative  Cardiovascular: negative  Gastrointestinal: negative  Musculoskeletal:negative  All others reviewed, negative    Physical Exam:   VITALS: Blood pressure 135/63, pulse 86, temperature 97.7 °F (36.5 °C), temperature source Temporal, resp. rate 16, height 5' 6\" (1.676 m), weight (!) 402 lb 8 oz (182.6 kg), last menstrual period 08/15/2021, SpO2 96 %, not currently breastfeeding. General appearance: alert, appears stated age and cooperative, does ambulate easily  Head: Normocephalic, without obvious abnormality, atraumatic  Eyes: PERRL  Ears/mouth/throat:  Ears clear, mouth normal, throat no redness  Neck: no adenopathy, no JVD, supple, symmetrical, trachea midline and thyroid not enlarged  Lungs: clear to auscultation bilaterally  Heart: regular rate and rhythm  Abdomen: soft, non-tender; bowel sounds normal; no masses,  no organomegaly  Extremities: extremities normal, atraumatic, no cyanosis or edema  Skin: no open wounds    Assessment:  Acid reflux on Tums    Plan:  EGD    Physician Signature: Electronically signed by Dr. Karely Ortiz MD    Send copy of H&P to PCP, Angélica Faye.  Shauna Schrader MD

## 2021-09-07 NOTE — ANESTHESIA PRE PROCEDURE
Department of Anesthesiology  Preprocedure Note       Name:  Corky Sena   Age:  30 y.o.  :  1985                                          MRN:  [id number]         Date:  2021      Surgeon: [place]):  Jenn Knowles MD    Procedure: Procedure(s):  EGD ESOPHAGOGASTRODUODENOSCOPY    Medications prior to admission:   Prior to Admission medications    Medication Sig Start Date End Date Taking?  Authorizing Provider   thiamine 100 MG tablet Take 1 tablet by mouth daily 21   Davis Fisher MD   phentermine (ADIPEX-P) 37.5 MG tablet Take one-half to one tablet every day 21  Davis Fisher MD   losartan (COZAAR) 100 MG tablet TAKE 1 TABLET BY MOUTH EVERY DAY 21   Darya Arcos MD   albuterol sulfate HFA (PROAIR HFA) 108 (90 Base) MCG/ACT inhaler Inhale 2 puffs into the lungs every 6 hours as needed for Wheezing 21   Darya Arcos MD   amLODIPine (NORVASC) 10 MG tablet Take 1 tablet by mouth daily 21   Darya Arcos MD   sertraline (ZOLOFT) 25 MG tablet Take 1 tablet by mouth daily 21   Darya Arcos MD   magnesium oxide (MAG-OX) 400 MG tablet Take 1 tablet by mouth daily 21   Historical Provider, MD   hydroCHLOROthiazide (HYDRODIURIL) 50 MG tablet Take 50 mg by mouth daily  21   Historical Provider, MD   metoprolol succinate (TOPROL XL) 25 MG extended release tablet Take 1 tablet by mouth daily 21   Darya Arcos MD   guanFACINE (TENEX) 1 MG tablet TAKE 1 TABLET BY MOUTH EVERY NIGHT 21   Historical Provider, MD   vitamin B-12 (CYANOCOBALAMIN) 1000 MCG tablet Take 1 tablet by mouth daily 21   Darya Arcos MD   Elastic Bandages & Supports (KNEE BRACE/CUSHION/L-XL) MISC Wear daily for knee pain 20   Darya Arcos MD   Multiple Vitamins-Minerals (THERAPEUTIC MULTIVITAMIN-MINERALS) tablet Take 1 tablet by mouth daily    Historical Provider, MD       Current medications:    Current Outpatient Medications Medication Sig Dispense Refill    thiamine 100 MG tablet Take 1 tablet by mouth daily 30 tablet 0    phentermine (ADIPEX-P) 37.5 MG tablet Take one-half to one tablet every day 30 tablet 0    losartan (COZAAR) 100 MG tablet TAKE 1 TABLET BY MOUTH EVERY DAY 30 tablet 1    albuterol sulfate HFA (PROAIR HFA) 108 (90 Base) MCG/ACT inhaler Inhale 2 puffs into the lungs every 6 hours as needed for Wheezing 1 Inhaler 1    amLODIPine (NORVASC) 10 MG tablet Take 1 tablet by mouth daily 90 tablet 1    sertraline (ZOLOFT) 25 MG tablet Take 1 tablet by mouth daily 30 tablet 5    magnesium oxide (MAG-OX) 400 MG tablet Take 1 tablet by mouth daily      hydroCHLOROthiazide (HYDRODIURIL) 50 MG tablet Take 50 mg by mouth daily       metoprolol succinate (TOPROL XL) 25 MG extended release tablet Take 1 tablet by mouth daily 30 tablet 1    guanFACINE (TENEX) 1 MG tablet TAKE 1 TABLET BY MOUTH EVERY NIGHT      vitamin B-12 (CYANOCOBALAMIN) 1000 MCG tablet Take 1 tablet by mouth daily 30 tablet 3    Elastic Bandages & Supports (KNEE BRACE/CUSHION/L-XL) MISC Wear daily for knee pain 1 each 0    Multiple Vitamins-Minerals (THERAPEUTIC MULTIVITAMIN-MINERALS) tablet Take 1 tablet by mouth daily       No current facility-administered medications for this visit. Allergies:     Allergies   Allergen Reactions    Lactose Intolerance (Gi) Diarrhea     Milk and ice cream       Problem List:    Patient Active Problem List   Diagnosis Code    Asthma J45.909    Morbid obesity ([facility] [address] E66.01    Current smoker F17.200    Vitamin D deficiency E55.9    Zinc deficiency E60    B12 deficiency E53.8    PRIYA (obstructive sleep apnea) G47.33    Essential hypertension I10       Past Medical History:        Diagnosis Date    Asthma     B12 deficiency 09/21/2018    Class 3 severe obesity due to excess calories with body mass index (BMI) of 60.0 to 69.9 in adult ([facility] [address]     GERD without esophagitis     Hypertension     Migraine     PRIYA (obstructive sleep apnea)     sleep apnea 4-11    Prolonged emergence from general anesthesia     Vitamin D deficiency 2018    Zinc deficiency 2018       Past Surgical History:        Procedure Laterality Date    CHOLECYSTECTOMY, LAPAROSCOPIC  3/2014 [place]    with repair umbilical hernia    LEEP  3/2008    OTHER SURGICAL HISTORY      coils to fallopian tubes    OTHER SURGICAL HISTORY      Tubal essure     TONSILLECTOMY AND ADENOIDECTOMY      TYMPANOSTOMY TUBE PLACEMENT      UMBILICAL HERNIA REPAIR  3/2014 Nadine Carrion UPPER GASTROINTESTINAL ENDOSCOPY N/A 2018    EGD BIOPSY performed by Justice Puente MD at [facility] ENDOSCOPY       Social History:    Social History     Tobacco Use    Smoking status: Former Smoker     Packs/day: 0.20     Years: 15.00     Pack years: 3.00     Types: Cigarettes     Quit date: 2021     Years since quittin.5    Smokeless tobacco: Never Used    Tobacco comment: has cut down to couple cigs a daily    Substance Use Topics    Alcohol use: Yes     Alcohol/week: 2.0 - 3.0 standard drinks     Types: 2 - 3 Shots of liquor per week     Comment: occasionally                                Counseling given: Not Answered  Comment: has cut down to couple cigs a daily       Vital Signs (Current): There were no vitals filed for this visit.                                            BP Readings from Last 3 Encounters:   21 125/81   21 (!) 144/90   21 139/86       NPO Status:                                                                                 BMI:   Wt Readings from Last 3 Encounters:   21 (!) 400 lb (181.4 kg)   21 (!) 400 lb (181.4 kg)   21 (!) 402 lb (182.3 kg)     There is no height or weight on file to calculate BMI.    CBC:   Lab Results   Component Value Date    WBC 11.1 2021    RBC 4.86 2021    HGB 12.7 2021    HCT 40.8 2021    MCV 84.0 2021    RDW 17.2 2021     08/13/2021       CMP:   Lab Results   Component Value Date     08/13/2021    K 4.1 08/13/2021    K 3.8 12/03/2020     08/13/2021    CO2 23 08/13/2021    BUN 14 08/13/2021    CREATININE 0.8 08/13/2021    GFRAA >60 08/13/2021    LABGLOM >60 08/13/2021    GLUCOSE 107 08/13/2021    PROT 6.9 08/13/2021    CALCIUM 9.5 08/13/2021    BILITOT 0.3 08/13/2021    ALKPHOS 77 08/13/2021    AST 13 08/13/2021    ALT 15 08/13/2021       POC Tests: No results for input(s): POCGLU, POCNA, POCK, POCCL, POCBUN, POCHEMO, POCHCT in the last 72 hours. Coags:   Lab Results   Component Value Date    PROTIME 12.3 03/11/2014    INR 1.1 03/11/2014       HCG (If Applicable):   Lab Results   Component Value Date    PREGTESTUR NEGATIVE 05/02/2018        ABGs: No results found for: PHART, PO2ART, JIN4YOC, FYJ9OUV, BEART, F1ICAFXN     Type & Screen (If Applicable):  No results found for: LABABO, [address]    Anesthesia Evaluation  Patient summary reviewed history of anesthetic complications (Prolonged Emergence.):   Airway: Mallampati: III  TM distance: >3 FB   Neck ROM: full  Mouth opening: > = 3 FB Dental: normal exam         Pulmonary: breath sounds clear to auscultation  (+) sleep apnea (Obstructive SA.):  asthma:                           ROS comment: Former Smoker. Cardiovascular:Negative CV ROS    (+) hypertension:,       ECG reviewed  Rhythm: regular  Rate: normal  Echocardiogram reviewed         Beta Blocker:  Dose within 24 Hrs      ROS comment: EKG: Normal Sinus Rhythm 82. ECHO:   Technically suboptimal and limited study. Left ventricle is normal in size . Mild left ventricular concentric hypertrophy noted. No regional wall motion abnormalities seen. Normal left ventricular ejection fraction.      Neuro/Psych:   (+) headaches: migraine headaches,             GI/Hepatic/Renal:   (+) GERD:, morbid obesity (BMI: 64.56)          Endo/Other:                      ROS comment: M Obesity  Deficiency of Vitamin D, B12 and Zinc. Abdominal:   (+) obese (BMI: 64.56),           Vascular: negative vascular ROS. Other Findings:               Anesthesia Plan      MAC     ASA 3       Induction: intravenous. Anesthetic plan and risks discussed with patient. Plan discussed with CRNA.     Attending anesthesiologist reviewed and agrees with Luis E Vasquez MD   9/7/2021

## 2021-09-07 NOTE — ANESTHESIA POSTPROCEDURE EVALUATION
Department of Anesthesiology  Postprocedure Note    Patient: Amarilis Hewitt  MRN: [id number]  YOB: 1985  Date of evaluation: 9/7/2021  Time:  4:01 PM     Procedure Summary     Date: 09/07/21 Room / Location: [address] [place] [address] / [address] [place] [address]    Anesthesia Start: 9561 Anesthesia Stop: 2360    Procedure: EGD BIOPSY (N/A ) Diagnosis: (GERD)    Surgeons: Campos Christianson MD Responsible Provider: Godfrey Bess MD    Anesthesia Type: MAC ASA Status: 3          Anesthesia Type: MAC    Nathanael Phase I: Nathanael Score: 10    Nathanael Phase II: Nathanael Score: 10    Last vitals: Reviewed and per EMR flowsheets.        Anesthesia Post Evaluation    Patient location during evaluation: PACU  Patient participation: complete - patient participated  Level of consciousness: awake  Pain score: 0  Airway patency: patent  Nausea & Vomiting: no nausea  Complications: no  Cardiovascular status: blood pressure returned to baseline  Respiratory status: acceptable  Hydration status: euvolemic

## 2021-09-07 NOTE — OP NOTE
retroflexed. There was no hiatal hernia, picture was taken. The scope was withdrawn. The patient tolerated the procedure well. Complications: none    Plan:   She may proceed with bariatric surgery.       Physician Signature: Electronically signed by Dr. Bennie Randall MD M.D.

## 2021-09-09 LAB — CLOTEST: NORMAL

## 2021-09-10 RX ORDER — METOPROLOL SUCCINATE 25 MG/1
TABLET, EXTENDED RELEASE ORAL
Qty: 30 TABLET | Refills: 1 | Status: SHIPPED
Start: 2021-09-10 | End: 2022-06-10 | Stop reason: SDUPTHER

## 2021-09-10 RX ORDER — OMEGA-3/DHA/EPA/FISH OIL 35-113.5MG
TABLET,CHEWABLE ORAL
Qty: 30 TABLET | Refills: 3 | Status: SHIPPED
Start: 2021-09-10 | End: 2021-12-29 | Stop reason: SDUPTHER

## 2021-09-14 ENCOUNTER — INITIAL CONSULT (OUTPATIENT)
Dept: BARIATRICS/WEIGHT MGMT | Age: 36
End: 2021-09-14
Payer: COMMERCIAL

## 2021-09-14 VITALS — HEIGHT: 68 IN | WEIGHT: 293 LBS | BODY MASS INDEX: 44.41 KG/M2

## 2021-09-14 DIAGNOSIS — E66.01 MORBID OBESITY DUE TO EXCESS CALORIES (HCC): ICD-10-CM

## 2021-09-14 DIAGNOSIS — Z71.3 DIETARY COUNSELING: Primary | ICD-10-CM

## 2021-09-14 PROCEDURE — 99999 PR OFFICE/OUTPT VISIT,PROCEDURE ONLY: CPT

## 2021-09-14 NOTE — PROGRESS NOTES
WEIGHT:  Weight: (!) 391 lb (177.4 kg)      Pt was in th office for his/her 2nd weight Loss appointment. Pt is aware he/she must meet his/her pre-op weight loss goal in order to proceed with weight loss surgery. James / Aleksandr faxed a copy of what was reviewed with the pt to her PCP. Pt verbalized understanding. Rd// Aleksandr enc the following at today's visit for successful post-surgical Weight Maintenance    1. Weigh yourself daily and record it. 2. Keep documented food records daily   3. Just be more active in day to day routine   4. Higher protein intake and a higher fiber intake. Not a high protein or a high fiber diet just a higher intake. 5.Eliminate empty calorie consumption    James Brandon addressed the following with the pt:  - James Brandon enc pt to comply with nutrition recommendations  - James / Aleksandr enc Participation in support group meetings  - James Brandon enc pt to go back to maintenance of food records and weight monitoring records   - James Brandon reviewed the importance of adequate sleep and stress management  - James Brandon reviewed nonfood strategies to cope with emotions and stress  - James Brandon encouraged pt to practice the following: Mindful eating: Eating slowly: Focusing   on the eating experience without distraction  - James Brandon enc. pt to pay attention to hunger and fullness  - Rd / Ld enc meal planning  - James Soto pt to chose nutrient dense whole foods instead of soft, high calorie foods  - James / Aleksandr enc not dr Parson Roller large amounts of fluids with or immediately after meals    Portion control ,meal planning and avoiding empty calorie consumption. James / Aleksandr reviewed insurance company weight loss requirement on 9/14/21. Pt verbalized understanding. Please be aware at each visit you have been instructed that in order for your insurance company to approve your surgery you must show a consistent weight loss of 2 lbs or greater at each visit.  We can not guarantee an approval by your insurance company we can only provide the

## 2021-09-16 ENCOUNTER — OFFICE VISIT (OUTPATIENT)
Dept: BARIATRICS/WEIGHT MGMT | Age: 36
End: 2021-09-16
Payer: COMMERCIAL

## 2021-09-16 VITALS
HEIGHT: 68 IN | BODY MASS INDEX: 44.41 KG/M2 | TEMPERATURE: 97.3 F | SYSTOLIC BLOOD PRESSURE: 130 MMHG | HEART RATE: 88 BPM | DIASTOLIC BLOOD PRESSURE: 82 MMHG | WEIGHT: 293 LBS

## 2021-09-16 DIAGNOSIS — G47.33 OSA (OBSTRUCTIVE SLEEP APNEA): Primary | ICD-10-CM

## 2021-09-16 PROCEDURE — 99211 OFF/OP EST MAY X REQ PHY/QHP: CPT

## 2021-09-16 PROCEDURE — 1036F TOBACCO NON-USER: CPT | Performed by: SURGERY

## 2021-09-16 PROCEDURE — 99213 OFFICE O/P EST LOW 20 MIN: CPT | Performed by: SURGERY

## 2021-09-16 PROCEDURE — G8427 DOCREV CUR MEDS BY ELIG CLIN: HCPCS | Performed by: SURGERY

## 2021-09-16 PROCEDURE — G8417 CALC BMI ABV UP PARAM F/U: HCPCS | Performed by: SURGERY

## 2021-09-16 NOTE — PROGRESS NOTES
Michaela Gutierrez  9/16/2021  [address]     Post-EGD Follow-up. Michaela Gutierrez is a 30 y.o. female post EGD 9/7/2021 showed no gastritis, biopsy done to check for H.pylori WILMER was negative, no duodenitis, no esophagitis, no hiatal hernia. She does complain of acid reflux on Tums and the gallbladder is out. Labs showed low B1 so she was started on supplements. Weight: (!) 392 lb (177.8 kg) 9/16/2021. Past Medical History:   Diagnosis Date    Asthma     B12 deficiency 09/21/2018    Class 3 severe obesity due to excess calories with body mass index (BMI) of 60.0 to 69.9 in adult ([facility] [address]     GERD without esophagitis     Hypertension     Migraine     PRIYA (obstructive sleep apnea)     sleep apnea 4-11    Prolonged emergence from general anesthesia     Vitamin D deficiency 09/21/2018    Zinc deficiency 09/21/2018     Past Surgical History:   Procedure Laterality Date    CHOLECYSTECTOMY, LAPAROSCOPIC  3/2014 Luba Julito    with repair umbilical hernia    LEEP  3/2008    OTHER SURGICAL HISTORY  2011    coils to fallopian tubes    OTHER SURGICAL HISTORY      Tubal essure     TONSILLECTOMY AND ADENOIDECTOMY      TYMPANOSTOMY TUBE PLACEMENT      UMBILICAL HERNIA REPAIR  3/2014 Karma Nelida UPPER GASTROINTESTINAL ENDOSCOPY N/A 5/2/2018    EGD BIOPSY performed by Jessica Guevara MD at [address] [facility] 9/7/2021    EGD BIOPSY performed by Jessica Guevara MD at [place] [address]     Prior to Visit Medications    Medication Sig Taking?  Authorizing Provider   metoprolol succinate (TOPROL XL) 25 MG extended release tablet TAKE 1 TABLET BY MOUTH EVERY DAY Yes Lawrence Guzman MD   [facility] VITAMIN B12 1000 MCG tablet TAKE 1 TABLET BY MOUTH EVERY DAY Yes Lawrence Guzman MD   thiamine 100 MG tablet Take 1 tablet by mouth daily Yes Lawrence Guzman MD   phentermine (ADIPEX-P) 37.5 MG tablet Take one-half to one tablet every day Yes Lawrence Guzman MD losartan (COZAAR) 100 MG tablet TAKE 1 TABLET BY MOUTH EVERY DAY Yes Sammi Vivas MD   albuterol sulfate HFA (PROAIR HFA) 108 (90 Base) MCG/ACT inhaler Inhale 2 puffs into the lungs every 6 hours as needed for Wheezing Yes Sammi Vivas MD   amLODIPine (NORVASC) 10 MG tablet Take 1 tablet by mouth daily Yes Sammi Vivas MD   sertraline (ZOLOFT) 25 MG tablet Take 1 tablet by mouth daily Yes Sammi Vivas MD   magnesium oxide (MAG-OX) 400 MG tablet Take 1 tablet by mouth daily Yes Historical Provider, MD   hydroCHLOROthiazide (HYDRODIURIL) 50 MG tablet Take 50 mg by mouth daily  Yes Historical Provider, MD   guanFACINE (TENEX) 1 MG tablet TAKE 1 TABLET BY MOUTH EVERY NIGHT Yes Historical Provider, MD   Elastic Bandages & Supports (KNEE BRACE/CUSHION/L-XL) MISC Wear daily for knee pain Yes Sammi Vivas MD   Multiple Vitamins-Minerals (THERAPEUTIC MULTIVITAMIN-MINERALS) tablet Take 1 tablet by mouth daily Yes Historical Provider, MD      Allergies: Allergies   Allergen Reactions    Lactose Intolerance (Gi) Diarrhea     Milk and ice cream        Physical Exam:   VITALS: Blood pressure 130/82, pulse 88, temperature 97.3 °F (36.3 °C), temperature source Temporal, height 5' 8\" (1.727 m), weight (!) 392 lb (177.8 kg), not currently breastfeeding.   General appearance: alert, appears stated age and cooperative, does ambulate easily  Head: Normocephalic, without obvious abnormality, atraumatic  Eyes: PERRL  Ears/mouth/throat:  Ears clear, mouth normal, throat no redness  Neck: no adenopathy, no JVD, supple, symmetrical, trachea midline and thyroid not enlarged  Lungs: clear to auscultation bilaterally  Heart: regular rate and rhythm  Abdomen: soft, non-tender; bowel sounds normal; no masses,  no organomegaly  Extremities: extremities normal, atraumatic, no cyanosis or edema  Skin: no open wounds    Assessment:    Doing well two weeks post EGD    Plan:   Ok to proceed with workup for bariatric surgery, stay on the high protein, low calorie diet while waiting for insurance approval. Start exercising daily. Physician Signature: Electronically signed by Dr. Selam Haskins MD  Cc:  Stefani Ortiz.  Gigi Harrison MD

## 2021-09-16 NOTE — PATIENT INSTRUCTIONS
What is the next step to proceed with weight loss surgery? Please be aware that any co-pays or deductibles may be requested prior to testing and / or procedures. You will need to schedule a psychological evaluation for weight loss surgery. Patients will be required to complete all psychological testing as required by the mental health provider. Patients must also follow all of the provider's recommendations before weight loss surgery can be scheduled. The evaluation must be done a standard way for weight loss surgery. We strongly recommend that you contact one of our preferred providers listed below to arrange this:      Diaz Bruce, [facility]  [address] [place], [place]   [phone]    [facility] and [address]   [phone]    Dr. Ilia Cohen, PhD    [address] [place], [place]    [phone]      You will also need to plan on attending a 2 hour nutrition class at [facility] prior to your surgery. We will schedule this for you when we schedule your surgery. Please remember to have your labs drawn 10 days prior to your first scheduled dietary appointment. Please remember, that while we will submit your case to insurance for surgery authorization, it is your responsibility to know if your plan covers weight loss surgery and keep up-to-date with changes to your insurance coverage. We will do everything possible to help you get approved for weight loss surgery, but cannot guarantee an approval.     Please note that you will not be submitted to your insurance company until all pre-operative testing requirements are met.

## 2021-09-28 ENCOUNTER — OFFICE VISIT (OUTPATIENT)
Dept: BARIATRICS/WEIGHT MGMT | Age: 36
End: 2021-09-28
Payer: COMMERCIAL

## 2021-09-28 VITALS
BODY MASS INDEX: 47.09 KG/M2 | SYSTOLIC BLOOD PRESSURE: 167 MMHG | WEIGHT: 293 LBS | HEART RATE: 93 BPM | HEIGHT: 66 IN | TEMPERATURE: 98.2 F | DIASTOLIC BLOOD PRESSURE: 98 MMHG

## 2021-09-28 DIAGNOSIS — E66.01 CLASS 3 SEVERE OBESITY DUE TO EXCESS CALORIES WITHOUT SERIOUS COMORBIDITY WITH BODY MASS INDEX (BMI) OF 60.0 TO 69.9 IN ADULT (HCC): ICD-10-CM

## 2021-09-28 DIAGNOSIS — G47.33 OSA (OBSTRUCTIVE SLEEP APNEA): Primary | ICD-10-CM

## 2021-09-28 PROCEDURE — 99214 OFFICE O/P EST MOD 30 MIN: CPT | Performed by: INTERNAL MEDICINE

## 2021-09-28 PROCEDURE — G8428 CUR MEDS NOT DOCUMENT: HCPCS | Performed by: INTERNAL MEDICINE

## 2021-09-28 PROCEDURE — 1036F TOBACCO NON-USER: CPT | Performed by: INTERNAL MEDICINE

## 2021-09-28 PROCEDURE — G8417 CALC BMI ABV UP PARAM F/U: HCPCS | Performed by: INTERNAL MEDICINE

## 2021-09-28 PROCEDURE — 99211 OFF/OP EST MAY X REQ PHY/QHP: CPT | Performed by: INTERNAL MEDICINE

## 2021-09-28 RX ORDER — PHENTERMINE HYDROCHLORIDE 37.5 MG/1
TABLET ORAL
Qty: 30 TABLET | Refills: 0 | Status: SHIPPED | OUTPATIENT
Start: 2021-09-28 | End: 2021-10-29

## 2021-09-28 NOTE — PATIENT INSTRUCTIONS
Rules:  · Count every calorie every day  · Limit sweets to one day per month  · Limit chips/crackers/pretzels/nuts to 100 michael/day  · Eliminate all sugar sweetened beverages (including fruit juice)  · Limit restaurants (including fast food and food from a convenience store) to one time every two weeks    Targets:  · Limit calorie intake to 1700 calories/day  · Walk 5 minutes daily  · Avoid eating 2 hours within bedtime. Tips:  · Do not eat outside of the dining room or the kitchen  · Do not eat while watching TV, videos, working on the computer or using a smart phone  · Do not eat food out of a multi-serving bag or container. Medications: Take phentermine 37.5 mg, one-half to one tablet daily as needed for appetite suppression. Take each dose 30-90 min before effect will be needed. While taking phentermine, check the Blood Pressure every morning and every evening. If the systolic BP is >086 mmHg, the diastolic BP is >51 mm/Hg or the heart rate is > 100 beats per minute, do not take phentermine that day. If the systolic BP is consistently >155 mmHg, the diastolic BP is consistently above 90 mm/Hg or the heart rate is consistently > 100 beats per minute, then stop taking phentermine altogether. If the systolic BP >605 mmHg or the diastolic BP is >149 mmHg (even if it is only once), then phentermine should be stopped altogether without proving that any of these are consistently elevated.

## 2021-09-28 NOTE — PROGRESS NOTES
CC -   PRIYA, Obesity    BACKGROUND -   Last visit: 8/25/21  First visit: 1/13/21 (previously followed with me for pre-bariatric surgery weight loss)    · PRIYA  Diagnosed 11/09/20  Mild  Followed by Dr. Loni Cuenca  Using APAP 7d/wk, uses <3 hrs for 3d/wk  Excess weight is worsening her underlying airway obstruction    · Obesity   Began in late teens  Initial BMI 67.3, Wt 417.2 lbs  HS Grad wt 150 lbs  Lowest   wt  150 lbs  Highest  wt  417 lbs  Pattern of wt gain: grad with rapid increases in preg  Wt change past yr: +30 lbs  Most wt lost: 20 lbs (pre-Bariatric surgery diet at our clinic)  Other diets attempted: Fad diets, ESTEBAN, Keto, Calorie counting    Initial Diet:    Number of meals per day - 1-2    Number of snacks per day - 1    Meal volume - 12\" plate, sometimes seconds    Fast food/convenience store - 2x/week    Restaurants (not fast food) - 0x/week   Sweets - 1d/week   Chips - 0d/week   Crackers/pretzels - 0d/week   Nuts - 0d/week   Peanut Butter - 1-2d/week   Popcorn - 0d/week   Dried fruit - 0d/week   Whole fruit - 5-6d/week (2 serving)   Breakfast cereal - 0-1d/week   Granola/Protein/Energy bar - 0d/week   Sugar sweetened beverages - 4 liters reg soda/day, 1 large DD isabel Iced coffee with extra cream and sugar 4x/wk (350 michael each)    Protein - No supplements   Fiber - No supplements     Exercise:    Gym membership - [employer]    Walking - none    Running - none    Resistance - none    Aerobic class - none    ______________________    [facility] -  Past Medical History:   Diagnosis Date    Asthma     B12 deficiency 09/21/2018    Class 3 severe obesity due to excess calories with body mass index (BMI) of 60.0 to 69.9 in adult ([address]     GERD without esophagitis     Hypertension     Migraine     PRIYA (obstructive sleep apnea)     sleep apnea 4-11    Prolonged emergence from general anesthesia     Vitamin D deficiency 09/21/2018    Zinc deficiency 09/21/2018     Current Outpatient Medications   Medication Sig Dispense Refill    metoprolol succinate (TOPROL XL) 25 MG extended release tablet TAKE 1 TABLET BY MOUTH EVERY DAY 30 tablet 1    CVS VITAMIN B12 1000 MCG tablet TAKE 1 TABLET BY MOUTH EVERY DAY 30 tablet 3    thiamine 100 MG tablet Take 1 tablet by mouth daily 30 tablet 0    losartan (COZAAR) 100 MG tablet TAKE 1 TABLET BY MOUTH EVERY DAY 30 tablet 1    albuterol sulfate HFA (PROAIR HFA) 108 (90 Base) MCG/ACT inhaler Inhale 2 puffs into the lungs every 6 hours as needed for Wheezing 1 Inhaler 1    amLODIPine (NORVASC) 10 MG tablet Take 1 tablet by mouth daily 90 tablet 1    sertraline (ZOLOFT) 25 MG tablet Take 1 tablet by mouth daily 30 tablet 5    magnesium oxide (MAG-OX) 400 MG tablet Take 1 tablet by mouth daily      hydroCHLOROthiazide (HYDRODIURIL) 50 MG tablet Take 50 mg by mouth daily       guanFACINE (TENEX) 1 MG tablet TAKE 1 TABLET BY MOUTH EVERY NIGHT      Elastic Bandages & Supports (KNEE BRACE/CUSHION/L-XL) MISC Wear daily for knee pain 1 each 0    Multiple Vitamins-Minerals (THERAPEUTIC MULTIVITAMIN-MINERALS) tablet Take 1 tablet by mouth daily       No current facility-administered medications for this visit. PE -  Gen : BP (!) 167/98 (Site: Left Upper Arm, Position: Sitting, Cuff Size: Thigh)   Pulse 93   Temp 98.2 °F (36.8 °C) (Temporal)   Ht 5' 6\" (1.676 m)   Wt (!) 395 lb 12.8 oz (179.5 kg)   BMI 63.88 kg/m²     Repeat 204/119   WN, WD, NAD  Heart:   RRR without MGR, +++ LE edema b/l, no carotid bruits  Lung: Nml resp effort, CTA b/l  Psych: Normal mood   Full affect  Neuro:  Moves all ext well  ______________________      HISTORY & ASSESSMENT/PLAN -     Problem 1  - PRIYA  HPI   - See above Background for description      Using her autopap 7d/wk at least 4-9 hours/night      Past wk daytime sleepiness: 1-2/10 (no change)      Needs to reduce her underlying airway obstruction by decreasing her excess weight      Her PRIYA may be contributing to her hypertension  Assessment  - Controlled  Plan   - Cont use of autopap      Cont with wt reduction per the plan below    Problem 2  - Obesity   HPI   - See above Background for description    Weight  Date    417.2 lbs 01/13/21    422.8 lbs 02/10/21    423.0 lbs 04/07/21     419.6 lbs 06/03/21    414.0 lbs 07/22/21 Phentermine #1    400.0 lbs 08/25/21 Phentermine #2    395.8 lbs 09/28/21  Total weight change to date: 21.4 lbs  DEN = 2350 michael/d = 16,450 michael/wk  Avg daily energy variance:   04/07/21-06/03/21 = - 3.4 lbs (11,900 michael)/57d = - 209 michael/d deficit   06/03/21-07/22/21 = - 5.6 lbs (19,600 michael)/49d = - 400 michael/d deficit   07/22/21-08/25/21 = - 14.0 lbs (49,000cal)/34d = - 1,441 michael/d deficit   08/25/21-09/28/21 = -   4.2 lbs (14,700cal)/34d = -   432 michael/d deficit  Wt effect of trouble foods = Restaurant 300 michael/wk + Sweets 200 + SSBs 12,300 = 12,800 michael/wk = 78% DEN = 1825 michael/day = 182 lbs/yr  Initial thought was that eliminating reg soda may produce a sufficient rate of wt loss. However, it did not. Therefore, discussed the options of a counting-based regimen and a VLCD  She decided on the counting-based program  Update: Working through the process of a bariatric surgery    Counting-calories 6-7d/wk, limiting to <1500    Sweets - 1x/month    SSB - 12 oz of reg in the past month, no coffee    Taking phentermine 37.5 mg, one-tablet daily (3d/wk; does not take when she her BP is high), appetite suppression 7.5/10, Side effects:  dry mouth, hypertension    Has a BTL  Assessment  - Improved, Cont the same plan and phentermine;  Cont with preparation for a bariatric surgery; pt aware of the need to closely follow the parameters for BP while taking phentermine.)  Plan   -   Rules:  · Count every calorie every day  · Limit sweets to one day per month  · Limit chips/crackers/pretzels/nuts to 100 michael/day  · Eliminate all sugar sweetened beverages (including fruit juice)  · Limit restaurants (including fast food and food from a convenience store) to one time every two weeks    Targets:  · Limit calorie intake to 1700 calories/day  · Walk 5 minutes daily  · Avoid eating 2 hours within bedtime. Tips:  · Do not eat outside of the dining room or the kitchen  · Do not eat while watching TV, videos, working on the computer or using a smart phone  · Do not eat food out of a multi-serving bag or container. Medications: Take phentermine 37.5 mg, one-half to one tablet daily as needed for appetite suppression. Take each dose 30-90 min before effect will be needed. While taking phentermine, check the Blood Pressure every morning and every evening. If the systolic BP is >443 mmHg, the diastolic BP is >91 mm/Hg or the heart rate is > 100 beats per minute, do not take phentermine that day. If the systolic BP is consistently >155 mmHg, the diastolic BP is consistently above 90 mm/Hg or the heart rate is consistently > 100 beats per minute, then stop taking phentermine altogether. If the systolic BP >804 mmHg or the diastolic BP is >744 mmHg (even if it is only once), then phentermine should be stopped altogether without proving that any of these are consistently elevated.       Return to see me in 5-7 weeks      Call me with problems: Jarod Boles MD  Endocrinology/Obesity  9/28/21

## 2021-09-29 ENCOUNTER — OFFICE VISIT (OUTPATIENT)
Dept: FAMILY MEDICINE CLINIC | Age: 36
End: 2021-09-29
Payer: COMMERCIAL

## 2021-09-29 VITALS
RESPIRATION RATE: 20 BRPM | DIASTOLIC BLOOD PRESSURE: 88 MMHG | BODY MASS INDEX: 47.09 KG/M2 | WEIGHT: 293 LBS | HEIGHT: 66 IN | OXYGEN SATURATION: 95 % | TEMPERATURE: 97.2 F | HEART RATE: 110 BPM | SYSTOLIC BLOOD PRESSURE: 138 MMHG

## 2021-09-29 DIAGNOSIS — E66.01 MORBID OBESITY (HCC): ICD-10-CM

## 2021-09-29 DIAGNOSIS — I10 ESSENTIAL HYPERTENSION: ICD-10-CM

## 2021-09-29 DIAGNOSIS — Z23 NEED FOR TETANUS BOOSTER: Primary | ICD-10-CM

## 2021-09-29 PROCEDURE — G8417 CALC BMI ABV UP PARAM F/U: HCPCS | Performed by: FAMILY MEDICINE

## 2021-09-29 PROCEDURE — 90715 TDAP VACCINE 7 YRS/> IM: CPT | Performed by: FAMILY MEDICINE

## 2021-09-29 PROCEDURE — G8427 DOCREV CUR MEDS BY ELIG CLIN: HCPCS | Performed by: FAMILY MEDICINE

## 2021-09-29 PROCEDURE — 99213 OFFICE O/P EST LOW 20 MIN: CPT | Performed by: FAMILY MEDICINE

## 2021-09-29 PROCEDURE — 1036F TOBACCO NON-USER: CPT | Performed by: FAMILY MEDICINE

## 2021-09-29 PROCEDURE — 90471 IMMUNIZATION ADMIN: CPT | Performed by: FAMILY MEDICINE

## 2021-09-29 RX ORDER — GUANFACINE 1 MG/1
TABLET ORAL
Qty: 30 TABLET | Refills: 3 | Status: SHIPPED
Start: 2021-09-29 | End: 2022-01-31

## 2021-09-29 RX ORDER — AMLODIPINE BESYLATE 5 MG/1
TABLET ORAL
COMMUNITY
Start: 2021-09-21 | End: 2022-06-10

## 2021-09-29 ASSESSMENT — ENCOUNTER SYMPTOMS
SHORTNESS OF BREATH: 0
NAUSEA: 0
COUGH: 0
ABDOMINAL PAIN: 0
WHEEZING: 0
VOMITING: 0
DIARRHEA: 0

## 2021-09-29 NOTE — PROGRESS NOTES
[address] [facility]  [phone]   Sofie Enrique MD     Patient: Candi Hays  YOB: 1985  Visit Date: 9/29/21    Alex Sapp is a 30y.o. year old female here today for   Chief Complaint   Patient presents with    Hypertension     6 weeks follow up       HPI  Patient is a 30year old female here for hypertension. Out of guanfacine. Has been taking all medications daily except for adipex depending on heart rate and blood pressure. Blood pressure at home has been 130-140/80-90s. Yesterday was high as she has missed two days of bp meds. Last saw Dr. Nicci Echevarria in August. Will see him again in November. Working on bariatric surgery . May need to do psych eval again. Has 1 more dietary class. No flu shot, covid shot, pneumonia shot ,   Ok to tdap today. Review of Systems   Constitutional: Positive for fatigue. Negative for chills and fever. Respiratory: Negative for cough, shortness of breath and wheezing. Cardiovascular: Negative for chest pain, palpitations and leg swelling. Gastrointestinal: Negative for abdominal pain, diarrhea, nausea and vomiting. Musculoskeletal: Positive for arthralgias. Neurological: Negative for weakness, numbness and headaches.        Current Outpatient Medications on File Prior to Visit   Medication Sig Dispense Refill    amLODIPine (NORVASC) 5 MG tablet       phentermine (ADIPEX-P) 37.5 MG tablet Take one-half to one tablet every day 30 tablet 0    metoprolol succinate (TOPROL XL) 25 MG extended release tablet TAKE 1 TABLET BY MOUTH EVERY DAY 30 tablet 1    CVS VITAMIN B12 1000 MCG tablet TAKE 1 TABLET BY MOUTH EVERY DAY 30 tablet 3    thiamine 100 MG tablet Take 1 tablet by mouth daily 30 tablet 0    losartan (COZAAR) 100 MG tablet TAKE 1 TABLET BY MOUTH EVERY DAY 30 tablet 1    amLODIPine (NORVASC) 10 MG tablet Take 1 tablet by mouth daily 90 tablet 1    sertraline (ZOLOFT) 25 MG tablet Take 1 tablet by mouth daily 30 tablet 5    magnesium oxide (MAG-OX) 400 MG tablet Take 1 tablet by mouth daily      hydroCHLOROthiazide (HYDRODIURIL) 50 MG tablet Take 50 mg by mouth daily       Elastic Bandages & Supports (KNEE BRACE/CUSHION/L-XL) MISC Wear daily for knee pain 1 each 0    Multiple Vitamins-Minerals (THERAPEUTIC MULTIVITAMIN-MINERALS) tablet Take 1 tablet by mouth daily       No current facility-administered medications on file prior to visit. Allergies   Allergen Reactions    Lactose Intolerance (Gi) Diarrhea     Milk and ice cream       Past medical, surgical, socialand/or family history reviewed, updated as needed, and are non-contributory (unless otherwise stated). Medications, allergies, and problem list also reviewed and updated as needed in patient's record. Wt Readings from Last 3 Encounters:   09/29/21 (!) 393 lb (178.3 kg)   09/28/21 (!) 395 lb 12.8 oz (179.5 kg)   09/16/21 (!) 392 lb (177.8 kg)                   /88   Pulse 110   Temp 97.2 °F (36.2 °C)   Resp 20   Ht 5' 6\" (1.676 m)   Wt (!) 393 lb (178.3 kg)   SpO2 95%   BMI 63.43 kg/m²        Physical Exam  Vitals and nursing note reviewed. Constitutional:       General: She is not in acute distress. Appearance: She is well-developed. She is not diaphoretic. HENT:      Head: Normocephalic and atraumatic. Cardiovascular:      Rate and Rhythm: Normal rate and regular rhythm. Heart sounds: Normal heart sounds. No murmur heard. Pulmonary:      Effort: Pulmonary effort is normal. No respiratory distress. Breath sounds: Normal breath sounds. No wheezing or rales. Abdominal:      General: Bowel sounds are normal. There is no distension. Palpations: Abdomen is soft. Tenderness: There is no abdominal tenderness. There is no guarding. Musculoskeletal:         General: Swelling (trace edema) present. Normal range of motion.    Psychiatric:         Behavior: Behavior normal. Results for orders placed or performed in visit on 09/16/21   VITAMIN B1   Result Value Ref Range    Vitamin B1,Whole Blood 106 70 - 180 nmol/L       ASSESSMENT/PLAN  Cristian Trevino was seen today for hypertension. Diagnoses and all orders for this visit:    Need for tetanus booster  -     Tdap (age 6y and older) IM ([address])    Essential hypertension  -     guanFACINE (TENEX) 1 MG tablet; TAKE 1 TABLET BY MOUTH EVERY NIGHT  - bp controlled. Seeing nephro . On guanfacine, amlodipine, losartan, hctz     Morbid obesity ([address]   - Working on getting bariatric surgery. Phone/MyChart follow up if tests abnormal.    Return in about 3 months (around 12/29/2021). or sooner if necessary. I have reviewed myfindings and recommendations with Cristian Trevino. Quinten Rutherford.  Yamileth Gutierrez MD, M.D

## 2021-10-04 RX ORDER — ALBUTEROL SULFATE 90 UG/1
AEROSOL, METERED RESPIRATORY (INHALATION)
Qty: 8.5 EACH | Refills: 1 | Status: SHIPPED
Start: 2021-10-04 | End: 2022-05-30 | Stop reason: SDUPTHER

## 2021-10-05 ENCOUNTER — TELEPHONE (OUTPATIENT)
Dept: BARIATRICS/WEIGHT MGMT | Age: 36
End: 2021-10-05

## 2021-10-05 NOTE — TELEPHONE ENCOUNTER
SW called PT regarding psych eval appointment but PT was unavailable. Left v-mail requesting that the PT call to schedule the appointment.      OSVALDO Russ

## 2021-10-19 DIAGNOSIS — I10 ESSENTIAL HYPERTENSION: ICD-10-CM

## 2021-10-19 RX ORDER — LOSARTAN POTASSIUM 100 MG/1
TABLET ORAL
Qty: 30 TABLET | Refills: 1 | Status: SHIPPED
Start: 2021-10-19 | End: 2022-05-30 | Stop reason: SDUPTHER

## 2021-10-25 ENCOUNTER — TELEPHONE (OUTPATIENT)
Dept: BARIATRICS/WEIGHT MGMT | Age: 36
End: 2021-10-25

## 2021-10-27 ENCOUNTER — VIRTUAL VISIT (OUTPATIENT)
Dept: BARIATRICS/WEIGHT MGMT | Age: 36
End: 2021-10-27
Payer: COMMERCIAL

## 2021-10-27 DIAGNOSIS — Z87.891 FORMER SMOKER: ICD-10-CM

## 2021-10-27 DIAGNOSIS — Z71.3 DIETARY COUNSELING: Primary | ICD-10-CM

## 2021-10-27 DIAGNOSIS — Z02.6 ENCOUNTER FOR EXAMINATION FOR INSURANCE PURPOSES: ICD-10-CM

## 2021-10-27 DIAGNOSIS — E66.01 MORBID OBESITY DUE TO EXCESS CALORIES (HCC): ICD-10-CM

## 2021-10-27 PROCEDURE — 99999 PR OFFICE/OUTPT VISIT,PROCEDURE ONLY: CPT

## 2021-10-27 NOTE — PROGRESS NOTES
WEIGHT:  390 lbs     Pt was in th office for his/her 3rd weight Loss appointment. Pt is aware he/she must meet his/her pre-op weight loss goal in order to proceed with weight loss surgery. James / Aleksandr faxed a copy of what was reviewed with the pt to her PCP. Pt verbalized understanding. James// Aleksandr enc the following at today's visit for successful post-surgical Weight Maintenance    Education:  Patient has been educated on the importance of reading food labels for the content of sugar and the content of fat that is in the foods serving size contributing to overall calorie consumption. Patient is aware how to identify hidden sugars and hidden fats found in food and reduce calorie intake of empty calories and high fat foods. Patient can verbalize the importance of label reading. Demonstrate the difference between a healthy food label and unhealthy food label. Real life food replicas demonstrating hidden sugars and hidden fats, and actual food labels were part of the patients education to help understand healthy eating habits and determine healthy food choices. 1. Weigh yourself daily and record it. 2. Keep documented food records daily   3. Just be more active in day to day routine   4. Higher protein intake and a higher fiber intake. Not a high protein or a high fiber diet just a higher intake. 5.Eliminate empty calorie consumption    James Brandon addressed the following with the pt:  - James Brandon enc pt to comply with nutrition recommendations  - James Brandon enc Participation in support group meetings  - James Brandon enc pt to go back to maintenance of food records and weight monitoring records   - James Brandon reviewed the importance of adequate sleep and stress management  - James Brandon reviewed nonfood strategies to cope with emotions and stress  - James Brandon encouraged pt to practice the following: Mindful eating: Eating slowly:  Focusing   on the eating experience without distraction  - James Brandon enc. pt to pay attention to hunger and fullness  - Rd / Ld enc meal planning  - Rd / Ld  Enc pt to chose nutrient dense whole foods instead of soft, high calorie foods  - Rd / Ld enc not dr Domingo Sandoval large amounts of fluids with or immediately after meals    Portion control ,meal planning and avoiding empty calorie consumption. James / Ld reviewed insurance company weight loss requirement on 10/27/21. Pt verbalized understanding. Please be aware at each visit you have been instructed that in order for your insurance company to approve your surgery you must show a consistent weight loss of 2 lbs or greater at each visit. We can not guarantee an approval by your insurance company we can only provide the information given to us it is up to you the patient to show compliancy to your insurance company.   If you do gain weight during your supervised weight loss counseling sessions insurance companies starting in 2018 are denying patients for not showing consistent weight loss results when part of a supervised weight loss counseling program.

## 2021-11-10 ENCOUNTER — INITIAL CONSULT (OUTPATIENT)
Dept: BARIATRICS/WEIGHT MGMT | Age: 36
End: 2021-11-10
Payer: COMMERCIAL

## 2021-11-10 ENCOUNTER — TELEPHONE (OUTPATIENT)
Dept: BARIATRICS/WEIGHT MGMT | Age: 36
End: 2021-11-10

## 2021-11-10 VITALS — BODY MASS INDEX: 44.41 KG/M2 | HEIGHT: 68 IN | WEIGHT: 293 LBS

## 2021-11-10 DIAGNOSIS — Z71.3 NUTRITIONAL COUNSELING: Primary | ICD-10-CM

## 2021-11-10 DIAGNOSIS — Z00.8 NUTRITIONAL ASSESSMENT: ICD-10-CM

## 2021-11-10 PROCEDURE — 99999 PR OFFICE/OUTPT VISIT,PROCEDURE ONLY: CPT | Performed by: DIETITIAN, REGISTERED

## 2021-11-10 NOTE — PROGRESS NOTES
Weight Loss Assessment: Completed by Nutrition Services RD/LD Certified in Adult Weight Management:  Phone Number:  [phone]  Fax Number:   [phone]    Anita Grijalva   11/10/21  Weight Loss Appointment: 4th Weight Loss Appointment      Wt Readings from Last 3 Encounters:   11/10/21 (!) 388 lb (176 kg)   09/29/21 (!) 393 lb (178.3 kg)   09/28/21 (!) 395 lb 12.8 oz (179.5 kg)        (!) 388 lb (176 kg)  IBW: 155 lbs         % IBW: 250%       % EBWL: 10%           ABW: 213 lbs  % ABW: 182^%       BMI: Body mass index is 59 kg/m². Patient's 24 Hour Recall:  Breakfast: Eggs and [place]  Snack: None  Lunch: None  Snack: None  Dinner: Chicken with Spinach and Tomatoes Cream Sauce with Tortellini  Snack: Synder Cheese Puffs  Water Intake: 6 to 10  - 16.9 oz Bottles  Other Beverages: None  Exercise: Pt states as much as she can d/t pt having bad knee. Education:     1. Weigh yourself daily and record it. 2. Keep documented food records daily   3. 220-225 minutes a week of moderate physical activity   4. Just be more active in day to day routine   5. Higher protein intake and a higher fiber intake. Not a high protein or a high fiber diet just a higher intake. James Brandon addressed the following with the pt:  - James Brandon enc pt to comply with nutrition recommendations  - James Brandon enc to go back to maintenance of regular physical activity  - Periodic assessment to prevent and treat eating or other psychiatric disorders   - James / Aleksandr enc participation in support group meetings  - James Brandon enc pt to go back to maintenance of daily food records and weight monitoring records   - James Brandon reviewed the importance of adequate sleep and stress management  - James Brandon reviewed nonfood strategies to cope with emotions and stress  - James Brandon encouraged pt to practice the following: Mindful eating: Eating slowly:  Focusing on the eating experience without distraction  - James Brandon enc. pt to pay attention to hunger and fullness cues  - Rd / Theo enc meal planning  - Rd / Ld  enc pt to chose nutrient dense whole foods instead of soft, high calorie foods  - Rd / Ld enc not drinking large amounts of fluids with or immediately after meals    Portion control, meal planning and avoiding empty calorie consumption. Weight loss goal for next follow-up appointment: 5 to 10 lbs    Patient has established the following three goals for the next follow-up appointment. 1. Pt states she wants to start to plan, prepare, pack and develop set meal patterns. 2. Pt states she wants to eliminate coffee all together within her diet. Pt is eliminating empty calorie consumption. 3. Pt states she wants to eliminate carbohydrates within diet to help control calorie intake and make sure she is meeting protein intake daily 70 - 80 grams of protein a day. Patient has established the following exercise goal for next follow-up appointment:  ADL's - Pt wants to increase ADL's d/t problems with her knee's    Did the patient keep food records:Yes    Pt. is aware if they do not comply with [facility] Guidelines that this can lead to the patient being dismissed from the program.    The registered dietitian spent the following time 60 minutes educating the patient and providing the patient with nutritional handouts to follow. __________________________________________________________________________________  Primary Care Physician Follow-up:  Pt. was seen by Katrin Vazquez RD/THEO regarding weight loss education and follow-up on 11/10/21. This was the patients 4th appointment with the registered dietitian. The registered dietitian spent the following amount of time with the patient 60 minutes. Please [place] the following: The Primary Care Physician reviewed the above nutrition assessment and patient education and agrees with current diet plan.     The Primary Care Physician wants the current diet plan changed to the following:_____________________________________________________________________________________________________________________________________________________________________________________________________________. Physician Signature:__________________________ Date:______________  Once signed please fax back to the [facility] [phone]. We thank you for allowing us to participate in your patients care.

## 2021-11-10 NOTE — TELEPHONE ENCOUNTER
Last Dietary Appointment Notes: 11/10/21    [facility]: [facility]    Surgery Requested by Patient: As of 11/10/21 - RYGB    Date: 2 Hour Nutrition Class: Once all testing is complete    Rd / Ld reviewed the following with the patient:    Rd / Ld at the [facility] reviewed with the patient that he / she has not completed the following in order to proceed with bariatric surgery:     Initial Appt with Surgeon was 21. Initial Appt is only good until 22. Testing will be required again after this date per your insurance company policy. Please remember just because you finished all of your requirements if you did not finish the requirements in a timely manner they can  and you can be required to complete these requirements over again. Each [employer] has its own set of requirements with its own set of deadlines. Once everything listed below is completed you will need to complete the following to proceed with sx. The [facility] will contact you to complete this process. 1. You will be called in order to select your surgery date for insurance submission. 2. You will be called and scheduled to attend a 3 Hour Nutrition Class on the type of surgery you are having completed. These are always scheduled on  from 10:00 am to 1:00 pm and dates vary depending on the type of surgery you are having completed. You will need to purchase your bariatric supplements at this appointment cost is $240.00 to $290.00. Failure to purchase supplements or attend the class will lead to your surgery being cancelled. 3. You will need final Medical Clearance from your Primary Care Physician. Failure to complete will lead to your surgery being cancelled. 4. You will be scheduled for a H&P appointment with your surgeon . It is at this appointment you will need to make goal weight. Failure to complete will lead to your surgery being cancelled.     5. You will be scheduled for PAT at [address] [place] [address] Hospital usually the week before your scheduled surgery. Failure to complete will lead to your surgery being cancelled. Remember after all testing that is required it is your responsibility as a patient to call [facility] to review that we received any testing results or requirements that you had completed. The [facility] is not responsible for tracking of results and testing. Your phone call will help facilitate if what is required was received and completed. - Nicotine Script Given -  10/27/21 and 11/10/21 // Quite Date - Former 1/21 // Draw Date ASAP // Pt instructed to call 10 day's after to review results. - UDS Script Given - 10/27/21 and 11/10/21 // Draw Date ASAP // Pt instructed to call 10 day's after to review results. - Psych Clearance - Pt is working M. Malon Alpers / Aleksandr at the [facility] reviewed with the patient that he / she is at his / her goal weight for surgery and can not gain weight from now until surgery:  Yes. Patient is aware weight gain at H&P will cancel the patients surgery date. Pre-Op Weight Loss Goal: 389 lbs. Pt weighs 388 lbs    Rd / Ld reviewed with the patient that he / she must purchase a 3 month supply of supplements before his / her surgery or at the time of his / her H&P appointment and the patient states he / she is going to purchase the 3 month supply of supplements on H&P. Patient is aware that failure to purchase the supplements at this appointment will cancel the patients surgery date. Pt has Jumper's Knee and Osteoarthritis and needs a knee replacement    Patient states at this time from the time of his / her initial consult here at the [facility] there has been no changes in his / her medical history. Patient is aware failure to disclose information can lead to his / her surgery being cancelled. Patient received a copy of this at the time of his / her final dietary consult.

## 2021-11-11 ENCOUNTER — OFFICE VISIT (OUTPATIENT)
Dept: BARIATRICS/WEIGHT MGMT | Age: 36
End: 2021-11-11
Payer: COMMERCIAL

## 2021-11-11 VITALS
BODY MASS INDEX: 47.09 KG/M2 | SYSTOLIC BLOOD PRESSURE: 154 MMHG | HEIGHT: 66 IN | HEART RATE: 84 BPM | WEIGHT: 293 LBS | DIASTOLIC BLOOD PRESSURE: 81 MMHG | TEMPERATURE: 97.2 F

## 2021-11-11 DIAGNOSIS — G47.33 OSA (OBSTRUCTIVE SLEEP APNEA): Primary | ICD-10-CM

## 2021-11-11 DIAGNOSIS — E66.01 CLASS 3 SEVERE OBESITY DUE TO EXCESS CALORIES WITHOUT SERIOUS COMORBIDITY WITH BODY MASS INDEX (BMI) OF 60.0 TO 69.9 IN ADULT (HCC): ICD-10-CM

## 2021-11-11 PROCEDURE — 99211 OFF/OP EST MAY X REQ PHY/QHP: CPT | Performed by: INTERNAL MEDICINE

## 2021-11-11 PROCEDURE — 99213 OFFICE O/P EST LOW 20 MIN: CPT | Performed by: INTERNAL MEDICINE

## 2021-11-11 PROCEDURE — G8417 CALC BMI ABV UP PARAM F/U: HCPCS | Performed by: INTERNAL MEDICINE

## 2021-11-11 PROCEDURE — 1036F TOBACCO NON-USER: CPT | Performed by: INTERNAL MEDICINE

## 2021-11-11 PROCEDURE — G8428 CUR MEDS NOT DOCUMENT: HCPCS | Performed by: INTERNAL MEDICINE

## 2021-11-11 PROCEDURE — G8484 FLU IMMUNIZE NO ADMIN: HCPCS | Performed by: INTERNAL MEDICINE

## 2021-11-11 NOTE — PROGRESS NOTES
CC -   PRIYA, Obesity    BACKGROUND -   Last visit: 9/28/21  First visit: 1/13/21     · PRIYA  Diagnosed 11/09/20  Mild  Followed by Dr. Elly Salvador  Using APAP 7d/wk, uses <3 hrs for 3d/wk  Excess weight is worsening her underlying airway obstruction    · Obesity   Began in late teens  Initial BMI 67.3, Wt 417.2 lbs  HS Grad wt 150 lbs  Lowest   wt  150 lbs  Highest  wt  417 lbs  Pattern of wt gain: grad with rapid increases in preg  Wt change past yr: +30 lbs  Most wt lost: 20 lbs (pre-Bariatric surgery diet at our clinic)  Other diets attempted: Fad diets, ESTEBAN, Keto, Calorie counting    Initial Diet:    Number of meals per day - 1-2    Number of snacks per day - 1    Meal volume - 12\" plate, sometimes seconds    Fast food/convenience store - 2x/week    Restaurants (not fast food) - 0x/week   Sweets - 1d/week   Chips - 0d/week   Crackers/pretzels - 0d/week   Nuts - 0d/week   Peanut Butter - 1-2d/week   Popcorn - 0d/week   Dried fruit - 0d/week   Whole fruit - 5-6d/week (2 serving)   Breakfast cereal - 0-1d/week   [place]anola/Protein/Energy bar - 0d/week   Sugar sweetened beverages - 4 liters reg soda/day, 1 large DD isabel Iced coffee with extra cream and sugar 4x/wk (350 michael each)    Protein - No supplements   Fiber - No supplements     Exercise:    Gym membership - [employer]    Walking - none    Running - none    Resistance - none    Aerobic class - none    ______________________    [facility] -  Past Medical History:   Diagnosis Date    Asthma     B12 deficiency 09/21/2018    Class 3 severe obesity due to excess calories with body mass index (BMI) of 60.0 to 69.9 in adult (HCC)     GERD without esophagitis     Hypertension     Migraine     PRIYA (obstructive sleep apnea)     sleep apnea 4-11    Prolonged emergence from general anesthesia     Vitamin D deficiency 09/21/2018    Zinc deficiency 09/21/2018     Current Outpatient Medications   Medication Sig Dispense Refill    losartan (COZAAR) 100 MG tablet TAKE 1 TABLET BY MOUTH EVERY DAY 30 tablet 1    albuterol sulfate  (90 Base) MCG/ACT inhaler TAKE 2 PUFFS BY MOUTH EVERY 6 HOURS AS NEEDED FOR WHEEZE 8.5 each 1    amLODIPine (NORVASC) 5 MG tablet       guanFACINE (TENEX) 1 MG tablet TAKE 1 TABLET BY MOUTH EVERY NIGHT 30 tablet 3    metoprolol succinate (TOPROL XL) 25 MG extended release tablet TAKE 1 TABLET BY MOUTH EVERY DAY 30 tablet 1    CVS VITAMIN B12 1000 MCG tablet TAKE 1 TABLET BY MOUTH EVERY DAY 30 tablet 3    thiamine 100 MG tablet Take 1 tablet by mouth daily 30 tablet 0    amLODIPine (NORVASC) 10 MG tablet Take 1 tablet by mouth daily 90 tablet 1    sertraline (ZOLOFT) 25 MG tablet Take 1 tablet by mouth daily 30 tablet 5    magnesium oxide (MAG-OX) 400 MG tablet Take 1 tablet by mouth daily      hydroCHLOROthiazide (HYDRODIURIL) 50 MG tablet Take 50 mg by mouth daily       Elastic Bandages & Supports (KNEE BRACE/CUSHION/L-XL) MISC Wear daily for knee pain 1 each 0    Multiple Vitamins-Minerals (THERAPEUTIC MULTIVITAMIN-MINERALS) tablet Take 1 tablet by mouth daily       No current facility-administered medications for this visit. PE -  Gen : BP (!) 154/81 (Site: Left Upper Arm, Position: Sitting, Cuff Size: Thigh)   Pulse 84   Temp 97.2 °F (36.2 °C) (Temporal)   Ht 5' 6\" (1.676 m)   Wt (!) 389 lb (176.4 kg)   BMI 62.79 kg/m²     WN, WD, NAD  Heart:   RRR without MGR, +++ LE edema b/l, no carotid bruits  Lung: Nml resp effort, CTA b/l  Psych: Normal mood   Full affect  Neuro:  Moves all ext well  ______________________      HISTORY & ASSESSMENT/PLAN -     Problem 1  - PRIYA  HPI   - See above Background for description      Using her autopap 6-7d/wk at least 4-9 hours/night      Daytime sleepiness, past week: 4/10 (increased by 2 b/c of sleep deprivation)      Needs to reduce her underlying airway obstruction by decreasing her excess weight      Her PRIYA may be contributing to her hypertension  Assessment  - Controlled  Plan   - Cont use of autopap      Cont with wt reduction per the plan below    Problem 2  - Obesity   HPI   - See above Background for description    Weight  Date    417.2 lbs 01/13/21    422.8 lbs 02/10/21    423.0 lbs 04/07/21     419.6 lbs 06/03/21    414.0 lbs 07/22/21 Phentermine #1    400.0 lbs 08/25/21 Phentermine #2    395.8 lbs 09/28/21 Phentermine #3    389.0 lbs 11/11/21  Total weight change to date: 28.2 lbs  DEN = 2350 michael/d = 16,450 michael/wk  Avg daily energy variance:   04/07/21-06/03/21 = - 3.4 lbs (11,900 michael)/57d = -   209 michael/d deficit   06/03/21-07/22/21 = - 5.6 lbs (19,600 michael)/49d = -   400 michael/d deficit   07/22/21-08/25/21 = - 14.0 lbs (49,000cal)/34d = - 1,441 michael/d deficit   08/25/21-09/28/21 = -   4.2 lbs (14,700cal)/34d = -   432 michael/d deficit   09/28/21-11/21/21 = - 6.8  Lbs (23,800cal)/44d = -   540 michael/d deficit  Wt effect of trouble foods = Restaurant 300 michael/wk + Sweets 200 + SSBs 12,300 = 12,800 michael/wk = 78% DEN = 1825 michael/day = 182 lbs/yr  Initial thought was that eliminating reg soda may produce a sufficient rate of wt loss. However, it did not. Therefore, discussed the options of a counting-based regimen and a VLCD  She decided on the counting-based program  Update: Working through the process of a bariatric surgery; awaiting psychology and drug/nicotene clearances    Counting-calories 2-3d/wk, calorie intake is typically 1500 -1600 michael    Sweets - 1x/month    SSB - 12 oz of reg in the past month, no coffee    Taking phentermine 37.5 mg, one-half tablet daily (2d/wk), appetite suppression 8.5/10, Side effects: dry mouth    BP at home 120-130/84-88    Contraception -  BTL  Assessment  - Improved, Cont the same plan and phentermine;  Cont with preparation for a bariatric surgery; pt aware of the need to closely follow the parameters for BP while taking phentermine.)  Plan   -   Rules:  · Count every calorie every day  · Limit sweets to one day per month  · Limit chips/crackers/pretzels/nuts to 100 michael/day  · Eliminate all sugar sweetened beverages (including fruit juice)  · Limit restaurants (including fast food and food from a convenience store) to one time every two weeks    Targets:  · Limit calorie intake to 1700 calories/day  · Walk 5 minutes daily  · Avoid eating 2 hours within bedtime. Tips:  · Do not eat outside of the dining room or the kitchen  · Do not eat while watching TV, videos, working on the computer or using a smart phone  · Do not eat food out of a multi-serving bag or container. Medications: Take phentermine 37.5 mg, one-half to one tablet daily as needed for appetite suppression. Take each dose 30-90 min before effect will be needed. While taking phentermine, check the Blood Pressure every morning and every evening. If the systolic BP is >804 mmHg, the diastolic BP is >44 mm/Hg or the heart rate is > 100 beats per minute, do not take phentermine that day. If the systolic BP is consistently >155 mmHg, the diastolic BP is consistently above 90 mm/Hg or the heart rate is consistently > 100 beats per minute, then stop taking phentermine altogether. If the systolic BP >087 mmHg or the diastolic BP is >201 mmHg (even if it is only once), then phentermine should be stopped altogether without proving that any of these are consistently elevated.       Return to see me in 5-7 weeks      Sergio Paez MD  Endocrinology/Obesity  11/11/21

## 2021-11-11 NOTE — PATIENT INSTRUCTIONS
Rules:  · Count every calorie every day  · Limit sweets to one day per month  · Limit chips/crackers/pretzels/nuts to 100 michael/day  · Eliminate all sugar sweetened beverages (including fruit juice)  · Limit restaurants (including fast food and food from a convenience store) to one time every two weeks    Targets:  · Limit calorie intake to 1700 calories/day  · Walk 5 minutes daily  · Avoid eating 2 hours within bedtime. Tips:  · Do not eat outside of the dining room or the kitchen  · Do not eat while watching TV, videos, working on the computer or using a smart phone  · Do not eat food out of a multi-serving bag or container. Medications: Take phentermine 37.5 mg, one-half to one tablet daily as needed for appetite suppression. Take each dose 30-90 min before effect will be needed. While taking phentermine, check the Blood Pressure every morning and every evening. If the systolic BP is >409 mmHg, the diastolic BP is >65 mm/Hg or the heart rate is > 100 beats per minute, do not take phentermine that day. If the systolic BP is consistently >155 mmHg, the diastolic BP is consistently above 90 mm/Hg or the heart rate is consistently > 100 beats per minute, then stop taking phentermine altogether. If the systolic BP >766 mmHg or the diastolic BP is >513 mmHg (even if it is only once), then phentermine should be stopped altogether without proving that any of these are consistently elevated.       Return to see me in 5-7 weeks

## 2021-12-09 ENCOUNTER — TELEPHONE (OUTPATIENT)
Dept: FAMILY MEDICINE CLINIC | Age: 36
End: 2021-12-09

## 2021-12-29 ENCOUNTER — OFFICE VISIT (OUTPATIENT)
Dept: FAMILY MEDICINE CLINIC | Age: 36
End: 2021-12-29
Payer: COMMERCIAL

## 2021-12-29 VITALS
HEIGHT: 66 IN | RESPIRATION RATE: 18 BRPM | HEART RATE: 92 BPM | BODY MASS INDEX: 47.09 KG/M2 | OXYGEN SATURATION: 97 % | WEIGHT: 293 LBS | SYSTOLIC BLOOD PRESSURE: 138 MMHG | DIASTOLIC BLOOD PRESSURE: 86 MMHG

## 2021-12-29 DIAGNOSIS — E53.8 B12 DEFICIENCY: ICD-10-CM

## 2021-12-29 DIAGNOSIS — I10 ESSENTIAL HYPERTENSION: ICD-10-CM

## 2021-12-29 PROCEDURE — G8484 FLU IMMUNIZE NO ADMIN: HCPCS | Performed by: FAMILY MEDICINE

## 2021-12-29 PROCEDURE — 99213 OFFICE O/P EST LOW 20 MIN: CPT | Performed by: FAMILY MEDICINE

## 2021-12-29 PROCEDURE — G8417 CALC BMI ABV UP PARAM F/U: HCPCS | Performed by: FAMILY MEDICINE

## 2021-12-29 PROCEDURE — 1036F TOBACCO NON-USER: CPT | Performed by: FAMILY MEDICINE

## 2021-12-29 PROCEDURE — G8427 DOCREV CUR MEDS BY ELIG CLIN: HCPCS | Performed by: FAMILY MEDICINE

## 2021-12-29 RX ORDER — SERTRALINE HYDROCHLORIDE 25 MG/1
25 TABLET, FILM COATED ORAL DAILY
Qty: 30 TABLET | Refills: 5 | Status: SHIPPED | OUTPATIENT
Start: 2021-12-29

## 2021-12-29 RX ORDER — MAGNESIUM OXIDE 400 MG/1
1 TABLET ORAL DAILY
Qty: 30 TABLET | Refills: 3 | Status: SHIPPED
Start: 2021-12-29 | End: 2022-06-10 | Stop reason: SDUPTHER

## 2021-12-29 RX ORDER — HYDROCHLOROTHIAZIDE 50 MG/1
50 TABLET ORAL DAILY
Qty: 30 TABLET | Refills: 5 | Status: SHIPPED
Start: 2021-12-29 | End: 2022-09-22 | Stop reason: SDUPTHER

## 2021-12-29 ASSESSMENT — ENCOUNTER SYMPTOMS
WHEEZING: 0
DIARRHEA: 0
VOMITING: 0
NAUSEA: 0
COUGH: 0
ABDOMINAL PAIN: 0
SHORTNESS OF BREATH: 0

## 2021-12-29 NOTE — PROGRESS NOTES
[address] [place] [address]  [phone]   Ellie Balderrama MD     Patient: Hardy Craig  YOB: 1985  Visit Date: 12/29/21    Brian Arriola is a 29y.o. year old female here today for   Chief Complaint   Patient presents with    Hypertension       HPI  Patient is a 29year old female here for follow up of hypertension. No complaints. Taking all medications. Has not used cpap in two weeks because someone stepped on her mask aand broke it. Has gotten a new mask and plans to restart cpap use. Had covid diagnosed on 12/8 . contiues to have loss of smell but overall is improving. Left knee hurts but this is chronic and has been seeing someone for this. No issues with GERD. Used inhaler once in the last month for asthma. Review of Systems   Constitutional: Positive for fatigue. Negative for chills and fever. Respiratory: Negative for cough, shortness of breath and wheezing. Cardiovascular: Negative for chest pain, palpitations and leg swelling. Gastrointestinal: Negative for abdominal pain, diarrhea, nausea and vomiting. Musculoskeletal: Positive for arthralgias. Neurological: Negative for weakness, numbness and headaches.        Current Outpatient Medications on File Prior to Visit   Medication Sig Dispense Refill    losartan (COZAAR) 100 MG tablet TAKE 1 TABLET BY MOUTH EVERY DAY 30 tablet 1    albuterol sulfate  (90 Base) MCG/ACT inhaler TAKE 2 PUFFS BY MOUTH EVERY 6 HOURS AS NEEDED FOR WHEEZE 8.5 each 1    amLODIPine (NORVASC) 5 MG tablet       guanFACINE (TENEX) 1 MG tablet TAKE 1 TABLET BY MOUTH EVERY NIGHT 30 tablet 3    metoprolol succinate (TOPROL XL) 25 MG extended release tablet TAKE 1 TABLET BY MOUTH EVERY DAY 30 tablet 1    thiamine 100 MG tablet Take 1 tablet by mouth daily 30 tablet 0    amLODIPine (NORVASC) 10 MG tablet Take 1 tablet by mouth daily 90 tablet 1    Elastic Bandages & Supports (KNEE BRACE/CUSHION/L-XL) MISC Wear daily for knee pain 1 each 0    Multiple Vitamins-Minerals (THERAPEUTIC MULTIVITAMIN-MINERALS) tablet Take 1 tablet by mouth daily       No current facility-administered medications on file prior to visit. Allergies   Allergen Reactions    Lactose Intolerance (Gi) Diarrhea     Milk and ice cream       Past medical, surgical, socialand/or family history reviewed, updated as needed, and are non-contributory (unless otherwise stated). Medications, allergies, and problem list also reviewed and updated as needed in patient's record. Wt Readings from Last 3 Encounters:   12/29/21 (!) 391 lb (177.4 kg)   11/11/21 (!) 389 lb (176.4 kg)   11/10/21 (!) 388 lb (176 kg)                   /86 (Site: Right Lower Arm, Position: Sitting)   Pulse 92   Resp 18   Ht 5' 6\" (1.676 m)   Wt (!) 391 lb (177.4 kg)   SpO2 97%   BMI 63.11 kg/m²        Physical Exam  Vitals and nursing note reviewed. Constitutional:       General: She is not in acute distress. Appearance: She is well-developed. She is not diaphoretic. HENT:      Head: Normocephalic and atraumatic. Cardiovascular:      Rate and Rhythm: Normal rate and regular rhythm. Heart sounds: Normal heart sounds. No murmur heard. Pulmonary:      Effort: Pulmonary effort is normal. No respiratory distress. Breath sounds: Normal breath sounds. No wheezing or rales. Abdominal:      General: Bowel sounds are normal. There is no distension. Palpations: Abdomen is soft. Tenderness: There is no abdominal tenderness. There is no guarding. Musculoskeletal:         General: Swelling (trace edema) present. Normal range of motion. Psychiatric:         Behavior: Behavior normal.       Results for orders placed or performed in visit on 09/16/21   VITAMIN B1   Result Value Ref Range    Vitamin B1,Whole Blood 106 70 - 180 nmol/L       ASSESSMENT/PLAN  Yoan was seen today for hypertension.     Diagnoses and all orders for this visit:    Essential hypertension   - Blood pressure controlled on current meds including, hctz, losartan, amlodipine, guanfacine, metoprolol  Sees nephrology     B12 deficiency  -     cyanocobalamin ([facility] VITAMIN B12) 1000 MCG tablet; TAKE 1 TABLET BY MOUTH EVERY DAY    Other orders  -     sertraline (ZOLOFT) 25 MG tablet; Take 1 tablet by mouth daily  -     hydroCHLOROthiazide (HYDRODIURIL) 50 MG tablet; Take 1 tablet by mouth daily  -     magnesium oxide (MAG-OX) 400 MG tablet; Take 1 tablet by mouth daily      Phone/MyChart follow up if tests abnormal.    Return in about 3 months (around 3/29/2022). or sooner if necessary. I have reviewed myfindings and recommendations with Vernon Holland. Lester Found.  Anaid Fox MD, M.D

## 2022-01-04 ENCOUNTER — OFFICE VISIT (OUTPATIENT)
Dept: BARIATRICS/WEIGHT MGMT | Age: 37
End: 2022-01-04
Payer: COMMERCIAL

## 2022-01-04 VITALS
WEIGHT: 293 LBS | SYSTOLIC BLOOD PRESSURE: 171 MMHG | DIASTOLIC BLOOD PRESSURE: 90 MMHG | HEART RATE: 87 BPM | TEMPERATURE: 97.2 F | BODY MASS INDEX: 47.09 KG/M2 | HEIGHT: 66 IN

## 2022-01-04 DIAGNOSIS — E66.01 CLASS 3 SEVERE OBESITY DUE TO EXCESS CALORIES WITHOUT SERIOUS COMORBIDITY WITH BODY MASS INDEX (BMI) OF 60.0 TO 69.9 IN ADULT (HCC): ICD-10-CM

## 2022-01-04 DIAGNOSIS — G47.33 OSA (OBSTRUCTIVE SLEEP APNEA): Primary | ICD-10-CM

## 2022-01-04 PROCEDURE — G8417 CALC BMI ABV UP PARAM F/U: HCPCS | Performed by: INTERNAL MEDICINE

## 2022-01-04 PROCEDURE — G8484 FLU IMMUNIZE NO ADMIN: HCPCS | Performed by: INTERNAL MEDICINE

## 2022-01-04 PROCEDURE — 1036F TOBACCO NON-USER: CPT | Performed by: INTERNAL MEDICINE

## 2022-01-04 PROCEDURE — 99214 OFFICE O/P EST MOD 30 MIN: CPT | Performed by: INTERNAL MEDICINE

## 2022-01-04 PROCEDURE — G8428 CUR MEDS NOT DOCUMENT: HCPCS | Performed by: INTERNAL MEDICINE

## 2022-01-04 PROCEDURE — 99211 OFF/OP EST MAY X REQ PHY/QHP: CPT

## 2022-01-04 RX ORDER — TOPIRAMATE 25 MG/1
25 TABLET ORAL 2 TIMES DAILY
Qty: 180 TABLET | Refills: 1 | Status: SHIPPED
Start: 2022-01-04 | End: 2022-03-23 | Stop reason: SDUPTHER

## 2022-01-04 NOTE — PROGRESS NOTES
CC -   PRIYA, Obesity    BACKGROUND -   Last visit: 11/11/21  First visit: 01/13/21     · PRIYA  Diagnosed 11/09/20  Mild  Followed by Dr. Angel Delgado  Using APAP 7d/wk, uses <3 hrs for 3d/wk  Excess weight is worsening her underlying airway obstruction    · Obesity   Began in late teens  Initial BMI 67.3, Wt 417.2 lbs  HS Grad wt 150 lbs  Lowest   wt  150 lbs  Highest  wt  417 lbs  Pattern of wt gain: grad with rapid increases in preg  Wt change past yr: +30 lbs  Most wt lost: 20 lbs (pre-Bariatric surgery diet at our clinic)  Other diets attempted: Fad diets, ESTEBAN, Keto, Calorie counting    Initial Diet:    Number of meals per day - 1-2    Number of snacks per day - 1    Meal volume - 12\" plate, sometimes seconds    Fast food/convenience store - 2x/week    Restaurants (not fast food) - 0x/week   Sweets - 1d/week   Chips - 0d/week   Crackers/pretzels - 0d/week   Nuts - 0d/week   Peanut Butter - 1-2d/week   Popcorn - 0d/week   Dried fruit - 0d/week   Whole fruit - 5-6d/week (2 serving)   Breakfast cereal - 0-1d/week   Granola/Protein/Energy bar - 0d/week   Sugar sweetened beverages - 4 liters reg soda/day, 1 large DD isabel Iced coffee with extra cream and sugar 4x/wk (350 michael each)    Protein - No supplements   Fiber - No supplements     Exercise:    Gym membership - [employer]    Walking - none    Running - none    Resistance - none    Aerobic class - none    ______________________    [facility] -  Past Medical History:   Diagnosis Date    Asthma     B12 deficiency 09/21/2018    Class 3 severe obesity due to excess calories with body mass index (BMI) of 60.0 to 69.9 in adult (HCC)     GERD without esophagitis     Hypertension     Migraine     PRIYA (obstructive sleep apnea)     sleep apnea 4-11    Prolonged emergence from general anesthesia     Vitamin D deficiency 09/21/2018    Zinc deficiency 09/21/2018     Current Outpatient Medications   Medication Sig Dispense Refill    sertraline (ZOLOFT) 25 MG tablet Take 1 tablet by mouth daily 30 tablet 5    hydroCHLOROthiazide (HYDRODIURIL) 50 MG tablet Take 1 tablet by mouth daily 30 tablet 5    magnesium oxide (MAG-OX) 400 MG tablet Take 1 tablet by mouth daily 30 tablet 3    cyanocobalamin ([facility] VITAMIN B12) 1000 MCG tablet TAKE 1 TABLET BY MOUTH EVERY DAY 30 tablet 3    losartan (COZAAR) 100 MG tablet TAKE 1 TABLET BY MOUTH EVERY DAY 30 tablet 1    albuterol sulfate  (90 Base) MCG/ACT inhaler TAKE 2 PUFFS BY MOUTH EVERY 6 HOURS AS NEEDED FOR WHEEZE 8.5 each 1    amLODIPine (NORVASC) 5 MG tablet       guanFACINE (TENEX) 1 MG tablet TAKE 1 TABLET BY MOUTH EVERY NIGHT 30 tablet 3    metoprolol succinate (TOPROL XL) 25 MG extended release tablet TAKE 1 TABLET BY MOUTH EVERY DAY 30 tablet 1    thiamine 100 MG tablet Take 1 tablet by mouth daily 30 tablet 0    amLODIPine (NORVASC) 10 MG tablet Take 1 tablet by mouth daily 90 tablet 1    Elastic Bandages & Supports (KNEE BRACE/CUSHION/L-XL) MISC Wear daily for knee pain 1 each 0    Multiple Vitamins-Minerals (THERAPEUTIC MULTIVITAMIN-MINERALS) tablet Take 1 tablet by mouth daily       No current facility-administered medications for this visit. PE -  Gen : BP (!) 171/90 (Site: Left Upper Arm, Position: Sitting, Cuff Size: Thigh)   Pulse 87   Temp 97.2 °F (36.2 °C) (Temporal)   Ht 5' 6\" (1.676 m)   Wt (!) 386 lb 9.6 oz (175.4 kg)   BMI 62.40 kg/m²   After clinic addendum - pt's recheck of BP at home : 149/89   WN, WD, NAD  Heart:   RRR without MGR, +++ LE edema b/l, no carotid bruits  Lung: Nml resp effort, CTA b/l  Psych: Normal mood   Full affect  Neuro:  Moves all ext well  ______________________      HISTORY & ASSESSMENT/PLAN -     Problem 1  - PRIYA  HPI   - See above Background for description      Using her autopap 6-7d/wk at least 4-9 hours/night      Daytime sleepiness, past week: 4/10 (increased by 2 b/c of sleep deprivation)      Needs to reduce her underlying airway obstruction by decreasing her excess weight      Her PRIYA may be contributing to her hypertension  Assessment  - Controlled  Plan   - Cont use of autopap      Cont with wt reduction per the plan below    Problem 2  - Obesity   HPI   - See above Background for description    Weight  Date    417.2 lbs 01/13/21    422.8 lbs 02/10/21    423.0 lbs 04/07/21     419.6 lbs 06/03/21    414.0 lbs 07/22/21 Phentermine #1    400.0 lbs 08/25/21 Phentermine #2    395.8 lbs 09/28/21 Phentermine #3    389.0 lbs 11/11/21    386.6 lbs 01/04/21  Total weight change to date: 30.6 lbs (36.2 lbs if going by 4/07/21 date)  DEN = 2350 michael/d = 16,450 michael/wk  Avg daily energy variance:   04/07/21-06/03/21 = - 3.4 lbs (11,900 michael)/57d = -   209 michael/d deficit   06/03/21-07/22/21 = - 5.6 lbs (19,600 michael)/49d = -   400 michael/d deficit   07/22/21-08/25/21 = - 14.0 lbs (49,000cal)/34d = - 1,441 michael/d deficit   08/25/21-09/28/21 = -   4.2 lbs (14,700cal)/34d = -   432 michael/d deficit   09/28/21-11/21/21 = -   6.8 lbs (23,800cal)/44d = -   540 michael/d deficit   11/21/21-01/04/21 = -   2.4 lbs ( 8,400cal)/54d = -    155 michael/d deficit  Wt effect of trouble foods = Restaurant 300 michael/wk + Sweets 200 + SSBs 12,300 = 12,800 michael/wk = 78% DEN = 1825 michael/day = 182 lbs/yr  Initial thought was that eliminating reg soda may produce a sufficient rate of wt loss. However, it did not. Therefore, discussed the options of a counting-based regimen and a VLCD  She decided on the counting-based program  Update: Had Covid since last visit.  Experienced no serious sequela    Working through the process of a bariatric surgery;  Psychology antoniettaal has been rescheduled to a later date; has not yet had drug/nicotene clearances   Not counting calories   Has lost her since of taste and smell    Sweets - none    SSB - none    Not taking phentermine    BP at home 125-140/72-90    Exercise - only restarted the past week    Contraception -  BTL    Add Topiramate for long term appetite suppression (only has 30 tablets of phentermine remaining)  Assessment  - Improved, Resume prescribed plan; cont phentermine prn; add topiramate; Cont with preparation for a bariatric surgery;   Plan   -   Rules:  · Count every calorie every day  · Limit sweets to one day per month  · Limit chips/crackers/pretzels/nuts to 100 michael/day  · Eliminate all sugar sweetened beverages (including fruit juice)  · Limit restaurants (including fast food and food from a convenience store) to one time every two weeks    Targets:  · Limit calorie intake to 1700 calories/day  · Walk 5 minutes daily  · Avoid eating 2 hours within bedtime. Tips:  · Do not eat outside of the dining room or the kitchen  · Do not eat while watching TV, videos, working on the computer or using a smart phone  · Do not eat food out of a multi-serving bag or container. Medications: Take phentermine 37.5 mg, one-half to one tablet daily as needed for appetite suppression. Take each dose 30-90 min before effect will be needed. While taking phentermine, check the Blood Pressure every morning and every evening. If the systolic BP is >616 mmHg, the diastolic BP is >36 mm/Hg or the heart rate is > 100 beats per minute, do not take phentermine that day. If the systolic BP is consistently >155 mmHg, the diastolic BP is consistently above 90 mm/Hg or the heart rate is consistently > 100 beats per minute, then stop taking phentermine altogether. If the systolic BP >367 mmHg or the diastolic BP is >534 mmHg (even if it is only once), then phentermine should be stopped altogether without proving that any of these are consistently elevated.     · Start Topiramate 25 mg, one tablet twice daily  Check the ordered blood work one week after starting it    Return to see me in 4-5 week      Mojgan Polo MD  Endocrinology/Obesity  1/4/22

## 2022-01-04 NOTE — PATIENT INSTRUCTIONS
Rules:  · Count every calorie every day  · Limit sweets to one day per month  · Limit chips/crackers/pretzels/nuts to 100 michael/day  · Eliminate all sugar sweetened beverages (including fruit juice)  · Limit restaurants (including fast food and food from a convenience store) to one time every two weeks    Targets:  · Limit calorie intake to 1700 calories/day  · Walk 5 minutes daily  · Avoid eating 2 hours within bedtime. Tips:  · Do not eat outside of the dining room or the kitchen  · Do not eat while watching TV, videos, working on the computer or using a smart phone  · Do not eat food out of a multi-serving bag or container. Medications: Take phentermine 37.5 mg, one-half to one tablet daily as needed for appetite suppression. Take each dose 30-90 min before effect will be needed. While taking phentermine, check the Blood Pressure every morning and every evening. If the systolic BP is >738 mmHg, the diastolic BP is >91 mm/Hg or the heart rate is > 100 beats per minute, do not take phentermine that day. If the systolic BP is consistently >155 mmHg, the diastolic BP is consistently above 90 mm/Hg or the heart rate is consistently > 100 beats per minute, then stop taking phentermine altogether. If the systolic BP >902 mmHg or the diastolic BP is >242 mmHg (even if it is only once), then phentermine should be stopped altogether without proving that any of these are consistently elevated.     · Start Topiramate 25 mg, one tablet twice daily  Check the ordered blood work one week after starting it    Return to see me in 4-5 weeks

## 2022-01-06 ENCOUNTER — INITIAL CONSULT (OUTPATIENT)
Dept: BARIATRICS/WEIGHT MGMT | Age: 37
End: 2022-01-06
Payer: COMMERCIAL

## 2022-01-06 DIAGNOSIS — Z00.8 ENCOUNTER FOR PSYCHOLOGICAL EVALUATION: Primary | ICD-10-CM

## 2022-01-06 DIAGNOSIS — F50.9 COMPULSIVE EATING PATTERNS: ICD-10-CM

## 2022-01-06 PROCEDURE — 90791 PSYCH DIAGNOSTIC EVALUATION: CPT | Performed by: SOCIAL WORKER

## 2022-01-06 PROCEDURE — 1036F TOBACCO NON-USER: CPT | Performed by: SOCIAL WORKER

## 2022-01-06 NOTE — PATIENT INSTRUCTIONS
Assignments/Recommendations: 1-2 follow-up sessions with SW for further education/evaluation. Complete entries in \"Why We Eat\", \"Reality Journal\" and \"Identifying and Handling Cravings\" to discuss next session. Attend [facility] support group. Follow-up with: referrals/present providers/all scheduled appointments at [facility].   Handouts provided  In office
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
## 2022-01-19 ENCOUNTER — OFFICE VISIT (OUTPATIENT)
Dept: FAMILY MEDICINE CLINIC | Age: 37
End: 2022-01-19
Payer: COMMERCIAL

## 2022-01-19 VITALS
TEMPERATURE: 97.4 F | OXYGEN SATURATION: 97 % | BODY MASS INDEX: 47.09 KG/M2 | WEIGHT: 293 LBS | SYSTOLIC BLOOD PRESSURE: 147 MMHG | RESPIRATION RATE: 17 BRPM | DIASTOLIC BLOOD PRESSURE: 96 MMHG | HEART RATE: 76 BPM | HEIGHT: 66 IN

## 2022-01-19 DIAGNOSIS — J40 SINOBRONCHITIS: Primary | ICD-10-CM

## 2022-01-19 DIAGNOSIS — J32.9 SINOBRONCHITIS: Primary | ICD-10-CM

## 2022-01-19 DIAGNOSIS — J45.20 MILD INTERMITTENT ASTHMA, UNSPECIFIED WHETHER COMPLICATED: ICD-10-CM

## 2022-01-19 PROCEDURE — G8427 DOCREV CUR MEDS BY ELIG CLIN: HCPCS | Performed by: NURSE PRACTITIONER

## 2022-01-19 PROCEDURE — 1036F TOBACCO NON-USER: CPT | Performed by: NURSE PRACTITIONER

## 2022-01-19 PROCEDURE — 99204 OFFICE O/P NEW MOD 45 MIN: CPT | Performed by: NURSE PRACTITIONER

## 2022-01-19 PROCEDURE — G8484 FLU IMMUNIZE NO ADMIN: HCPCS | Performed by: NURSE PRACTITIONER

## 2022-01-19 PROCEDURE — G8417 CALC BMI ABV UP PARAM F/U: HCPCS | Performed by: NURSE PRACTITIONER

## 2022-01-19 RX ORDER — BROMPHENIRAMINE MALEATE, PSEUDOEPHEDRINE HYDROCHLORIDE, AND DEXTROMETHORPHAN HYDROBROMIDE 2; 30; 10 MG/5ML; MG/5ML; MG/5ML
10 SYRUP ORAL 4 TIMES DAILY PRN
Qty: 120 ML | Refills: 0 | Status: SHIPPED
Start: 2022-01-19 | End: 2022-07-15

## 2022-01-19 RX ORDER — DOXYCYCLINE HYCLATE 100 MG
100 TABLET ORAL 2 TIMES DAILY
Qty: 14 TABLET | Refills: 0 | Status: SHIPPED | OUTPATIENT
Start: 2022-01-19 | End: 2022-01-26

## 2022-01-19 RX ORDER — BENZONATATE 100 MG/1
100 CAPSULE ORAL 3 TIMES DAILY PRN
Qty: 21 CAPSULE | Refills: 0 | Status: SHIPPED | OUTPATIENT
Start: 2022-01-19 | End: 2022-01-26

## 2022-01-19 RX ORDER — METHYLPREDNISOLONE 4 MG/1
TABLET ORAL
Qty: 1 KIT | Refills: 0 | Status: SHIPPED | OUTPATIENT
Start: 2022-01-19 | End: 2022-01-25

## 2022-01-19 ASSESSMENT — PATIENT HEALTH QUESTIONNAIRE - PHQ9
7. TROUBLE CONCENTRATING ON THINGS, SUCH AS READING THE NEWSPAPER OR WATCHING TELEVISION: 0
SUM OF ALL RESPONSES TO PHQ QUESTIONS 1-9: 0
2. FEELING DOWN, DEPRESSED OR HOPELESS: 0
8. MOVING OR SPEAKING SO SLOWLY THAT OTHER PEOPLE COULD HAVE NOTICED. OR THE OPPOSITE, BEING SO FIGETY OR RESTLESS THAT YOU HAVE BEEN MOVING AROUND A LOT MORE THAN USUAL: 0
SUM OF ALL RESPONSES TO PHQ QUESTIONS 1-9: 0
5. POOR APPETITE OR OVEREATING: 0
4. FEELING TIRED OR HAVING LITTLE ENERGY: 0
3. TROUBLE FALLING OR STAYING ASLEEP: 0
SUM OF ALL RESPONSES TO PHQ9 QUESTIONS 1 & 2: 0
10. IF YOU CHECKED OFF ANY PROBLEMS, HOW DIFFICULT HAVE THESE PROBLEMS MADE IT FOR YOU TO DO YOUR WORK, TAKE CARE OF THINGS AT HOME, OR GET ALONG WITH OTHER PEOPLE: 0
SUM OF ALL RESPONSES TO PHQ QUESTIONS 1-9: 0
6. FEELING BAD ABOUT YOURSELF - OR THAT YOU ARE A FAILURE OR HAVE LET YOURSELF OR YOUR FAMILY DOWN: 0
1. LITTLE INTEREST OR PLEASURE IN DOING THINGS: 0
9. THOUGHTS THAT YOU WOULD BE BETTER OFF DEAD, OR OF HURTING YOURSELF: 0
SUM OF ALL RESPONSES TO PHQ QUESTIONS 1-9: 0

## 2022-01-19 NOTE — PROGRESS NOTES
Chief Complaint       Cough (sinus congestion and drainage, chest congestion,  started thursday )    History of Present Illness   Source of history provided by:  patient. Anabell Michele is a 29 y.o. old female presenting to the walk in clinic for evaluation of above symptoms, for x 7 days. Denies any diarrhea, nausea CP, dyspnea, LE edema, abdominal pain, vomiting, rash, or lethargy. Denies hx of asthma or COPD; reports tobacco use. Patient denies recent sick exposures. Patient has not been vaccinated for COVID-19, had COVID in December. Patient has been taking multiple OTC for symptomatic relief. Works [employer]. Able to eat & drink. Using rescue inhaler 3x/day. ROS    Unless otherwise stated in this report or unable to obtain because of the patient's clinical or mental status as evidenced by the medical record, this patients's positive and negative responses for Review of Systems, constitutional, psych, eyes, ENT, cardiovascular, respiratory, gastrointestinal, neurological, genitourinary, musculoskeletal, integument systems and systems related to the presenting problem are either stated in the preceding or were not pertinent or were negative for the symptoms and/or complaints related to the medical problem. Past Medical History:  has a past medical history of Asthma, B12 deficiency, Class 3 severe obesity due to excess calories with body mass index (BMI) of 60.0 to 69.9 in [facility]), GERD without esophagitis, Hypertension, Migraine, PRIYA (obstructive sleep apnea), Prolonged emergence from general anesthesia, Vitamin D deficiency, and Zinc deficiency. Past Surgical History:  has a past surgical history that includes LEEP (3/2008); Tonsillectomy and adenoidectomy; Tympanostomy tube placement; other surgical history (2011); Cholecystectomy, laparoscopic (3/2014 Jose Castillo); Umbilical hernia repair (3/2014 Jsoe Castillo); other surgical history;  Upper gastrointestinal endoscopy (N/A, 5/2/2018); and Upper gastrointestinal endoscopy (N/A, 9/7/2021). Social History:  reports that she quit smoking about a year ago. Her smoking use included cigarettes. She has a 3.00 pack-year smoking history. She has never used smokeless tobacco. She reports current alcohol use of about 2.0 - 3.0 standard drinks of alcohol per week. She reports that she does not use drugs. Family History: family history includes Brain Cancer in her maternal grandmother; Cancer in her paternal grandmother; Diabetes in her father and maternal grandfather; Heart Attack in her father; Heart Disease in her maternal grandfather; Hypertension in her father and mother; Other in her father; Scoliosis in her sister; Stroke in her father. Allergies: Lactose intolerance (gi)    Physical Exam         VS:  BP (!) 147/96   Pulse 76   Temp 97.4 °F (36.3 °C) (Temporal)   Resp 17   Ht 5' 6\" (1.676 m)   Wt (!) 386 lb (175.1 kg)   LMP 01/17/2022   SpO2 97%   BMI 62.30 kg/m²    Oxygen Saturation Interpretation: Normal.    Constitutional:  Alert, development consistent with age. NAD. Head:  NC/NT. Airway patent. Cerumen noted. Mouth: Posterior pharynx with mild erythema and clear postnasal drip. No tonsillar hypertrophy or exudate. Neck:  Normal ROM. Supple. No anterior cervical adenopathy noted. Lungs: CTAB without wheezes, rales, or rhonchi. CV:  Regular rate and rhythm, normal heart sounds, without pathological murmurs, ectopy, gallops, or rubs. Skin:  Normal turgor. Warm, dry, without visible rash. Lymphatic: No lymphangitis or adenopathy noted. Neurological:  Oriented. Motor functions intact. Lab / Imaging Results   (All laboratory and radiology results have been personally reviewed by myself)  Labs:  No results found for this visit on 01/19/22. Imaging: All Radiology results interpreted by Radiologist unless otherwise noted. No results found for this visit on 01/19/22.   Assessment / Plan     Impression(s):  Deb Adler was seen today for cough.    Diagnoses and all orders for this visit:    Sinobronchitis  -     doxycycline hyclate (VIBRA-TABS) 100 MG tablet; Take 1 tablet by mouth 2 times daily for 7 days  -     brompheniramine-pseudoephedrine-DM (BROMFED DM) 2-30-10 MG/5ML syrup; Take 10 mLs by mouth 4 times daily as needed for Congestion or Cough  -     benzonatate (TESSALON PERLES) 100 MG capsule; Take 1 capsule by mouth 3 times daily as needed for Cough  -     methylPREDNISolone (MEDROL DOSEPACK) 4 MG tablet; Take by mouth.    - Red Flag items & conservative methods discussed including OTC methods & Coricidin medication  - F/u with PCP if symptoms persist  - Work/school letter provided in AVS    Disposition:  Disposition: Discharge to home. Advised cautionary self-quarantine at home in the interim. Increase fluids and rest. Symptomatic relief discussed including Tylenol prn pain/fever. Schedule virtual f/u with PCP in 7-10 days if symptoms persist. ED sooner if symptoms worsen or change. ED immediately with high or refractory fever, progressive SOB, dyspnea, CP, calf pain/swelling, shaking chills, vomiting, abdominal pain, lethargy, flank pain, or decreased urinary output. Pt verbalizes understanding and is in agreement with plan of care. All questions answered. SHARI Nazario - CNP    **This report was transcribed using voice recognition software. Every effort was made to ensure accuracy; however, inadvertent computerized transcription errors may be present.

## 2022-03-23 ENCOUNTER — OFFICE VISIT (OUTPATIENT)
Dept: BARIATRICS/WEIGHT MGMT | Age: 37
End: 2022-03-23
Payer: COMMERCIAL

## 2022-03-23 VITALS
SYSTOLIC BLOOD PRESSURE: 168 MMHG | WEIGHT: 293 LBS | DIASTOLIC BLOOD PRESSURE: 92 MMHG | HEART RATE: 74 BPM | HEIGHT: 66 IN | BODY MASS INDEX: 47.09 KG/M2 | TEMPERATURE: 97.1 F

## 2022-03-23 DIAGNOSIS — E66.01 CLASS 3 SEVERE OBESITY DUE TO EXCESS CALORIES WITHOUT SERIOUS COMORBIDITY WITH BODY MASS INDEX (BMI) OF 60.0 TO 69.9 IN ADULT (HCC): ICD-10-CM

## 2022-03-23 DIAGNOSIS — G47.33 OSA (OBSTRUCTIVE SLEEP APNEA): Primary | ICD-10-CM

## 2022-03-23 PROCEDURE — 99211 OFF/OP EST MAY X REQ PHY/QHP: CPT

## 2022-03-23 PROCEDURE — 99214 OFFICE O/P EST MOD 30 MIN: CPT | Performed by: INTERNAL MEDICINE

## 2022-03-23 PROCEDURE — 1036F TOBACCO NON-USER: CPT | Performed by: INTERNAL MEDICINE

## 2022-03-23 PROCEDURE — G8417 CALC BMI ABV UP PARAM F/U: HCPCS | Performed by: INTERNAL MEDICINE

## 2022-03-23 PROCEDURE — G8428 CUR MEDS NOT DOCUMENT: HCPCS | Performed by: INTERNAL MEDICINE

## 2022-03-23 PROCEDURE — G8484 FLU IMMUNIZE NO ADMIN: HCPCS | Performed by: INTERNAL MEDICINE

## 2022-03-23 RX ORDER — TOPIRAMATE 25 MG/1
50 TABLET ORAL 2 TIMES DAILY
Qty: 360 TABLET | Refills: 1 | Status: SHIPPED
Start: 2022-03-23 | End: 2022-06-10 | Stop reason: SINTOL

## 2022-03-23 NOTE — PROGRESS NOTES
CC -   PRIYA, Obesity    BACKGROUND -   Last visit: 01/04/22  First visit: 01/13/21     · PRIYA  Diagnosed 11/09/20  Mild  Followed by Dr. Elva Flynn  Using APAP 7d/wk, uses <3 hrs for 3d/wk  Excess weight is worsening her underlying airway obstruction    · Obesity   Began in late teens  Initial BMI 67.3, Wt 417.2 lbs  HS Grad wt 150 lbs  Lowest   wt  150 lbs  Highest  wt  417 lbs  Pattern of wt gain: grad with rapid increases in preg  Wt change past yr: +30 lbs  Most wt lost: 20 lbs (pre-Bariatric surgery diet at our clinic)  Other diets attempted: Fad diets, ESTEBAN, Keto, Calorie counting    Initial Diet:    Number of meals per day - 1-2    Number of snacks per day - 1    Meal volume - 12\" plate, sometimes seconds    Fast food/convenience store - 2x/week    Restaurants (not fast food) - 0x/week   Sweets - 1d/week   Chips - 0d/week   Crackers/pretzels - 0d/week   Nuts - 0d/week   Peanut Butter - 1-2d/week   Popcorn - 0d/week   Dried fruit - 0d/week   Whole fruit - 5-6d/week (2 serving)   Breakfast cereal - 0-1d/week   Granola/Protein/Energy bar - 0d/week   Sugar sweetened beverages - 4 liters reg soda/day, 1 large DD isabel Iced coffee with extra cream and sugar 4x/wk (350 michael each)    Protein - No supplements   Fiber - No supplements     Exercise:    Gym membership - [employer]    Walking - none    Running - none    Resistance - none    Aerobic class - none    ______________________    [facility] -  Past Medical History:   Diagnosis Date    Asthma     B12 deficiency 09/21/2018    Class 3 severe obesity due to excess calories with body mass index (BMI) of 60.0 to 69.9 in adult (HCC)     GERD without esophagitis     Hypertension     Migraine     PRIYA (obstructive sleep apnea)     sleep apnea 4-11    Prolonged emergence from general anesthesia     Vitamin D deficiency 09/21/2018    Zinc deficiency 09/21/2018     Current Outpatient Medications   Medication Sig Dispense Refill    guanFACINE (TENEX) 1 MG tablet TAKE 1 TABLET BY MOUTH EVERY DAY AT NIGHT 30 tablet 1    brompheniramine-pseudoephedrine-DM (BROMFED DM) 2-30-10 MG/5ML syrup Take 10 mLs by mouth 4 times daily as needed for Congestion or Cough 120 mL 0    topiramate (TOPAMAX) 25 MG tablet Take 1 tablet by mouth 2 times daily 180 tablet 1    sertraline (ZOLOFT) 25 MG tablet Take 1 tablet by mouth daily 30 tablet 5    hydroCHLOROthiazide (HYDRODIURIL) 50 MG tablet Take 1 tablet by mouth daily 30 tablet 5    magnesium oxide (MAG-OX) 400 MG tablet Take 1 tablet by mouth daily 30 tablet 3    cyanocobalamin (CVS VITAMIN B12) 1000 MCG tablet TAKE 1 TABLET BY MOUTH EVERY DAY 30 tablet 3    losartan (COZAAR) 100 MG tablet TAKE 1 TABLET BY MOUTH EVERY DAY 30 tablet 1    albuterol sulfate  (90 Base) MCG/ACT inhaler TAKE 2 PUFFS BY MOUTH EVERY 6 HOURS AS NEEDED FOR WHEEZE 8.5 each 1    amLODIPine (NORVASC) 5 MG tablet       metoprolol succinate (TOPROL XL) 25 MG extended release tablet TAKE 1 TABLET BY MOUTH EVERY DAY 30 tablet 1    thiamine 100 MG tablet Take 1 tablet by mouth daily 30 tablet 0    amLODIPine (NORVASC) 10 MG tablet Take 1 tablet by mouth daily 90 tablet 1    Elastic Bandages & Supports (KNEE BRACE/CUSHION/L-XL) MISC Wear daily for knee pain 1 each 0    Multiple Vitamins-Minerals (THERAPEUTIC MULTIVITAMIN-MINERALS) tablet Take 1 tablet by mouth daily       No current facility-administered medications for this visit. PE -  Gen : BP (!) 158/95 (Site: Left Upper Arm, Position: Sitting, Cuff Size: Thigh)   Pulse 77   Temp 97.1 °F (36.2 °C) (Temporal)   Ht 5' 6\" (1.676 m)   Wt (!) 380 lb 3.2 oz (172.5 kg)   LMP 03/14/2022 (Exact Date)   BMI 61.37 kg/m²      Repeat /92   WN, WD, NAD  Heart:   RRR without MGR, +++ LE edema b/l, no carotid bruits  Lung: Nml resp effort, CTA b/l  Psych: Normal mood   Full affect  Neuro:  Moves all ext well  ______________________      HISTORY & ASSESSMENT/PLAN -     Problem 1 - PRIYA  HPI - See above Background for description   Using her autopap 6-7d/wk at least 4 hours/night   Daytime sleepiness, past week: 3/10 (last was 4/10)    Needs to reduce her underlying airway obstruction by decreasing her excess weight   Her PRIYA may be contributing to her hypertension  Assessment  - Controlled  Plan  -   Cont use of autopap  Cont with wt reduction per the plan below    Problem 2  - Obesity   HPI   - See above Background for description   Weight  Date   417.2 lbs 01/13/21   422.8 lbs 02/10/21   423.0 lbs 04/07/21    419.6 lbs 06/03/21   414.0 lbs 07/22/21 Phentermine #1   400.0 lbs 08/25/21 Phentermine #2   395.8 lbs 09/28/21 Phentermine #3   389.0 lbs 11/11/21   386.6 lbs 01/04/22 Started topamax   380.2 lbs 03/23/22 Topamax  Total weight change to date: 30.6 lbs (36.2 lbs if going by 4/07/21 date)  DEN = 2350 michael/d = 16,450 michael/wk  Avg daily energy variance:   04/07/21-06/03/21 = - 3.4 lbs (11,900 michael)/57d = -   209 michael/d deficit   06/03/21-07/22/21 = - 5.6 lbs (19,600 michael)/49d = -   400 michael/d deficit   07/22/21-08/25/21 = - 14.0 lbs (49,000cal)/34d = - 1,441 michael/d deficit   08/25/21-09/28/21 = -   4.2 lbs (14,700cal)/34d = -   432 michael/d deficit   09/28/21-11/21/21 = -   6.8 lbs (23,800cal)/44d = -   540 michael/d deficit   11/21/21-01/04/21 = -   2.4 lbs ( 8,400cal)/54d = -    155 michael/d deficit  (had Covid and lost taste and smell)  Wt effect of trouble foods = Restaurant 300 michael/wk + Sweets 200 + SSBs 12,300 = 12,800 michael/wk = 78% DEN = 1825 michael/day = 182 lbs/yr  Initial thought was that eliminating reg soda may produce a sufficient rate of wt loss. However, it did not.   Therefore, discussed the options of a counting-based regimen and a VLCD  She decided on the counting-based program  Update:  Conts to work through the process of a bariatric surgery;  Psychology gio has been rescheduled to a later date; has not yet had drug/nicotene clearances   Not counting calories  Regained her sense of taste, but not smell   Sweets - none   SSB - none   BP at home 125-160/80-95   Exercise - walking 5 min 2x/d, 7d/wk; exercise has been impeded due to injury to her knee 5 weeks ago    Appetite suppressant - topamax 25 mg, one tablet twice daily, appetite suppression 3/10, side effects - possible some moodiness (pt denies this, but family is telling her she is more edgey)   Contraception -  BTL  Assessment  -  Improved, Needs to fully adhere to prescribed plan; increase topiramate cautiously (stop if moodiness worsens); Cont with preparation for a bariatric surgery  Plan   -   Rules:  · Count every calorie every day  · Limit sweets to one day per month  · Limit chips/crackers/pretzels/nuts to 100 michael/day  · Eliminate all sugar sweetened beverages (including fruit juice)  · Limit restaurants (including fast food and food from a convenience store) to one time every two weeks    Targets:  · Limit calorie intake to 1700 calories/day  · Walk 5 minutes daily  · Avoid eating 2 hours within bedtime. Tips:  · Do not eat outside of the dining room or the kitchen  · Do not eat while watching TV, videos, working on the computer or using a smart phone  · Do not eat food out of a multi-serving bag or container. Medications:  · increaseTopiramate 25 mg, to two tablets twice daily (decrease back to one tablet twice daily if moodiness worsens)  · Check the ordered blood work one week after increasing it    Other - BP is very high; freq'ly high over the past year; does have some readings that are normal; at PCP 12/29/21, it was 138/86; I instructed her to monitor it closely at home and to contact Dr. Angel Tirado if it remains elevated.     Return to see me in 4-5 week      Rosanne Child MD  Endocrinology/Obesity  3/23/22

## 2022-04-06 DIAGNOSIS — I10 ESSENTIAL HYPERTENSION: ICD-10-CM

## 2022-04-07 RX ORDER — GUANFACINE 1 MG/1
TABLET ORAL
Qty: 30 TABLET | Refills: 1 | Status: SHIPPED
Start: 2022-04-07 | End: 2022-06-10 | Stop reason: SDUPTHER

## 2022-04-19 ENCOUNTER — INITIAL CONSULT (OUTPATIENT)
Dept: BARIATRICS/WEIGHT MGMT | Age: 37
End: 2022-04-19
Payer: COMMERCIAL

## 2022-04-19 DIAGNOSIS — F50.9 COMPULSIVE EATING PATTERNS: Primary | ICD-10-CM

## 2022-04-19 PROCEDURE — 90832 PSYTX W PT 30 MINUTES: CPT | Performed by: SOCIAL WORKER

## 2022-04-19 PROCEDURE — 1036F TOBACCO NON-USER: CPT | Performed by: SOCIAL WORKER

## 2022-04-19 NOTE — PATIENT INSTRUCTIONS
Assignments/Recommendations: Continue follow-up with SW for education further evaluation. Continue with entries in hand-outs \"Reality Journal\"  and \"Identifying and Handling Cravings\"  Attend [facility] support group. Follow up with referrals/present providers/all scheduled appointment at [facility].

## 2022-04-19 NOTE — PROGRESS NOTES
INDIVIDUAL SESSION: EVALUATION/PSYCHOEDUCATION (2nd visit)    Robbi Eduardo is a 29 y.o. Single,   female,   Patient identify verified by Name and . Those attending session : patient      Chief Complaint   Patient presents with    Consultation     2nd visit follow up         DX:   Encounter Diagnosis   Name Primary?  Compulsive eating patterns Yes          Wt Readings from Last 3 Encounters:   22 (!) 380 lb 3.2 oz (172.5 kg)   22 (!) 386 lb (175.1 kg)   22 (!) 386 lb 9.6 oz (175.4 kg)     Narrative: Luis Carlos More stated that she did not complete the hand out \"Reality Journal\" because she did not understand the assignment. She stated that she is following a sample diet and has reached to pre surgery goals weight but has not attend the support group. She stated that she had some trouble identifying triggers to craving for food as well. She stated that she does understand how to complete the handouts and will do that for the next session. Mental Status Exam: appearance:  appropriately dressed and appropriately groomed, behavior:  normal, attitude:  cooperative, speech:  appropriate, mood:  euthymic, affect:  congruent with mood, thought content:  no evidence of psychosis, thought process:  logical and coherent, orientation:  oriented in all spheres, memory:  recent:  good and remote:  good, insight:  fair , judgment:  fair  and cognitive:  intact and intelligent    RISK ASSESSMENT    Suicide screen: denies current suicidal ideation, plan and intent    Self Injurious Behavior: denies    Homicide screen: denies current homicidal ideation, plan and intent    TREATMENT PLAN:  Goal: Increase understanding of role of emotional factors contributing to issues with food and obesity and strategies to cope. Interventions in session:  Began Reviewing \"Why We Eat\", \"Reality Journal\" and \"Identifying and Handling Cravings\" and explained best way to complete handouts.   Provided

## 2022-05-30 DIAGNOSIS — I10 ESSENTIAL HYPERTENSION: ICD-10-CM

## 2022-05-31 RX ORDER — LOSARTAN POTASSIUM 100 MG/1
TABLET ORAL
Qty: 30 TABLET | Refills: 1 | Status: SHIPPED
Start: 2022-05-31 | End: 2022-10-17

## 2022-05-31 RX ORDER — ALBUTEROL SULFATE 90 UG/1
2 AEROSOL, METERED RESPIRATORY (INHALATION) EVERY 6 HOURS PRN
Qty: 8.5 EACH | Refills: 1 | Status: SHIPPED | OUTPATIENT
Start: 2022-05-31

## 2022-06-01 ENCOUNTER — INITIAL CONSULT (OUTPATIENT)
Dept: BARIATRICS/WEIGHT MGMT | Age: 37
End: 2022-06-01
Payer: COMMERCIAL

## 2022-06-01 DIAGNOSIS — Z02.6 ENCOUNTER FOR EXAMINATION FOR INSURANCE PURPOSES: ICD-10-CM

## 2022-06-01 DIAGNOSIS — Z78.9 NONSMOKER: ICD-10-CM

## 2022-06-01 DIAGNOSIS — F50.9 COMPULSIVE EATING PATTERNS: Primary | ICD-10-CM

## 2022-06-01 PROCEDURE — 1036F TOBACCO NON-USER: CPT | Performed by: SOCIAL WORKER

## 2022-06-01 PROCEDURE — 90834 PSYTX W PT 45 MINUTES: CPT | Performed by: SOCIAL WORKER

## 2022-06-01 NOTE — PROGRESS NOTES
INDIVIDUAL SESSION:  SUMMARY/PSYCH CLEARANCE     Alejandro Kerns is a 29 y.o. Single,   female, referred by Primary Care Provider  for evaluation and treatment. Patient identify verified by Name and . Those attending session : patient    DX:   Encounter Diagnosis   Name Primary?  Compulsive eating patterns Yes       Chief Complaint   Patient presents with    Consultation     3rd vist final clearance         Wt Readings from Last 3 Encounters:   22 (!) 380 lb 3.2 oz (172.5 kg)   22 (!) 386 lb (175.1 kg)   22 (!) 386 lb 9.6 oz (175.4 kg)        Narrative: Roc Chan stated that she completed the hand outs and became aware of how often she is distracted while she is eating. She stated that she feels rushed to eat when at work. She also noticed that while distracted she tends to eat more than she should. Roc Chan stated that when she is overwhelmed she tends to over eat. She stated that she is measuring out portions when eating, shutting off the TV during meals and doing some meal prepping to help make changes. She stated that she will continue to work on meal prepping and has the older children helping her plan menus. She was able to identify triggers to cravings, including  people (children's grandparents), things that she sees (recipes on FB), places([place] and the many restaurants), sounds of food frying, smells (Holiday foods), Summer events/cook outs, menstrual cycle, and things that are said (others mentioning food or coffee). She stated that she naps, drinks water, goes to the gym, gets out side \"in the sun\" and has picked up a second job. She stated that her older children are her biggest support.        Mental Status Exam: appearance:  appropriately dressed and appropriately groomed, behavior:  normal, attitude:  cooperative, speech:  appropriate, mood:  euthymic, affect:  congruent with mood, thought content:  no evidence of psychosis, thought process:  logical and coherent, orientation:  oriented in all spheres, memory:  recent:  good and remote:  good, insight:  fair , judgment:  fair  and cognitive:  intact and intelligent    RISK ASSESSMENT    Suicide screen: denies current suicidal ideation, plan and intent    Self Injurious Behavior: denies    Homicide screen: denies current homicidal ideation, plan and intent    TREATMENT PLAN:  Goal: Increase understanding of role of emotional factors contributing to issues with food and obesity and strategies to cope. Interventions in session:  Reviewed previous information provided to the patient and reinforced the need for continued engagement in support group and other resources of the [facility]. Provided Psychoeducational regarding physical, emotional, and behavioral changes that might occur for the patient post WLS. Assignments/Recommendations: Follow-up with SW as needed. Attend [facility] support group. Follow up with present providers/all scheduled appointment at [facility]. Next steps: Follow up with SW post surgery as needed. Bariatric Surgery: Final visit:  The patient has completed a Biopsychosocial assessment and 1 educational sessions to evaluate her appropriateness for bariatric surgery. This patient has been compliant with all scheduled sessions and completed all assignments/recommendations including attendance at least one support group meeting. She does present with mental health issues which appear to be stable and so would not interfere with her ability to adapt to the EBC changes that will be necessary for success. The patient appears well motivated to make necessary changes and achieve weight loss goals. Based on the information gathered during assessment and subsequent session it appears that she is a reasonable candidate for Bariatric surgery.      Patient and/or family/guardian verbalizes understanding of and agreement with treatment recommendations and plan: yes    Start time: 9:43 am         End time: 10:30 am     Visit [facility]: [facility]

## 2022-06-01 NOTE — PATIENT INSTRUCTIONS
Assignments/Recommendations: Follow-up with SW as needed. Attend [facility] support group. Follow up with present providers/all scheduled appointment at [facility].

## 2022-06-09 DIAGNOSIS — I10 ESSENTIAL HYPERTENSION: ICD-10-CM

## 2022-06-09 LAB
ANION GAP SERPL CALCULATED.3IONS-SCNC: 18 MMOL/L (ref 7–16)
BUN BLDV-MCNC: 18 MG/DL (ref 6–20)
CALCIUM SERPL-MCNC: 9.1 MG/DL (ref 8.6–10.2)
CHLORIDE BLD-SCNC: 101 MMOL/L (ref 98–107)
CO2: 21 MMOL/L (ref 22–29)
CREAT SERPL-MCNC: 1.2 MG/DL (ref 0.5–1)
GFR AFRICAN AMERICAN: >60
GFR NON-AFRICAN AMERICAN: 51 ML/MIN/1.73
GLUCOSE BLD-MCNC: 97 MG/DL (ref 74–99)
POTASSIUM SERPL-SCNC: 4.6 MMOL/L (ref 3.5–5)
SODIUM BLD-SCNC: 140 MMOL/L (ref 132–146)

## 2022-06-10 ENCOUNTER — OFFICE VISIT (OUTPATIENT)
Dept: BARIATRICS/WEIGHT MGMT | Age: 37
End: 2022-06-10
Payer: COMMERCIAL

## 2022-06-10 VITALS
HEIGHT: 66 IN | DIASTOLIC BLOOD PRESSURE: 93 MMHG | SYSTOLIC BLOOD PRESSURE: 156 MMHG | WEIGHT: 293 LBS | HEART RATE: 77 BPM | TEMPERATURE: 98.4 F | BODY MASS INDEX: 47.09 KG/M2

## 2022-06-10 DIAGNOSIS — I10 ESSENTIAL HYPERTENSION: ICD-10-CM

## 2022-06-10 DIAGNOSIS — G47.33 OSA (OBSTRUCTIVE SLEEP APNEA): Primary | ICD-10-CM

## 2022-06-10 DIAGNOSIS — E66.01 CLASS 3 SEVERE OBESITY DUE TO EXCESS CALORIES WITHOUT SERIOUS COMORBIDITY WITH BODY MASS INDEX (BMI) OF 60.0 TO 69.9 IN ADULT (HCC): ICD-10-CM

## 2022-06-10 DIAGNOSIS — G47.33 OSA (OBSTRUCTIVE SLEEP APNEA): ICD-10-CM

## 2022-06-10 PROCEDURE — 1036F TOBACCO NON-USER: CPT | Performed by: INTERNAL MEDICINE

## 2022-06-10 PROCEDURE — G8417 CALC BMI ABV UP PARAM F/U: HCPCS | Performed by: INTERNAL MEDICINE

## 2022-06-10 PROCEDURE — 99211 OFF/OP EST MAY X REQ PHY/QHP: CPT

## 2022-06-10 PROCEDURE — 99211 OFF/OP EST MAY X REQ PHY/QHP: CPT | Performed by: INTERNAL MEDICINE

## 2022-06-10 PROCEDURE — G8428 CUR MEDS NOT DOCUMENT: HCPCS | Performed by: INTERNAL MEDICINE

## 2022-06-10 PROCEDURE — 99213 OFFICE O/P EST LOW 20 MIN: CPT | Performed by: INTERNAL MEDICINE

## 2022-06-10 RX ORDER — MAGNESIUM OXIDE 400 MG/1
1 TABLET ORAL DAILY
Qty: 90 TABLET | Refills: 0 | Status: SHIPPED
Start: 2022-06-10 | End: 2022-09-22 | Stop reason: SDUPTHER

## 2022-06-10 RX ORDER — METOPROLOL SUCCINATE 25 MG/1
25 TABLET, EXTENDED RELEASE ORAL DAILY
Qty: 90 TABLET | Refills: 0 | Status: SHIPPED
Start: 2022-06-10 | End: 2022-06-16 | Stop reason: SDUPTHER

## 2022-06-10 RX ORDER — GUANFACINE 1 MG/1
1 TABLET ORAL NIGHTLY
Qty: 90 TABLET | Refills: 0 | Status: SHIPPED
Start: 2022-06-10 | End: 2022-09-22 | Stop reason: SDUPTHER

## 2022-06-10 NOTE — PATIENT INSTRUCTIONS
Rules:  · Count every calorie every day  · Limit sweets to one day per month  · Limit chips/crackers/pretzels/nuts to 100 michael/day  · Eliminate all sugar sweetened beverages (including fruit juice)  · Limit restaurants (including fast food and food from a convenience store) to one time every two weeks    Targets:  · Limit calorie intake to 1700 calories/day  · Walk 5 minutes daily  · Avoid eating 2 hours within bedtime. Tips:  · Do not eat outside of the dining room or the kitchen  · Do not eat while watching TV, videos, working on the computer or using a smart phone  · Do not eat food out of a multi-serving bag or container.     Other:  · Cont to proceed through process of bariatric surgery

## 2022-06-10 NOTE — PROGRESS NOTES
extended release tablet Take 1 tablet by mouth daily 90 tablet 0    guanFACINE (TENEX) 1 MG tablet Take 1 tablet by mouth nightly 90 tablet 0    magnesium oxide (MAG-OX) 400 MG tablet Take 1 tablet by mouth daily 90 tablet 0    albuterol sulfate  (90 Base) MCG/ACT inhaler Inhale 2 puffs into the lungs every 6 hours as needed for Wheezing 8.5 each 1    losartan (COZAAR) 100 MG tablet TAKE 1 TABLET BY MOUTH EVERY DAY 30 tablet 1    topiramate (TOPAMAX) 25 MG tablet Take 2 tablets by mouth 2 times daily 360 tablet 1    brompheniramine-pseudoephedrine-DM (BROMFED DM) 2-30-10 MG/5ML syrup Take 10 mLs by mouth 4 times daily as needed for Congestion or Cough 120 mL 0    sertraline (ZOLOFT) 25 MG tablet Take 1 tablet by mouth daily 30 tablet 5    hydroCHLOROthiazide (HYDRODIURIL) 50 MG tablet Take 1 tablet by mouth daily 30 tablet 5    cyanocobalamin (CVS VITAMIN B12) 1000 MCG tablet TAKE 1 TABLET BY MOUTH EVERY DAY 30 tablet 3    thiamine 100 MG tablet Take 1 tablet by mouth daily 30 tablet 0    amLODIPine (NORVASC) 10 MG tablet Take 1 tablet by mouth daily 90 tablet 1    Elastic Bandages & Supports (KNEE BRACE/CUSHION/L-XL) MISC Wear daily for knee pain 1 each 0    Multiple Vitamins-Minerals (THERAPEUTIC MULTIVITAMIN-MINERALS) tablet Take 1 tablet by mouth daily       No current facility-administered medications for this visit. PE -  Gen : BP (!) 170/100 (Site: Left Upper Arm, Position: Sitting, Cuff Size: Thigh)   Pulse 89   Temp 98.4 °F (36.9 °C) (Temporal)   Ht 5' 6\" (1.676 m)   Wt (!) 389 lb 9.6 oz (176.7 kg)   BMI 62.88 kg/m²      Repeat /92   WN, WD, NAD  Lung: Nml resp effor  Psych: Normal mood   Full affect  Neuro:  Moves all ext well  ______________________      HISTORY & ASSESSMENT/PLAN -     Problem 1 - PRIYA  HPI - See above Background for description   Using her autopap 6-7d/wk at least 4 hours/night   Daytime sleepiness, past week: 3/10 (last was 4/10)    Needs to reduce her underlying airway obstruction by decreasing her excess weight   Her PRIYA may be contributing to her hypertension  Assessment  - Controlled  Plan  -   Cont use of autopap  Cont with wt reduction per the plan below    Problem 2  - Obesity   HPI   - See above Background for description   Weight  Date   417.2 lbs 01/13/21   422.8 lbs 02/10/21   423.0 lbs 04/07/21    419.6 lbs 06/03/21   414.0 lbs 07/22/21 Phentermine #1   400.0 lbs 08/25/21 Phentermine #2   395.8 lbs 09/28/21 Phentermine #3   389.0 lbs 11/11/21   386.6 lbs 01/04/22 Started topamax   380.2 lbs 03/23/22 Topamax   389.6 lbs 06/10/22 Stopped topamax prior to 4/27/22 b/c of edginess  Total weight change to date: 30.6 lbs (36.2 lbs if going by 4/07/21 date)  DEN = 2350 michael/d = 16,450 michael/wk  Avg daily energy variance:   04/07/21-06/03/21 = - 3.4 lbs (11,900 michael)/57d = -   209 michael/d deficit   06/03/21-07/22/21 = - 5.6 lbs (19,600 michael)/49d = -   400 michael/d deficit   07/22/21-08/25/21 = - 14.0 lbs (49,000cal)/34d = - 1,441 michael/d deficit   08/25/21-09/28/21 = -   4.2 lbs (14,700cal)/34d = -   432 michael/d deficit   09/28/21-11/21/21 = -   6.8 lbs (23,800cal)/44d = -   540 michael/d deficit   11/21/21-01/04/22 = -   2.4 lbs ( 8,400cal)/54d = -    155 michael/d deficit  (had Covid and lost taste and smell)   01/04/22-03/23/22 = -   6.4 lbs (22,400cal)/78d = -   287 michael/d deficit   03/23/22-06/10/22 = +   9.4 lbs (32,900cal)/79d = + 416 michael/d excess  Wt effect of trouble foods = Restaurant 300 michael/wk + Sweets 200 + SSBs 12,300 = 12,800 michael/wk = 78% DEN = 1825 michael/day = 182 lbs/yr  Initial thought was that eliminating reg soda may produce a sufficient rate of wt loss. However, it did not.   Therefore, discussed the options of a counting-based regimen and a VLCD  She decided on the counting-based program  Update:  Conts to work through the process of a bariatric surgery;  Psychology has cleared her; has not yet had drug/nicotene clearances   Has not been following her diet plan b/c of upheaval in her life from multiple family members getting sick or injured   Appetite suppressant -Stopped topamax b/c of edginess  Contraception -  BTL  6/09/22 BUN/Cr 18/1.2, GFR 51 (took a lot ibuprofen the last week of May while taking her long-term ARB and thiazide diuretic)  Assessment  -  Worsened, needs to resume her plan; follow through with having a repeat BMP ordered by PCP to assess her decrease in Cr  Plan   -   Rules:  · Count every calorie every day  · Limit sweets to one day per month  · Limit chips/crackers/pretzels/nuts to 100 michael/day  · Eliminate all sugar sweetened beverages (including fruit juice)  · Limit restaurants (including fast food and food from a convenience store) to one time every two weeks    Targets:  · Limit calorie intake to 1700 calories/day  · Walk 5 minutes daily  · Avoid eating 2 hours within bedtime. Tips:  · Do not eat outside of the dining room or the kitchen  · Do not eat while watching TV, videos, working on the computer or using a smart phone  · Do not eat food out of a multi-serving bag or container.     Other:  · Cont in process to have a bariatric surgery      Return to see me in 4-5 week      Jeison Tariq MD  Endocrinology/Obesity  6/10/22

## 2022-06-15 DIAGNOSIS — Z78.9 NONSMOKER: ICD-10-CM

## 2022-06-15 DIAGNOSIS — Z02.6 ENCOUNTER FOR EXAMINATION FOR INSURANCE PURPOSES: ICD-10-CM

## 2022-06-15 DIAGNOSIS — I10 ESSENTIAL HYPERTENSION: ICD-10-CM

## 2022-06-15 LAB
AMPHETAMINE SCREEN, URINE: NOT DETECTED
ANION GAP SERPL CALCULATED.3IONS-SCNC: 15 MMOL/L (ref 7–16)
BARBITURATE SCREEN URINE: NOT DETECTED
BENZODIAZEPINE SCREEN, URINE: NOT DETECTED
BUN BLDV-MCNC: 15 MG/DL (ref 6–20)
CALCIUM SERPL-MCNC: 8.5 MG/DL (ref 8.6–10.2)
CANNABINOID SCREEN URINE: NOT DETECTED
CHLORIDE BLD-SCNC: 102 MMOL/L (ref 98–107)
CO2: 23 MMOL/L (ref 22–29)
COCAINE METABOLITE SCREEN URINE: NOT DETECTED
CREAT SERPL-MCNC: 0.9 MG/DL (ref 0.5–1)
FENTANYL SCREEN, URINE: NOT DETECTED
GFR AFRICAN AMERICAN: >60
GFR NON-AFRICAN AMERICAN: >60 ML/MIN/1.73
GLUCOSE BLD-MCNC: 98 MG/DL (ref 74–99)
Lab: NORMAL
METHADONE SCREEN, URINE: NOT DETECTED
OPIATE SCREEN URINE: NOT DETECTED
OXYCODONE URINE: NOT DETECTED
PHENCYCLIDINE SCREEN URINE: NOT DETECTED
POTASSIUM SERPL-SCNC: 4.9 MMOL/L (ref 3.5–5)
SODIUM BLD-SCNC: 140 MMOL/L (ref 132–146)

## 2022-06-16 ENCOUNTER — OFFICE VISIT (OUTPATIENT)
Dept: FAMILY MEDICINE CLINIC | Age: 37
End: 2022-06-16
Payer: COMMERCIAL

## 2022-06-16 VITALS
DIASTOLIC BLOOD PRESSURE: 110 MMHG | WEIGHT: 293 LBS | HEIGHT: 66 IN | HEART RATE: 84 BPM | TEMPERATURE: 97.2 F | BODY MASS INDEX: 47.09 KG/M2 | SYSTOLIC BLOOD PRESSURE: 164 MMHG | RESPIRATION RATE: 18 BRPM

## 2022-06-16 DIAGNOSIS — E66.01 MORBID OBESITY (HCC): ICD-10-CM

## 2022-06-16 DIAGNOSIS — E83.51 HYPOCALCEMIA: ICD-10-CM

## 2022-06-16 DIAGNOSIS — I10 ESSENTIAL HYPERTENSION: Primary | ICD-10-CM

## 2022-06-16 DIAGNOSIS — N17.9 AKI (ACUTE KIDNEY INJURY) (HCC): ICD-10-CM

## 2022-06-16 PROCEDURE — G8417 CALC BMI ABV UP PARAM F/U: HCPCS | Performed by: FAMILY MEDICINE

## 2022-06-16 PROCEDURE — 1036F TOBACCO NON-USER: CPT | Performed by: FAMILY MEDICINE

## 2022-06-16 PROCEDURE — 99213 OFFICE O/P EST LOW 20 MIN: CPT | Performed by: FAMILY MEDICINE

## 2022-06-16 PROCEDURE — 99212 OFFICE O/P EST SF 10 MIN: CPT | Performed by: FAMILY MEDICINE

## 2022-06-16 PROCEDURE — G8427 DOCREV CUR MEDS BY ELIG CLIN: HCPCS | Performed by: FAMILY MEDICINE

## 2022-06-16 RX ORDER — METOPROLOL SUCCINATE 50 MG/1
50 TABLET, EXTENDED RELEASE ORAL DAILY
Qty: 90 TABLET | Refills: 0 | Status: SHIPPED
Start: 2022-06-16 | End: 2022-09-22 | Stop reason: SDUPTHER

## 2022-06-16 NOTE — PROGRESS NOTES
with repair umbilical hernia    LEEP  3/2008    OTHER SURGICAL HISTORY      coils to fallopian tubes    OTHER SURGICAL HISTORY      Tubal essure     TONSILLECTOMY AND ADENOIDECTOMY      TYMPANOSTOMY TUBE PLACEMENT      UMBILICAL HERNIA REPAIR  3/2014 [facility] UPPER GASTROINTESTINAL ENDOSCOPY N/A 2018    EGD BIOPSY performed by Javier Dent MD at [address] N/A 2021    EGD BIOPSY performed by Javier Dent MD at [place] [id number] of Systems :    ROS - Per HPI   ______________________________________________________________________    Physical Exam :    Wt Readings from Last 3 Encounters:   22 (!) 399 lb (181 kg)   06/10/22 (!) 389 lb 9.6 oz (176.7 kg)   22 (!) 380 lb 3.2 oz (172.5 kg)       BMI Readings from Last 3 Encounters:   22 64.40 kg/m²   06/10/22 62.88 kg/m²   22 61.37 kg/m²   ]      Vitals: BP (!) 164/110 (Site: Left Lower Arm, Position: Sitting, Cuff Size: Medium Adult)   Pulse 84   Temp 97.2 °F (36.2 °C) (Temporal)   Resp 18   Ht 5' 6\" (1.676 m)   Wt (!) 399 lb (181 kg)   LMP 2022   BMI 64.40 kg/m²     Physical Exam  Constitutional:       Appearance: Normal appearance. HENT:      Head: Normocephalic and atraumatic. Cardiovascular:      Rate and Rhythm: Normal rate and regular rhythm. Heart sounds: No murmur heard. Pulmonary:      Effort: Pulmonary effort is normal.      Breath sounds: Normal breath sounds. No wheezing or rales. Abdominal:      General: Abdomen is flat. Palpations: Abdomen is soft. Tenderness: There is no abdominal tenderness. There is no guarding. Musculoskeletal:      Cervical back: Normal range of motion and neck supple. Right lower leg: Edema (trace) present. Left lower leg: Edema (trace) present. Neurological:      Mental Status: She is alert.    Psychiatric:         Mood and Affect: Mood normal.         Behavior: Behavior normal.         ______________________________________________________________________    Assessment & Plan :     Diagnosis Orders   1. Essential hypertension  metoprolol succinate (TOPROL XL) 50 MG extended release tablet   2. Morbid obesity ([facility]     3. LYNDA (acute kidney injury) ([facility]     4. Hypocalcemia       HTN: uncontrolled, increase metoprolol to 50, same meds otherwise. Follow up closely with nephrology. Patient to call with any side effects from metoprolol and be seen sooner. Obesity: follow up with bariatrics, continue lifestyle modification  LYNDA: Resolved  Hypocalcemia: Very mild, follow-up on next blood draw    Follow up:  Return in about 4 weeks (around 7/14/2022) for follow up new medication.       Kerry Gutierrez MD PGY-3    Discussed with: Dr. Misti Jain

## 2022-06-16 NOTE — PROGRESS NOTES
S: Nikki Fairbanks 29 y.o. female  here for follow-up. Chief concerns: Hypertension; lab review  Hypertension: Uncontrolled despite multiple antihypertensive agents. Signs or symptoms of hypertension/hypotension. Under the care of bariatrics in preparation for surgical weight loss procedure. Labs reviewed; other than very mild depression of calcium, all are WDL. Will monitor all labs routinely. Social history: Former smoker; denies alcohol use. Lifestyle: Reports compliance with that dietitians dietary recommendations. However, weight is increasing rather than decreasing  O: VS: BP (!) 164/110 (Site: Left Lower Arm, Position: Sitting, Cuff Size: Medium Adult)   Pulse 84   Temp 97.2 °F (36.2 °C) (Temporal)   Resp 18   Ht 5' 6\" (1.676 m)   Wt (!) 399 lb (181 kg)   LMP 06/16/2022   BMI 64.40 kg/m²    General: NAD   CV:  RRR, no gallops, rubs, or murmurs   Resp: CTAB no R/R/W   Abd:  Soft, nontender, no masses. Obese   Ext:  no C/C/E    Assessment / Plan:      Pati Mendoza was seen today for hypertension and other. Diagnoses and all orders for this visit:    1. Essential hypertension  metoprolol succinate (TOPROL XL) 50 MG extended release tablet   2. Morbid obesity ([address]      3. LYNDA (acute kidney injury) ([facility]      4. Hypocalcemia         HTN: uncontrolled, increase metoprolol to 50, same meds otherwise. Follow up closely with nephrology. Patient to call with any side effects from metoprolol and be seen sooner. Obesity: follow up with bariatrics, continue lifestyle modification  LYNDA: Resolved  Hypocalcemia: Very mild, follow-up on next blood draw     Follow up:  Return in about 4 weeks (around 7/14/2022) for follow up new medication. Assessment/Plan:  1. Hypertension: Uncontrolled. Continue current management; increase metoprolol XL to 50 mg a day. Continue to follow with nephrology (Dr. Glenn Cesar). 2.  Morbid obesity: BMI 64.40. Under the care of bariatrics        No follow-ups on file. Follow-up 4 to 6 weeks to assess response of blood pressure to increased medication dosage. Attending Physician Statement  I have discussed the case, including pertinent history and exam findings with the resident. I agree with the documented assessment and plan.          Luciano Herrera MD

## 2022-06-19 LAB
3-OH-COTININE: <2 NG/ML
COTININE: <2 NG/ML
NICOTINE: <2 NG/ML

## 2022-07-15 ENCOUNTER — OFFICE VISIT (OUTPATIENT)
Dept: BARIATRICS/WEIGHT MGMT | Age: 37
End: 2022-07-15
Payer: COMMERCIAL

## 2022-07-15 ENCOUNTER — OFFICE VISIT (OUTPATIENT)
Dept: FAMILY MEDICINE CLINIC | Age: 37
End: 2022-07-15
Payer: COMMERCIAL

## 2022-07-15 VITALS
HEIGHT: 68 IN | DIASTOLIC BLOOD PRESSURE: 113 MMHG | BODY MASS INDEX: 44.41 KG/M2 | TEMPERATURE: 98.2 F | RESPIRATION RATE: 18 BRPM | SYSTOLIC BLOOD PRESSURE: 182 MMHG | WEIGHT: 293 LBS | OXYGEN SATURATION: 97 % | HEART RATE: 88 BPM

## 2022-07-15 VITALS
HEIGHT: 66 IN | BODY MASS INDEX: 47.09 KG/M2 | HEART RATE: 81 BPM | SYSTOLIC BLOOD PRESSURE: 189 MMHG | DIASTOLIC BLOOD PRESSURE: 105 MMHG | WEIGHT: 293 LBS | TEMPERATURE: 98.1 F

## 2022-07-15 DIAGNOSIS — I10 ESSENTIAL HYPERTENSION: Primary | ICD-10-CM

## 2022-07-15 DIAGNOSIS — E66.01 CLASS 3 SEVERE OBESITY DUE TO EXCESS CALORIES WITHOUT SERIOUS COMORBIDITY WITH BODY MASS INDEX (BMI) OF 60.0 TO 69.9 IN ADULT (HCC): ICD-10-CM

## 2022-07-15 DIAGNOSIS — G47.33 OSA (OBSTRUCTIVE SLEEP APNEA): Primary | ICD-10-CM

## 2022-07-15 PROCEDURE — G8428 CUR MEDS NOT DOCUMENT: HCPCS | Performed by: INTERNAL MEDICINE

## 2022-07-15 PROCEDURE — 1036F TOBACCO NON-USER: CPT | Performed by: STUDENT IN AN ORGANIZED HEALTH CARE EDUCATION/TRAINING PROGRAM

## 2022-07-15 PROCEDURE — G8427 DOCREV CUR MEDS BY ELIG CLIN: HCPCS | Performed by: STUDENT IN AN ORGANIZED HEALTH CARE EDUCATION/TRAINING PROGRAM

## 2022-07-15 PROCEDURE — 99213 OFFICE O/P EST LOW 20 MIN: CPT | Performed by: INTERNAL MEDICINE

## 2022-07-15 PROCEDURE — G8417 CALC BMI ABV UP PARAM F/U: HCPCS | Performed by: STUDENT IN AN ORGANIZED HEALTH CARE EDUCATION/TRAINING PROGRAM

## 2022-07-15 PROCEDURE — 1036F TOBACCO NON-USER: CPT | Performed by: INTERNAL MEDICINE

## 2022-07-15 PROCEDURE — 99212 OFFICE O/P EST SF 10 MIN: CPT | Performed by: STUDENT IN AN ORGANIZED HEALTH CARE EDUCATION/TRAINING PROGRAM

## 2022-07-15 PROCEDURE — 99213 OFFICE O/P EST LOW 20 MIN: CPT | Performed by: STUDENT IN AN ORGANIZED HEALTH CARE EDUCATION/TRAINING PROGRAM

## 2022-07-15 PROCEDURE — G8417 CALC BMI ABV UP PARAM F/U: HCPCS | Performed by: INTERNAL MEDICINE

## 2022-07-15 RX ORDER — HYDRALAZINE HYDROCHLORIDE 25 MG/1
25 TABLET, FILM COATED ORAL 3 TIMES DAILY
Qty: 90 TABLET | Refills: 3 | Status: SHIPPED
Start: 2022-07-15 | End: 2022-07-22

## 2022-07-15 SDOH — ECONOMIC STABILITY: TRANSPORTATION INSECURITY
IN THE PAST 12 MONTHS, HAS THE LACK OF TRANSPORTATION KEPT YOU FROM MEDICAL APPOINTMENTS OR FROM GETTING MEDICATIONS?: NO

## 2022-07-15 SDOH — ECONOMIC STABILITY: FOOD INSECURITY: WITHIN THE PAST 12 MONTHS, YOU WORRIED THAT YOUR FOOD WOULD RUN OUT BEFORE YOU GOT MONEY TO BUY MORE.: NEVER TRUE

## 2022-07-15 SDOH — ECONOMIC STABILITY: TRANSPORTATION INSECURITY
IN THE PAST 12 MONTHS, HAS LACK OF TRANSPORTATION KEPT YOU FROM MEETINGS, WORK, OR FROM GETTING THINGS NEEDED FOR DAILY LIVING?: NO

## 2022-07-15 SDOH — ECONOMIC STABILITY: FOOD INSECURITY: WITHIN THE PAST 12 MONTHS, THE FOOD YOU BOUGHT JUST DIDN'T LAST AND YOU DIDN'T HAVE MONEY TO GET MORE.: NEVER TRUE

## 2022-07-15 ASSESSMENT — PATIENT HEALTH QUESTIONNAIRE - PHQ9
SUM OF ALL RESPONSES TO PHQ QUESTIONS 1-9: 0
2. FEELING DOWN, DEPRESSED OR HOPELESS: 0
SUM OF ALL RESPONSES TO PHQ QUESTIONS 1-9: 0
1. LITTLE INTEREST OR PLEASURE IN DOING THINGS: 0
SUM OF ALL RESPONSES TO PHQ9 QUESTIONS 1 & 2: 0

## 2022-07-15 ASSESSMENT — SOCIAL DETERMINANTS OF HEALTH (SDOH): HOW HARD IS IT FOR YOU TO PAY FOR THE VERY BASICS LIKE FOOD, HOUSING, MEDICAL CARE, AND HEATING?: NOT HARD AT ALL

## 2022-07-15 ASSESSMENT — LIFESTYLE VARIABLES: HOW OFTEN DO YOU HAVE A DRINK CONTAINING ALCOHOL: NEVER

## 2022-07-15 NOTE — PATIENT INSTRUCTIONS
Rules:  Count every calorie every day  Limit sweets to one day per month  Limit chips/crackers/pretzels/nuts to 100 michael/day  Eliminate all sugar sweetened beverages (including fruit juice)  Limit restaurants (including fast food and food from a convenience store) to one time every two weeks    Targets:  Limit calorie intake to 1700 calories/day  Walk 5 minutes daily  Avoid eating 2 hours within bedtime. Tips:  Do not eat outside of the dining room or the kitchen  Do not eat while watching TV, videos, working on the computer or using a smart phone  Do not eat food out of a multi-serving bag or container.     Other:  Cont in process to have a bariatric surgery      Return to see me in 4-5 week

## 2022-07-15 NOTE — PROGRESS NOTES
CC -   PRIYA, Obesity    BACKGROUND -   Last visit: 06/10/22  First visit: 01/13/21     PRIYA  Diagnosed 11/09/20  Mild  Followed by Dr. Ricarda Padron  Using APAP 7d/wk, uses <3 hrs for 3d/wk  Excess weight is worsening her underlying airway obstruction    Obesity   Began in late teens  Initial BMI 67.3, Wt 417.2 lbs  HS Grad wt 150 lbs  Lowest   wt  150 lbs  Highest  wt  417 lbs  Pattern of wt gain: grad with rapid increases in preg  Wt change past yr: +30 lbs  Most wt lost: 20 lbs (pre-Bariatric surgery diet at our clinic)  Other diets attempted: Fad diets, ESTEBAN, Keto, Calorie counting    Initial Diet:    Number of meals per day - 1-2    Number of snacks per day - 1    Meal volume - 12\" plate, sometimes seconds    Fast food/convenience store - 2x/week    Restaurants (not fast food) - 0x/week   Sweets - 1d/week   Chips - 0d/week   Crackers/pretzels - 0d/week   Nuts - 0d/week   Peanut Butter - 1-2d/week   Popcorn - 0d/week   Dried fruit - 0d/week   Whole fruit - 5-6d/week (2 serving)   Breakfast cereal - 0-1d/week   Granola/Protein/Energy bar - 0d/week   Sugar sweetened beverages - 4 liters reg soda/day, 1 large DD isabel Iced coffee with extra cream and sugar 4x/wk (350 michael each)    Protein - No supplements   Fiber - No supplements     Exercise:    Gym membership - [employer]    Walking - none    Running - none    Resistance - none    Aerobic class - none    ______________________    [facility] [place] [facility] -  Past Medical History:   Diagnosis Date    Asthma     B12 deficiency 09/21/2018    Class 3 severe obesity due to excess calories with body mass index (BMI) of 60.0 to 69.9 in adult [facility])     GERD without esophagitis     Hypertension     Migraine     PRIYA (obstructive sleep apnea)     sleep apnea 4-11    Prolonged emergence from general anesthesia     Vitamin D deficiency 09/21/2018    Zinc deficiency 09/21/2018     Current Outpatient Medications   Medication Sig Dispense Refill    metoprolol succinate (TOPROL XL) 50 MG extended release decreasing her excess weight   Her PRIYA may be contributing to her hypertension  Assessment  - Controlled  Plan  -   Cont use of autopap  Cont with wt reduction per the plan below    Problem 2  - Obesity   HPI   - See above Background for description   Weight  Date   417.2 lbs 01/13/21   422.8 lbs 02/10/21   423.0 lbs 04/07/21    419.6 lbs 06/03/21   414.0 lbs 07/22/21 Phentermine #1   400.0 lbs 08/25/21 Phentermine #2   395.8 lbs 09/28/21 Phentermine #3   389.0 lbs 11/11/21   386.6 lbs 01/04/22 Started topamax   380.2 lbs 03/23/22 Topamax   389.6 lbs 06/10/22 Stopped topamax prior to 4/27/22 b/c of edginess   399.0 lbs 07/15/22      Total weight change to date: 18.2lbs (36.2 lbs if going by 4/07/21 date)  DEN = 2350 michael/d = 16,450 michael/wk  Avg daily energy variance:   04/07/21-06/03/21 = - 3.4 lbs (11,900 michael)/57d = -   209 michael/d deficit   06/03/21-07/22/21 = - 5.6 lbs (19,600 michael)/49d = -   400 michael/d deficit   07/22/21-08/25/21 = - 14.0 lbs (49,000cal)/34d = - 1,441 michael/d deficit   08/25/21-09/28/21 = -   4.2 lbs (14,700cal)/34d = -   432 michael/d deficit   09/28/21-11/21/21 = -   6.8 lbs (23,800cal)/44d = -   540 michael/d deficit   11/21/21-01/04/22 = -   2.4 lbs ( 8,400cal)/54d = -    155 michael/d deficit  (had Covid and lost taste and smell)   01/04/22-03/23/22 = -   6.4 lbs (22,400cal)/78d = -   287 michael/d deficit   03/23/22-06/10/22 = +   9.4 lbs (32,900cal)/79d = + 416 michael/d excess   06/10/22-07/15/22 = +  9.4 lbs (32,900cal)/35d = + 940 michael/d excess  Wt effect of trouble foods = Restaurant 300 michael/wk + Sweets 200 + SSBs 12,300 = 12,800 michael/wk = 78% DEN = 1825 michael/day = 182 lbs/yr  Initial thought was that eliminating reg soda may produce a sufficient rate of wt loss. However, it did not.   Therefore, discussed the options of a counting-based regimen and a VLCD  She decided on the counting-based program  Update:  Conts to work through the process of a bariatric surgery;  Psychology has cleared her; passed the drug/nicotene tests  Calorie monitoring: counting calories 4-5d/wk, usual intake 1500 michael/d  Sweets: 1d/mo  Salty snacks: 1d/wk, 100 michael  SSB: Strawberry lemonade large from [employer] or [employer] (440 michael)  Restaurant food: 3x/month  Contraception -  BTL  6/09/22 BUN/Cr 18/1.2, GFR 51 (took a lot ibuprofen the last week of May while taking her long-term ARB and thiazide diuretic)  6/15/22 BUN/Cr 15/0.9, GFR 60 (on ARB and thiazide, but without ibuprofen)  Assessment  -  Worsened, needs to fully adhere to her plan  Plan   -   Rules:  Count every calorie every day  Limit sweets to one day per month  Limit chips/crackers/pretzels/nuts to 100 michael/day  Eliminate all sugar sweetened beverages (including fruit juice)  Limit restaurants (including fast food and food from a convenience store) to one time every two weeks    Targets:  Limit calorie intake to 1700 calories/day  Walk 5 minutes daily  Avoid eating 2 hours within bedtime. Tips:  Do not eat outside of the dining room or the kitchen  Do not eat while watching TV, videos, working on the computer or using a smart phone  Do not eat food out of a multi-serving bag or container.     Other:  Cont in process to have a bariatric surgery      Elevated BP: check BP at home, if remains consistently high, then contact PCP     Return to see me in 4-5 week      Lexis Whyte MD  Endocrinology/Obesity  7/15/22

## 2022-07-15 NOTE — PROGRESS NOTES
S: 29 y.o. female here for HTN. Not controlled. Amlodipine, losartan, metoprolol, hctz 50. O: VS: BP (!) 182/113 (Site: Left Lower Arm, Position: Sitting, Cuff Size: Medium Adult)   Pulse 88   Temp 98.2 °F (36.8 °C) (Temporal)   Resp 18   Ht 5' 7.5\" (1.715 m)   Wt (!) 400 lb 8 oz (181.7 kg)   LMP 07/15/2022   SpO2 97%   BMI 61.80 kg/m²    General: NAD, alert and interacting appropriately. obese   CV:  RRR, no gallops, rubs, or murmurs    Resp: CTAB   Abd:  Soft, nontender   Ext:  No edema    Impression: HTN. Obesity. Plan: Add hydralazine  Bariatric surgery    Attending Physician Statement  I have discussed the case, including pertinent history and exam findings with the resident. I agree with the documented assessment and plan.
Reviewed as above, otherwise negative       Physical Exam   Vitals:   Vitals:    07/15/22 1503   BP: (!) 182/113   Pulse:    Resp:    Temp:    SpO2:        Physical Exam  Constitutional:       Appearance: Normal appearance. HENT:      Head: Normocephalic and atraumatic. Eyes:      Pupils: Pupils are equal, round, and reactive to light. Cardiovascular:      Rate and Rhythm: Normal rate and regular rhythm. Pulses: Normal pulses. Pulmonary:      Effort: Pulmonary effort is normal.      Breath sounds: Normal breath sounds. Neurological:      Mental Status: She is alert. Assessment and Plan     1. Essential hypertension  - BP remains uncontrolled. Secondary HTN workup completed in the past. She plans on having bariatric surgery sometime in Nov 2022. Patient informed that weight loss can help lower overall blood pressure  - patient advised to take medication as prescribed. Will add on hydralazine at this time TID. - advised to seek medical attention if she begins to experienced slurred speech, chest pain, or extremity weakness.   - hydrALAZINE (APRESOLINE) 25 MG tablet; Take 1 tablet by mouth in the morning and 1 tablet at noon and 1 tablet before bedtime. Dispense: 90 tablet;  Refill: 3      RTO 1 week for BP check  Case discussed with Dr. Jacques Arreguin MD

## 2022-07-22 ENCOUNTER — OFFICE VISIT (OUTPATIENT)
Dept: FAMILY MEDICINE CLINIC | Age: 37
End: 2022-07-22
Payer: COMMERCIAL

## 2022-07-22 VITALS
BODY MASS INDEX: 44.41 KG/M2 | WEIGHT: 293 LBS | HEIGHT: 68 IN | RESPIRATION RATE: 16 BRPM | HEART RATE: 82 BPM | SYSTOLIC BLOOD PRESSURE: 109 MMHG | DIASTOLIC BLOOD PRESSURE: 69 MMHG | TEMPERATURE: 97.2 F | OXYGEN SATURATION: 96 %

## 2022-07-22 DIAGNOSIS — I10 ESSENTIAL HYPERTENSION: Primary | ICD-10-CM

## 2022-07-22 PROCEDURE — 99212 OFFICE O/P EST SF 10 MIN: CPT | Performed by: STUDENT IN AN ORGANIZED HEALTH CARE EDUCATION/TRAINING PROGRAM

## 2022-07-22 PROCEDURE — 1036F TOBACCO NON-USER: CPT | Performed by: STUDENT IN AN ORGANIZED HEALTH CARE EDUCATION/TRAINING PROGRAM

## 2022-07-22 PROCEDURE — G8417 CALC BMI ABV UP PARAM F/U: HCPCS | Performed by: STUDENT IN AN ORGANIZED HEALTH CARE EDUCATION/TRAINING PROGRAM

## 2022-07-22 PROCEDURE — 99213 OFFICE O/P EST LOW 20 MIN: CPT | Performed by: STUDENT IN AN ORGANIZED HEALTH CARE EDUCATION/TRAINING PROGRAM

## 2022-07-22 PROCEDURE — G8427 DOCREV CUR MEDS BY ELIG CLIN: HCPCS | Performed by: STUDENT IN AN ORGANIZED HEALTH CARE EDUCATION/TRAINING PROGRAM

## 2022-07-22 RX ORDER — DONEPEZIL HYDROCHLORIDE 5 MG/1
5 TABLET, FILM COATED ORAL NIGHTLY
Qty: 30 TABLET | Refills: 3 | Status: SHIPPED
Start: 2022-07-22 | End: 2022-07-22

## 2022-07-22 RX ORDER — HYDRALAZINE HYDROCHLORIDE 25 MG/1
25 TABLET, FILM COATED ORAL 2 TIMES DAILY
Qty: 90 TABLET | Refills: 3 | Status: SHIPPED | OUTPATIENT
Start: 2022-07-22

## 2022-07-22 NOTE — PROGRESS NOTES
[facility]  Department of Family Medicine  Family Medicine Residency Program  DATE OF VISIT : 2022      Patient:  Naya Walker  Age: 29 y.o.       : 1985      Chief complaint:   Chief Complaint   Patient presents with    Hypertension         History of Present Illness     Naya Walker is a 29 y.o. female who presented to the clinic today for HTN    HTN  - denies any headaches's, dizziness, or fatigue.   -Continues to take most medications as prescribed. Does report taking hydralazine only twice daily due to forgetting to take midday  -Patient reports having blood pressure well controlled at home 120-130/80. Medication List:    Current Outpatient Medications   Medication Sig Dispense Refill    hydrALAZINE (APRESOLINE) 25 MG tablet Take 1 tablet by mouth in the morning and at bedtime 90 tablet 3    metoprolol succinate (TOPROL XL) 50 MG extended release tablet Take 1 tablet by mouth daily 90 tablet 0    guanFACINE (TENEX) 1 MG tablet Take 1 tablet by mouth nightly 90 tablet 0    magnesium oxide (MAG-OX) 400 MG tablet Take 1 tablet by mouth daily 90 tablet 0    albuterol sulfate  (90 Base) MCG/ACT inhaler Inhale 2 puffs into the lungs every 6 hours as needed for Wheezing 8.5 each 1    losartan (COZAAR) 100 MG tablet TAKE 1 TABLET BY MOUTH EVERY DAY 30 tablet 1    sertraline (ZOLOFT) 25 MG tablet Take 1 tablet by mouth daily 30 tablet 5    hydroCHLOROthiazide (HYDRODIURIL) 50 MG tablet Take 1 tablet by mouth daily 30 tablet 5    cyanocobalamin ([employer] VITAMIN B12) 1000 MCG tablet TAKE 1 TABLET BY MOUTH EVERY DAY 30 tablet 3    amLODIPine (NORVASC) 10 MG tablet Take 1 tablet by mouth daily 90 tablet 1    Multiple Vitamins-Minerals (THERAPEUTIC MULTIVITAMIN-MINERALS) tablet Take 1 tablet by mouth daily       No current facility-administered medications for this visit.             ROS   Reviewed as above, otherwise negative       Physical Exam   Vitals:   Vitals: 07/22/22 1322   BP: 109/69   Pulse: 82   Resp: 16   Temp: 97.2 °F (36.2 °C)   SpO2: 96%       Physical Exam  Vitals reviewed. Constitutional:       Appearance: Normal appearance. HENT:      Head: Normocephalic and atraumatic. Cardiovascular:      Rate and Rhythm: Normal rate and regular rhythm. Pulses: Normal pulses. Heart sounds: Normal heart sounds. Pulmonary:      Effort: Pulmonary effort is normal.      Breath sounds: Normal breath sounds. Abdominal:      General: Bowel sounds are normal.      Palpations: Abdomen is soft. Neurological:      Mental Status: She is alert. Psychiatric:         Mood and Affect: Mood normal.         Behavior: Behavior normal.         Assessment and Plan     1. Essential hypertension  -Well-controlled at this time. Continue present management. We will refill hydralazine to twice daily. - hydrALAZINE (APRESOLINE) 25 MG tablet; Take 1 tablet by mouth in the morning and at bedtime  Dispense: 90 tablet; Refill: 3    -Of note from prescription was sent to pharmacy. Patient was informed of incorrect medication (Aricept) and pharmacy was contacted to discontinue medication. Patient does not have a history of mild cognitive impairment.     RTO 1 month for blood pressure check  Case discussed with Dr. Logan Martin MD

## 2022-07-22 NOTE — PROGRESS NOTES
Patient is a 55-year-old female with past medical history of uncontrolled hypertension who presents today for blood pressure recheck. On previous visits patient has had work-up for secondary hypertension performed. Patient currently has resistant hypertension and was prescribed hydralazine 25 mg 3 times daily at the last office visit. She reports taking all medications as prescribed and denies missing any doses. Denies any headaches, dizziness, or lightheadedness. Reports blood pressure at home is well controlled and is usually systolic [id number] over 55I. Blood pressure 109/69, pulse 82, temperature 97.2 °F (36.2 °C), temperature source Temporal, resp. rate 16, height 5' 7.5\" (1.715 m), weight (!) 398 lb (180.5 kg), last menstrual period 07/15/2022, SpO2 96 %, not currently breastfeeding. General: Alert and oriented x3  Heart: Regular rhythm and rate no murmurs gallops or rubs  Lungs: Clear to auscultation bilaterally    Assessment and plan  Hypertension: Well-controlled. Will continue present management. Patient currently taking HCTZ, metoprolol, hydralazine twice daily, amlodipine and losartan. 2-month follow-up for blood pressure check    Attending Physician Statement  I have discussed the case, including pertinent history and exam findings with the resident. I agree with the documented assessment and plan.

## 2022-07-27 ENCOUNTER — TELEPHONE (OUTPATIENT)
Dept: BARIATRICS/WEIGHT MGMT | Age: 37
End: 2022-07-27

## 2022-07-27 DIAGNOSIS — Z01.818 PRE-OP EVALUATION: Primary | ICD-10-CM

## 2022-07-27 NOTE — LETTER
Medical Clearance / Medical Necessity for Gwendolyn-en-Y Gastric Bypass    Name: Emilee Camacho                                     Date of Birth: [birth year]-    I have been caring for the above patient for _____ years. He/she suffers from the following medical conditions:  __________________________________________________________________________________________________________________________________________________________________________________________________________________    The above medical conditions are either caused by or worsened by the patients morbid obesity. Yes_________ No__________    The above medical conditions are currently stable:      Yes_________ No__________    The following medical conditions are difficult to manage/require multiple medications to achieve control due to the patients weight: ______________________________________________________________________  ______________________________________________________________________                    The above patient has participated in the following medical weight loss efforts without long-term weight reduction:  ____________________________________________________________________________________________________________________________________________    I am in support of my patient undergoing a weight loss surgical procedure with [address] [place] [address] [facility] and the patient understands the risks and benefits of weight loss surgery. The patient has reasonable expectations and I believe the patient will be compliant with all post-surgical requirements. I understand the program is comprehensive with dedicated and specially trained staff.  In the event that my patient is over the age of 61, I would like to state that the patients physiological age and co-morbid conditions result in a positive risk to benefit ratio.        ________  In my medical opinion, there are no medical contraindications to proceed with gastric bypass surgery and the patients benefits of surgery outweigh the risks of surgery.       Physician Name (Please Print): __________________________________    Primary Care Physicians Signature: __________________________________    Date: ______/______/______

## 2022-07-27 NOTE — TELEPHONE ENCOUNTER
Preparing for insurance submission and need TSH and medical clearance. Discussed with patient and faxed clearance request to PCP. She will go for TSH as soon as possible. Once we have both back, she is ready for insurance submission.

## 2022-08-29 ENCOUNTER — OFFICE VISIT (OUTPATIENT)
Dept: FAMILY MEDICINE CLINIC | Age: 37
End: 2022-08-29
Payer: COMMERCIAL

## 2022-08-29 VITALS
TEMPERATURE: 98.1 F | BODY MASS INDEX: 44.41 KG/M2 | OXYGEN SATURATION: 100 % | SYSTOLIC BLOOD PRESSURE: 128 MMHG | DIASTOLIC BLOOD PRESSURE: 88 MMHG | HEART RATE: 82 BPM | RESPIRATION RATE: 18 BRPM | HEIGHT: 68 IN | WEIGHT: 293 LBS

## 2022-08-29 DIAGNOSIS — R73.03 PREDIABETES: ICD-10-CM

## 2022-08-29 DIAGNOSIS — Z01.818 PREOP EXAMINATION: Primary | ICD-10-CM

## 2022-08-29 LAB — HBA1C MFR BLD: 5.9 %

## 2022-08-29 PROCEDURE — 83036 HEMOGLOBIN GLYCOSYLATED A1C: CPT | Performed by: FAMILY MEDICINE

## 2022-08-29 PROCEDURE — 1036F TOBACCO NON-USER: CPT | Performed by: FAMILY MEDICINE

## 2022-08-29 PROCEDURE — G8427 DOCREV CUR MEDS BY ELIG CLIN: HCPCS | Performed by: FAMILY MEDICINE

## 2022-08-29 PROCEDURE — 99213 OFFICE O/P EST LOW 20 MIN: CPT | Performed by: FAMILY MEDICINE

## 2022-08-29 PROCEDURE — G8417 CALC BMI ABV UP PARAM F/U: HCPCS | Performed by: FAMILY MEDICINE

## 2022-08-29 PROCEDURE — 99212 OFFICE O/P EST SF 10 MIN: CPT | Performed by: FAMILY MEDICINE

## 2022-08-29 NOTE — PROGRESS NOTES
Progress Note    Subjective: Nita Zavala is a 29y.o. year old female here for preop evaluation. Health Maintenance Due   Topic Date Due    COVID-19 Vaccine (1) Never done    Varicella vaccine (1 of 2 - 2-dose childhood series) Never done    Pneumococcal 0-64 years Vaccine (1 - PCV) Never done    Hepatitis B vaccine (2 of 3 - 3-dose primary series) 03/31/1999    Cervical cancer screen  02/12/2022    A1C test (Diabetic or Prediabetic)  08/13/2022     Planning on having Gwendolyn-En-Y bypass with Dr. Inocencia Rodríguez on 9/15/2022.    Previous issues with anesthesia: No  Agreeable to receiving blood products: Yes    Past Medical History:   Diagnosis Date    Asthma     B12 deficiency 09/21/2018    Class 3 severe obesity due to excess calories with body mass index (BMI) of 60.0 to 69.9 in [facility])     GERD without esophagitis     Hypertension     Migraine     PRIYA (obstructive sleep apnea)     sleep apnea 4-11    Prolonged emergence from general anesthesia     Vitamin D deficiency 09/21/2018    Zinc deficiency 09/21/2018     Past Surgical History:   Procedure Laterality Date    CHOLECYSTECTOMY, LAPAROSCOPIC  3/2014 Rose Pickle    with repair umbilical hernia    LEEP  3/2008    OTHER SURGICAL HISTORY  2011    coils to fallopian tubes    OTHER SURGICAL HISTORY      Tubal essure     TONSILLECTOMY AND ADENOIDECTOMY      TYMPANOSTOMY TUBE PLACEMENT      UMBILICAL HERNIA REPAIR  3/2014 Benja    UPPER GASTROINTESTINAL ENDOSCOPY N/A 5/2/2018    EGD BIOPSY performed by Raj Bruce MD at [address] N/A 9/7/2021    EGD BIOPSY performed by Raj Bruce MD at [facility] History   Problem Relation Age of Onset    Cancer Paternal Grandmother         stomach    Brain Cancer Maternal Grandmother     Hypertension Father     Stroke Father     Heart Attack Father     Diabetes Father     Other Father         gout    Hypertension Mother     Scoliosis Sister     Diabetes Maternal "Pt states that he normally drinks 2-3 half pints whisky per day. He states that he has had no ETOH today and \"feels like he's dying.\" Pt c/o neck and abd pain.   " Grandfather     Heart Disease Maternal Grandfather      Social History     Socioeconomic History    Marital status: Single     Spouse name: Not on file    Number of children: Not on file    Years of education: Not on file    Highest education level: Not on file   Occupational History    Not on file   Tobacco Use    Smoking status: Former     Packs/day: 0.20     Years: 15.00     Pack years: 3.00     Types: Cigarettes     Quit date: 2021     Years since quittin.5    Smokeless tobacco: Never    Tobacco comments:     has cut down to couple cigs a daily    Vaping Use    Vaping Use: Never used   Substance and Sexual Activity    Alcohol use: Yes     Alcohol/week: 2.0 - 3.0 standard drinks     Types: 2 - 3 Shots of liquor per week     Comment: occasionally    Drug use: No    Sexual activity: Yes     Partners: Male   Other Topics Concern    Not on file   Social History Narrative    Not on file     Social Determinants of Health     Financial Resource Strain: Low Risk     Difficulty of Paying Living Expenses: Not hard at all   Food Insecurity: No Food Insecurity    Worried About [address] in the Last Year: Never true    [address] [employer] in the Last Year: Never true   Transportation Needs: No Transportation Needs    Lack of Transportation (Medical): No    Lack of Transportation (Non-Medical):  No   Physical Activity: Not on file   Stress: Not on file   Social Connections: Not on file   Intimate Partner Violence: Not on file   Housing Stability: Not on file       FUNCTIONAL CAPACITY (Bolded line indicates patient's functional capacity level):  1-3      Due to pain  Watching television  Eating, dressing, cooking, using the toilet  Walking one or two blocks on level ground at 2-3 miles per hour  Doing light housework (ex dusting)     4-10        Climbing a flight of stairs     Walking on level ground at 4 miles per hour  Running a short distance  Doing heavy chores around the house (ex scrubbing floors, lifting Cardiac risk <1%         Superficial procedures                                                                          Breast surgery                                                                          Cataract surgery                                                                          Endoscopic procedures                                                                          Most ambulatory surgeries     taken from  AFP Volume 85, Number 3, p 241    Review Of Systems (unless otherwise specified)  Gen: No fevers, sweats, chills   No fatigue   No weight unintentional weight changes  Skin:  No rash, no lesion  Resp: No cough, shortness of breath, wheeze, chest congestion  CV: No chest pain, palpitation, edema   GI:  No abdominal pain   No nausea, no vomiting   No change in bowels, no diarrhea or constipation   No blood in stool or blood per rectum  Neuro: No headaches   No syncope/near syncope   No dizziness  MS: No back pain   No arthralgias   No myalgias   No gait issues    : No dysuria, no hematuria, no frequency, no incontinence  Eyes: No vision changes   No eye itching  ENT: No earache, no drainage   No nasal congestion   No sinus tenderness   No sore throat   No swallowing issues  Endo: No polyuria, polydipsia, polyphagia   No temperature intolerance  Psych: No mood changes   No dysphoria   No anxiety   No sleep disturbance   No suicidal or homicidal ideation      Objective:  /88   Pulse 82   Temp 98.1 °F (36.7 °C) (Temporal)   Resp 18   Ht 5' 7.5\" (1.715 m)   Wt (!) 405 lb (183.7 kg)   LMP 07/12/2022 (Approximate)   SpO2 100%   BMI 62.50 kg/m²   General appearance: NAD, alert and interacting appropriately  HEENT: NCAT, PERRLA, EOMI   Resp: CTAB, no WRC  CVS: RRR, no MRG  Abdomen: BS +, SNDNT  Extremities: No clubbing, cyanosis, or edema. Warm. Dry. I have reviewed this patient's previous records. I have reviewed this patient's labs.     I have reviewed this patient's

## 2022-08-29 NOTE — PROGRESS NOTES
S: 29 y.o. female here for preop. HTN, migraines, alonso. Sept 15 for SIDNEY gastric bypass with Dr. Shashi Hutchison. O: VS: /88   Pulse 82   Temp 98.1 °F (36.7 °C) (Temporal)   Resp 18   Ht 5' 7.5\" (1.715 m)   Wt (!) 405 lb (183.7 kg)   LMP 07/12/2022 (Approximate)   SpO2 100%   BMI 62.50 kg/m²    General: NAD, alert and interacting appropriately. CV:  RRR, no gallops, rubs, or murmurs    Resp: CTAB   Abd:  Soft, nontender   Ext:  ble edema    Impression: preop  Plan:   proceed to surgery      Attending Physician Statement  I have discussed the case, including pertinent history and exam findings with the resident. I agree with the documented assessment and plan.

## 2022-08-31 ENCOUNTER — TELEPHONE (OUTPATIENT)
Dept: BARIATRICS/WEIGHT MGMT | Age: 37
End: 2022-08-31

## 2022-08-31 NOTE — TELEPHONE ENCOUNTER
Called patient to let her know we received her medical clearance. Last thing outstanding for insurance submission is TSH level. Left message reminding her to have this done, and if she went to a non [facility] lab, to have the result sent to us.

## 2022-09-19 ENCOUNTER — TELEPHONE (OUTPATIENT)
Dept: BARIATRICS/WEIGHT MGMT | Age: 37
End: 2022-09-19

## 2022-09-19 NOTE — TELEPHONE ENCOUNTER
Second call placed to patient to let her know we received her medical clearance. Last thing outstanding for insurance submission is TSH level. Left another message reminding her to have this done, and if she went to a non [facility] lab, to have the result sent to us.

## 2022-09-22 ENCOUNTER — OFFICE VISIT (OUTPATIENT)
Dept: FAMILY MEDICINE CLINIC | Age: 37
End: 2022-09-22
Payer: COMMERCIAL

## 2022-09-22 VITALS
WEIGHT: 293 LBS | HEIGHT: 68 IN | DIASTOLIC BLOOD PRESSURE: 80 MMHG | SYSTOLIC BLOOD PRESSURE: 154 MMHG | BODY MASS INDEX: 44.41 KG/M2 | OXYGEN SATURATION: 92 % | TEMPERATURE: 98.2 F | HEART RATE: 90 BPM | RESPIRATION RATE: 16 BRPM

## 2022-09-22 DIAGNOSIS — I10 ESSENTIAL HYPERTENSION: ICD-10-CM

## 2022-09-22 PROCEDURE — G8417 CALC BMI ABV UP PARAM F/U: HCPCS | Performed by: FAMILY MEDICINE

## 2022-09-22 PROCEDURE — 99212 OFFICE O/P EST SF 10 MIN: CPT | Performed by: FAMILY MEDICINE

## 2022-09-22 PROCEDURE — 99213 OFFICE O/P EST LOW 20 MIN: CPT | Performed by: FAMILY MEDICINE

## 2022-09-22 PROCEDURE — 1036F TOBACCO NON-USER: CPT | Performed by: FAMILY MEDICINE

## 2022-09-22 PROCEDURE — G8427 DOCREV CUR MEDS BY ELIG CLIN: HCPCS | Performed by: FAMILY MEDICINE

## 2022-09-22 RX ORDER — MAGNESIUM OXIDE 400 MG/1
1 TABLET ORAL DAILY
Qty: 90 TABLET | Refills: 1 | Status: SHIPPED | OUTPATIENT
Start: 2022-09-22

## 2022-09-22 RX ORDER — GUANFACINE 1 MG/1
1 TABLET ORAL NIGHTLY
Qty: 90 TABLET | Refills: 1 | Status: SHIPPED | OUTPATIENT
Start: 2022-09-22

## 2022-09-22 RX ORDER — METOPROLOL SUCCINATE 50 MG/1
50 TABLET, EXTENDED RELEASE ORAL DAILY
Qty: 90 TABLET | Refills: 1 | Status: SHIPPED | OUTPATIENT
Start: 2022-09-22

## 2022-09-22 RX ORDER — HYDROCHLOROTHIAZIDE 50 MG/1
50 TABLET ORAL DAILY
Qty: 90 TABLET | Refills: 1 | Status: SHIPPED | OUTPATIENT
Start: 2022-09-22

## 2022-09-22 ASSESSMENT — ENCOUNTER SYMPTOMS
VOMITING: 0
SHORTNESS OF BREATH: 0
WHEEZING: 0
DIARRHEA: 0
NAUSEA: 0
ABDOMINAL PAIN: 0
COUGH: 0

## 2022-09-22 NOTE — PROGRESS NOTES
[address] [facility]  [phone]   Ramya Mars MD     Patient: Piotr Pitts  YOB: 1985  Visit Date: 9/22/22    Monik Rosa is a 29y.o. year old female here today for   Chief Complaint   Patient presents with    Hypertension     F/u  Patient needs a BMP TSH       HPI  Patient is a 29year old female here for follow up of hypertension. Did not take her blood pressure medicine this morning. Has a CPAP that she has been using . Has not seen nephrology recently . Plans to do this. Getting gastric bypass done. Due for Pap smear - will go to Dr. Jasiel Hare. Review of Systems   Respiratory:  Negative for cough, shortness of breath and wheezing. Cardiovascular:  Positive for leg swelling. Negative for chest pain and palpitations. Gastrointestinal:  Negative for abdominal pain, diarrhea, nausea and vomiting. Musculoskeletal:  Positive for arthralgias. Neurological:  Negative for dizziness, light-headedness and headaches. Psychiatric/Behavioral:  Negative for dysphoric mood. The patient is not nervous/anxious.       Current Outpatient Medications on File Prior to Visit   Medication Sig Dispense Refill    hydrALAZINE (APRESOLINE) 25 MG tablet Take 1 tablet by mouth in the morning and at bedtime 90 tablet 3    albuterol sulfate  (90 Base) MCG/ACT inhaler Inhale 2 puffs into the lungs every 6 hours as needed for Wheezing 8.5 each 1    sertraline (ZOLOFT) 25 MG tablet Take 1 tablet by mouth daily 30 tablet 5    cyanocobalamin ([employer] VITAMIN B12) 1000 MCG tablet TAKE 1 TABLET BY MOUTH EVERY DAY 30 tablet 3    amLODIPine (NORVASC) 10 MG tablet Take 1 tablet by mouth daily 90 tablet 1    Multiple Vitamins-Minerals (THERAPEUTIC MULTIVITAMIN-MINERALS) tablet Take 1 tablet by mouth daily      losartan (COZAAR) 100 MG tablet TAKE 1 TABLET BY MOUTH EVERY DAY 30 tablet 1     No current facility-administered medications on file prior to visit. Allergies   Allergen Reactions    Lactose Intolerance (Gi) Diarrhea     Milk and ice cream       Past medical, surgical, socialand/or family history reviewed, updated as needed, and are non-contributory (unless otherwise stated). Medications, allergies, and problem list also reviewed and updated as needed in patient's record. Wt Readings from Last 3 Encounters:   09/22/22 (!) 405 lb (183.7 kg)   08/29/22 (!) 405 lb (183.7 kg)   07/22/22 (!) 398 lb (180.5 kg)                   BP (!) 154/80 (Site: Left Lower Arm, Position: Sitting, Cuff Size: Large Adult)   Pulse 90   Temp 98.2 °F (36.8 °C) (Temporal)   Resp 16   Ht 5' 7.5\" (1.715 m)   Wt (!) 405 lb (183.7 kg)   LMP 09/22/2022   SpO2 92%   BMI 62.50 kg/m²        Physical Exam  Vitals and nursing note reviewed. Constitutional:       General: She is not in acute distress. Appearance: She is well-developed. She is not diaphoretic. HENT:      Head: Normocephalic and atraumatic. Cardiovascular:      Rate and Rhythm: Normal rate and regular rhythm. Heart sounds: Normal heart sounds. No murmur heard. Pulmonary:      Effort: Pulmonary effort is normal. No respiratory distress. Breath sounds: Normal breath sounds. No wheezing or rales. Abdominal:      General: Bowel sounds are normal. There is no distension. Palpations: Abdomen is soft. Tenderness: There is no abdominal tenderness. There is no guarding. Musculoskeletal:         General: Swelling (trace edema) present. Normal range of motion. Psychiatric:         Behavior: Behavior normal.     Results for orders placed or performed in visit on 08/29/22   POCT glycosylated hemoglobin (Hb A1C)   Result Value Ref Range    Hemoglobin A1C 5.9 %       ASSESSMENT/PLAN  Syd King was seen today for hypertension. Diagnoses and all orders for this visit:    Essential hypertension  -     metoprolol succinate (TOPROL XL) 50 MG extended release tablet;  Take 1 tablet by mouth daily  -     guanFACINE (TENEX) 1 MG tablet; Take 1 tablet by mouth nightly  -     Comprehensive Metabolic Panel; Future  -     TSH; Future  -     CBC with Auto Differential; Future    Other orders  -     magnesium oxide (MAG-OX) 400 MG tablet; Take 1 tablet by mouth daily  -     hydroCHLOROthiazide (HYDRODIURIL) 50 MG tablet; Take 1 tablet by mouth daily    Did not take her blood pressure medicine today. Continue current meds. Check labs. Phone/MyChart follow up if tests abnormal.    Return in about 3 months (around 12/22/2022). or sooner if necessary. I have reviewed myfindings and recommendations with Bettina Moncada. Jennifer Akbar.  Lluvia Parker MD, M.D

## 2022-09-26 DIAGNOSIS — E66.01 CLASS 3 SEVERE OBESITY DUE TO EXCESS CALORIES WITHOUT SERIOUS COMORBIDITY WITH BODY MASS INDEX (BMI) OF 60.0 TO 69.9 IN ADULT (HCC): ICD-10-CM

## 2022-09-26 DIAGNOSIS — Z01.818 PRE-OP EVALUATION: ICD-10-CM

## 2022-09-26 DIAGNOSIS — I10 ESSENTIAL HYPERTENSION: ICD-10-CM

## 2022-09-26 LAB
ALBUMIN SERPL-MCNC: 3.8 G/DL (ref 3.5–5.2)
ALP BLD-CCNC: 81 U/L (ref 35–104)
ALT SERPL-CCNC: 11 U/L (ref 0–32)
ANION GAP SERPL CALCULATED.3IONS-SCNC: 10 MMOL/L (ref 7–16)
AST SERPL-CCNC: 10 U/L (ref 0–31)
BASOPHILS ABSOLUTE: 0.05 E9/L (ref 0–0.2)
BASOPHILS RELATIVE PERCENT: 0.6 % (ref 0–2)
BILIRUB SERPL-MCNC: 0.3 MG/DL (ref 0–1.2)
BUN BLDV-MCNC: 19 MG/DL (ref 6–20)
CALCIUM SERPL-MCNC: 9.8 MG/DL (ref 8.6–10.2)
CHLORIDE BLD-SCNC: 102 MMOL/L (ref 98–107)
CO2: 28 MMOL/L (ref 22–29)
CREAT SERPL-MCNC: 0.9 MG/DL (ref 0.5–1)
EOSINOPHILS ABSOLUTE: 0.26 E9/L (ref 0.05–0.5)
EOSINOPHILS RELATIVE PERCENT: 2.9 % (ref 0–6)
GFR AFRICAN AMERICAN: >60
GFR NON-AFRICAN AMERICAN: >60 ML/MIN/1.73
GLUCOSE BLD-MCNC: 104 MG/DL (ref 74–99)
HCT VFR BLD CALC: 36.7 % (ref 34–48)
HEMOGLOBIN: 10.9 G/DL (ref 11.5–15.5)
IMMATURE GRANULOCYTES #: 0.02 E9/L
IMMATURE GRANULOCYTES %: 0.2 % (ref 0–5)
LYMPHOCYTES ABSOLUTE: 2.43 E9/L (ref 1.5–4)
LYMPHOCYTES RELATIVE PERCENT: 26.8 % (ref 20–42)
MCH RBC QN AUTO: 23.8 PG (ref 26–35)
MCHC RBC AUTO-ENTMCNC: 29.7 % (ref 32–34.5)
MCV RBC AUTO: 80.1 FL (ref 80–99.9)
MONOCYTES ABSOLUTE: 0.63 E9/L (ref 0.1–0.95)
MONOCYTES RELATIVE PERCENT: 7 % (ref 2–12)
NEUTROPHILS ABSOLUTE: 5.67 E9/L (ref 1.8–7.3)
NEUTROPHILS RELATIVE PERCENT: 62.5 % (ref 43–80)
PDW BLD-RTO: 17.2 FL (ref 11.5–15)
PLATELET # BLD: 301 E9/L (ref 130–450)
PMV BLD AUTO: 10.9 FL (ref 7–12)
POTASSIUM SERPL-SCNC: 4.9 MMOL/L (ref 3.5–5)
RBC # BLD: 4.58 E12/L (ref 3.5–5.5)
SODIUM BLD-SCNC: 140 MMOL/L (ref 132–146)
TOTAL PROTEIN: 6.6 G/DL (ref 6.4–8.3)
TSH SERPL DL<=0.05 MIU/L-ACNC: 2.24 UIU/ML (ref 0.27–4.2)
WBC # BLD: 9.1 E9/L (ref 4.5–11.5)

## 2022-09-28 ENCOUNTER — OFFICE VISIT (OUTPATIENT)
Dept: BARIATRICS/WEIGHT MGMT | Age: 37
End: 2022-09-28
Payer: COMMERCIAL

## 2022-09-28 VITALS
WEIGHT: 293 LBS | DIASTOLIC BLOOD PRESSURE: 96 MMHG | SYSTOLIC BLOOD PRESSURE: 154 MMHG | BODY MASS INDEX: 47.09 KG/M2 | HEIGHT: 66 IN | HEART RATE: 76 BPM | TEMPERATURE: 97.3 F

## 2022-09-28 DIAGNOSIS — E66.01 CLASS 3 SEVERE OBESITY DUE TO EXCESS CALORIES WITHOUT SERIOUS COMORBIDITY WITH BODY MASS INDEX (BMI) OF 60.0 TO 69.9 IN ADULT (HCC): ICD-10-CM

## 2022-09-28 DIAGNOSIS — G47.33 OSA (OBSTRUCTIVE SLEEP APNEA): Primary | ICD-10-CM

## 2022-09-28 DIAGNOSIS — I10 ESSENTIAL HYPERTENSION: ICD-10-CM

## 2022-09-28 PROCEDURE — G8428 CUR MEDS NOT DOCUMENT: HCPCS | Performed by: INTERNAL MEDICINE

## 2022-09-28 PROCEDURE — 1036F TOBACCO NON-USER: CPT | Performed by: INTERNAL MEDICINE

## 2022-09-28 PROCEDURE — 99213 OFFICE O/P EST LOW 20 MIN: CPT | Performed by: INTERNAL MEDICINE

## 2022-09-28 PROCEDURE — G8417 CALC BMI ABV UP PARAM F/U: HCPCS | Performed by: INTERNAL MEDICINE

## 2022-09-28 PROCEDURE — 99211 OFF/OP EST MAY X REQ PHY/QHP: CPT

## 2022-09-28 RX ORDER — AMLODIPINE BESYLATE 5 MG/1
TABLET ORAL
Qty: 30 TABLET | Refills: 2 | OUTPATIENT
Start: 2022-09-28

## 2022-09-28 NOTE — PATIENT INSTRUCTIONS
Rules:  Count every calorie every day  Limit sweets to one day per month  Limit chips/crackers/pretzels/nuts to 100 michael/day  Eliminate all sugar sweetened beverages (including fruit juice)  Limit restaurants (including fast food and food from a convenience store) to one time every two weeks    Targets:  Limit calorie intake to 1700 calories/day  Walk 5 minutes daily  Avoid eating 2 hours within bedtime. Tips:  Do not eat outside of the dining room or the kitchen  Do not eat while watching TV, videos, working on the computer or using a smart phone  Do not eat food out of a multi-serving bag or container.     Other:  Cont in process to have a bariatric surgery    Return to see me in 6-8 weeks    See the dietician in 3-4 weeks    Return to see me in 6-8 weeks

## 2022-09-28 NOTE — PROGRESS NOTES
CC -   PRIYA, Obesity    BACKGROUND -   Last visit: 07/15/22  First visit: 01/13/21     PRIYA  Diagnosed 11/09/20  Mild  Followed by Dr. Esperanza Hinton  Using APAP 7d/wk, uses <3 hrs for 3d/wk  Excess weight is worsening her underlying airway obstruction    Obesity   Began in late teens  Initial BMI 67.3, Wt 417.2 lbs  HS Grad wt 150 lbs  Lowest   wt  150 lbs  Highest  wt  417 lbs  Pattern of wt gain: grad with rapid increases in preg  Wt change past yr: +30 lbs  Most wt lost: 20 lbs (pre-Bariatric surgery diet at our clinic)  Other diets attempted: Fad diets, ESTEBAN, Keto, Calorie counting    Initial Diet:    Number of meals per day - 1-2    Number of snacks per day - 1    Meal volume - 12\" plate, sometimes seconds    Fast food/convenience store - 2x/week    Restaurants (not fast food) - 0x/week   Sweets - 1d/week   Chips - 0d/week   Crackers/pretzels - 0d/week   Nuts - 0d/week   Peanut Butter - 1-2d/week   Popcorn - 0d/week   Dried fruit - 0d/week   Whole fruit - 5-6d/week (2 serving)   Breakfast cereal - 0-1d/week   [place]/Protein/Energy bar - 0d/week   Sugar sweetened beverages - 4 liters reg soda/day, 1 large DD isabel Iced coffee with extra cream and sugar 4x/wk (350 michael each)    Protein - No supplements   Fiber - No supplements     Exercise:    Gym membership - [employer]    Walking - none    Running - none    Resistance - none    Aerobic class - none    ______________________    [address] [place] [address] -  Past Medical History:   Diagnosis Date    Asthma     B12 deficiency 09/21/2018    Class 3 severe obesity due to excess calories with body mass index (BMI) of 60.0 to 69.9 in adult [facility])     GERD without esophagitis     Hypertension     Migraine     PRIYA (obstructive sleep apnea)     sleep apnea 4-11    Prolonged emergence from general anesthesia     Vitamin D deficiency 09/21/2018    Zinc deficiency 09/21/2018     Current Outpatient Medications   Medication Sig Dispense Refill    metoprolol succinate (TOPROL XL) 50 MG extended release tablet Take 1 tablet by mouth daily 90 tablet 1    guanFACINE (TENEX) 1 MG tablet Take 1 tablet by mouth nightly 90 tablet 1    magnesium oxide (MAG-OX) 400 MG tablet Take 1 tablet by mouth daily 90 tablet 1    hydroCHLOROthiazide (HYDRODIURIL) 50 MG tablet Take 1 tablet by mouth daily 90 tablet 1    hydrALAZINE (APRESOLINE) 25 MG tablet Take 1 tablet by mouth in the morning and at bedtime 90 tablet 3    albuterol sulfate  (90 Base) MCG/ACT inhaler Inhale 2 puffs into the lungs every 6 hours as needed for Wheezing 8.5 each 1    losartan (COZAAR) 100 MG tablet TAKE 1 TABLET BY MOUTH EVERY DAY 30 tablet 1    sertraline (ZOLOFT) 25 MG tablet Take 1 tablet by mouth daily 30 tablet 5    cyanocobalamin ([employer] VITAMIN B12) 1000 MCG tablet TAKE 1 TABLET BY MOUTH EVERY DAY 30 tablet 3    amLODIPine (NORVASC) 10 MG tablet Take 1 tablet by mouth daily 90 tablet 1    Multiple Vitamins-Minerals (THERAPEUTIC MULTIVITAMIN-MINERALS) tablet Take 1 tablet by mouth daily       No current facility-administered medications for this visit. PE -  Gen : BP (!) 160/107 (Site: Left Lower Arm, Position: Sitting, Cuff Size: Large Adult)   Pulse 90   Temp 97.3 °F (36.3 °C) (Temporal)   Ht 5' 6\" (1.676 m)   Wt (!) 404 lb 6.4 oz (183.4 kg)   LMP 09/22/2022 (Exact Date)   BMI 65.27 kg/m²      WN, WD, NAD  Lung: Nml resp effor  Psych: Normal mood   Full affect  Neuro:  Moves all ext well  ______________________      HISTORY & ASSESSMENT/PLAN -     Problem 1 - PRIYA  HPI - See above Background for description   Using her autopap 6-7d/wk at least 4 hours/night   Daytime drowsiness, past week: 3-4/10   Needs to reduce her underlying airway obstruction by decreasing her excess weight   Her PRIYA may be contributing to her hypertension  Assessment  - Controlled  Plan  -   Cont use of autopap  Cont with wt reduction per the plan below    Problem 2  - Obesity   HPI   - See above Background for description   Weight  Date   417.2 lbs 01/13/21   422.8 lbs 02/10/21   423.0 lbs 04/07/21    419.6 lbs 06/03/21   414.0 lbs 07/22/21 Phentermine #1   400.0 lbs 08/25/21 Phentermine #2   395.8 lbs 09/28/21 Phentermine #3   389.0 lbs 11/11/21   386.6 lbs 01/04/22 Started topamax   380.2 lbs 03/23/22 Topamax   389.6 lbs 06/10/22 Stopped topamax prior to 4/27/22 b/c of edginess   399.0 lbs 07/15/22    404.4 lbs 09/28/22     Total weight change to date: 12.8 lbs (18.6 lbs if going by 4/07/21 date)  DEN = 2350 michael/d = 16,450 michael/wk  Avg daily energy variance:   04/07/21-06/03/21 = - 3.4 lbs /57d = -   209 michael/d deficit   06/03/21-07/22/21 = - 5.6 lbs /49d = -   400 michael/d deficit   07/22/21-08/25/21 = - 14.0 lbs /34d = - 1,441 michael/d deficit   08/25/21-09/28/21 = -   4.2 lbs /34d = -   432 michael/d deficit   09/28/21-11/21/21 = -   6.8 lbs /44d = -   540 michael/d deficit   11/21/21-01/04/22 = -   2.4 lbs /54d = -    155 michael/d deficit  (had Covid and lost taste and smell)   01/04/22-03/23/22 = -   6.4 lbs /78d = -   287 michael/d deficit   03/23/22-06/10/22 = +   9.4 lbs /79d = + 416 michael/d excess   06/10/22-07/15/22 = +  9.4 lbs /35d = + 940 michael/d excess   07/15/22-09/27/22 = +  5.4 lbs/74d = + 255 michael/d excess  Wt effect of trouble foods = Restaurant 300 michael/wk + Sweets 200 + SSBs 12,300 = 12,800 michael/wk = 78% DEN = 1825 michael/day = 182 lbs/yr  Initial thought was that eliminating reg soda may produce a sufficient rate of wt loss. However, it did not.   Therefore, discussed the options of a counting-based regimen and a VLCD  She decided on the counting-based program  Update:  Still working towards a bariatric surgery  Calorie monitoring: counting calories 4-5d/wk, usual intake 1500 michael/d  Sweets: 0d/mo  Salty snacks: 1-2d/wk, <100 michael  SSB: none  Restaurant food: 0x/month (other than a few sips from children's sports drinks and juices some)  Contraception -  BTL  Assessment  -  Worsened, needs to fully adhere to her plan - especially counting calories  Plan   - Rules:  Count every calorie every day  Limit sweets to one day per month  Limit chips/crackers/pretzels/nuts to 100 michael/day  Eliminate all sugar sweetened beverages (including fruit juice)  Limit restaurants (including fast food and food from a convenience store) to one time every two weeks    Targets:  Limit calorie intake to 1700 calories/day  Walk 5 minutes daily  Avoid eating 2 hours within bedtime. Tips:  Do not eat outside of the dining room or the kitchen  Do not eat while watching TV, videos, working on the computer or using a smart phone  Do not eat food out of a multi-serving bag or container.     Other:  Cont in process to have a bariatric surgery      Elevated BP: check BP at home, if remains consistently high, then contact PCP     Return to see me in 6-8 weeks    See the dietician in 3-4 weeks      Hamzah Blake MD  Endocrinology/Obesity  9/28/22

## 2022-09-29 ENCOUNTER — TELEPHONE (OUTPATIENT)
Dept: BARIATRICS/WEIGHT MGMT | Age: 37
End: 2022-09-29

## 2022-10-03 ENCOUNTER — SCHEDULED TELEPHONE ENCOUNTER (OUTPATIENT)
Dept: BARIATRICS/WEIGHT MGMT | Age: 37
End: 2022-10-03

## 2022-10-03 DIAGNOSIS — Z71.3 NUTRITIONAL COUNSELING: ICD-10-CM

## 2022-10-03 DIAGNOSIS — Z00.8 NUTRITIONAL ASSESSMENT: Primary | ICD-10-CM

## 2022-10-03 PROCEDURE — 99999 PR OFFICE/OUTPT VISIT,PROCEDURE ONLY: CPT | Performed by: DIETITIAN, REGISTERED

## 2022-10-03 NOTE — PROGRESS NOTES
James Brandon called the pt and scheduled the pt for the following. Pt is aware he/she needs to be down to their pre-op weight loss goal when they come in for their weight check or their sx will be cx. Pt verbalized understanding. Pre-op weight loss goal reviewed. Pt scheduled for the following see below. Pt is aware supplements are cash, check, credit or debt card. SX Type: RYGB  SX Date: 11/28/22  H&P Appt: Dr. Bridget Singletary ???  Weight Check Appt: This will occur after the 3 hour class at the [facility]  3 Hour Class: At the [facility] From 8:00 - 11:00 pm on - 11/2/22 - RYGB Class  Supplements: Pt was provided a handout on approved protein supplements for use after WLS. (Handout - Emailed 10/3/22) Pt was instructed he / she will need to bring his / her protein supplements to the class in order to move forward with sx or sx can be cx. Handouts reviewed and provided. Pt verbalized understanding. Pt will purchase the Bariatric approved MVI, Fe+, Ca+ and Vit D at the [facility]. 2 Hour Pt Education Book: Pt needs      Pt was instructed he / she can call any time with questions. Goal Weight 389 lbs - Pt has copy of pre-op diet. Enc pt to follow pre-op diet to get down to pre-op weight loss goal. Pt verbalized understanding.   Pt is aware sx can be cx by his / her surgeon at H&P appt if he / she feels pt has not achieved pre-op weight loss goal.

## 2022-10-09 DIAGNOSIS — E66.01 CLASS 3 SEVERE OBESITY DUE TO EXCESS CALORIES WITHOUT SERIOUS COMORBIDITY WITH BODY MASS INDEX (BMI) OF 60.0 TO 69.9 IN ADULT (HCC): ICD-10-CM

## 2022-10-10 ENCOUNTER — PREP FOR PROCEDURE (OUTPATIENT)
Dept: BARIATRICS/WEIGHT MGMT | Age: 37
End: 2022-10-10

## 2022-10-10 ENCOUNTER — TELEPHONE (OUTPATIENT)
Dept: BARIATRICS/WEIGHT MGMT | Age: 37
End: 2022-10-10

## 2022-10-10 RX ORDER — TOPIRAMATE 25 MG/1
TABLET ORAL
Qty: 180 TABLET | Refills: 1 | OUTPATIENT
Start: 2022-10-10

## 2022-10-10 NOTE — TELEPHONE ENCOUNTER
Per order of Dr. Stephan Ascencio patient is ready to be scheduled for LRYGB. Call placed to patient and SHE is in agreement with surgery on 11-28-22. Pre-op class IS scheduled and patient knows she will need to purchase supplements at that visit. Pre-op letter will be mailed. She is working towards pre-op weight loss goal.   She does not smoke and will refrain from alcohol use. We reviewed at length all meds to avoid 2 weeks prior to surgery and patient voiced understanding. This list is in the pre-op letter which will be mailed to patient. Reviewed with patient instructions for Gatorade the night before surgery. Included written instructions with pre-op letter with consideration if patient is on insulin. She uses a CPAP with a setting of unknown. She has final medical clearance appointment scheduled. Instructed to discuss with PCP extended release medications. Patient placed on [facility] calendar. Patient surgery ordered in Gen9. Patient denies latex allergy. Patient denies PONV.

## 2022-10-11 ENCOUNTER — TELEPHONE (OUTPATIENT)
Dept: FAMILY MEDICINE CLINIC | Age: 37
End: 2022-10-11

## 2022-10-11 DIAGNOSIS — D64.9 ANEMIA, UNSPECIFIED TYPE: Primary | ICD-10-CM

## 2022-10-11 NOTE — TELEPHONE ENCOUNTER
Called patient and discussed labs. Patient had just finished period and she reports her periods are heavy. Will repeat this week. Start iron as needed.

## 2022-10-15 DIAGNOSIS — I10 ESSENTIAL HYPERTENSION: ICD-10-CM

## 2022-10-15 DIAGNOSIS — E66.01 CLASS 3 SEVERE OBESITY DUE TO EXCESS CALORIES WITHOUT SERIOUS COMORBIDITY WITH BODY MASS INDEX (BMI) OF 60.0 TO 69.9 IN ADULT (HCC): ICD-10-CM

## 2022-10-17 RX ORDER — LOSARTAN POTASSIUM 100 MG/1
TABLET ORAL
Qty: 30 TABLET | Refills: 1 | Status: SHIPPED | OUTPATIENT
Start: 2022-10-17

## 2022-10-17 RX ORDER — TOPIRAMATE 25 MG/1
TABLET ORAL
Qty: 180 TABLET | Refills: 1 | OUTPATIENT
Start: 2022-10-17

## 2022-11-02 ENCOUNTER — INITIAL CONSULT (OUTPATIENT)
Dept: BARIATRICS/WEIGHT MGMT | Age: 37
End: 2022-11-02

## 2022-11-02 DIAGNOSIS — Z00.8 NUTRITIONAL ASSESSMENT: Primary | ICD-10-CM

## 2022-11-02 DIAGNOSIS — Z71.3 NUTRITIONAL COUNSELING: ICD-10-CM

## 2022-11-02 PROCEDURE — 99999 PR OFFICE/OUTPT VISIT,PROCEDURE ONLY: CPT | Performed by: SURGERY

## 2022-11-16 ENCOUNTER — TELEPHONE (OUTPATIENT)
Dept: FAMILY MEDICINE CLINIC | Age: 37
End: 2022-11-16

## 2022-11-16 DIAGNOSIS — D64.9 ANEMIA, UNSPECIFIED TYPE: ICD-10-CM

## 2022-11-16 LAB
BASOPHILS ABSOLUTE: 0.04 E9/L (ref 0–0.2)
BASOPHILS RELATIVE PERCENT: 0.4 % (ref 0–2)
EOSINOPHILS ABSOLUTE: 0.28 E9/L (ref 0.05–0.5)
EOSINOPHILS RELATIVE PERCENT: 3 % (ref 0–6)
HCT VFR BLD CALC: 37.9 % (ref 34–48)
HEMOGLOBIN: 11.4 G/DL (ref 11.5–15.5)
IMMATURE GRANULOCYTES #: 0.02 E9/L
IMMATURE GRANULOCYTES %: 0.2 % (ref 0–5)
LYMPHOCYTES ABSOLUTE: 2.14 E9/L (ref 1.5–4)
LYMPHOCYTES RELATIVE PERCENT: 23.1 % (ref 20–42)
MCH RBC QN AUTO: 24.2 PG (ref 26–35)
MCHC RBC AUTO-ENTMCNC: 30.1 % (ref 32–34.5)
MCV RBC AUTO: 80.3 FL (ref 80–99.9)
MONOCYTES ABSOLUTE: 0.68 E9/L (ref 0.1–0.95)
MONOCYTES RELATIVE PERCENT: 7.3 % (ref 2–12)
NEUTROPHILS ABSOLUTE: 6.11 E9/L (ref 1.8–7.3)
NEUTROPHILS RELATIVE PERCENT: 66 % (ref 43–80)
PDW BLD-RTO: 17.2 FL (ref 11.5–15)
PLATELET # BLD: 291 E9/L (ref 130–450)
PMV BLD AUTO: 11.3 FL (ref 7–12)
RBC # BLD: 4.72 E12/L (ref 3.5–5.5)
WBC # BLD: 9.3 E9/L (ref 4.5–11.5)

## 2022-11-16 NOTE — TELEPHONE ENCOUNTER
Tamika Tyler called in and Dr. Shaggy Plummer is needing her Toprol XL changed from and extended release to a regular Toprol. She is asking if you could please change and send in a new script to her pharmacy. She is getting her labs done today also.     Please advise    Thank you

## 2022-11-17 ENCOUNTER — OFFICE VISIT (OUTPATIENT)
Dept: BARIATRICS/WEIGHT MGMT | Age: 37
End: 2022-11-17
Payer: COMMERCIAL

## 2022-11-17 VITALS
HEART RATE: 97 BPM | HEIGHT: 68 IN | BODY MASS INDEX: 44.41 KG/M2 | RESPIRATION RATE: 20 BRPM | WEIGHT: 293 LBS | DIASTOLIC BLOOD PRESSURE: 121 MMHG | SYSTOLIC BLOOD PRESSURE: 208 MMHG | TEMPERATURE: 97.1 F

## 2022-11-17 DIAGNOSIS — Z98.890 PONV (POSTOPERATIVE NAUSEA AND VOMITING): ICD-10-CM

## 2022-11-17 DIAGNOSIS — K91.2 MALNUTRITION FOLLOWING GASTROINTESTINAL SURGERY: Primary | ICD-10-CM

## 2022-11-17 DIAGNOSIS — K21.9 GASTROESOPHAGEAL REFLUX DISEASE WITHOUT ESOPHAGITIS: ICD-10-CM

## 2022-11-17 DIAGNOSIS — R11.2 PONV (POSTOPERATIVE NAUSEA AND VOMITING): ICD-10-CM

## 2022-11-17 PROCEDURE — G8427 DOCREV CUR MEDS BY ELIG CLIN: HCPCS | Performed by: SURGERY

## 2022-11-17 PROCEDURE — 99214 OFFICE O/P EST MOD 30 MIN: CPT | Performed by: SURGERY

## 2022-11-17 PROCEDURE — 3074F SYST BP LT 130 MM HG: CPT | Performed by: SURGERY

## 2022-11-17 PROCEDURE — 99213 OFFICE O/P EST LOW 20 MIN: CPT

## 2022-11-17 PROCEDURE — G8484 FLU IMMUNIZE NO ADMIN: HCPCS | Performed by: SURGERY

## 2022-11-17 PROCEDURE — 3078F DIAST BP <80 MM HG: CPT | Performed by: SURGERY

## 2022-11-17 PROCEDURE — 1036F TOBACCO NON-USER: CPT | Performed by: SURGERY

## 2022-11-17 PROCEDURE — G8417 CALC BMI ABV UP PARAM F/U: HCPCS | Performed by: SURGERY

## 2022-11-17 RX ORDER — SUCRALFATE 1 G/1
0.5 TABLET ORAL 4 TIMES DAILY
Qty: 120 TABLET | Refills: 1 | Status: SHIPPED | OUTPATIENT
Start: 2022-11-17

## 2022-11-17 RX ORDER — ONDANSETRON 4 MG/1
4 TABLET, ORALLY DISINTEGRATING ORAL EVERY 8 HOURS PRN
Qty: 15 TABLET | Refills: 0 | Status: SHIPPED | OUTPATIENT
Start: 2022-11-17

## 2022-11-17 NOTE — PATIENT INSTRUCTIONS
Please follow the instruction sheets you received today for your pre-surgical skin and bowel prep. It is very important that your abdomen is properly cleaned and your bowel is cleansed of stool prior to surgery to decrease the chance of any complications. Make sure that you do not eat food or drink fluids after midnight prior to your surgery. If you eat or drink within 8 hours of your surgery, your procedure will be cancelled. Hold your medications as well unless you receive special instruction from either your surgeon or the anesthesiologist to take one of your medications with a small sip of water. Please be advised that most patients are now released from the hospital the day after surgery, regardless of procedure (Band, Bypass, or Sleeve), if they are progressing well and feel ready for discharge. You will see your surgeon prior to your hospital release so that he can examine you and discuss your discharge needs. Please call the [facility] with any questions you may have [phone]. Skin Prep    Home Instruction for Preoperative Antibacterial Dial Soap    Reason for using this soap before surgery:    - To decrease the potential for wound infection after surgery    How to use:    - Obtain Antibacterial Dial soap, either in bar or liquid form from your local store. The soap must be the Antibacterial type of Dial.    Unless you are allergic to this product or notice that it is causing skin irritation, wash with this soap from now until surgery. Make sure to shower with this soap the morning of your surgery before departing for the hospital.    Use a clean wash cloth or new body sponge. Wash from the neck down, paying particular attention to the abdominal area and belly button. Be gentle. DO NOT irritate or scrub the skin until red. Rinse thoroughly and pat dry with a clean towel. Dress with clean clothing.     Do not apply lotions or powders the morning of surgery.

## 2022-11-17 NOTE — H&P (VIEW-ONLY)
Andrew Mckeon Duecaster  11/17/2022  [facility]               History and Physical  Gastric Bypass (23557)     CHIEF COMPLAINT: Morbid obesity    HISTORY OF PRESENT ILLNESS: Soha Rey is a morbidly-obese 29 y.o.  female who weighs (!) 402 lb (182.3 kg), Body mass index is 61.12 kg/m². She has multiple medical problems aggravated by her obesity. She wishes to have a gastric bypass so that she can lose more weight and keep the weight off. I have met with her on 3 different occasions in the [facility], where we discussed the surgery in great detail and went over the risks and benefits. She has watched our informational video so she understands all of the extensive risks involved. She states that she understands all of these risks and wishes to proceed. Weights:  402 lb 11/17/2022  bypass  392 lb 9/2021  initial    Past Medical History:     Asthma     Morbid obesity ([address]     PRIYA (obstructive sleep apnea)     Essential hypertension    Past Surgical History:   Procedure Laterality Date    CHOLECYSTECTOMY, LAPAROSCOPIC  3/2014 Jonny Drow    with repair umbilical hernia    LEEP  3/2008    OTHER SURGICAL HISTORY  2011    coils to fallopian tubes    OTHER SURGICAL HISTORY      Tubal essure     TONSILLECTOMY AND ADENOIDECTOMY      TYMPANOSTOMY TUBE PLACEMENT      UMBILICAL HERNIA REPAIR  3/2014 [facility]    UPPER GASTROINTESTINAL ENDOSCOPY N/A 5/2/2018    EGD BIOPSY performed by Sheldon Kasper MD at [place] [address] N/A 9/7/2021    EGD BIOPSY performed by Sheldon Kasper MD at [facility] ENDOSCOPY      Allergies: Lactose intolerance (gi)     Home Medications  Prior to Visit Medications    Medication Sig Taking?  Authorizing Provider   sucralfate (CARAFATE) 1 GM tablet Take 0.5 tablets by mouth 4 times daily Yes Sheldon Kasper MD   ondansetron (ZOFRAN-ODT) 4 MG disintegrating tablet Take 1 tablet by mouth every 8 hours as needed for Nausea or Vomiting Yes Kody Santos MD   losartan (COZAAR) 100 MG tablet TAKE 1 TABLET BY MOUTH EVERY DAY Yes Francisco Javier Sanchez MD   metoprolol succinate (TOPROL XL) 50 MG extended release tablet Take 1 tablet by mouth daily Yes Francisco Javier Sanchez MD   guanFACINE (TENEX) 1 MG tablet Take 1 tablet by mouth nightly Yes Francisco Javier Sanchez MD   magnesium oxide (MAG-OX) 400 MG tablet Take 1 tablet by mouth daily Yes Francisco Javier Sanchez MD   hydroCHLOROthiazide (HYDRODIURIL) 50 MG tablet Take 1 tablet by mouth daily Yes Francisco Javier Sanchez MD   hydrALAZINE (APRESOLINE) 25 MG tablet Take 1 tablet by mouth in the morning and at bedtime Yes Griselda Cohen MD   albuterol sulfate  (90 Base) MCG/ACT inhaler Inhale 2 puffs into the lungs every 6 hours as needed for Wheezing Yes Danisha Weiss MD   sertraline (ZOLOFT) 25 MG tablet Take 1 tablet by mouth daily Yes Francisco Javier Sanchez MD   cyanocobalamin ([employer] VITAMIN B12) 1000 MCG tablet TAKE 1 TABLET BY MOUTH EVERY DAY Yes Francisco Javier Sanchez MD   amLODIPine (NORVASC) 10 MG tablet Take 1 tablet by mouth daily Yes Francisco Javier Sanchez MD   Multiple Vitamins-Minerals (THERAPEUTIC MULTIVITAMIN-MINERALS) tablet Take 1 tablet by mouth daily Yes Historical Provider, MD     Social History:   TOBACCO:   reports that she quit smoking about 21 months ago. Her smoking use included cigarettes. She has a 3.00 pack-year smoking history. She has never used smokeless tobacco.  All smokers must join the free smoking cessation program and stop smoking for 3 months before having any Bariatric surgery. ETOH:    reports current alcohol use of about 2.0 - 3.0 standard drinks per week.   Family History   Problem Relation Age of Onset    Cancer Paternal Grandmother         stomach    Brain Cancer Maternal Grandmother     Hypertension Father     Stroke Father     Heart Attack Father     Diabetes Father     Other Father         gout    Hypertension Mother     Scoliosis Sister     Diabetes Maternal Grandfather     Heart Disease Maternal Grandfather      Review of Systems:  Psychiatric:  depression and anxiety  Respiratory: negative  Cardiovascular: negative  Gastrointestinal: negative  Musculoskeletal:negative  All others reviewed, negative    Physical Exam:   VITALS: Blood pressure (!) 208/121, pulse 97, temperature 97.1 °F (36.2 °C), temperature source Temporal, resp. rate 20, height 5' 8\" (1.727 m), weight (!) 402 lb (182.3 kg), not currently breastfeeding. General appearance: alert, appears stated age and cooperative, does ambulate easily  Head: Normocephalic, without obvious abnormality, atraumatic  Eyes: PERRL  Ears/mouth/throat:  Ears clear, mouth normal, throat no redness  Neck: no adenopathy, no JVD, supple, symmetrical, trachea midline and thyroid not enlarged  Lungs: clear to auscultation bilaterally  Heart: regular rate and rhythm  Abdomen: soft, non-tender; bowel sounds normal; no masses,  no organomegaly  Extremities: extremities normal, atraumatic, no cyanosis or edema  Skin: no open wounds    Assessment:  Morbid obesity with failure of conservative therapy. Patient has been cleared psychologically and medically. EGD 9/7/2021 showed no gastritis, biopsy done to check for H.pylori WILMER was negative, no duodenitis, no esophagitis, no hiatal hernia. She does complain of acid reflux on Tums and the gallbladder is out. Labs showed low B1 so she was started on supplements. The patient was informed that risks include, but are not limited to: death, anastomotic leak, bowel obstruction, bleeding, and sepsis. Any of these could require further surgery. Other risks include heart attack, DVT, PE, pneumonia, hernia, wound infection, the need for dilatations of the gastrojejunostomy, and the inability to lose appropriate weight and keep it off. We may not be able to do a Gastic Bypass due to unforseen scar tissue, in that case we might do a Sleeve Gastrectomy.  We discussed that our goal is to ameliorate the medical problems and not to obtain a specific body mass index. She understands the risks and benefits and wishes to proceed with the procedure and has signed the consent form. She will go home with Acetaminophen for pain, Carafate 1/2 tablet every 4 hours for ulcer prophylaxis and Zofran and Scopolamine for nausea. Instructed to drink two 24 ounce bottles of G 2 the night before surgery and another 6 hours pre-op. Plan:  ([id number]) Gwendolyn-en-Y Gastric Bypass, robotic, possible open. She does not need Lovenox at home post-op. Physician Signature: Electronically signed by Dr. Arie Diaz MD    Send copy of H&P to PCP, Bashir Da Silva.  Edwin Boswell MD

## 2022-11-17 NOTE — PROGRESS NOTES
Latosha Mantilla Duecaster  11/17/2022  [facility]               History and Physical  Gastric Bypass (66499)     CHIEF COMPLAINT: Morbid obesity    HISTORY OF PRESENT ILLNESS: Tres Blanchard is a morbidly-obese 29 y.o.  female who weighs (!) 402 lb (182.3 kg), Body mass index is 61.12 kg/m². She has multiple medical problems aggravated by her obesity. She wishes to have a gastric bypass so that she can lose more weight and keep the weight off. I have met with her on 3 different occasions in the [facility], where we discussed the surgery in great detail and went over the risks and benefits. She has watched our informational video so she understands all of the extensive risks involved. She states that she understands all of these risks and wishes to proceed. Weights:  402 lb 11/17/2022  bypass  392 lb 9/2021  initial    Past Medical History:     Asthma     Morbid obesity ([address]     PRIYA (obstructive sleep apnea)     Essential hypertension    Past Surgical History:   Procedure Laterality Date    CHOLECYSTECTOMY, LAPAROSCOPIC  3/2014 Bard Darter    with repair umbilical hernia    LEEP  3/2008    OTHER SURGICAL HISTORY  2011    coils to fallopian tubes    OTHER SURGICAL HISTORY      Tubal essure     TONSILLECTOMY AND ADENOIDECTOMY      TYMPANOSTOMY TUBE PLACEMENT      UMBILICAL HERNIA REPAIR  3/2014 [facility]    UPPER GASTROINTESTINAL ENDOSCOPY N/A 5/2/2018    EGD BIOPSY performed by Melania Rojas MD at [address] [place] [address] N/A 9/7/2021    EGD BIOPSY performed by Melania Rojas MD at [facility] ENDOSCOPY      Allergies: Lactose intolerance (gi)     Home Medications  Prior to Visit Medications    Medication Sig Taking?  Authorizing Provider   sucralfate (CARAFATE) 1 GM tablet Take 0.5 tablets by mouth 4 times daily Yes Melania Rojas MD   ondansetron (ZOFRAN-ODT) 4 MG disintegrating tablet Take 1 tablet by mouth every 8 hours as needed for Nausea or Vomiting Disease Maternal Grandfather      Review of Systems:  Psychiatric:  depression and anxiety  Respiratory: negative  Cardiovascular: negative  Gastrointestinal: negative  Musculoskeletal:negative  All others reviewed, negative    Physical Exam:   VITALS: Blood pressure (!) 208/121, pulse 97, temperature 97.1 °F (36.2 °C), temperature source Temporal, resp. rate 20, height 5' 8\" (1.727 m), weight (!) 402 lb (182.3 kg), not currently breastfeeding. General appearance: alert, appears stated age and cooperative, does ambulate easily  Head: Normocephalic, without obvious abnormality, atraumatic  Eyes: PERRL  Ears/mouth/throat:  Ears clear, mouth normal, throat no redness  Neck: no adenopathy, no JVD, supple, symmetrical, trachea midline and thyroid not enlarged  Lungs: clear to auscultation bilaterally  Heart: regular rate and rhythm  Abdomen: soft, non-tender; bowel sounds normal; no masses,  no organomegaly  Extremities: extremities normal, atraumatic, no cyanosis or edema  Skin: no open wounds    Assessment:  Morbid obesity with failure of conservative therapy. Patient has been cleared psychologically and medically. EGD 9/7/2021 showed no gastritis, biopsy done to check for H.pylori WILMER was negative, no duodenitis, no esophagitis, no hiatal hernia. She does complain of acid reflux on Tums and the gallbladder is out. Labs showed low B1 so she was started on supplements. The patient was informed that risks include, but are not limited to: death, anastomotic leak, bowel obstruction, bleeding, and sepsis. Any of these could require further surgery. Other risks include heart attack, DVT, PE, pneumonia, hernia, wound infection, the need for dilatations of the gastrojejunostomy, and the inability to lose appropriate weight and keep it off. We may not be able to do a Gastic Bypass due to unforseen scar tissue, in that case we might do a Sleeve Gastrectomy.  We discussed that our goal is to ameliorate the medical problems and

## 2022-11-18 ENCOUNTER — HOSPITAL ENCOUNTER (OUTPATIENT)
Dept: PREADMISSION TESTING | Age: 37
Discharge: HOME | End: 2022-11-18
Payer: COMMERCIAL

## 2022-11-18 VITALS
HEART RATE: 74 BPM | TEMPERATURE: 97.3 F | WEIGHT: 293 LBS | RESPIRATION RATE: 18 BRPM | OXYGEN SATURATION: 97 % | SYSTOLIC BLOOD PRESSURE: 148 MMHG | BODY MASS INDEX: 44.41 KG/M2 | HEIGHT: 68 IN | DIASTOLIC BLOOD PRESSURE: 96 MMHG

## 2022-11-18 DIAGNOSIS — Z01.818 PRE-OP TESTING: Primary | ICD-10-CM

## 2022-11-18 LAB
ALBUMIN SERPL-MCNC: 3.8 G/DL (ref 3.5–5.2)
ALP BLD-CCNC: 74 U/L (ref 35–104)
ALT SERPL-CCNC: 9 U/L (ref 0–32)
ANION GAP SERPL CALCULATED.3IONS-SCNC: 8 MMOL/L (ref 7–16)
AST SERPL-CCNC: 10 U/L (ref 0–31)
BILIRUB SERPL-MCNC: 0.6 MG/DL (ref 0–1.2)
BUN BLDV-MCNC: 15 MG/DL (ref 6–20)
CALCIUM SERPL-MCNC: 8.9 MG/DL (ref 8.6–10.2)
CHLORIDE BLD-SCNC: 103 MMOL/L (ref 98–107)
CO2: 26 MMOL/L (ref 22–29)
CREAT SERPL-MCNC: 0.8 MG/DL (ref 0.5–1)
GFR SERPL CREATININE-BSD FRML MDRD: >60 ML/MIN/1.73
GLUCOSE BLD-MCNC: 108 MG/DL (ref 74–99)
POTASSIUM REFLEX MAGNESIUM: 4.5 MMOL/L (ref 3.5–5)
SODIUM BLD-SCNC: 137 MMOL/L (ref 132–146)
TOTAL PROTEIN: 6.3 G/DL (ref 6.4–8.3)

## 2022-11-18 PROCEDURE — 36415 COLL VENOUS BLD VENIPUNCTURE: CPT

## 2022-11-18 PROCEDURE — 93005 ELECTROCARDIOGRAM TRACING: CPT | Performed by: ANESTHESIOLOGY

## 2022-11-18 PROCEDURE — 80053 COMPREHEN METABOLIC PANEL: CPT

## 2022-11-18 RX ORDER — METOPROLOL TARTRATE 50 MG/1
50 TABLET, FILM COATED ORAL 2 TIMES DAILY
Qty: 60 TABLET | Refills: 1 | Status: SHIPPED | OUTPATIENT
Start: 2022-11-18

## 2022-11-18 NOTE — PROGRESS NOTES
[address] [facility]                                                                                                                    PRE OP INSTRUCTIONS FOR  Meryl Mercado        Date: 11/18/2022    Date of surgery: 11/28/22   Arrival Time: Hospital will call you between 5pm and 7pm with your final arrival time for surgery    Nothing by mouth (NPO) as instructed.____folow NPO instructions given by surgical weightloss________________________________________________________________    Take the following medications with a small sip of water on the morning of Surgery: metoprolol, amlodipine, hydralazine     Diabetics may take evening dose of insulin but none after midnight. If you feel symptomatic or low blood sugar morning of surgery drink 1-2 ounces of apple juice only. Aspirin, Ibuprofen, Advil, Naproxen, Vitamin E and other Anti-inflammatory products should be stopped  before surgery  as directed by your physician. Take Tylenol only unless instructed otherwise by your surgeon. Check with your Doctor regarding stopping Plavix, Coumadin, Lovenox, Eliquis, Effient, or other blood thinners. Do not smoke,use illicit drugs and do not drink any alcoholic beverages 24 hours prior to surgery. You may brush your teeth the morning of surgery. DO NOT SWALLOW WATER    You MUST make arrangements for a responsible adult to take you home after your surgery. You will not be allowed to leave alone or drive yourself home. It is strongly suggested someone stay with you the first 24 hrs. Your surgery will be cancelled if you do not have a ride home. PEDIATRIC PATIENTS ONLY:  A parent/legal guardian must accompany a child scheduled for surgery and plan to stay at the hospital until the child is discharged. Please do not bring other children with you.     Please wear simple, loose fitting clothing to the hospital.  Do not bring valuables (money, credit cards, checkbooks, etc.) Do not wear any makeup (including no eye makeup) or nail polish on your fingers or toes. DO NOT wear any jewelry or piercings on day of surgery. All body piercing jewelry must be removed. Shower the night before surgery with __x_Antibacterial soap /FORREST WIPES________May use deodorant. No creams lotions or powders from the neck down. TOTAL JOINT REPLACEMENT/HYSTERECTOMY PATIENTS ONLY---Remember to bring Blood Bank bracelet to the hospital on the day of surgery. If you have a Living Will and Durable Power of  for Healthcare, please bring in a copy. If appropriate bring crutches, inspirex, WALKER, CANE etc... Notify your Surgeon if you develop any illness between now and surgery time, cough, cold, fever, sore throat, nausea, vomiting, etc.  Please notify your surgeon if you experience dizziness, shortness of breath or blurred vision between now & the time of your surgery. If you have ___dentures, they will be removed before going to the OR; we will provide you a container. If you wear ___contact lenses or ___glasses, they will be removed; please bring a case for them. To provide excellent care visitors will be limited to 2 in the room at any given time. Please bring picture ID and insurance card. During flu season no children under the age of 15 are permitted in the hospital for the safety of all patients. Other please check in at the information desk/main lobby. Please call AMBULATORY CARE if you have any further questions.    [facility]     [address]

## 2022-11-19 LAB
EKG ATRIAL RATE: 69 BPM
EKG P AXIS: -16 DEGREES
EKG P-R INTERVAL: 188 MS
EKG Q-T INTERVAL: 416 MS
EKG QRS DURATION: 90 MS
EKG QTC CALCULATION (BAZETT): 445 MS
EKG R AXIS: 25 DEGREES
EKG T AXIS: 23 DEGREES
EKG VENTRICULAR RATE: 69 BPM

## 2022-11-23 ENCOUNTER — ANESTHESIA EVENT (OUTPATIENT)
Dept: OPERATING ROOM | Age: 37
DRG: 403 | End: 2022-11-23
Payer: COMMERCIAL

## 2022-11-28 ENCOUNTER — HOSPITAL ENCOUNTER (INPATIENT)
Age: 37
LOS: 3 days | Discharge: HOME | DRG: 403 | End: 2022-12-01
Attending: SURGERY | Admitting: SURGERY
Payer: COMMERCIAL

## 2022-11-28 ENCOUNTER — ANESTHESIA (OUTPATIENT)
Dept: OPERATING ROOM | Age: 37
DRG: 403 | End: 2022-11-28
Payer: COMMERCIAL

## 2022-11-28 DIAGNOSIS — E66.01 MORBID OBESITY (HCC): ICD-10-CM

## 2022-11-28 PROBLEM — Z98.84 GASTRIC BYPASS STATUS FOR OBESITY: Status: ACTIVE | Noted: 2022-11-28

## 2022-11-28 PROBLEM — E60 ZINC DEFICIENCY: Status: RESOLVED | Noted: 2018-09-21 | Resolved: 2022-11-28

## 2022-11-28 PROBLEM — E53.8 B12 DEFICIENCY: Status: RESOLVED | Noted: 2018-09-21 | Resolved: 2022-11-28

## 2022-11-28 PROBLEM — E55.9 VITAMIN D DEFICIENCY: Status: RESOLVED | Noted: 2018-09-21 | Resolved: 2022-11-28

## 2022-11-28 LAB
HCG, URINE, POC: NEGATIVE
Lab: NORMAL
NEGATIVE QC PASS/FAIL: NORMAL
POSITIVE QC PASS/FAIL: NORMAL

## 2022-11-28 PROCEDURE — 6360000002 HC RX W HCPCS

## 2022-11-28 PROCEDURE — 7100000001 HC PACU RECOVERY - ADDTL 15 MIN: Performed by: SURGERY

## 2022-11-28 PROCEDURE — 6360000002 HC RX W HCPCS: Performed by: SURGERY

## 2022-11-28 PROCEDURE — 1200000000 HC SEMI PRIVATE

## 2022-11-28 PROCEDURE — 2580000003 HC RX 258: Performed by: SURGERY

## 2022-11-28 PROCEDURE — 6370000000 HC RX 637 (ALT 250 FOR IP): Performed by: SURGERY

## 2022-11-28 PROCEDURE — 3700000000 HC ANESTHESIA ATTENDED CARE: Performed by: SURGERY

## 2022-11-28 PROCEDURE — 3600000015 HC SURGERY LEVEL 5 ADDTL 15MIN: Performed by: SURGERY

## 2022-11-28 PROCEDURE — 7100000000 HC PACU RECOVERY - FIRST 15 MIN: Performed by: SURGERY

## 2022-11-28 PROCEDURE — 8E0W4CZ ROBOTIC ASSISTED PROCEDURE OF TRUNK REGION, PERCUTANEOUS ENDOSCOPIC APPROACH: ICD-10-PCS | Performed by: SURGERY

## 2022-11-28 PROCEDURE — 2500000003 HC RX 250 WO HCPCS

## 2022-11-28 PROCEDURE — 2709999900 HC NON-CHARGEABLE SUPPLY: Performed by: SURGERY

## 2022-11-28 PROCEDURE — 3600000005 HC SURGERY LEVEL 5 BASE: Performed by: SURGERY

## 2022-11-28 PROCEDURE — 2720000010 HC SURG SUPPLY STERILE: Performed by: SURGERY

## 2022-11-28 PROCEDURE — 0D164ZA BYPASS STOMACH TO JEJUNUM, PERCUTANEOUS ENDOSCOPIC APPROACH: ICD-10-PCS | Performed by: SURGERY

## 2022-11-28 PROCEDURE — 2500000003 HC RX 250 WO HCPCS: Performed by: SURGERY

## 2022-11-28 PROCEDURE — 3700000001 HC ADD 15 MINUTES (ANESTHESIA): Performed by: SURGERY

## 2022-11-28 PROCEDURE — 6360000002 HC RX W HCPCS: Performed by: ANESTHESIOLOGY

## 2022-11-28 RX ORDER — LOSARTAN POTASSIUM 100 MG/1
100 TABLET ORAL DAILY
Status: DISCONTINUED | OUTPATIENT
Start: 2022-11-28 | End: 2022-12-01 | Stop reason: HOSPADM

## 2022-11-28 RX ORDER — ONDANSETRON 2 MG/ML
INJECTION INTRAMUSCULAR; INTRAVENOUS
Status: COMPLETED
Start: 2022-11-28 | End: 2022-11-28

## 2022-11-28 RX ORDER — ACETAMINOPHEN 160 MG/5ML
650 SUSPENSION, ORAL (FINAL DOSE FORM) ORAL EVERY 6 HOURS
Status: DISCONTINUED | OUTPATIENT
Start: 2022-11-28 | End: 2022-12-01 | Stop reason: HOSPADM

## 2022-11-28 RX ORDER — SODIUM CHLORIDE 0.9 % (FLUSH) 0.9 %
5-40 SYRINGE (ML) INJECTION PRN
Status: DISCONTINUED | OUTPATIENT
Start: 2022-11-28 | End: 2022-11-28 | Stop reason: HOSPADM

## 2022-11-28 RX ORDER — KETOROLAC TROMETHAMINE 30 MG/ML
30 INJECTION, SOLUTION INTRAMUSCULAR; INTRAVENOUS
Status: DISCONTINUED | OUTPATIENT
Start: 2022-11-28 | End: 2022-11-28 | Stop reason: HOSPADM

## 2022-11-28 RX ORDER — SODIUM CHLORIDE 0.9 % (FLUSH) 0.9 %
5-40 SYRINGE (ML) INJECTION EVERY 12 HOURS SCHEDULED
Status: DISCONTINUED | OUTPATIENT
Start: 2022-11-28 | End: 2022-11-28 | Stop reason: HOSPADM

## 2022-11-28 RX ORDER — SODIUM CHLORIDE, SODIUM LACTATE, POTASSIUM CHLORIDE, CALCIUM CHLORIDE 600; 310; 30; 20 MG/100ML; MG/100ML; MG/100ML; MG/100ML
INJECTION, SOLUTION INTRAVENOUS CONTINUOUS
Status: DISCONTINUED | OUTPATIENT
Start: 2022-11-28 | End: 2022-11-28 | Stop reason: HOSPADM

## 2022-11-28 RX ORDER — MAGNESIUM HYDROXIDE/ALUMINUM HYDROXICE/SIMETHICONE 120; 1200; 1200 MG/30ML; MG/30ML; MG/30ML
10 SUSPENSION ORAL EVERY 4 HOURS PRN
Status: DISCONTINUED | OUTPATIENT
Start: 2022-11-28 | End: 2022-12-01 | Stop reason: HOSPADM

## 2022-11-28 RX ORDER — SODIUM CHLORIDE 9 MG/ML
INJECTION, SOLUTION INTRAVENOUS PRN
Status: DISCONTINUED | OUTPATIENT
Start: 2022-11-28 | End: 2022-11-28 | Stop reason: HOSPADM

## 2022-11-28 RX ORDER — MEPERIDINE HYDROCHLORIDE 25 MG/ML
12.5 INJECTION INTRAMUSCULAR; INTRAVENOUS; SUBCUTANEOUS EVERY 5 MIN PRN
Status: DISCONTINUED | OUTPATIENT
Start: 2022-11-28 | End: 2022-11-28 | Stop reason: HOSPADM

## 2022-11-28 RX ORDER — METOPROLOL TARTRATE 50 MG/1
50 TABLET, FILM COATED ORAL 2 TIMES DAILY
Status: DISCONTINUED | OUTPATIENT
Start: 2022-11-28 | End: 2022-12-01 | Stop reason: HOSPADM

## 2022-11-28 RX ORDER — EPHEDRINE SULFATE/0.9% NACL/PF 50 MG/5 ML
SYRINGE (ML) INTRAVENOUS PRN
Status: DISCONTINUED | OUTPATIENT
Start: 2022-11-28 | End: 2022-11-28 | Stop reason: SDUPTHER

## 2022-11-28 RX ORDER — SCOLOPAMINE TRANSDERMAL SYSTEM 1 MG/1
1 PATCH, EXTENDED RELEASE TRANSDERMAL
Status: DISCONTINUED | OUTPATIENT
Start: 2022-11-28 | End: 2022-11-28

## 2022-11-28 RX ORDER — ROCURONIUM BROMIDE 10 MG/ML
INJECTION, SOLUTION INTRAVENOUS PRN
Status: DISCONTINUED | OUTPATIENT
Start: 2022-11-28 | End: 2022-11-28 | Stop reason: SDUPTHER

## 2022-11-28 RX ORDER — LIDOCAINE HYDROCHLORIDE 20 MG/ML
INJECTION, SOLUTION INTRAVENOUS PRN
Status: DISCONTINUED | OUTPATIENT
Start: 2022-11-28 | End: 2022-11-28 | Stop reason: SDUPTHER

## 2022-11-28 RX ORDER — OXYCODONE HYDROCHLORIDE 5 MG/1
10 TABLET ORAL EVERY 4 HOURS PRN
Status: DISCONTINUED | OUTPATIENT
Start: 2022-11-28 | End: 2022-11-30

## 2022-11-28 RX ORDER — MIDAZOLAM HYDROCHLORIDE 1 MG/ML
2 INJECTION INTRAMUSCULAR; INTRAVENOUS
Status: DISCONTINUED | OUTPATIENT
Start: 2022-11-28 | End: 2022-11-28 | Stop reason: HOSPADM

## 2022-11-28 RX ORDER — HYDRALAZINE HYDROCHLORIDE 20 MG/ML
10 INJECTION INTRAMUSCULAR; INTRAVENOUS
Status: DISCONTINUED | OUTPATIENT
Start: 2022-11-28 | End: 2022-11-28 | Stop reason: HOSPADM

## 2022-11-28 RX ORDER — PROPOFOL 10 MG/ML
INJECTION, EMULSION INTRAVENOUS PRN
Status: DISCONTINUED | OUTPATIENT
Start: 2022-11-28 | End: 2022-11-28 | Stop reason: SDUPTHER

## 2022-11-28 RX ORDER — ONDANSETRON 2 MG/ML
4 INJECTION INTRAMUSCULAR; INTRAVENOUS
Status: COMPLETED | OUTPATIENT
Start: 2022-11-28 | End: 2022-11-28

## 2022-11-28 RX ORDER — LABETALOL HYDROCHLORIDE 5 MG/ML
10 INJECTION, SOLUTION INTRAVENOUS
Status: DISCONTINUED | OUTPATIENT
Start: 2022-11-28 | End: 2022-11-28 | Stop reason: HOSPADM

## 2022-11-28 RX ORDER — BUPIVACAINE HYDROCHLORIDE AND EPINEPHRINE 2.5; 5 MG/ML; UG/ML
INJECTION, SOLUTION EPIDURAL; INFILTRATION; INTRACAUDAL; PERINEURAL PRN
Status: DISCONTINUED | OUTPATIENT
Start: 2022-11-28 | End: 2022-11-28 | Stop reason: ALTCHOICE

## 2022-11-28 RX ORDER — KETOROLAC TROMETHAMINE 30 MG/ML
30 INJECTION, SOLUTION INTRAMUSCULAR; INTRAVENOUS EVERY 6 HOURS
Status: DISPENSED | OUTPATIENT
Start: 2022-11-28 | End: 2022-12-01

## 2022-11-28 RX ORDER — SCOLOPAMINE TRANSDERMAL SYSTEM 1 MG/1
1 PATCH, EXTENDED RELEASE TRANSDERMAL
Status: DISCONTINUED | OUTPATIENT
Start: 2022-12-01 | End: 2022-12-01 | Stop reason: HOSPADM

## 2022-11-28 RX ORDER — KETOROLAC TROMETHAMINE 30 MG/ML
30 INJECTION, SOLUTION INTRAMUSCULAR; INTRAVENOUS ONCE
Status: COMPLETED | OUTPATIENT
Start: 2022-11-28 | End: 2022-11-28

## 2022-11-28 RX ORDER — SODIUM CHLORIDE, SODIUM LACTATE, POTASSIUM CHLORIDE, CALCIUM CHLORIDE 600; 310; 30; 20 MG/100ML; MG/100ML; MG/100ML; MG/100ML
INJECTION, SOLUTION INTRAVENOUS CONTINUOUS
Status: DISCONTINUED | OUTPATIENT
Start: 2022-11-28 | End: 2022-11-29

## 2022-11-28 RX ORDER — ENOXAPARIN SODIUM 100 MG/ML
40 INJECTION SUBCUTANEOUS ONCE
Status: COMPLETED | OUTPATIENT
Start: 2022-11-28 | End: 2022-11-28

## 2022-11-28 RX ORDER — SODIUM CHLORIDE 0.9 % (FLUSH) 0.9 %
5-40 SYRINGE (ML) INJECTION PRN
Status: DISCONTINUED | OUTPATIENT
Start: 2022-11-28 | End: 2022-12-01 | Stop reason: HOSPADM

## 2022-11-28 RX ORDER — ALBUTEROL SULFATE 2.5 MG/3ML
2.5 SOLUTION RESPIRATORY (INHALATION) EVERY 4 HOURS PRN
Status: DISCONTINUED | OUTPATIENT
Start: 2022-11-28 | End: 2022-12-01 | Stop reason: HOSPADM

## 2022-11-28 RX ORDER — DIPHENHYDRAMINE HYDROCHLORIDE 50 MG/ML
12.5 INJECTION INTRAMUSCULAR; INTRAVENOUS
Status: DISCONTINUED | OUTPATIENT
Start: 2022-11-28 | End: 2022-11-28 | Stop reason: HOSPADM

## 2022-11-28 RX ORDER — PROMETHAZINE HYDROCHLORIDE 25 MG/ML
6.25 INJECTION, SOLUTION INTRAMUSCULAR; INTRAVENOUS ONCE
Status: COMPLETED | OUTPATIENT
Start: 2022-11-28 | End: 2022-11-28

## 2022-11-28 RX ORDER — SUCCINYLCHOLINE/SOD CL,ISO/PF 200MG/10ML
SYRINGE (ML) INTRAVENOUS PRN
Status: DISCONTINUED | OUTPATIENT
Start: 2022-11-28 | End: 2022-11-28 | Stop reason: SDUPTHER

## 2022-11-28 RX ORDER — MORPHINE SULFATE 2 MG/ML
INJECTION, SOLUTION INTRAMUSCULAR; INTRAVENOUS
Status: COMPLETED
Start: 2022-11-28 | End: 2022-11-28

## 2022-11-28 RX ORDER — ENOXAPARIN SODIUM 100 MG/ML
40 INJECTION SUBCUTANEOUS DAILY
Status: DISCONTINUED | OUTPATIENT
Start: 2022-11-29 | End: 2022-12-01 | Stop reason: HOSPADM

## 2022-11-28 RX ORDER — FENTANYL CITRATE 50 UG/ML
INJECTION, SOLUTION INTRAMUSCULAR; INTRAVENOUS PRN
Status: DISCONTINUED | OUTPATIENT
Start: 2022-11-28 | End: 2022-11-28 | Stop reason: SDUPTHER

## 2022-11-28 RX ORDER — IPRATROPIUM BROMIDE AND ALBUTEROL SULFATE 2.5; .5 MG/3ML; MG/3ML
1 SOLUTION RESPIRATORY (INHALATION)
Status: DISCONTINUED | OUTPATIENT
Start: 2022-11-28 | End: 2022-11-28 | Stop reason: HOSPADM

## 2022-11-28 RX ORDER — SODIUM CHLORIDE 9 MG/ML
INJECTION, SOLUTION INTRAVENOUS PRN
Status: DISCONTINUED | OUTPATIENT
Start: 2022-11-28 | End: 2022-12-01 | Stop reason: HOSPADM

## 2022-11-28 RX ORDER — PROCHLORPERAZINE EDISYLATE 5 MG/ML
5 INJECTION INTRAMUSCULAR; INTRAVENOUS
Status: COMPLETED | OUTPATIENT
Start: 2022-11-28 | End: 2022-11-28

## 2022-11-28 RX ORDER — PROCHLORPERAZINE EDISYLATE 5 MG/ML
10 INJECTION INTRAMUSCULAR; INTRAVENOUS EVERY 6 HOURS PRN
Status: DISCONTINUED | OUTPATIENT
Start: 2022-11-28 | End: 2022-12-01 | Stop reason: HOSPADM

## 2022-11-28 RX ORDER — SODIUM CHLORIDE 0.9 % (FLUSH) 0.9 %
5-40 SYRINGE (ML) INJECTION EVERY 12 HOURS SCHEDULED
Status: DISCONTINUED | OUTPATIENT
Start: 2022-11-28 | End: 2022-12-01 | Stop reason: HOSPADM

## 2022-11-28 RX ORDER — MORPHINE SULFATE 4 MG/ML
4 INJECTION, SOLUTION INTRAMUSCULAR; INTRAVENOUS
Status: DISCONTINUED | OUTPATIENT
Start: 2022-11-28 | End: 2022-12-01

## 2022-11-28 RX ORDER — GLYCOPYRROLATE 0.2 MG/ML
INJECTION INTRAMUSCULAR; INTRAVENOUS PRN
Status: DISCONTINUED | OUTPATIENT
Start: 2022-11-28 | End: 2022-11-28 | Stop reason: SDUPTHER

## 2022-11-28 RX ORDER — METHOCARBAMOL 100 MG/ML
INJECTION, SOLUTION INTRAMUSCULAR; INTRAVENOUS
Status: COMPLETED
Start: 2022-11-28 | End: 2022-11-28

## 2022-11-28 RX ORDER — ONDANSETRON 2 MG/ML
4 INJECTION INTRAMUSCULAR; INTRAVENOUS EVERY 6 HOURS PRN
Status: DISCONTINUED | OUTPATIENT
Start: 2022-11-28 | End: 2022-12-01 | Stop reason: HOSPADM

## 2022-11-28 RX ORDER — MORPHINE SULFATE 2 MG/ML
2 INJECTION, SOLUTION INTRAMUSCULAR; INTRAVENOUS EVERY 5 MIN PRN
Status: DISCONTINUED | OUTPATIENT
Start: 2022-11-28 | End: 2022-11-28 | Stop reason: HOSPADM

## 2022-11-28 RX ORDER — ACETAMINOPHEN 500 MG
1000 TABLET ORAL ONCE
Status: COMPLETED | OUTPATIENT
Start: 2022-11-28 | End: 2022-11-28

## 2022-11-28 RX ORDER — METHOCARBAMOL 100 MG/ML
1000 INJECTION, SOLUTION INTRAMUSCULAR; INTRAVENOUS ONCE
Status: COMPLETED | OUTPATIENT
Start: 2022-11-28 | End: 2022-11-28

## 2022-11-28 RX ORDER — SUCRALFATE 1 G/1
0.5 TABLET ORAL 4 TIMES DAILY
Status: DISCONTINUED | OUTPATIENT
Start: 2022-11-28 | End: 2022-12-01 | Stop reason: HOSPADM

## 2022-11-28 RX ORDER — NEOSTIGMINE METHYLSULFATE 1 MG/ML
INJECTION, SOLUTION INTRAVENOUS PRN
Status: DISCONTINUED | OUTPATIENT
Start: 2022-11-28 | End: 2022-11-28 | Stop reason: SDUPTHER

## 2022-11-28 RX ORDER — KETOROLAC TROMETHAMINE 30 MG/ML
INJECTION, SOLUTION INTRAMUSCULAR; INTRAVENOUS
Status: DISCONTINUED
Start: 2022-11-28 | End: 2022-11-28 | Stop reason: WASHOUT

## 2022-11-28 RX ORDER — DEXAMETHASONE SODIUM PHOSPHATE 10 MG/ML
INJECTION, SOLUTION INTRAMUSCULAR; INTRAVENOUS PRN
Status: DISCONTINUED | OUTPATIENT
Start: 2022-11-28 | End: 2022-11-28 | Stop reason: SDUPTHER

## 2022-11-28 RX ORDER — PROMETHAZINE HYDROCHLORIDE 25 MG/1
25 SUPPOSITORY RECTAL EVERY 6 HOURS PRN
Status: DISCONTINUED | OUTPATIENT
Start: 2022-11-28 | End: 2022-12-01 | Stop reason: HOSPADM

## 2022-11-28 RX ORDER — MORPHINE SULFATE 2 MG/ML
2 INJECTION, SOLUTION INTRAMUSCULAR; INTRAVENOUS
Status: DISCONTINUED | OUTPATIENT
Start: 2022-11-28 | End: 2022-12-01

## 2022-11-28 RX ORDER — MIDAZOLAM HYDROCHLORIDE 1 MG/ML
INJECTION INTRAMUSCULAR; INTRAVENOUS PRN
Status: DISCONTINUED | OUTPATIENT
Start: 2022-11-28 | End: 2022-11-28 | Stop reason: SDUPTHER

## 2022-11-28 RX ORDER — ONDANSETRON 2 MG/ML
INJECTION INTRAMUSCULAR; INTRAVENOUS PRN
Status: DISCONTINUED | OUTPATIENT
Start: 2022-11-28 | End: 2022-11-28 | Stop reason: SDUPTHER

## 2022-11-28 RX ORDER — AMLODIPINE BESYLATE 10 MG/1
10 TABLET ORAL DAILY
Status: DISCONTINUED | OUTPATIENT
Start: 2022-11-29 | End: 2022-12-01 | Stop reason: HOSPADM

## 2022-11-28 RX ORDER — OXYCODONE HYDROCHLORIDE 5 MG/1
5 TABLET ORAL EVERY 4 HOURS PRN
Status: DISCONTINUED | OUTPATIENT
Start: 2022-11-28 | End: 2022-11-30

## 2022-11-28 RX ORDER — PROCHLORPERAZINE EDISYLATE 5 MG/ML
INJECTION INTRAMUSCULAR; INTRAVENOUS
Status: COMPLETED
Start: 2022-11-28 | End: 2022-11-28

## 2022-11-28 RX ADMIN — SUCRALFATE 0.5 G: 1 TABLET ORAL at 20:19

## 2022-11-28 RX ADMIN — ACETAMINOPHEN 1000 MG: 500 TABLET ORAL at 06:58

## 2022-11-28 RX ADMIN — Medication 650 MG: at 14:29

## 2022-11-28 RX ADMIN — Medication 3 MG: at 09:46

## 2022-11-28 RX ADMIN — PROPOFOL 170 MG: 10 INJECTION, EMULSION INTRAVENOUS at 08:22

## 2022-11-28 RX ADMIN — Medication 130 MG: at 08:22

## 2022-11-28 RX ADMIN — KETOROLAC TROMETHAMINE 30 MG: 30 INJECTION, SOLUTION INTRAMUSCULAR at 14:24

## 2022-11-28 RX ADMIN — PROMETHAZINE HYDROCHLORIDE 6.25 MG: 25 INJECTION INTRAMUSCULAR; INTRAVENOUS at 11:30

## 2022-11-28 RX ADMIN — SUCRALFATE 0.5 G: 1 TABLET ORAL at 17:14

## 2022-11-28 RX ADMIN — HYDROMORPHONE HYDROCHLORIDE 0.5 MG: 1 INJECTION, SOLUTION INTRAMUSCULAR; INTRAVENOUS; SUBCUTANEOUS at 10:45

## 2022-11-28 RX ADMIN — ROCURONIUM BROMIDE 10 MG: 10 INJECTION, SOLUTION INTRAVENOUS at 08:22

## 2022-11-28 RX ADMIN — METHOCARBAMOL 1000 MG: 100 INJECTION, SOLUTION INTRAMUSCULAR; INTRAVENOUS at 11:25

## 2022-11-28 RX ADMIN — ROCURONIUM BROMIDE 30 MG: 10 INJECTION, SOLUTION INTRAVENOUS at 08:36

## 2022-11-28 RX ADMIN — PROCHLORPERAZINE EDISYLATE 5 MG: 5 INJECTION INTRAMUSCULAR; INTRAVENOUS at 10:45

## 2022-11-28 RX ADMIN — MORPHINE SULFATE 2 MG: 2 INJECTION, SOLUTION INTRAMUSCULAR; INTRAVENOUS at 11:10

## 2022-11-28 RX ADMIN — FENTANYL CITRATE 50 MCG: 50 INJECTION, SOLUTION INTRAMUSCULAR; INTRAVENOUS at 09:53

## 2022-11-28 RX ADMIN — LOSARTAN POTASSIUM 100 MG: 100 TABLET, FILM COATED ORAL at 14:34

## 2022-11-28 RX ADMIN — MORPHINE SULFATE 4 MG: 4 INJECTION, SOLUTION INTRAMUSCULAR; INTRAVENOUS at 17:27

## 2022-11-28 RX ADMIN — KETOROLAC TROMETHAMINE 30 MG: 30 INJECTION, SOLUTION INTRAMUSCULAR at 20:19

## 2022-11-28 RX ADMIN — Medication 5 MG: at 08:59

## 2022-11-28 RX ADMIN — SERTRALINE 25 MG: 50 TABLET, FILM COATED ORAL at 14:34

## 2022-11-28 RX ADMIN — Medication 15 MG: at 08:34

## 2022-11-28 RX ADMIN — FENTANYL CITRATE 100 MCG: 50 INJECTION, SOLUTION INTRAMUSCULAR; INTRAVENOUS at 08:20

## 2022-11-28 RX ADMIN — HYDROMORPHONE HYDROCHLORIDE 0.5 MG: 1 INJECTION, SOLUTION INTRAMUSCULAR; INTRAVENOUS; SUBCUTANEOUS at 10:55

## 2022-11-28 RX ADMIN — ONDANSETRON 4 MG: 2 INJECTION INTRAMUSCULAR; INTRAVENOUS at 10:39

## 2022-11-28 RX ADMIN — KETOROLAC TROMETHAMINE 30 MG: 30 INJECTION, SOLUTION INTRAMUSCULAR at 06:59

## 2022-11-28 RX ADMIN — DEXAMETHASONE SODIUM PHOSPHATE 10 MG: 10 INJECTION, SOLUTION INTRAMUSCULAR; INTRAVENOUS at 08:30

## 2022-11-28 RX ADMIN — Medication 0.5 MG: at 10:55

## 2022-11-28 RX ADMIN — GLYCOPYRROLATE 0.6 MG: 0.2 INJECTION INTRAMUSCULAR; INTRAVENOUS at 09:46

## 2022-11-28 RX ADMIN — CEFAZOLIN 3000 MG: 10 INJECTION, POWDER, FOR SOLUTION INTRAVENOUS at 08:27

## 2022-11-28 RX ADMIN — SODIUM CHLORIDE, PRESERVATIVE FREE 10 ML: 5 INJECTION INTRAVENOUS at 17:29

## 2022-11-28 RX ADMIN — SODIUM CHLORIDE, POTASSIUM CHLORIDE, SODIUM LACTATE AND CALCIUM CHLORIDE: 600; 310; 30; 20 INJECTION, SOLUTION INTRAVENOUS at 14:36

## 2022-11-28 RX ADMIN — ROCURONIUM BROMIDE 10 MG: 10 INJECTION, SOLUTION INTRAVENOUS at 08:53

## 2022-11-28 RX ADMIN — Medication 0.5 MG: at 10:45

## 2022-11-28 RX ADMIN — SODIUM CHLORIDE, POTASSIUM CHLORIDE, SODIUM LACTATE AND CALCIUM CHLORIDE: 600; 310; 30; 20 INJECTION, SOLUTION INTRAVENOUS at 12:16

## 2022-11-28 RX ADMIN — METOPROLOL TARTRATE 50 MG: 50 TABLET, FILM COATED ORAL at 20:19

## 2022-11-28 RX ADMIN — ONDANSETRON 4 MG: 2 INJECTION INTRAMUSCULAR; INTRAVENOUS at 09:46

## 2022-11-28 RX ADMIN — SODIUM CHLORIDE, POTASSIUM CHLORIDE, SODIUM LACTATE AND CALCIUM CHLORIDE: 600; 310; 30; 20 INJECTION, SOLUTION INTRAVENOUS at 09:19

## 2022-11-28 RX ADMIN — Medication 650 MG: at 20:18

## 2022-11-28 RX ADMIN — GLYCOPYRROLATE 0.2 MG: 0.2 INJECTION INTRAMUSCULAR; INTRAVENOUS at 08:52

## 2022-11-28 RX ADMIN — FENTANYL CITRATE 50 MCG: 50 INJECTION, SOLUTION INTRAMUSCULAR; INTRAVENOUS at 08:37

## 2022-11-28 RX ADMIN — SODIUM CHLORIDE, POTASSIUM CHLORIDE, SODIUM LACTATE AND CALCIUM CHLORIDE: 600; 310; 30; 20 INJECTION, SOLUTION INTRAVENOUS at 06:55

## 2022-11-28 RX ADMIN — ENOXAPARIN SODIUM 40 MG: 40 INJECTION SUBCUTANEOUS at 14:26

## 2022-11-28 RX ADMIN — MIDAZOLAM 2 MG: 1 INJECTION INTRAMUSCULAR; INTRAVENOUS at 08:13

## 2022-11-28 RX ADMIN — LIDOCAINE HYDROCHLORIDE 100 MG: 20 INJECTION, SOLUTION INTRAVENOUS at 08:20

## 2022-11-28 ASSESSMENT — PAIN DESCRIPTION - LOCATION
LOCATION: ABDOMEN
LOCATION: BACK;ABDOMEN
LOCATION: ABDOMEN

## 2022-11-28 ASSESSMENT — PAIN - FUNCTIONAL ASSESSMENT
PAIN_FUNCTIONAL_ASSESSMENT: ACTIVITIES ARE NOT PREVENTED
PAIN_FUNCTIONAL_ASSESSMENT: NONE - DENIES PAIN
PAIN_FUNCTIONAL_ASSESSMENT: ACTIVITIES ARE NOT PREVENTED

## 2022-11-28 ASSESSMENT — PAIN DESCRIPTION - PAIN TYPE
TYPE: SURGICAL PAIN

## 2022-11-28 ASSESSMENT — PAIN DESCRIPTION - FREQUENCY
FREQUENCY: INTERMITTENT
FREQUENCY: CONTINUOUS

## 2022-11-28 ASSESSMENT — PAIN DESCRIPTION - DESCRIPTORS
DESCRIPTORS: ACHING;DISCOMFORT
DESCRIPTORS: ACHING
DESCRIPTORS: ACHING
DESCRIPTORS: SORE
DESCRIPTORS: SORE
DESCRIPTORS: ACHING
DESCRIPTORS: SORE
DESCRIPTORS: ACHING;DISCOMFORT;SORE;SHARP
DESCRIPTORS: SORE

## 2022-11-28 ASSESSMENT — PAIN SCALES - GENERAL
PAINLEVEL_OUTOF10: 6
PAINLEVEL_OUTOF10: 8
PAINLEVEL_OUTOF10: 9
PAINLEVEL_OUTOF10: 3
PAINLEVEL_OUTOF10: 0
PAINLEVEL_OUTOF10: 9
PAINLEVEL_OUTOF10: 5
PAINLEVEL_OUTOF10: 8
PAINLEVEL_OUTOF10: 2
PAINLEVEL_OUTOF10: 7
PAINLEVEL_OUTOF10: 9

## 2022-11-28 ASSESSMENT — PAIN DESCRIPTION - ONSET
ONSET: ON-GOING

## 2022-11-28 ASSESSMENT — PAIN DESCRIPTION - ORIENTATION
ORIENTATION: MID
ORIENTATION: RIGHT;LEFT;MID
ORIENTATION: MID
ORIENTATION: MID
ORIENTATION: RIGHT;LEFT
ORIENTATION: MID
ORIENTATION: RIGHT;LEFT;MID

## 2022-11-28 ASSESSMENT — LIFESTYLE VARIABLES: SMOKING_STATUS: 0

## 2022-11-28 NOTE — DISCHARGE SUMMARY
Discharge Summary  Andrew Leon  [id number]    Admit date: 11/28/2022    Discharge date and time: 11/29/2022    Admitting Physician: Hector Gonzales MD    Patient Active Problem List   Diagnosis    Asthma    Morbid obesity ([address]    PRIYA (obstructive sleep apnea)    Essential hypertension    Gastric bypass       Hospital Course: Andrew Leon is a 29 y.o. female post laparoscopic Gastric Bypass 11/28. She did well with surgery, pain has been well controlled over night, walking well in the halls. Labs not ordered. Good urine output, chaparro was not used. She is tolerating the Bariatric clear liquid diet with no nausea. Incisions all clean and dry, surgical glue in place. Discharge Exam:   VITALS: Blood pressure 126/72, pulse 91, temperature 98 °F (36.7 °C), temperature source Oral, resp. rate 16, height 5' 7\" (1.702 m), weight (!) 412 lb (186.9 kg), SpO2 95 %, not currently breastfeeding. General appearance: alert, appears stated age and cooperative  Neck: no adenopathy, no carotid bruit, no JVD, supple, symmetrical, trachea midline and thyroid not enlarged, symmetric, no tenderness/mass/nodules  Lungs: On 2.5L NC  Heart: regular rate and rhythm,   Abdomen: Incisions clean and dry, surgical glue in place.   Extremities: extremities normal, atraumatic, no cyanosis or edema       Medication List        ASK your doctor about these medications      albuterol sulfate  (90 Base) MCG/ACT inhaler  Commonly known as: PROVENTIL;VENTOLIN;PROAIR  Inhale 2 puffs into the lungs every 6 hours as needed for Wheezing     amLODIPine 10 MG tablet  Commonly known as: NORVASC  Take 1 tablet by mouth daily     cyanocobalamin 1000 MCG tablet  Commonly known as: [employer] VITAMIN B12  TAKE 1 TABLET BY MOUTH EVERY DAY     guanFACINE 1 MG tablet  Commonly known as: TENEX  Take 1 tablet by mouth nightly     hydrALAZINE 25 MG tablet  Commonly known as: APRESOLINE  Take 1 tablet by mouth in the morning and at bedtime hydroCHLOROthiazide 50 MG tablet  Commonly known as: HYDRODIURIL  Take 1 tablet by mouth daily     losartan 100 MG tablet  Commonly known as: COZAAR  TAKE 1 TABLET BY MOUTH EVERY DAY     magnesium oxide 400 MG tablet  Commonly known as: MAG-OX  Take 1 tablet by mouth daily     metoprolol tartrate 50 MG tablet  Commonly known as: LOPRESSOR  Take 1 tablet by mouth 2 times daily     ondansetron 4 MG disintegrating tablet  Commonly known as: ZOFRAN-ODT  Take 1 tablet by mouth every 8 hours as needed for Nausea or Vomiting     sertraline 25 MG tablet  Commonly known as: ZOLOFT  Take 1 tablet by mouth daily     sucralfate 1 GM tablet  Commonly known as: Carafate  Take 0.5 tablets by mouth 4 times daily     therapeutic multivitamin-minerals tablet              Patient Instructions:   Tylenol and Ibuprofen for pain. Ok to restart Aspirin and other blood thinners. Activity:  no lifting, or strenuous exercise for one or two weeks. No driving until off narcotics and nausea medications. Diet:  Bariatric clear liquid sips yesterday and today, then starting tomorrow, bariatric full liquids for 10 days, 1 oz every 15 minutes. Wound Care: shower and then leave the incisions open to air, do not rub off the surgical glue. Make sure the bowels move daily by using fiber such as Metamucil, stool softeners or laxatives to prevent constipation pains. Condition at discharge:  Stable  Disposition: home    Follow-up with Dr. Bridget Singletary in 10 days.     Signed:  Art Pino MD  11/29/2022  5:45 AM

## 2022-11-28 NOTE — ANESTHESIA POSTPROCEDURE EVALUATION
Department of Anesthesiology  Postprocedure Note    Patient: Venu Loyola  MRN: [id number]  YOB: 1985  Date of evaluation: 11/28/2022      Procedure Summary     Date: 11/28/22 Room / Location: [address] [place] [address] / [address] [place] [address]    Anesthesia Start: 5164 Anesthesia Stop: 1008    Procedure: GASTRIC BYPASS ASHLIE-EN-Y LAPAROSCOPIC Diagnosis:       Morbid obesity ([address]      (Morbid obesity ([facility] [address] [E66.01])    Surgeons: Aaron Mccarthy MD Responsible Provider: Manuela Matthew MD    Anesthesia Type: General ASA Status: 3          Anesthesia Type: General    Nathanael Phase I: Nathanael Score: 9    Nathanael Phase II:        Anesthesia Post Evaluation    Patient location during evaluation: PACU  Patient participation: complete - patient participated  Level of consciousness: awake  Airway patency: patent  Nausea & Vomiting: no nausea and no vomiting  Complications: no  Cardiovascular status: hemodynamically stable  Respiratory status: acceptable  Hydration status: euvolemic

## 2022-11-28 NOTE — INTERVAL H&P NOTE
Update History & Physical    The patient's History and Physical of November 17, 2022 was reviewed with the patient and I examined the patient. There was no change. The surgical site was confirmed by the patient and me. Plan: The risks, benefits, expected outcome, and alternative to the recommended procedure have been discussed with the patient. Patient understands and wants to proceed with the procedure.      Electronically signed by Therese Velez MD on 11/28/2022 at 7:09 AM

## 2022-11-28 NOTE — DISCHARGE INSTRUCTIONS
Your information:  Name: Huong Lee  : 1985    [facility]   Discharge Instructions   For Bariatric Surgery    HOME CARE  Keep the incision area clean and dry. The glue on the incision is waterproof so you can shower. Do not remove the surgical glue. Leave the incisions open to air. Only cover if drainage occurs. Place ice on painful incision for 1-2 days. Make sure you know how to use and continue to use your incentive spirometer every hour while awake at home. DIET  Follow the diet instructions that you received in your Nutrition Education Manual for the dates and times of when to start your Bariatric Clear Liquids and Bariatric Full Liquid Diet along with the protein supplements. DO NOT take the Bariatric Multivitamins, Iron, Calcium, Vitamin D or Vitamin b12 supplements until instructed to do so at your 2 week follow up appointment with your surgeon. Slow down intake of liquids if there is chest pressure, acid or fullness. Drinking too fast or too much at one time can result in pain and vomiting. You may notice increased gas or changes in your bowel habits during the first month after surgery. Keep the bowels soft with stool softeners at first, then switch to Metamucil. If you are not able to drink 64 ounces of fluid a day, you will develop constipation. Keep the bowels moving daily. PHYSICAL ACTIVITY  Walk frequently and keep your legs elevated when sitting to prevent blood clots in the legs. Do not do anything that causes pain in the incisions. Increase your activity gradually, walking at least 10 minutes every hour you are awake. Do not drive while taking narcotic pain medication as this can cause drowsiness. You must be narcotic free for at least 24 hours before driving. Do not bend, twist, pull, or lift over 20 pounds for the first 2 weeks after surgery. Then for the next 2 weeks after that, do not bend, twist, pull, or lift over 50 pounds.   If anything causes pain or discomfort, stop! Breathe deeply every hour to prevent pneumonia. Continue to use your incentive spirometer at home as this will help to prevent pneumonia as well. Medications  Restart blood thinners in 24 hours if no signs of bleeding. Take Tylenol for pain. Take Colace 100 mg twice a day. Take Ondansetron (Zofran), Omeprazole (Prilosec), or Sucralfate (Carafate) as prescribed. Follow up with Primary Care Physician (PCP)regarding home medications you were taking prior to surgery or with the prescribing physician if not your PCP. Extended-release medications are not recommended. Your PCP should convert to another medication if possible. DO NOT STOP ANY PRESCRIBED MEDICATION WITHOUT TALKING TO YOUR DOCTOR. If diabetic, check blood sugars as ordered by PCP or Endocrinologist.  Follow up with PCP or Endocrinologist regarding diabetic medications. Make sure you take any medicines you were on for depression or anxiety. FOLLOW-UP  Follow-up appointment with surgeon 10-14 days after surgery. Complete lab work prior to this appointment. Follow-up with PCP and/or Endocrinologist prior to seeing surgeon. CALL [facility] [phone] IF ANY OF THE FOLLOWING OCCURS TO BE SEEN IN THE OFFICE:  Signs of infection, fever, chills, redness, swelling, increasing pain, or discharge at the incision site. Nausea not relieved by medication, frequent vomiting and not able to keep anything down, and excessive diarrhea (more than 8 episodes in 24 hours). Pain, burning, urgency, or frequency of urination or blood in the urine. Pain and/or swelling in your feet, calves, or legs. You have pain that does not get better after taking pain medication  You cannot pass stool or gas. You are sick to your stomach and cannot drink fluids. You have loose stitches, or your incision comes open. You have signs of a blood clot such as:  - pain in your calf, back of the knee, thigh, or groin.   - redness and swelling in your leg or groin  If you feel dehydrated, call the office to have IV fluids ordered. Signs of dehydration include dizziness, tiredness, dry mouth, dry lips, decreased urine output, dark colored urine, headache, feeling thirsty and muscle cramps. This can occur if you are not meeting your daily fluid intake goal.  Watch closely for changes in your health, and be sure to contact your doctor if you have any problems  FOR ANY OF THE FOLLOWING, GO TO THE ER:  SHORTNESS  [address] PAIN  IN CASE OF EMERGENCY, CALL 911 IMMEDIATELY   Call 911 anytime you think you may need emergency care. For example, call if:    You passed out (lost consciousness). You are short of breath. Call your doctor now or seek immediate medical care if:    You have pain that does not get better after you take pain medicine. You cannot pass stool or gas. You are sick to your stomach and cannot drink fluids. You have loose stitches, or your incision comes open. You have signs of a blood clot, such as:  Pain in your calf, back of the knee, thigh, or groin. Redness and swelling in your leg or groin. You have signs of infection, such as: Increased pain, swelling, warmth, or redness. Red streaks leading from the incision. Pus draining from the incision. A fever. Watch closely for changes in your health, and be sure to contact your doctor if you have any problems. The following personal items were collected during your admission and were returned to you:    Belongings  Dental Appliances: None  Vision - Corrective Lenses: None  Hearing Aid: None  Clothing: Pajamas, Undergarments, Footwear, Shirt, Socks  Jewelry: None  Body Piercings Removed: No  Electronic Devices: Cell Phone,   Weapons (Notify Protective Services/Security): None  Other Valuables:  Other (Comment)  Home Medications: Kept at bedside  Valuables Given To: Family (Comment)  Provide Name(s) of Who Valuable(s) Were Given To: lily-mother  Responsible person(s) in the waiting room: lily  Patient approves for provider to speak to responsible person post operatively: Yes    Information obtained by:  By signing below, I understand that if any problems occur once I leave the hospital I am to contact Dr. Junior Gee. I understand and acknowledge receipt of the instructions indicated above.

## 2022-11-28 NOTE — PLAN OF CARE
Problem: Discharge Planning  Goal: Discharge to home or other facility with appropriate resources  Outcome: Progressing  Flowsheets (Taken 11/28/2022 1242)  Discharge to home or other facility with appropriate resources: Identify barriers to discharge with patient and caregiver     Problem: Pain  Goal: Verbalizes/displays adequate comfort level or baseline comfort level  Outcome: Progressing  Flowsheets (Taken 11/28/2022 1232)  Verbalizes/displays adequate comfort level or baseline comfort level: Encourage patient to monitor pain and request assistance     Problem: ABCDS Injury Assessment  Goal: Absence of physical injury  Outcome: Progressing

## 2022-11-28 NOTE — ANESTHESIA PRE PROCEDURE
Department of Anesthesiology  Preprocedure Note       Name:  Tj Driscoll   Age:  29 y.o.  :  1985                                          MRN:  [id number]         Date:  2022      Surgeon: Asher Reeves):  David Kearns MD    Procedure: Procedure(s):  GASTRIC BYPASS ASHLIE-EN-Y LAPAROSCOPIC    Medications prior to admission:   Prior to Admission medications    Medication Sig Start Date End Date Taking?  Authorizing Provider   metoprolol tartrate (LOPRESSOR) 50 MG tablet Take 1 tablet by mouth 2 times daily 22   Rose Marie Wallace MD   sucralfate (CARAFATE) 1 GM tablet Take 0.5 tablets by mouth 4 times daily 22   David Kearns MD   ondansetron (ZOFRAN-ODT) 4 MG disintegrating tablet Take 1 tablet by mouth every 8 hours as needed for Nausea or Vomiting 22   David Kearns MD   losartan (COZAAR) 100 MG tablet TAKE 1 TABLET BY MOUTH EVERY DAY 10/17/22   Rose Marie Wallace MD   guanFACINE (TENEX) 1 MG tablet Take 1 tablet by mouth nightly 22   Rose Marie Wallace MD   magnesium oxide (MAG-OX) 400 MG tablet Take 1 tablet by mouth daily 22   Rose Marie Wallace MD   hydroCHLOROthiazide (HYDRODIURIL) 50 MG tablet Take 1 tablet by mouth daily 22   Rose Marie Wallace MD   hydrALAZINE (APRESOLINE) 25 MG tablet Take 1 tablet by mouth in the morning and at bedtime 22   Miky Coyle MD   albuterol sulfate  (90 Base) MCG/ACT inhaler Inhale 2 puffs into the lungs every 6 hours as needed for Wheezing 22   Raúl Irwin MD   sertraline (ZOLOFT) 25 MG tablet Take 1 tablet by mouth daily 21   Rose Marie Wallace MD   cyanocobalamin ([employer] VITAMIN B12) 1000 MCG tablet TAKE 1 TABLET BY MOUTH EVERY DAY 21   RoseM arie Wallace MD   amLODIPine (NORVASC) 10 MG tablet Take 1 tablet by mouth daily 21   Rose Marie Wallace MD   Multiple Vitamins-Minerals (THERAPEUTIC MULTIVITAMIN-MINERALS) tablet Take 1 tablet by mouth daily    Historical Provider, MD       Current medications:    No current facility-administered medications for this visit. No current outpatient medications on file. Facility-Administered Medications Ordered in Other Visits   Medication Dose Route Frequency Provider Last Rate Last Admin    lactated ringers infusion   IntraVENous Continuous Khanh Cabello  mL/hr at 11/28/22 0655 New Bag at 11/28/22 0655    sodium chloride flush 0.9 % injection 5-40 mL  5-40 mL IntraVENous 2 times per day Khanh Cabello MD        sodium chloride flush 0.9 % injection 5-40 mL  5-40 mL IntraVENous PRN Khanh Cabello MD        0.9 % sodium chloride infusion   IntraVENous PRN Khanh Cabello MD        ceFAZolin (ANCEF) 3,000 mg in dextrose 5 % 100 mL IVPB  3,000 mg IntraVENous On Call to [facility] MD Joanna        scopolamine (TRANSDERM-SCOP) transdermal patch 1 patch  1 patch TransDERmal Q72H Khanh Cabello MD   1 patch at 11/28/22 0659       Allergies:     Allergies   Allergen Reactions    Lactose Intolerance (Gi) Diarrhea     Milk and ice cream    Percocet [Oxycodone-Acetaminophen] Nausea And Vomiting       Problem List:    Patient Active Problem List   Diagnosis Code    Asthma J45.909    Morbid obesity ([facility] [address] E66.01    Current smoker F17.200    Vitamin D deficiency E55.9    Zinc deficiency E60    B12 deficiency E53.8    PRIYA (obstructive sleep apnea) G47.33    Essential hypertension I10       Past Medical History:        Diagnosis Date    Asthma     B12 deficiency 09/21/2018    Class 3 severe obesity due to excess calories with body mass index (BMI) of 60.0 to 69.9 in adult ([facility] [address]     GERD without esophagitis     Hypertension     Migraine     PRIYA (obstructive sleep apnea)     sleep apnea 4-11    Prolonged emergence from general anesthesia     Vitamin D deficiency 09/21/2018    Zinc deficiency 09/21/2018       Past Surgical History:        Procedure Laterality Date    CHOLECYSTECTOMY, LAPAROSCOPIC  3/2014 Keny Grandchild    with repair umbilical hernia    LEEP  3/2008    OTHER SURGICAL HISTORY      coils to fallopian tubes    OTHER SURGICAL HISTORY      Tubal essure     TONSILLECTOMY AND ADENOIDECTOMY      TYMPANOSTOMY TUBE PLACEMENT      UMBILICAL HERNIA REPAIR  3/2014 [facility] UPPER GASTROINTESTINAL ENDOSCOPY N/A 2018    EGD BIOPSY performed by Khanh Cabello MD at [address] N/A 2021    EGD BIOPSY performed by Khanh Cabello MD at [address] History:    Social History     Tobacco Use    Smoking status: Former     Packs/day: 0.20     Years: 15.00     Pack years: 3.00     Types: Cigarettes     Quit date: 2021     Years since quittin.8    Smokeless tobacco: Never    Tobacco comments:     has cut down to couple cigs a daily    Substance Use Topics    Alcohol use: Not Currently     Alcohol/week: 2.0 - 3.0 standard drinks     Types: 2 - 3 Shots of liquor per week     Comment: occasionally                                Counseling given: Not Answered  Tobacco comments: has cut down to couple cigs a daily       Vital Signs (Current): There were no vitals filed for this visit.                                            BP Readings from Last 3 Encounters:   22 127/78   22 (!) 148/96   22 (!) 208/121       NPO Status:                                                                                 BMI:   Wt Readings from Last 3 Encounters:   22 (!) 393 lb (178.3 kg)   22 (!) 398 lb 1.6 oz (180.6 kg)   22 (!) 402 lb (182.3 kg)     There is no height or weight on file to calculate BMI.    CBC:   Lab Results   Component Value Date/Time    WBC 9.3 2022 08:49 AM    RBC 4.72 2022 08:49 AM    HGB 11.4 2022 08:49 AM    HCT 37.9 2022 08:49 AM    MCV 80.3 2022 08:49 AM    RDW 17.2 2022 08:49 AM     2022 08:49 AM       CMP:   Lab Results   Component Value Date/Time     2022 08:39 AM    K 4.5 11/18/2022 08:39 AM     11/18/2022 08:39 AM    CO2 26 11/18/2022 08:39 AM    BUN 15 11/18/2022 08:39 AM    CREATININE 0.8 11/18/2022 08:39 AM    GFRAA >60 09/26/2022 09:19 AM    LABGLOM >60 11/18/2022 08:39 AM    GLUCOSE 108 11/18/2022 08:39 AM    PROT 6.3 11/18/2022 08:39 AM    CALCIUM 8.9 11/18/2022 08:39 AM    BILITOT 0.6 11/18/2022 08:39 AM    ALKPHOS 74 11/18/2022 08:39 AM    AST 10 11/18/2022 08:39 AM    ALT 9 11/18/2022 08:39 AM       POC Tests: No results for input(s): POCGLU, POCNA, POCK, POCCL, POCBUN, POCHEMO, POCHCT in the last 72 hours. Coags:   Lab Results   Component Value Date/Time    PROTIME 12.3 03/11/2014 06:30 AM    INR 1.1 03/11/2014 06:30 AM       HCG (If Applicable):   Lab Results   Component Value Date    PREGTESTUR NEGATIVE 09/07/2021        ABGs: No results found for: PHART, PO2ART, KNW7CBJ, FAG4KJT, BEART, N7CHNRIW     Type & Screen (If Applicable):  No results found for: LABABO, [address]    Anesthesia Evaluation  Patient summary reviewed history of anesthetic complications (Prolonged Emergence.):   Airway: Mallampati: III  TM distance: >3 FB   Neck ROM: full  Mouth opening: > = 3 FB   Dental: normal exam         Pulmonary: breath sounds clear to auscultation  (+) sleep apnea (Obstructive SA.):  asthma:     (-) not a current smoker                          ROS comment: Former Smoker. Cardiovascular:Negative CV ROS    (+) hypertension:,       ECG reviewed  Rhythm: regular  Rate: normal  Echocardiogram reviewed         Beta Blocker:  Dose within 24 Hrs      ROS comment: EKG: Normal Sinus Rhythm 82. ECHO:   Technically suboptimal and limited study. Left ventricle is normal in size . Mild left ventricular concentric hypertrophy noted. No regional wall motion abnormalities seen. Normal left ventricular ejection fraction.      Neuro/Psych:   (+) headaches: migraine headaches,             GI/Hepatic/Renal:   (+) GERD:, morbid obesity          Endo/Other: ROS comment: M Obesity  Deficiency of Vitamin D, B12 and Zinc. Abdominal:   (+) obese,           Vascular: negative vascular ROS. Other Findings:             Anesthesia Plan      general     ASA 3     (Troop pillow, video laryngoscope, succinylcholine for induction)  Induction: intravenous. MIPS: Postoperative opioids intended and Prophylactic antiemetics administered. Anesthetic plan and risks discussed with patient and mother. Plan discussed with CRNA. DOS STAFF ADDENDUM:    Pt seen and examined, chart reviewed (including anesthesia, drug and allergy history). Anesthetic plan, risks, benefits, alternatives, and personnel involved discussed with patient. Patient verbalized an understanding and agrees to proceed. Plan discussed with care team members and agreed upon.     Danielle Hui MD  Staff Anesthesiologist  7:08 AM      Danielle Hui MD   11/28/2022        Reviewed SHARI Wong - CRNA  11/28/22

## 2022-11-28 NOTE — OP NOTE
[address]    Operative Report    DATE OF PROCEDURE: 11/28/2022  SURGEON: Dr. Rossi Whitney MD, M.D. Resident: Fifi Juarez MD     PREOPERATIVE DIAGNOSES:    Morbid obesity ([address] E66.01    PRIYA (obstructive sleep apnea) G47.33    Essential hypertension I10       POSTOPERATIVE DIAGNOSES:   Same      OPERATION:   1) Robotic thaddeus-en-Y gastric bypass  2) Omental flap  3) EGD    ANESTHESIA: General endotracheal.   ESTIMATED BLOOD LOSS: 10 mL. SPECIMEN: jejunal tissue  COMPLICATIONS: None. HISTORY: Rashmi Beltrán is a morbidly-obese 29 y.o.  female, who weighs (!) 393 lb (178.3 kg). The Body mass index is 59.76 kg/m². She has multiple medical problems aggravated by her obesity. She wishes to have a gastric bypass so that she can lose more weight and keep the weight off. We discussed the extensive risks involved in the surgery including the risk of bleeding, infection, and needing further procedures. We also discussed wound infections, hernias and the risks of general anesthetic including MI, CVA, sudden death or reactions to anesthetic medications. The patient understood the risks. All questions were answered to the patient's satisfaction and they freely signed the consent and wished to proceed. PROCEDURE: The patient was placed on the table in the supine position and placed under general endotracheal anesthesia. She had SCDs on the legs as DVT prophylaxis. She had Ancef 3 g IV. Orogastric tube placed in the stomach, then removed. The abdomen was shaved, then prepped with ChloroPrep and draped in the usual sterile fashion. 0.25% Marcaine plus epinephrine was injected into the skin and muscle of each incision to help with postoperative pain control. An 8 mm incision was made left of midline 35 cm below the xyphoid.   A varies needle was used to insufflate the abdomen and an 8 mm robotic trocar was placed and an 8 mm 30 degree angled 3-D robot scope was used. A 12-mm incision was made in the right mid abdomen and a 12 mm robotic trocar was placed angled through the rectus sheath. 8-mm trocars were placed in the left mid and lateral abdomen and the table rotated right. The liver was massively distended so the Juan retractor was placed well below the xiphoid to get below the large liver, the left lobe was retracted. The head was elevated 17 degrees and the robot was docked. Cadiere grasper on the right, camera above the umbilicus, vessel sealer on the left and Cadiere on the far left. The anterior fat pad was grasped and the angle of His dissected free. Pars flaccida clear area was opened with the vessel sealer and the lesser omental fat and neurovascular structures were ligated over to the lesser curvature of the stomach. The robotic 60 mm linear blue cartridge was fired across the lower stomach to start the pouch. A 36 F bougie was placed by Anaesthesia down to the staple line. The Vessel sealer was used to dissect the tunnel behind the stomach through to the opening at the angle of HIS. Two more firings were taken vertically out through an opening at the angle of HIS completely  the small pouch from the bypassed stomach. A 30 cm double armed Stratafix 0 Monocryl absorbable locking suture was placed through the 12 trocar, the Cadiere grasper replaced and the suture placed through the corner of the staple line. The omentum was retracted superiorly with the Vessel Sealer. The ligament of Treitz was identified and measured 150 cm and the Omega loop of jejunum was brought up anti colic anti gastric to the gastric pouch for an end to side anastomosis. The absorbable locking suture was used to sew the jejunum to the gastric pouch forming the posterior wall of the anastomosis. The biliary limb was divided laterally with the robotic 60 mm white cartridge leaving the mesenteric vessels to the anastomosis.  The far left Caudiere was removed and the hook cautery was used to make holes for the anastomosis, 10 mm holes in the jejunum and on the gastric pouch over the bougie. The bougie was advanced through the anastomosis into the jejunum. The anterior wall of the anastomosis was then closed with the other arm of the absorbable locking suture from the right and met from the left. Pouch size approximately 30 mL. One of the needles and the bougie were removed. The hook cautery was used to make a small hole in the divided jejunum 4 cm from the end. The small bowel was run 100 cm from the gastrojejunal anastomosis and a loop of jejunum was brought up for the distal anastomosis using the triple staple technique. The hook cautery was used to make a small opening in the other limb of jejunum. The hook was removed and the Caudiere grasper placed. The robotic 60 mm white cartridge was fired with one arm inside each lumen first proximally, then distally. The enterotomy was closed transversely with the 60 mm white cartridge, finishing the distal anastomosis, the stapler and specimen were removed through the 12 trocar and a 6 inch 0 V-lock absorbable locking suture and Caudiere grasper placed. The V lock was used to close the mesenteric defect to prevent internal hernias, needle removed. The far left robot arm was undocked and a bowel clamp was placed on the jejunum distal to the gastrojejunal anastomosis. Suction/ placed through the right trocar. Endoscope was passed down through the mouth. Old blood was removed with suction. The pouch was inflated with air. The pouch and anastomosis were covered with saline with the . There was no bubbling from the suture or staple lines indicating they were airtight and watertight. The anastomosis was open approximately 8 mm. The scope was easily passed through the anastomosis into the jejunum. All of the mucosa looked healthy. The anastomosis looked good so the scope was withdrawn, fluid suctioned. Cadiere grasper placed through the 12 and omentum was sewn over the gastro-jejunal anastomosis as a gram patch using the end of the Stratafix suture, needle removed. The robot was undocked. The liver retractor was removed. All of the CO2 was released. The trocars were removed. Skin wounds were closed with 4-0 Monocryl dermal stitches. Skin-Afix glue was applied to each incision, no dressing. The needle and sponge count were correct. The patient tolerated the procedure well and went to recovery in stable condition. Plan: Will keep over night for pain and nausea control. Home tomorrow on Tylenol for pain, Zofran and Scopolamine for nausea, Carafate as ulcer prophylaxis.     Physician Signature: Electronically signed by Dr. Belem Guy M.D.

## 2022-11-29 ENCOUNTER — TELEPHONE (OUTPATIENT)
Dept: BARIATRICS/WEIGHT MGMT | Age: 37
End: 2022-11-29

## 2022-11-29 ENCOUNTER — APPOINTMENT (OUTPATIENT)
Dept: GENERAL RADIOLOGY | Age: 37
DRG: 403 | End: 2022-11-29
Attending: SURGERY
Payer: COMMERCIAL

## 2022-11-29 LAB
ADENOVIRUS BY PCR: NOT DETECTED
ANION GAP SERPL CALCULATED.3IONS-SCNC: 8 MMOL/L (ref 7–16)
BASOPHILS ABSOLUTE: 0.02 E9/L (ref 0–0.2)
BASOPHILS RELATIVE PERCENT: 0.2 % (ref 0–2)
BORDETELLA PARAPERTUSSIS BY PCR: NOT DETECTED
BORDETELLA PERTUSSIS BY PCR: NOT DETECTED
BUN BLDV-MCNC: 12 MG/DL (ref 6–20)
CALCIUM SERPL-MCNC: 8.6 MG/DL (ref 8.6–10.2)
CHLAMYDOPHILIA PNEUMONIAE BY PCR: NOT DETECTED
CHLORIDE BLD-SCNC: 101 MMOL/L (ref 98–107)
CO2: 29 MMOL/L (ref 22–29)
CORONAVIRUS 229E BY PCR: NOT DETECTED
CORONAVIRUS HKU1 BY PCR: NOT DETECTED
CORONAVIRUS NL63 BY PCR: NOT DETECTED
CORONAVIRUS OC43 BY PCR: NOT DETECTED
CREAT SERPL-MCNC: 0.8 MG/DL (ref 0.5–1)
EOSINOPHILS ABSOLUTE: 0.07 E9/L (ref 0.05–0.5)
EOSINOPHILS RELATIVE PERCENT: 0.6 % (ref 0–6)
GFR SERPL CREATININE-BSD FRML MDRD: >60 ML/MIN/1.73
GLUCOSE BLD-MCNC: 108 MG/DL (ref 74–99)
HCT VFR BLD CALC: 36.1 % (ref 34–48)
HEMOGLOBIN: 10.7 G/DL (ref 11.5–15.5)
HUMAN METAPNEUMOVIRUS BY PCR: NOT DETECTED
HUMAN RHINOVIRUS/ENTEROVIRUS BY PCR: NOT DETECTED
IMMATURE GRANULOCYTES #: 0.04 E9/L
IMMATURE GRANULOCYTES %: 0.4 % (ref 0–5)
INFLUENZA A BY PCR: NOT DETECTED
INFLUENZA B BY PCR: NOT DETECTED
LYMPHOCYTES ABSOLUTE: 2.92 E9/L (ref 1.5–4)
LYMPHOCYTES RELATIVE PERCENT: 26.3 % (ref 20–42)
MCH RBC QN AUTO: 24.2 PG (ref 26–35)
MCHC RBC AUTO-ENTMCNC: 29.6 % (ref 32–34.5)
MCV RBC AUTO: 81.5 FL (ref 80–99.9)
MONOCYTES ABSOLUTE: 0.78 E9/L (ref 0.1–0.95)
MONOCYTES RELATIVE PERCENT: 7 % (ref 2–12)
MYCOPLASMA PNEUMONIAE BY PCR: NOT DETECTED
NEUTROPHILS ABSOLUTE: 7.29 E9/L (ref 1.8–7.3)
NEUTROPHILS RELATIVE PERCENT: 65.5 % (ref 43–80)
PARAINFLUENZA VIRUS 1 BY PCR: NOT DETECTED
PARAINFLUENZA VIRUS 2 BY PCR: NOT DETECTED
PARAINFLUENZA VIRUS 3 BY PCR: NOT DETECTED
PARAINFLUENZA VIRUS 4 BY PCR: NOT DETECTED
PDW BLD-RTO: 16.9 FL (ref 11.5–15)
PLATELET # BLD: 326 E9/L (ref 130–450)
PMV BLD AUTO: 11 FL (ref 7–12)
POTASSIUM SERPL-SCNC: 4.2 MMOL/L (ref 3.5–5)
RBC # BLD: 4.43 E12/L (ref 3.5–5.5)
RESPIRATORY SYNCYTIAL VIRUS BY PCR: NOT DETECTED
SARS-COV-2, PCR: NOT DETECTED
SODIUM BLD-SCNC: 138 MMOL/L (ref 132–146)
WBC # BLD: 11.1 E9/L (ref 4.5–11.5)

## 2022-11-29 PROCEDURE — 80048 BASIC METABOLIC PNL TOTAL CA: CPT

## 2022-11-29 PROCEDURE — 0202U NFCT DS 22 TRGT SARS-COV-2: CPT

## 2022-11-29 PROCEDURE — 6370000000 HC RX 637 (ALT 250 FOR IP): Performed by: SURGERY

## 2022-11-29 PROCEDURE — 85025 COMPLETE CBC W/AUTO DIFF WBC: CPT

## 2022-11-29 PROCEDURE — 2580000003 HC RX 258: Performed by: SURGERY

## 2022-11-29 PROCEDURE — 36415 COLL VENOUS BLD VENIPUNCTURE: CPT

## 2022-11-29 PROCEDURE — 6360000002 HC RX W HCPCS: Performed by: SURGERY

## 2022-11-29 PROCEDURE — 1200000000 HC SEMI PRIVATE

## 2022-11-29 PROCEDURE — 71046 X-RAY EXAM CHEST 2 VIEWS: CPT

## 2022-11-29 RX ADMIN — KETOROLAC TROMETHAMINE 30 MG: 30 INJECTION, SOLUTION INTRAMUSCULAR at 01:39

## 2022-11-29 RX ADMIN — KETOROLAC TROMETHAMINE 30 MG: 30 INJECTION, SOLUTION INTRAMUSCULAR at 08:47

## 2022-11-29 RX ADMIN — ENOXAPARIN SODIUM 40 MG: 100 INJECTION SUBCUTANEOUS at 08:51

## 2022-11-29 RX ADMIN — Medication 650 MG: at 14:16

## 2022-11-29 RX ADMIN — SODIUM CHLORIDE, PRESERVATIVE FREE 10 ML: 5 INJECTION INTRAVENOUS at 08:53

## 2022-11-29 RX ADMIN — Medication 650 MG: at 01:39

## 2022-11-29 RX ADMIN — SUCRALFATE 0.5 G: 1 TABLET ORAL at 21:37

## 2022-11-29 RX ADMIN — SODIUM CHLORIDE, PRESERVATIVE FREE 10 ML: 5 INJECTION INTRAVENOUS at 21:38

## 2022-11-29 RX ADMIN — AMLODIPINE BESYLATE 10 MG: 10 TABLET ORAL at 08:41

## 2022-11-29 RX ADMIN — Medication 650 MG: at 08:43

## 2022-11-29 RX ADMIN — METOPROLOL TARTRATE 50 MG: 50 TABLET, FILM COATED ORAL at 08:41

## 2022-11-29 RX ADMIN — SUCRALFATE 0.5 G: 1 TABLET ORAL at 16:23

## 2022-11-29 RX ADMIN — SERTRALINE 25 MG: 50 TABLET, FILM COATED ORAL at 08:42

## 2022-11-29 RX ADMIN — KETOROLAC TROMETHAMINE 30 MG: 30 INJECTION, SOLUTION INTRAMUSCULAR at 14:23

## 2022-11-29 RX ADMIN — KETOROLAC TROMETHAMINE 30 MG: 30 INJECTION, SOLUTION INTRAMUSCULAR at 21:36

## 2022-11-29 RX ADMIN — LOSARTAN POTASSIUM 100 MG: 100 TABLET, FILM COATED ORAL at 08:41

## 2022-11-29 RX ADMIN — SUCRALFATE 0.5 G: 1 TABLET ORAL at 08:40

## 2022-11-29 RX ADMIN — Medication 650 MG: at 21:36

## 2022-11-29 RX ADMIN — METOPROLOL TARTRATE 50 MG: 50 TABLET, FILM COATED ORAL at 21:37

## 2022-11-29 RX ADMIN — SUCRALFATE 0.5 G: 1 TABLET ORAL at 14:16

## 2022-11-29 ASSESSMENT — PAIN DESCRIPTION - DESCRIPTORS
DESCRIPTORS: ACHING

## 2022-11-29 ASSESSMENT — PAIN SCALES - GENERAL
PAINLEVEL_OUTOF10: 5
PAINLEVEL_OUTOF10: 6
PAINLEVEL_OUTOF10: 3

## 2022-11-29 ASSESSMENT — PAIN DESCRIPTION - LOCATION
LOCATION: ABDOMEN

## 2022-11-29 ASSESSMENT — PAIN DESCRIPTION - ORIENTATION
ORIENTATION: UPPER
ORIENTATION: RIGHT;LEFT

## 2022-11-29 NOTE — TELEPHONE ENCOUNTER
[facility]        [facility]       Discharge Review for     Gwendolyn-en-Y Gastric Bypass                    And         Sleeve Gastrectomy     Reviewed with patient the following for discharge home:    Incision care- yes  Walking- yes  Elevate Head of Bed- yes  Infection Control- yes  Post op medications (PPI, Carafate and medication for nausea, Zofran) - yes  Review of Clear Liquid Diet- yes  Review of Full Liquid Diet- yes  Use of Emergency Room- yes  How and When to Call Bariatric Office- yes  Reminder of PCP Follow Up Appointment and Lab Draw- yes  Discuss Post-Op Call Schedule- yes  Any other issues- yes    Completed by Indio Peng RN

## 2022-11-29 NOTE — CONSULTS
Department of Internal Medicine  Internal Medicine Consultation Note    Primary Care Physician: Liam Molina. MD Irasema   Admitting Physician:  Stafford Apgar, MD  Admission date and time: 2022  6:20 AM    Room:  0316/0316-01  Admitting diagnosis: Morbid obesity [facility]) [E66.01]    Patient Name: Yessy Lawrence  MRN: [id number]    Date of Service: 2022     Reason for consultation:  Medical management    History of present illness:    Yulia Kohli is a 52-year-old female who underwent robotic assisted laparoscopic Gwendolyn-en-Y gastric bypass on 2022. She was scheduled for discharge this morning but became hypoxic with activity with oxygen saturation documented as low as 81%. We were asked to provide consultation for medical management. The patient does not appear overtly uncomfortable on examination and exhibits no conversational dyspnea. She is morbidly obese and states that she has been diagnosed with sleep apnea in the past and has been prescribed a CPAP machine. She also deals with accelerated hypertension which has been well controlled postoperatively. She describes volume retention with a strong family history of cardiovascular disease including congestive heart failure. As stated previously, she is not uncomfortable appearing. I visualized her utilizing the incentive spirometer and she was pulling good volumes. She did have coughing compatible with atelectasis. We discussed increased activity and ongoing use of the incentive spirometer. We also discussed work-up for hypoxia.     PAST MEDICAL Hx:  Past Medical History:   Diagnosis Date    Asthma     B12 deficiency 2018    Class 3 severe obesity due to excess calories with body mass index (BMI) of 60.0 to 69.9 in adult [facility])     GERD without esophagitis     Hypertension     Migraine     PRIYA (obstructive sleep apnea)     sleep apnea 4-11    Prolonged emergence from general anesthesia     Vitamin D deficiency 2018    Zinc deficiency 09/21/2018       PAST SURGICAL Hx:   Past Surgical History:   Procedure Laterality Date    CHOLECYSTECTOMY, LAPAROSCOPIC  3/2014 Gini Rosina    with repair umbilical hernia    LEEP  3/2008    OTHER SURGICAL HISTORY  2011    coils to fallopian tubes    OTHER SURGICAL HISTORY      Tubal essure     ASHLIE-EN-Y GASTRIC BYPASS N/A 11/28/2022    GASTRIC BYPASS ASHLIE-EN-Y LAPAROSCOPIC performed by Adam Gabriel MD at [facility]  3/2014 Gini Almaguer    UPPER GASTROINTESTINAL ENDOSCOPY N/A 5/2/2018    EGD BIOPSY performed by Adam Gabriel MD at [facility] N/A 9/7/2021    EGD BIOPSY performed by Adam Gabriel MD at [address] Hx:  Family History   Problem Relation Age of Onset    Cancer Paternal Grandmother         stomach    Brain Cancer Maternal Grandmother     Hypertension Father     Stroke Father     Heart Attack Father     Diabetes Father     Other Father         gout    Hypertension Mother     Scoliosis Sister     Diabetes Maternal Grandfather     Heart Disease Maternal Grandfather        HOME MEDICATIONS:  Prior to Admission medications    Medication Sig Start Date End Date Taking?  Authorizing Provider   metoprolol tartrate (LOPRESSOR) 50 MG tablet Take 1 tablet by mouth 2 times daily 11/18/22   Moi Pozo MD   sucralfate (CARAFATE) 1 GM tablet Take 0.5 tablets by mouth 4 times daily 11/17/22   Adam Gabriel MD   ondansetron (ZOFRAN-ODT) 4 MG disintegrating tablet Take 1 tablet by mouth every 8 hours as needed for Nausea or Vomiting 11/17/22   Adam Gabriel MD   losartan (COZAAR) 100 MG tablet TAKE 1 TABLET BY MOUTH EVERY DAY 10/17/22   Moi Pozo MD   guanFACINE (TENEX) 1 MG tablet Take 1 tablet by mouth nightly 9/22/22   Moi Pozo MD   magnesium oxide (MAG-OX) 400 MG tablet Take 1 tablet by mouth daily 9/22/22   Moi Pozo MD hydroCHLOROthiazide (HYDRODIURIL) 50 MG tablet Take 1 tablet by mouth daily 22   Dima Underwood MD   hydrALAZINE (APRESOLINE) 25 MG tablet Take 1 tablet by mouth in the morning and at bedtime 22   Yajaira White MD   albuterol sulfate  (90 Base) MCG/ACT inhaler Inhale 2 puffs into the lungs every 6 hours as needed for Wheezing 22   Ney Martinez MD   sertraline (ZOLOFT) 25 MG tablet Take 1 tablet by mouth daily 21   Dima Underwood MD   cyanocobalamin ([employer] VITAMIN B12) 1000 MCG tablet TAKE 1 TABLET BY MOUTH EVERY DAY 21   Dima Underwood MD   amLODIPine (NORVASC) 10 MG tablet Take 1 tablet by mouth daily 21   Dima Underwood MD   Multiple Vitamins-Minerals (THERAPEUTIC MULTIVITAMIN-MINERALS) tablet Take 1 tablet by mouth daily    Historical Provider, MD       ALLERGIES:  Lactose intolerance (gi) and Percocet [oxycodone-acetaminophen]    SOCIAL Hx:  Social History     Socioeconomic History    Marital status: Single     Spouse name: Not on file    Number of children: Not on file    Years of education: Not on file    Highest education level: Not on file   Occupational History    Not on file   Tobacco Use    Smoking status: Former     Packs/day: 0.20     Years: 15.00     Pack years: 3.00     Types: Cigarettes     Quit date: 2021     Years since quittin.8    Smokeless tobacco: Never    Tobacco comments:     has cut down to couple cigs a daily    Vaping Use    Vaping Use: Never used   Substance and Sexual Activity    Alcohol use: Not Currently     Alcohol/week: 2.0 - 3.0 standard drinks     Types: 2 - 3 Shots of liquor per week     Comment: occasionally    Drug use: No    Sexual activity: Yes     Partners: Male   Other Topics Concern    Not on file   Social History Narrative    Not on file     Social Determinants of Health     Financial Resource Strain: Low Risk     Difficulty of Paying Living Expenses: Not hard at all   Food Insecurity: No Food Insecurity Worried About [address] [facility] in the Last Year: Never true    [address] [facility] [address] in the Last Year: Never true   Transportation Needs: No Transportation Needs    Lack of Transportation (Medical): No    Lack of Transportation (Non-Medical): No   Physical Activity: Not on file   Stress: Not on file   Social Connections: Not on file   Intimate Partner Violence: Not on file   Housing Stability: Not on file       ROS:  General:   Denies chills, fatigue, fever, malaise, night sweats or weight loss    Psychological:   Denies anxiety, disorientation or hallucinations    ENT:    Denies epistaxis, headaches, vertigo or visual changes. Nasal cannula oxygen is in place at 2.5 L per    Cardiovascular:   Denies any chest pain, irregular heartbeats, or palpitations. No paroxysmal nocturnal dyspnea. Respiratory:   Denies shortness of breath while at rest.  She does have some exertional shortness of breath. She describes coughing while utilizing the incentive spirometer. Gastrointestinal:   Postoperative abdominal pain as to be expected. Genito-Urinary:    Denies any urgency, frequency, hematuria. Voiding without difficulty. Musculoskeletal:   Denies joint pain, joint stiffness, joint swelling or muscle pain    Neurology:    Denies any headache or focal neurological deficits. No weakness or paresthesia. Derm:    Denies any rashes, ulcers, or excoriations. Denies bruising. Extremities:   Denies any lower extremity swelling or edema. PHYSICAL EXAM:  VITALS:  Vitals:    11/29/22 0841   BP: 133/80   Pulse: 92   Resp:    Temp:    SpO2:          CONSTITUTIONAL:    Awake, alert, cooperative, no apparent distress, and appears stated age    EYES:    PERRL, EOMI, sclera clear without icterus, conjunctiva normal    ENT:    Normocephalic, atraumatic, sinuses nontender on palpation. External ears without lesions. Oral pharynx with moist mucus membranes. Tonsils without erythema or exudates.   Nasal cannula oxygen is in place. NECK:    Supple, symmetrical, trachea midline, no adenopathy, thyroid symmetric, not enlarged and no tenderness, skin normal, no bruits, no JVD    HEMATOLOGIC/LYMPHATICS:    No cervical lymphadenopathy and no supraclavicular lymphadenopathy    LUNGS:    Symmetric. No increased work of breathing, good air exchange, clear to auscultation bilaterally, no wheezes, rhonchi, or rales,     CARDIOVASCULAR:    Normal apical impulse, regular rate and rhythm, normal S1 and S2, no S3 or S4, and no murmur noted    ABDOMEN:    Soft. Mildly tender to palpation diffusely as expected postoperatively. Incision sites are clean, dry, and intact. Bowel sounds are hypoactive. MUSCULOSKELETAL:    There is no redness, warmth, or swelling of the joints. Full range of motion noted. Motor strength is 5 out of 5 all extremities bilaterally. Tone is normal.    NEUROLOGIC:    Awake, alert, oriented to name, place and time. Cranial nerves II-XII are grossly intact. Motor is 5 out of 5 bilaterally. SKIN:    No bruising or bleeding. No redness, warmth, or swelling    EXTREMITIES:    Peripheral pulses present. No edema, cyanosis, or swelling.     LABORATORY DATA:  CBC with Differential:    Lab Results   Component Value Date/Time    WBC 9.3 11/16/2022 08:49 AM    RBC 4.72 11/16/2022 08:49 AM    HGB 11.4 11/16/2022 08:49 AM    HCT 37.9 11/16/2022 08:49 AM     11/16/2022 08:49 AM    MCV 80.3 11/16/2022 08:49 AM    MCH 24.2 11/16/2022 08:49 AM    MCHC 30.1 11/16/2022 08:49 AM    RDW 17.2 11/16/2022 08:49 AM    SEGSPCT 55 03/11/2014 06:30 AM    LYMPHOPCT 23.1 11/16/2022 08:49 AM    MONOPCT 7.3 11/16/2022 08:49 AM    BASOPCT 0.4 11/16/2022 08:49 AM    MONOSABS 0.68 11/16/2022 08:49 AM    LYMPHSABS 2.14 11/16/2022 08:49 AM    EOSABS 0.28 11/16/2022 08:49 AM    BASOSABS 0.04 11/16/2022 08:49 AM     BMP:    Lab Results   Component Value Date/Time     11/18/2022 08:39 AM    K 4.5 11/18/2022 08:39 AM     11/18/2022 08:39 AM    CO2 26 11/18/2022 08:39 AM    BUN 15 11/18/2022 08:39 AM    LABALBU 3.8 11/18/2022 08:39 AM    CREATININE 0.8 11/18/2022 08:39 AM    CALCIUM 8.9 11/18/2022 08:39 AM    GFRAA >60 09/26/2022 09:19 AM    LABGLOM >60 11/18/2022 08:39 AM    GLUCOSE 108 11/18/2022 08:39 AM       ASSESSMENT:  Status post robotic assisted laparoscopic Gwendolyn-en-Y gastric bypass  Postoperative hypoxia most compatible with atelectasis complicated by obesity hypoventilatory syndrome and obstructive sleep apnea  Accelerated hypertension    PLAN:  I suspect that the patient's postoperative hypoxia is directly related to atelectasis. I have encouraged increased work with the incentive spirometer. Two-view chest x-ray will be obtained. Stat laboratory values will also be obtained. She has a known history of obstructive sleep apnea and was prescribed CPAP therapy to wear nocturnally and with naps. This is also contributing to her current respiratory situation. She deals with chronic volume retention necessitating diuretic therapy and a 2D echocardiogram will be obtained. Blood pressure medications will be adjusted as accelerated hypertension can also result in flash pulmonary edema. Otherwise, I anticipate with increased activity and ongoing use of the incentive spirometer, nasal cannula oxygen will be weaned off by tomorrow.     Angel Hair DO, MATILDE  1:42 PM  11/29/2022    Electronically signed by Angel Hair DO on 11/29/22 at 1:42 PM EST

## 2022-11-29 NOTE — PROGRESS NOTES
GENERAL SURGERY  DAILY PROGRESS NOTE  11/29/2022    No chief complaint on file. Subjective:  Pt states she feels good. Pain is controlled. Tolerating diet.     Objective:  /80   Pulse 92   Temp 98.6 °F (37 °C) (Oral)   Resp 16   Ht 5' 7\" (1.702 m)   Wt (!) 412 lb (186.9 kg)   SpO2 95%   BMI 64.53 kg/m²     GENERAL:  Laying in bed, awake, alert, cooperative, no apparent distress  HEAD: Normocephalic, atraumatic  EYES: No sclera icterus, pupils equal  LUNGS: On 2.5L NC  CARDIOVASCULAR:  RR and normotensive  ABDOMEN:  Soft, appropriate TTP around incisions, incisions c/d/i, non-distended  EXTREMITIES: No edema or swelling  SKIN: Warm and dry    Assessment/Plan:  29 y.o. female s/p robotic assisted laparoscopic Gwendolyn-en-Y Gastric Bypass 11/28    - Continue bariatric CLD  - IVFs  - Pain control PRN  - Encourage ambulation, OOB  - Incentive spirometer and deep breathing  - Wean O2 as able  - DC once off O2    Electronically signed by Shar Sanchez MD on 11/29/2022 at 6:33 AM

## 2022-11-29 NOTE — CARE COORDINATION
MITUL note: chart reviewed for potential transition of care needs. Pt is post op day 1 gastric bypass. She is currently requiring supplemental oxygen which she does not use at home. Will need weaned off as able. No other discharge needs identified at this time.

## 2022-11-30 LAB
ANION GAP SERPL CALCULATED.3IONS-SCNC: 6 MMOL/L (ref 7–16)
ANION GAP SERPL CALCULATED.3IONS-SCNC: 7 MMOL/L (ref 7–16)
BASOPHILS ABSOLUTE: 0.03 E9/L (ref 0–0.2)
BASOPHILS RELATIVE PERCENT: 0.3 % (ref 0–2)
BUN BLDV-MCNC: 12 MG/DL (ref 6–20)
BUN BLDV-MCNC: 12 MG/DL (ref 6–20)
CALCIUM SERPL-MCNC: 8.4 MG/DL (ref 8.6–10.2)
CALCIUM SERPL-MCNC: 8.6 MG/DL (ref 8.6–10.2)
CHLORIDE BLD-SCNC: 102 MMOL/L (ref 98–107)
CHLORIDE BLD-SCNC: 99 MMOL/L (ref 98–107)
CO2: 30 MMOL/L (ref 22–29)
CO2: 31 MMOL/L (ref 22–29)
CREAT SERPL-MCNC: 0.7 MG/DL (ref 0.5–1)
CREAT SERPL-MCNC: 0.7 MG/DL (ref 0.5–1)
EOSINOPHILS ABSOLUTE: 0.17 E9/L (ref 0.05–0.5)
EOSINOPHILS RELATIVE PERCENT: 1.9 % (ref 0–6)
GFR SERPL CREATININE-BSD FRML MDRD: >60 ML/MIN/1.73
GFR SERPL CREATININE-BSD FRML MDRD: >60 ML/MIN/1.73
GLUCOSE BLD-MCNC: 102 MG/DL (ref 74–99)
GLUCOSE BLD-MCNC: 95 MG/DL (ref 74–99)
HCT VFR BLD CALC: 34 % (ref 34–48)
HCT VFR BLD CALC: 34.7 % (ref 34–48)
HEMOGLOBIN: 10.3 G/DL (ref 11.5–15.5)
HEMOGLOBIN: 10.4 G/DL (ref 11.5–15.5)
IMMATURE GRANULOCYTES #: 0.03 E9/L
IMMATURE GRANULOCYTES %: 0.3 % (ref 0–5)
LYMPHOCYTES ABSOLUTE: 2.85 E9/L (ref 1.5–4)
LYMPHOCYTES RELATIVE PERCENT: 32.3 % (ref 20–42)
MCH RBC QN AUTO: 24.3 PG (ref 26–35)
MCHC RBC AUTO-ENTMCNC: 29.7 % (ref 32–34.5)
MCV RBC AUTO: 82 FL (ref 80–99.9)
MONOCYTES ABSOLUTE: 0.62 E9/L (ref 0.1–0.95)
MONOCYTES RELATIVE PERCENT: 7 % (ref 2–12)
NEUTROPHILS ABSOLUTE: 5.11 E9/L (ref 1.8–7.3)
NEUTROPHILS RELATIVE PERCENT: 58.2 % (ref 43–80)
PDW BLD-RTO: 17.1 FL (ref 11.5–15)
PLATELET # BLD: 317 E9/L (ref 130–450)
PMV BLD AUTO: 11.5 FL (ref 7–12)
POTASSIUM SERPL-SCNC: 3.8 MMOL/L (ref 3.5–5)
POTASSIUM SERPL-SCNC: 4.1 MMOL/L (ref 3.5–5)
RBC # BLD: 4.23 E12/L (ref 3.5–5.5)
SODIUM BLD-SCNC: 136 MMOL/L (ref 132–146)
SODIUM BLD-SCNC: 139 MMOL/L (ref 132–146)
TROPONIN, HIGH SENSITIVITY: <6 NG/L (ref 0–9)
WBC # BLD: 8.8 E9/L (ref 4.5–11.5)

## 2022-11-30 PROCEDURE — 2580000003 HC RX 258: Performed by: SURGERY

## 2022-11-30 PROCEDURE — 85025 COMPLETE CBC W/AUTO DIFF WBC: CPT

## 2022-11-30 PROCEDURE — 1200000000 HC SEMI PRIVATE

## 2022-11-30 PROCEDURE — 36415 COLL VENOUS BLD VENIPUNCTURE: CPT

## 2022-11-30 PROCEDURE — 80048 BASIC METABOLIC PNL TOTAL CA: CPT

## 2022-11-30 PROCEDURE — 6360000002 HC RX W HCPCS: Performed by: SURGERY

## 2022-11-30 PROCEDURE — 94660 CPAP INITIATION&MGMT: CPT

## 2022-11-30 PROCEDURE — 6370000000 HC RX 637 (ALT 250 FOR IP): Performed by: SURGERY

## 2022-11-30 PROCEDURE — 85014 HEMATOCRIT: CPT

## 2022-11-30 PROCEDURE — 2700000000 HC OXYGEN THERAPY PER DAY

## 2022-11-30 PROCEDURE — 93306 TTE W/DOPPLER COMPLETE: CPT

## 2022-11-30 PROCEDURE — 84484 ASSAY OF TROPONIN QUANT: CPT

## 2022-11-30 PROCEDURE — 6360000002 HC RX W HCPCS: Performed by: INTERNAL MEDICINE

## 2022-11-30 PROCEDURE — 85018 HEMOGLOBIN: CPT

## 2022-11-30 RX ORDER — FUROSEMIDE 10 MG/ML
20 INJECTION INTRAMUSCULAR; INTRAVENOUS ONCE
Status: DISCONTINUED | OUTPATIENT
Start: 2022-11-30 | End: 2022-11-30

## 2022-11-30 RX ORDER — FUROSEMIDE 10 MG/ML
40 INJECTION INTRAMUSCULAR; INTRAVENOUS ONCE
Status: COMPLETED | OUTPATIENT
Start: 2022-11-30 | End: 2022-11-30

## 2022-11-30 RX ORDER — HYDROCODONE BITARTRATE AND ACETAMINOPHEN 5; 325 MG/1; MG/1
1 TABLET ORAL EVERY 8 HOURS PRN
Status: DISCONTINUED | OUTPATIENT
Start: 2022-11-30 | End: 2022-12-01 | Stop reason: HOSPADM

## 2022-11-30 RX ADMIN — MORPHINE SULFATE 4 MG: 4 INJECTION, SOLUTION INTRAMUSCULAR; INTRAVENOUS at 05:36

## 2022-11-30 RX ADMIN — SUCRALFATE 0.5 G: 1 TABLET ORAL at 20:43

## 2022-11-30 RX ADMIN — AMLODIPINE BESYLATE 10 MG: 10 TABLET ORAL at 08:36

## 2022-11-30 RX ADMIN — METOPROLOL TARTRATE 50 MG: 50 TABLET, FILM COATED ORAL at 20:43

## 2022-11-30 RX ADMIN — Medication 650 MG: at 08:35

## 2022-11-30 RX ADMIN — SERTRALINE 25 MG: 50 TABLET, FILM COATED ORAL at 08:37

## 2022-11-30 RX ADMIN — SUCRALFATE 0.5 G: 1 TABLET ORAL at 08:36

## 2022-11-30 RX ADMIN — SODIUM CHLORIDE, PRESERVATIVE FREE 10 ML: 5 INJECTION INTRAVENOUS at 08:48

## 2022-11-30 RX ADMIN — SODIUM CHLORIDE, PRESERVATIVE FREE 10 ML: 5 INJECTION INTRAVENOUS at 20:47

## 2022-11-30 RX ADMIN — Medication 650 MG: at 20:44

## 2022-11-30 RX ADMIN — SUCRALFATE 0.5 G: 1 TABLET ORAL at 16:19

## 2022-11-30 RX ADMIN — KETOROLAC TROMETHAMINE 30 MG: 30 INJECTION, SOLUTION INTRAMUSCULAR at 13:15

## 2022-11-30 RX ADMIN — ENOXAPARIN SODIUM 40 MG: 100 INJECTION SUBCUTANEOUS at 08:37

## 2022-11-30 RX ADMIN — FUROSEMIDE 40 MG: 10 INJECTION, SOLUTION INTRAMUSCULAR; INTRAVENOUS at 08:48

## 2022-11-30 RX ADMIN — SUCRALFATE 0.5 G: 1 TABLET ORAL at 13:15

## 2022-11-30 RX ADMIN — LOSARTAN POTASSIUM 100 MG: 100 TABLET, FILM COATED ORAL at 08:36

## 2022-11-30 RX ADMIN — KETOROLAC TROMETHAMINE 30 MG: 30 INJECTION, SOLUTION INTRAMUSCULAR at 08:36

## 2022-11-30 RX ADMIN — Medication 650 MG: at 13:15

## 2022-11-30 RX ADMIN — METOPROLOL TARTRATE 50 MG: 50 TABLET, FILM COATED ORAL at 08:36

## 2022-11-30 ASSESSMENT — PAIN SCALES - GENERAL
PAINLEVEL_OUTOF10: 1
PAINLEVEL_OUTOF10: 2
PAINLEVEL_OUTOF10: 3
PAINLEVEL_OUTOF10: 7

## 2022-11-30 ASSESSMENT — PAIN DESCRIPTION - LOCATION
LOCATION: ABDOMEN
LOCATION: ABDOMEN

## 2022-11-30 ASSESSMENT — PAIN DESCRIPTION - FREQUENCY: FREQUENCY: INTERMITTENT

## 2022-11-30 ASSESSMENT — PAIN - FUNCTIONAL ASSESSMENT: PAIN_FUNCTIONAL_ASSESSMENT: ACTIVITIES ARE NOT PREVENTED

## 2022-11-30 ASSESSMENT — PAIN DESCRIPTION - DESCRIPTORS
DESCRIPTORS: PRESSURE
DESCRIPTORS: TENDER;SORE;DISCOMFORT
DESCRIPTORS: PRESSURE

## 2022-11-30 ASSESSMENT — PAIN DESCRIPTION - ORIENTATION: ORIENTATION: MID;LEFT;RIGHT

## 2022-11-30 ASSESSMENT — PAIN DESCRIPTION - PAIN TYPE: TYPE: SURGICAL PAIN

## 2022-11-30 NOTE — PROGRESS NOTES
Internal Medicine Progress Note    [facility]    Kirstie LEENA SantosOBEV Hurtado D.O., STEVAN Pal D.O. Editha Leghorn, MSN, APRN, NP-C  Abeba Hurtado. Shalom Diaz, MSN, APRN-CNP     Primary Care Physician: Nolvia Nava. Tamika Irene MD   Admitting Physician:  Giulia Calzada MD  Admission date and time: 11/28/2022  6:20 AM    Room:  [address] [place] [address]  Admitting diagnosis: Morbid obesity [facility]) [E66.01]    Patient Name: Emre Barraza  MRN: [id number]    Date of Service: 11/30/2022     Subjective:  Chris Linares is a 29 y.o. female who was seen and examined today,11/30/2022, at the bedside. Patient was resting in bed. She states she feels much better today. States at rest in bed she has since that her oxygen saturation stays good. She states that yesterday when she was up and ambulating she felt perfectly fine however staff informed her that her SPO2 was low. Patient states at home she does use CPAP therapy. Patient states she has been utilizing the incentive spirometry. Reports she was informed that she was doing it incorrectly and has been now ongoing at properly. Admits that she does cough after proper use. Tolerating diet well. States she did have 3 bowel movements and did notice that she had \" coffee-ground stools\" no fresh blood. Positive for flatus. No family present during my examination. Review of System:   Constitutional:   Denies fever or chills, weight loss or gain, fatigue or malaise. HEENT:   Denies ear pain, sore throat, sinus or eye problems. Cardiovascular:   Denies any chest pain, irregular heartbeats, or palpitations. Respiratory:   Denies shortness of breath, coughing, sputum production, hemoptysis, or wheezing. See subjective  Gastrointestinal:   Denies nausea, vomiting, diarrhea, or constipation. Surgical abdominal pain which is well controlled.   See subjective  Genitourinary:    Denies any urgency, frequency, hematuria. Voiding  without difficulty. Extremities:   Denies lower extremity swelling, edema or cyanosis. Neurology:    Denies any headache or focal neurological deficits, Denies generalized weakness or memory difficulty. Psch:   Denies being anxious or depressed. Musculoskeletal:    Denies  myalgias, joint complaints or back pain. Integumentary:   Denies any rashes, ulcers, or excoriations. Denies bruising. Surgical changes of the abdomen. Hematologic/Lymphatic:  Denies bruising or bleeding. Physical Exam:  I/O this shift:  In: 120 [P.O.:120]  Out: -     Intake/Output Summary (Last 24 hours) at 11/30/2022 1646  Last data filed at 11/30/2022 0902  Gross per 24 hour   Intake 900 ml   Output 1100 ml   Net -200 ml   I/O last 3 completed shifts: In: 2072.7 [P.O.:1700; I.V.:372.7]  Out: 3350 [[facility]:8640]  Patient Vitals for the past 96 hrs (Last 3 readings):   Weight   11/28/22 1232 (!) 412 lb (186.9 kg)   11/28/22 0648 (!) 393 lb (178.3 kg)     Vital Signs:   Blood pressure 108/68, pulse 60, temperature 98.1 °F (36.7 °C), temperature source Oral, resp. rate 16, height 5' 7\" (1.702 m), weight (!) 412 lb (186.9 kg), SpO2 93 %, not currently breastfeeding. General appearance:  Alert, responsive, oriented to person, place, and time. Well preserved, alert, no distress. Head:  Normocephalic. No masses, lesions or tenderness. Eyes:  PERRLA. EOMI. Sclera clear. Buccal mucosa moist.  ENT:  Ears normal. Mucosa normal.  Oxygen on via nasal cannula. Neck:    Supple. Trachea midline. No thyromegaly. No JVD. No bruits. Heart:    Rhythm regular. Rate controlled. No murmurs. Lungs:    Symmetrical. Clear to auscultation bilaterally but diminished. No wheezes. No rhonchi. No rales. Abdomen:   Soft. Tender. Non-distended. Bowel sounds positive. No organomegaly or masses. Mild pain on palpation. Obese. Surgical changes.   Extremities: Peripheral pulses present. No peripheral edema. No ulcers. No cyanosis. No clubbing. Neurologic:    Alert x 3. No focal deficit. Cranial nerves grossly intact. No focal weakness. Psych:   Behavior is normal. Mood appears normal. Speech is not rapid and/or pressured. Musculoskeletal:   Spine ROM normal. Muscular strength intact. Gait not assessed. Integumentary:  No rashes  Skin normal color and texture.   Genitalia/Breast:  Deferred    Medication:  Scheduled Meds:   amLODIPine  10 mg Oral Daily    losartan  100 mg Oral Daily    metoprolol tartrate  50 mg Oral BID    sertraline  25 mg Oral Daily    sucralfate  0.5 g Oral 4x Daily    sodium chloride flush  5-40 mL IntraVENous 2 times per day    acetaminophen  650 mg Oral Q6H    ketorolac  30 mg IntraVENous Q6H    [START ON 12/1/2022] scopolamine  1 patch TransDERmal Q72H    enoxaparin  40 mg SubCUTAneous Daily     Continuous Infusions:   sodium chloride         Objective Data:  Recent Labs     11/29/22  1659 11/30/22  0437 11/30/22  1421   WBC 11.1 8.8  --    RBC 4.43 4.23  --    HGB 10.7* 10.3* 10.4*   HCT 36.1 34.7 34.0   MCV 81.5 82.0  --    MCH 24.2* 24.3*  --    MCHC 29.6* 29.7*  --    RDW 16.9* 17.1*  --     317  --    MPV 11.0 11.5  --      Recent Labs     11/29/22  1659 11/30/22  0437 11/30/22  1421    136 139   K 4.2 3.8 4.1    99 102   CO2 29 30* 31*   BUN 12 12 12   CREATININE 0.8 0.7 0.7   GLUCOSE 108* 95 102*   CALCIUM 8.6 8.4* 8.6     Lab Results   Component Value Date    TROPONINI <0.01 02/10/2021    TROPONINI <0.01 12/03/2020    TROPONINI <0.01 03/10/2014          Wound Documentation:   Incision 03/17/14 Abdomen (Active)   Number of days: 3180       Assessment:  Status post robotic assisted laparoscopic Gwendolyn-en-Y gastric bypass  Postoperative hypoxia most compatible with atelectasis complicated by obesity hypoventilatory syndrome and obstructive sleep apnea  Obstructive sleep apnea on CPAP therapy  Accelerated hypertension  Obesity secondary to excess caloric intake  Anemia    Plan:   Increase activity as tolerated. High-protein diet as per general surgery. Lasix as ordered. Monitor output. Will obtain troponin and EKG. Encourage patient to continue his incentive spirometry. Lovenox for VTE prophylaxis  Vital signs have been stable. Continue to monitor make adjustment antihypertensive therapy as needed. Likely discharge later today- defer to nerval surgery    Continue current therapy. See orders for further plan of care. More than 50% of my  time was spent at the bedside counseling/coordinating care with the patient and/or family with face to face contact. This time was spent reviewing notes and laboratory data as well as instructing and counseling the patient. Time I spent with the family or surrogate(s) is included only if the patient was incapable of providing the necessary information or participating in medical decisions. I also discussed the differential diagnosis and all of the proposed management plans with the patient and individuals accompanying the patient. The patient was seen, examined and then discussed with Dr. Lisa Negron. Gennaro Saunders, SHARI - CNP,   11/30/2022  4:46 PM        I saw and evaluated the patient. I agree with the findings and the plan of care as documented in Gennaro Saunders NP-C's  note.     Argelia Márquez DO, D.O., [facility]  5:31 PM  11/30/2022

## 2022-11-30 NOTE — CARE COORDINATION
CM note: post op day 2 gastric bypass, has been weaned off of oxygen while at rest.  Given IV lasix today and for an echocardiogram.  Plan remains home with no needs once patient is able to ambulate while maintaining oxygen sats >88% on room air.

## 2022-11-30 NOTE — PROGRESS NOTES
GENERAL SURGERY  DAILY PROGRESS NOTE  11/30/2022    Subjective:  Pt states she feels okay, still has some pain, breathing easily, does not feel short of breath, can't tell when the O2 sat drops. Bowels moved, passed some old blood. Still on 2L NC, O2 Sat drops to 82% on room air, comes up to 99 % on 2 L nasal canula while deep breathing. Objective:  /65   Pulse 79   Temp 98.4 °F (36.9 °C) (Oral)   Resp 16   Ht 5' 7\" (1.702 m)   Wt (!) 412 lb (186.9 kg)   SpO2 92%   BMI 64.53 kg/m²     GENERAL:  Laying in bed, awake, alert, cooperative, no apparent distress  HEAD: Normocephalic, atraumatic  EYES: No sclera icterus, pupils equal  LUNGS: On 2L NC  CARDIOVASCULAR:  RR and normotensive  ABDOMEN:  Soft, appropriate TTP around incisions, incisions c/d/i, non-distended  EXTREMITIES: No edema or swelling  SKIN: Warm and dry    Assessment:  29 y.o. female with low O2 sat on room air, drops to 82% then come up to normal on oxygen post robotic assisted Gwendolyn-en-Y Gastric Bypass 11/28/22    Plan:  - high protein full liquid diet for 10 days.  - IVFs  - Pain control PRN  - IM recs appreciated  - Encourage ambulation, OOB  - Incentive spirometer and deep breathing  - Wean O2 as able  - DC once off O2 or will need home oxygen.     Electronically signed Alden Frias MD,  Ronal Hurtado MD on 11/30/2022 at 6:34 AM

## 2022-12-01 VITALS
TEMPERATURE: 97.9 F | RESPIRATION RATE: 20 BRPM | WEIGHT: 293 LBS | BODY MASS INDEX: 45.99 KG/M2 | HEART RATE: 85 BPM | DIASTOLIC BLOOD PRESSURE: 68 MMHG | SYSTOLIC BLOOD PRESSURE: 118 MMHG | OXYGEN SATURATION: 96 % | HEIGHT: 67 IN

## 2022-12-01 LAB
ANION GAP SERPL CALCULATED.3IONS-SCNC: 6 MMOL/L (ref 7–16)
ANISOCYTOSIS: ABNORMAL
BASOPHILS ABSOLUTE: 0.13 E9/L (ref 0–0.2)
BASOPHILS RELATIVE PERCENT: 1.7 % (ref 0–2)
BUN BLDV-MCNC: 10 MG/DL (ref 6–20)
CALCIUM SERPL-MCNC: 8.6 MG/DL (ref 8.6–10.2)
CHLORIDE BLD-SCNC: 101 MMOL/L (ref 98–107)
CO2: 31 MMOL/L (ref 22–29)
CREAT SERPL-MCNC: 0.8 MG/DL (ref 0.5–1)
EOSINOPHILS ABSOLUTE: 0.27 E9/L (ref 0.05–0.5)
EOSINOPHILS RELATIVE PERCENT: 3.5 % (ref 0–6)
GFR SERPL CREATININE-BSD FRML MDRD: >60 ML/MIN/1.73
GLUCOSE BLD-MCNC: 98 MG/DL (ref 74–99)
HCT VFR BLD CALC: 35.3 % (ref 34–48)
HEMOGLOBIN: 10.2 G/DL (ref 11.5–15.5)
LYMPHOCYTES ABSOLUTE: 2.58 E9/L (ref 1.5–4)
LYMPHOCYTES RELATIVE PERCENT: 33.9 % (ref 20–42)
MCH RBC QN AUTO: 23.6 PG (ref 26–35)
MCHC RBC AUTO-ENTMCNC: 28.9 % (ref 32–34.5)
MCV RBC AUTO: 81.5 FL (ref 80–99.9)
MONOCYTES ABSOLUTE: 0.3 E9/L (ref 0.1–0.95)
MONOCYTES RELATIVE PERCENT: 4.3 % (ref 2–12)
NEUTROPHILS ABSOLUTE: 4.33 E9/L (ref 1.8–7.3)
NEUTROPHILS RELATIVE PERCENT: 56.5 % (ref 43–80)
PDW BLD-RTO: 16.7 FL (ref 11.5–15)
PLATELET # BLD: 294 E9/L (ref 130–450)
PMV BLD AUTO: 10.7 FL (ref 7–12)
POTASSIUM SERPL-SCNC: 3.9 MMOL/L (ref 3.5–5)
RBC # BLD: 4.33 E12/L (ref 3.5–5.5)
SODIUM BLD-SCNC: 138 MMOL/L (ref 132–146)
WBC # BLD: 7.6 E9/L (ref 4.5–11.5)

## 2022-12-01 PROCEDURE — 85025 COMPLETE CBC W/AUTO DIFF WBC: CPT

## 2022-12-01 PROCEDURE — 36415 COLL VENOUS BLD VENIPUNCTURE: CPT

## 2022-12-01 PROCEDURE — 6370000000 HC RX 637 (ALT 250 FOR IP): Performed by: SURGERY

## 2022-12-01 PROCEDURE — 80048 BASIC METABOLIC PNL TOTAL CA: CPT

## 2022-12-01 PROCEDURE — 6360000002 HC RX W HCPCS: Performed by: SURGERY

## 2022-12-01 RX ADMIN — SUCRALFATE 0.5 G: 1 TABLET ORAL at 07:47

## 2022-12-01 RX ADMIN — METOPROLOL TARTRATE 50 MG: 50 TABLET, FILM COATED ORAL at 07:51

## 2022-12-01 RX ADMIN — LOSARTAN POTASSIUM 100 MG: 100 TABLET, FILM COATED ORAL at 07:51

## 2022-12-01 RX ADMIN — SUCRALFATE 0.5 G: 1 TABLET ORAL at 11:54

## 2022-12-01 RX ADMIN — ENOXAPARIN SODIUM 40 MG: 100 INJECTION SUBCUTANEOUS at 07:47

## 2022-12-01 RX ADMIN — Medication 650 MG: at 07:47

## 2022-12-01 RX ADMIN — AMLODIPINE BESYLATE 10 MG: 10 TABLET ORAL at 07:47

## 2022-12-01 ASSESSMENT — PAIN DESCRIPTION - LOCATION: LOCATION: ABDOMEN

## 2022-12-01 ASSESSMENT — PAIN DESCRIPTION - DESCRIPTORS: DESCRIPTORS: PRESSURE

## 2022-12-01 ASSESSMENT — PAIN SCALES - GENERAL
PAINLEVEL_OUTOF10: 4
PAINLEVEL_OUTOF10: 2

## 2022-12-01 NOTE — PROGRESS NOTES
GENERAL SURGERY  DAILY PROGRESS NOTE  12/1/2022    Subjective:  Pt states she feels great. Denies any SOB or abdominal pain. On CPAP overnight. Objective:  /68   Pulse 85   Temp 97.9 °F (36.6 °C) (Oral)   Resp 20   Ht 5' 7\" (1.702 m)   Wt (!) 412 lb (186.9 kg)   SpO2 97%   BMI 64.53 kg/m²     GENERAL:  Laying in bed, awake, alert, cooperative, no apparent distress  HEAD: Normocephalic, atraumatic  EYES: No sclera icterus, pupils equal  LUNGS: On CPAP  CARDIOVASCULAR:  RR and normotensive  ABDOMEN:  Soft, appropriate TTP around incisions, incisions c/d/i, non-distended  EXTREMITIES: No edema or swelling  SKIN: Warm and dry    Assessment:  29 y.o. female with low O2 sat on room air, drops to 82% then come up to normal on oxygen post robotic assisted Gwendolyn-en-Y Gastric Bypass 11/28/22    - high protein full liquid diet for 10 days.  - Stop IVFs  - Pain control PRN  - IM recs appreciated  - Encourage ambulation, OOB  - Incentive spirometer and deep breathing  - Wean O2 as able  - DC once off O2 or will need home oxygen.     Electronically signed Carlos Alberto Reyes MD on 12/1/2022 at 6:21 AM

## 2022-12-01 NOTE — PROGRESS NOTES
Internal Medicine Progress Note    [facility]    [facility]. Magnolia Gotti., ANTWON Kirk D.O., F.A.C.O.I. Jackqueline Goltz, D.O. Ronnette Big, D.O. Dorla Real, MSN, APRN, NP-C  Ami Connor. Kemi Johnson, MSN, APRN-CNP     Primary Care Physician: Marian Alexandre. Chen Bah MD   Admitting Physician:  Azael Miranda MD  Admission date and time: 11/28/2022  6:20 AM    Room:  [address] [place] [address]  Admitting diagnosis: Morbid obesity [facility]) [E66.01]    Patient Name: Louise Olson  MRN: [id number]    Date of Service: 12/1/2022     Subjective:  Nona Galvez is a 29 y.o. female who was seen and examined today,12/1/2022, at the bedside. Seen clinical improved today. Patient is currently off oxygen and has been ambulating in the [place]villa compliant with CPAP. Complaining of being hungry. Possible discharge today    No family present during my examination. Review of System:   Constitutional:   Denies fever or chills, weight loss or gain, fatigue or malaise. HEENT:   Denies ear pain, sore throat, sinus or eye problems. Cardiovascular:   Denies any chest pain, irregular heartbeats, or palpitations. Respiratory:   Denies shortness of breath, coughing, sputum production, hemoptysis, or wheezing. See subjective  Gastrointestinal:   Denies nausea, vomiting, diarrhea, or constipation. Surgical abdominal pain which is well controlled. See subjective  Genitourinary:    Denies any urgency, frequency, hematuria. Voiding  without difficulty. Extremities:   Denies lower extremity swelling, edema or cyanosis. Neurology:    Denies any headache or focal neurological deficits, Denies generalized weakness or memory difficulty. Psch:   Denies being anxious or depressed. Musculoskeletal:    Denies  myalgias, joint complaints or back pain. Integumentary:   Denies any rashes, ulcers, or excoriations. Denies bruising.   Surgical changes of the abdomen. Hematologic/Lymphatic:  Denies bruising or bleeding. Physical Exam:  I/O this shift: In: 480 [P.O.:480]  Out: -     Intake/Output Summary (Last 24 hours) at 12/1/2022 1716  Last data filed at 12/1/2022 1402  Gross per 24 hour   Intake 960 ml   Output 500 ml   Net 460 ml   I/O last 3 completed shifts: In: 660 [P.O.:660]  Out: 5262 [LQJNN:[id number]]  Patient Vitals for the past 96 hrs (Last 3 readings):   Weight   11/28/22 1232 (!) 412 lb (186.9 kg)   11/28/22 0648 (!) 393 lb (178.3 kg)     Vital Signs:   Blood pressure 118/68, pulse 85, temperature 97.9 °F (36.6 °C), temperature source Oral, resp. rate 20, height 5' 7\" (1.702 m), weight (!) 412 lb (186.9 kg), SpO2 96 %, not currently breastfeeding. General appearance:  Alert, responsive, oriented to person, place, and time. Well preserved, alert, no distress. Head:  Normocephalic. No masses, lesions or tenderness. Eyes:  PERRLA. EOMI. Sclera clear. Buccal mucosa moist.  ENT:  Ears normal. Mucosa normal.  Oxygen on via nasal cannula. Neck:    Supple. Trachea midline. No thyromegaly. No JVD. No bruits. Heart:    Rhythm regular. Rate controlled. No murmurs. Lungs:    Symmetrical. Clear to auscultation bilaterally but diminished. No wheezes. No rhonchi. No rales. Abdomen:   Soft. Tender. Non-distended. Bowel sounds positive. No organomegaly or masses. Mild pain on palpation. Obese. Surgical changes. Extremities:    Peripheral pulses present. No peripheral edema. No ulcers. No cyanosis. No clubbing. Neurologic:    Alert x 3. No focal deficit. Cranial nerves grossly intact. No focal weakness. Psych:   Behavior is normal. Mood appears normal. Speech is not rapid and/or pressured. Musculoskeletal:   Spine ROM normal. Muscular strength intact. Gait not assessed. Integumentary:  No rashes  Skin normal color and texture.   Genitalia/Breast:  Deferred    Medication:  Scheduled Meds:   amLODIPine  10 mg Oral Daily    losartan  100 mg Oral Daily metoprolol tartrate  50 mg Oral BID    sertraline  25 mg Oral Daily    sucralfate  0.5 g Oral 4x Daily    sodium chloride flush  5-40 mL IntraVENous 2 times per day    acetaminophen  650 mg Oral Q6H    scopolamine  1 patch TransDERmal Q72H    enoxaparin  40 mg SubCUTAneous Daily     Continuous Infusions:   sodium chloride         Objective Data:  Recent Labs     11/29/22  1659 11/30/22  0437 11/30/22  1421 12/01/22  0411   WBC 11.1 8.8  --  7.6   RBC 4.43 4.23  --  4.33   HGB 10.7* 10.3* 10.4* 10.2*   HCT 36.1 34.7 34.0 35.3   MCV 81.5 82.0  --  81.5   MCH 24.2* 24.3*  --  23.6*   MCHC 29.6* 29.7*  --  28.9*   RDW 16.9* 17.1*  --  16.7*    317  --  294   MPV 11.0 11.5  --  10.7     Recent Labs     11/30/22  0437 11/30/22  1421 12/01/22  0411    139 138   K 3.8 4.1 3.9   CL 99 102 101   CO2 30* 31* 31*   BUN 12 12 10   CREATININE 0.7 0.7 0.8   GLUCOSE 95 102* 98   CALCIUM 8.4* 8.6 8.6     Lab Results   Component Value Date    TROPONINI <0.01 02/10/2021    TROPONINI <0.01 12/03/2020    TROPONINI <0.01 03/10/2014          Wound Documentation:   Incision 03/17/14 Abdomen (Active)   Number of days: 3180       Assessment:  Status post robotic assisted laparoscopic Gwendolyn-en-Y gastric bypass  Postoperative hypoxia most compatible with atelectasis complicated by obesity hypoventilatory syndrome and obstructive sleep apnea  Obstructive sleep apnea on CPAP therapy  Accelerated hypertension  Obesity secondary to excess caloric intake  Anemia    Plan:     Patient postop hypoxemia has resolved. Currently off oxygen and using CPAP  Has been ambulatory without signs of desaturation  Tolerating diet  Per surgery recommendation possible discharge today  Follow-up recommendations    More than 50% of my  time was spent at the bedside counseling/coordinating care with the patient and/or family with face to face contact.   This time was spent reviewing notes and laboratory data as well as instructing and counseling the patient. Time I spent with the family or surrogate(s) is included only if the patient was incapable of providing the necessary information or participating in medical decisions. I also discussed the differential diagnosis and all of the proposed management plans with the patient and individuals accompanying the patient.         Santa George DO, D.O., Filomena Vidya  5:16 PM  12/1/2022

## 2022-12-01 NOTE — DISCHARGE SUMMARY
Discharge Summary  Michelle Lopes  [id number]    Admit date: 11/28/2022    Discharge date and time: 12/1/2022    Admitting Physician: Axel Gracia MD    Patient Active Problem List   Diagnosis    Asthma    Morbid obesity ([address]    PRIYA (obstructive sleep apnea)    Essential hypertension    Gastric bypass       Hospital Course: Michelle Lopes is a 29 y.o. female post robotic Gastric Bypass 11/28/22. She did well with surgery but O2 sat was low on room air in the low 80's, came up to 98% on oxygen when she breathed deeply. Pain well controlled, no dyspnea, walking well in the halls. Labs normal. Medicine consulted and workup normal other than mild fluid overload. Saturations stay low so she was kept two extra night. Good urine output, chaparro was not used. She was tolerating the Bariatric clear liquid diet with no nausea so was advanced to a full liquid diet. Incisions all clean and dry, surgical glue in place. Saturations came up to normal on room air while awake and normal while asleep as long as she used her CPAP. She felt good and wished to go home. Discharge Exam:   VITALS: Blood pressure 118/68, pulse 85, temperature 97.9 °F (36.6 °C), temperature source Oral, resp. rate 20, height 5' 7\" (1.702 m), weight (!) 412 lb (186.9 kg), SpO2 96 %, not currently breastfeeding. General appearance: alert, appears stated age and cooperative  Neck: no adenopathy, no carotid bruit, no JVD, supple, symmetrical, trachea midline and thyroid not enlarged, symmetric, no tenderness/mass/nodules  Lungs: clear to auscultation bilaterally  Heart: regular rate and rhythm,   Abdomen: Incisions clean and dry, surgical glue in place.   Extremities: extremities normal, atraumatic, no cyanosis or edema       Medication List        CONTINUE taking these medications      albuterol sulfate  (90 Base) MCG/ACT inhaler  Commonly known as: PROVENTIL;VENTOLIN;PROAIR  Inhale 2 puffs into the lungs every 6 hours as needed for Wheezing     amLODIPine 10 MG tablet  Commonly known as: NORVASC  Take 1 tablet by mouth daily     cyanocobalamin 1000 MCG tablet  Commonly known as: [employer] VITAMIN B12  TAKE 1 TABLET BY MOUTH EVERY DAY     guanFACINE 1 MG tablet  Commonly known as: TENEX  Take 1 tablet by mouth nightly     hydrALAZINE 25 MG tablet  Commonly known as: APRESOLINE  Take 1 tablet by mouth in the morning and at bedtime     losartan 100 MG tablet  Commonly known as: COZAAR  TAKE 1 TABLET BY MOUTH EVERY DAY     magnesium oxide 400 MG tablet  Commonly known as: MAG-OX  Take 1 tablet by mouth daily     metoprolol tartrate 50 MG tablet  Commonly known as: LOPRESSOR  Take 1 tablet by mouth 2 times daily     ondansetron 4 MG disintegrating tablet  Commonly known as: ZOFRAN-ODT  Take 1 tablet by mouth every 8 hours as needed for Nausea or Vomiting     sertraline 25 MG tablet  Commonly known as: ZOLOFT  Take 1 tablet by mouth daily     sucralfate 1 GM tablet  Commonly known as: Carafate  Take 0.5 tablets by mouth 4 times daily     therapeutic multivitamin-minerals tablet            STOP taking these medications      hydroCHLOROthiazide 50 MG tablet  Commonly known as: HYDRODIURIL              Patient Instructions: The Scopolamine patch may help with the nausea but can cause blurry vision and dry mouth. Use Tylenol and Ibuprofen for pain. Ok to restart Aspirin and other blood thinners. Activity:  no lifting, or strenuous exercise for one or two weeks. No driving until off narcotics and nausea medications. Diet:  Bariatric full liquids for 10 days, 1 oz every 15 minutes. Wound Care: shower and then leave the incisions open to air, do not rub off the surgical glue. Make sure the bowels move daily by using fiber such as Metamucil, stool softeners or laxatives to prevent constipation pains. Condition at discharge:  Stable  Disposition: home    Follow-up with Dr. Shaggy Plummer in 10 days.     Signed:  Aureliano Lazo MD  12/1/2022  4:26 PM

## 2022-12-02 ENCOUNTER — TELEPHONE (OUTPATIENT)
Dept: BARIATRICS/WEIGHT MGMT | Age: 37
End: 2022-12-02

## 2022-12-02 NOTE — TELEPHONE ENCOUNTER
Post op questions:    Nausea/vomiting present (YES/NO):no    Fever > 101/chills present (YES/NO):no    Tolerating liquids (YES/NO):yes  Ounces per day: 64  Protein shakes:yes    Taking post op medications, Carafate/Omeprazole & Zofran (YES/NO): yes- holding Colace for now due to loose stools    Is patient up and walking (YES/NO):yes    Having bowel movements (YES/NO):yes    Are incisions healing well (YES/NO):yes    Having any problems or concerns that need to be addressed:  none

## 2022-12-02 NOTE — TELEPHONE ENCOUNTER
[facility]:  RY[facility] or [facility] - Dietary Phone Follow-up Form:    Sx Date:11/28/22   rs/p RYGB    Current Diet:______Bariatric Full Liquid Diet___________________       Patient's symptoms include the following:      Pt states  he / she has the following symptoms:    Nausea -  No  Vomiting -  No  Diarrhea -  yes, - No BM since yesterday early afternoon. Abdominal Cramping -  no  Gas -  no       Dizziness -  no   Fever - no //  Pt physically took his / her temperature today  - No    Constipation -  no  Bloody Stools - no  Tarry Stools - no  Shakiness -  no   Low Blood Sugar -  no  Feels Faint -  no   Excessive Salvia -  no  Other  - None  Pt started his / her Colace 100 mgs twice daily -  Pt is holding the Colace d/t the diarrhea and coffee ground stools in the hospital  Pt is taking PPI Medications or Carafate as instructed -  Pt is taking her Carafate - 4 times daily     Hunger during the liquid phase no   Reports no restriction during the liquid phase no  Reports moderate restriction during liquid phase no  Reports severe restriction during liquid phase no  Can't tolerate liquids no  Food gets stuck frequently no    Reflux Symptoms no       24 Hour Recall: Bar Clear Liquid Diet  Chicken Broth, Crystal Light Lemonade, Iced Tea with Sugar Substitute and Water - 1 oz fluid every 15 minutes d/t pt being  in the hospital    Pt  states denies dehydration on today's date  - 12/2/22    Protein Supplement: James Brandon reviewed pt can start his / her protein supplement on 12/3/22. James Brandon reviewed how pt can mix his / her protein supplement - 3 scoops Isopure Infusion mixed in 12 oz of water broken down into 4 meals 3 oz in size // and 2 Scoops Whey Protein Isolate 12 oz Fat Free Fair Life Milk broken down into 4 meals 3 oz in size. Pt verbalized understanding.    How many times a day do you take your Protein Supplement: 4    PCP Appt Monday  Scripts for pt to have labs drawn      Instructed per Standing Orders: yes, Bariatric Full Liquid Diet . James Brandon reviewed Bar Full Liquid Diet and reinforced diet concepts. Pt has education book and handouts and states he/ she is following the instruction given. Use of protein supplement was reviewed with how to mix his / her protein supplement. Pt verbalized understanding. Pt instructed to call with any dietary questions.      POC D/T problem noted above: None    Surgeon Notified:no    Patient Response:  Verbalized understanding of the above instruction: no  Will keep detailed food records for 2 week f/u appt: no  Will return to [facility] for his her 2 week f/u appointment - Reminded pt to have labs drawn

## 2022-12-05 ENCOUNTER — OFFICE VISIT (OUTPATIENT)
Dept: FAMILY MEDICINE CLINIC | Age: 37
End: 2022-12-05
Payer: COMMERCIAL

## 2022-12-05 ENCOUNTER — TELEPHONE (OUTPATIENT)
Dept: BARIATRICS/WEIGHT MGMT | Age: 37
End: 2022-12-05

## 2022-12-05 VITALS
RESPIRATION RATE: 22 BRPM | TEMPERATURE: 97.6 F | HEART RATE: 75 BPM | WEIGHT: 293 LBS | SYSTOLIC BLOOD PRESSURE: 102 MMHG | OXYGEN SATURATION: 94 % | BODY MASS INDEX: 44.41 KG/M2 | HEIGHT: 68 IN | DIASTOLIC BLOOD PRESSURE: 72 MMHG

## 2022-12-05 DIAGNOSIS — Z98.84 GASTRIC BYPASS STATUS FOR OBESITY: ICD-10-CM

## 2022-12-05 DIAGNOSIS — Z09 HOSPITAL DISCHARGE FOLLOW-UP: Primary | ICD-10-CM

## 2022-12-05 DIAGNOSIS — K91.2 MALNUTRITION FOLLOWING GASTROINTESTINAL SURGERY: ICD-10-CM

## 2022-12-05 LAB
ALBUMIN SERPL-MCNC: 3.8 G/DL (ref 3.5–5.2)
ALP BLD-CCNC: 67 U/L (ref 35–104)
ALT SERPL-CCNC: 25 U/L (ref 0–32)
ANION GAP SERPL CALCULATED.3IONS-SCNC: 14 MMOL/L (ref 7–16)
AST SERPL-CCNC: 19 U/L (ref 0–31)
BILIRUB SERPL-MCNC: 0.3 MG/DL (ref 0–1.2)
BUN BLDV-MCNC: 16 MG/DL (ref 6–20)
CALCIUM SERPL-MCNC: 8.8 MG/DL (ref 8.6–10.2)
CHLORIDE BLD-SCNC: 103 MMOL/L (ref 98–107)
CO2: 24 MMOL/L (ref 22–29)
CREAT SERPL-MCNC: 0.9 MG/DL (ref 0.5–1)
GFR SERPL CREATININE-BSD FRML MDRD: >60 ML/MIN/1.73
GLUCOSE BLD-MCNC: 104 MG/DL (ref 74–99)
HCT VFR BLD CALC: 37.5 % (ref 34–48)
HEMOGLOBIN: 11.3 G/DL (ref 11.5–15.5)
MCH RBC QN AUTO: 24.1 PG (ref 26–35)
MCHC RBC AUTO-ENTMCNC: 30.1 % (ref 32–34.5)
MCV RBC AUTO: 80.1 FL (ref 80–99.9)
PDW BLD-RTO: 17.5 FL (ref 11.5–15)
PLATELET # BLD: 302 E9/L (ref 130–450)
PMV BLD AUTO: 12 FL (ref 7–12)
POTASSIUM SERPL-SCNC: 4.1 MMOL/L (ref 3.5–5)
RBC # BLD: 4.68 E12/L (ref 3.5–5.5)
SODIUM BLD-SCNC: 141 MMOL/L (ref 132–146)
TOTAL PROTEIN: 6.6 G/DL (ref 6.4–8.3)
WBC # BLD: 6.5 E9/L (ref 4.5–11.5)

## 2022-12-05 PROCEDURE — 99212 OFFICE O/P EST SF 10 MIN: CPT | Performed by: FAMILY MEDICINE

## 2022-12-05 NOTE — TELEPHONE ENCOUNTER
Post op questions:    Nausea/vomiting present (YES/NO): no    Fever > 101/chills present (YES/NO): no    Tolerating liquids (YES/NO): Yes  Ounces per day: 64 oz  Protein shakes: Yes    Taking post op medications, Carafate/Omeprazole & Zofran (YES/NO): Yes    Is patient up and walking (YES/NO): Yes    Having bowel movements (YES/NO): Yes    Are incisions healing well (YES/NO): Yes    Having any problems or concerns that need to be addressed:  None as of current

## 2022-12-05 NOTE — PROGRESS NOTES
[address] [facility]  Department of Family Medicine  Family Medicine Residency Program      Patient:  Dre Montemayor 29 y.o. female          Chief complaint:   Chief Complaint   Patient presents with    Check-Up     Recent surg- gastric bypass         History of Present Illness   The patient is a 29 y.o. female with a PMH of obesity who presents to the clinic for the following medical concerns: Follow up gastric bypass: Had bypass with Dr. Junior Gee. Had some hypoxia after surgery. They had concern for cardiac issues due to this and got an echo. Didn't show any dysfunction with normal EF and LVH. Received lasix at hospital and doing better. Not on lasix now and not having swelling or SOB. Eating and drinking well.        Past Medical History:      Diagnosis Date    Asthma     B12 deficiency 09/21/2018    Class 3 severe obesity due to excess calories with body mass index (BMI) of 60.0 to 69.9 in [facility])     GERD without esophagitis     Hypertension     Migraine     PRIYA (obstructive sleep apnea)     sleep apnea 4-11    Prolonged emergence from general anesthesia     Vitamin D deficiency 09/21/2018    Zinc deficiency 09/21/2018       Past Surgical History:        Procedure Laterality Date    CHOLECYSTECTOMY, LAPAROSCOPIC  3/2014 Rufuscarolyn Merrick    with repair umbilical hernia    LEEP  3/2008    OTHER SURGICAL HISTORY  2011    coils to fallopian tubes    OTHER SURGICAL HISTORY      Tubal essure     ASHLIE-EN-Y GASTRIC BYPASS N/A 11/28/2022    GASTRIC BYPASS ASHLIE-EN-Y LAPAROSCOPIC performed by Gt Darden MD at [address] [place] [address]  3/2014 [address] [place] [address] GASTROINTESTINAL ENDOSCOPY N/A 5/2/2018    EGD BIOPSY performed by Gt Darden MD at [facility] 9/7/2021    EGD BIOPSY performed by Gt Darden MD at [facility] ENDOSCOPY       Allergies:    Lactose intolerance (gi) and Percocet [oxycodone-acetaminophen]    Social History:   Social History     Tobacco Use    Smoking status: Former     Packs/day: 0.20     Years: 15.00     Pack years: 3.00     Types: Cigarettes     Quit date: 2021     Years since quittin.8    Smokeless tobacco: Never    Tobacco comments:     has cut down to couple cigs a daily    Substance Use Topics    Alcohol use: Not Currently     Alcohol/week: 2.0 - 3.0 standard drinks     Types: 2 - 3 Shots of liquor per week     Comment: occasionally        Family History:       Problem Relation Age of Onset    Cancer Paternal Grandmother         stomach    Brain Cancer Maternal Grandmother     Hypertension Father     Stroke Father     Heart Attack Father     Diabetes Father     Other Father         gout    Hypertension Mother     Scoliosis Sister     Diabetes Maternal Grandfather     Heart Disease Maternal Grandfather        Reviewof Systems:   Review of Systems Negative unless otherwise stated in HPI. Physical Exam   Vitals: /72   Pulse 75   Temp 97.6 °F (36.4 °C) (Temporal)   Resp 22   Ht 5' 7.5\" (1.715 m)   Wt (!) 378 lb (171.5 kg)   LMP 2022   SpO2 94%   BMI 58.33 kg/m²        Physical Exam  Constitutional:       Appearance: Normal appearance. Eyes:      Extraocular Movements: Extraocular movements intact. Conjunctiva/sclera: Conjunctivae normal.   Cardiovascular:      Rate and Rhythm: Normal rate and regular rhythm. Heart sounds: No murmur heard. No friction rub. No gallop. Pulmonary:      Effort: Pulmonary effort is normal.      Breath sounds: Normal breath sounds. Abdominal:      General: Abdomen is flat. Palpations: Abdomen is soft. Musculoskeletal:         General: No swelling or tenderness. Cervical back: Normal range of motion and neck supple. Right lower leg: No edema. Left lower leg: No edema. Skin:     General: Skin is warm and dry.    Neurological:      Mental Status: She is alert and oriented to person, place, and time. Mental status is at baseline. Psychiatric:         Mood and Affect: Mood normal.         Thought Content: Thought content normal.        Assessment and Plan     Elspeth Dubin was seen today for check-up. Diagnoses and all orders for this visit:    Hospital discharge follow-up    Gastric bypass  Has appropriate follow up with bariatrics  Improved breathing   Echo with some mild LVH, continue to monitor if symptoms recur      Please see Patient Instructions for further counseling and information given. Advised to please be adherent to the treatment plans discussed today, and please call with any questions or concerns, letting the office know of any reasons that the plans may not be followed. The risks of untreated conditions include worsening illness, injury, disability, and possibly, death. Please call if symptoms change in any way, worsen, or fail to completely resolve, as this could necessitate a change to treatment plans. Patient and/or caregiver expressed understanding. Indications and proper use of medication(s) reviewed. Potential side-effects and risks of medication(s) also explained. Patient and/or caregiver was instructed to call if any new symptoms develop prior to next visit. Health risk factors discussed and addressed. Return to Office: Return in about 1 month (around 1/5/2023) for f/u HTN and SOB, with PCP, consider decreasing meds if BP improves.       Medication List:    Current Outpatient Medications   Medication Sig Dispense Refill    metoprolol tartrate (LOPRESSOR) 50 MG tablet Take 1 tablet by mouth 2 times daily 60 tablet 1    sucralfate (CARAFATE) 1 GM tablet Take 0.5 tablets by mouth 4 times daily 120 tablet 1    ondansetron (ZOFRAN-ODT) 4 MG disintegrating tablet Take 1 tablet by mouth every 8 hours as needed for Nausea or Vomiting 15 tablet 0    losartan (COZAAR) 100 MG tablet TAKE 1 TABLET BY MOUTH EVERY DAY 30 tablet 1    guanFACINE (TENEX) 1 MG tablet Take 1 tablet by mouth nightly 90 tablet 1    hydrALAZINE (APRESOLINE) 25 MG tablet Take 1 tablet by mouth in the morning and at bedtime 90 tablet 3    albuterol sulfate  (90 Base) MCG/ACT inhaler Inhale 2 puffs into the lungs every 6 hours as needed for Wheezing 8.5 each 1    sertraline (ZOLOFT) 25 MG tablet Take 1 tablet by mouth daily 30 tablet 5    cyanocobalamin ([employer] VITAMIN B12) 1000 MCG tablet TAKE 1 TABLET BY MOUTH EVERY DAY 30 tablet 3    amLODIPine (NORVASC) 10 MG tablet Take 1 tablet by mouth daily 90 tablet 1    magnesium oxide (MAG-OX) 400 MG tablet Take 1 tablet by mouth daily 90 tablet 1    Multiple Vitamins-Minerals (THERAPEUTIC MULTIVITAMIN-MINERALS) tablet Take 1 tablet by mouth daily       No current facility-administered medications for this visit.         Mary Jane Ugalde MD     Electronically signed by Mary Jane Ugalde MD on 12/8/2022 at 9:43 AM

## 2022-12-05 NOTE — PROGRESS NOTES
S: 29 y.o. female here for hospital f/u of sob. Chf vs postop atelectasis. Lasix seemed to help. Off O2, feels well. O: VS: /72   Pulse 75   Temp 97.6 °F (36.4 °C) (Temporal)   Resp 22   Ht 5' 7.5\" (1.715 m)   Wt (!) 378 lb (171.5 kg)   LMP 11/21/2022   SpO2 94%   BMI 58.33 kg/m²    General: NAD, alert and interacting appropriately. CV:  RRR, no gallops, rubs, or murmurs    Resp: CTAB   Abd:  Soft, nontender. Surgical sites c/d/i. Ext:  No edema    Impression: hospital f/u for sob 2/2 chf vs postop atelectasis. Plan:   CTM HTN, CPM for now  Rtc 1 mo for sob, HTN      Attending Physician Statement  I have discussed the case, including pertinent history and exam findings with the resident. I agree with the documented assessment and plan.

## 2022-12-08 ENCOUNTER — INITIAL CONSULT (OUTPATIENT)
Dept: BARIATRICS/WEIGHT MGMT | Age: 37
End: 2022-12-08

## 2022-12-08 ENCOUNTER — OFFICE VISIT (OUTPATIENT)
Dept: BARIATRICS/WEIGHT MGMT | Age: 37
End: 2022-12-08
Payer: COMMERCIAL

## 2022-12-08 VITALS
HEART RATE: 74 BPM | RESPIRATION RATE: 20 BRPM | WEIGHT: 293 LBS | BODY MASS INDEX: 44.41 KG/M2 | SYSTOLIC BLOOD PRESSURE: 131 MMHG | DIASTOLIC BLOOD PRESSURE: 78 MMHG | TEMPERATURE: 97.6 F | HEIGHT: 68 IN

## 2022-12-08 VITALS — HEIGHT: 68 IN | BODY MASS INDEX: 44.41 KG/M2 | WEIGHT: 293 LBS

## 2022-12-08 DIAGNOSIS — I10 ESSENTIAL HYPERTENSION: Primary | ICD-10-CM

## 2022-12-08 DIAGNOSIS — Z71.3 DIETARY COUNSELING: Primary | ICD-10-CM

## 2022-12-08 DIAGNOSIS — E66.01 MORBID OBESITY DUE TO EXCESS CALORIES (HCC): ICD-10-CM

## 2022-12-08 PROCEDURE — 99212 OFFICE O/P EST SF 10 MIN: CPT

## 2022-12-08 PROCEDURE — 99999 PR OFFICE/OUTPT VISIT,PROCEDURE ONLY: CPT

## 2022-12-08 PROCEDURE — 99024 POSTOP FOLLOW-UP VISIT: CPT | Performed by: SURGERY

## 2022-12-08 NOTE — PROGRESS NOTES
Patient is 2 wk LRYGB. Incisions are healing well. Water intake is around 96 oz daily. Protein through shakes and foods. Bowel movements are good.

## 2022-12-08 NOTE — PROGRESS NOTES
Medical Nutrition Therapy (MNT) Assessment     Pt. Name: Barbie Donahue   NOOY:[birth year]  F/U Appt: Sx Type 2 week rygb      Ht 5' 8\" (1.727 m)   Wt (!) 376 lb (170.6 kg)   LMP 11/21/2022   BMI 57.17 kg/m²  Height: 5' 8\" (1.727 m) Weight: (!) 376 lb (170.6 kg)   IBW:ideal body weight   155 lbs % EBWL: 10%     Wt Readings from Last 3 Encounters:   12/08/22 (!) 376 lb (170.6 kg)   12/08/22 (!) 376 lb (170.6 kg)   12/05/22 (!) 378 lb (171.5 kg)                     Protein supplements:   Daily Protein needs (based on 1.0 gram per kg of IBW)  70-80 grams   Beneprotein added to liquids, and Whey protein isolate powder         Subjective:                    MNT ADVANCE TO:     Bariatric puree diet with MVI, Calcium & Protein Supplements          SWLC Bowel Protocol:  yes - Diarrhea     pt has had this priior to sx - hx of gallbladder removal  No - Constipation  How much plain water are you drinking daily 64 oz plus            How many meals a day 6 / Portions Sizes of Meals are 4 oz  (up to 5 oz slowly with shakes)         12/5/22 Labs: glucose 104H, Hgb 11.3L    Supplements: not yet     Estimated Daily Nutritional Needs: Based on Bariatric procedure for Wt. Loss  Energy: Will be calculated at Maintenance Stage    Protein: 60 - 80 gms Daily    MNT Plan and Additional Education: RD/LD reviewed Bariatric Puree Diet and how to puree food. Encouraged pt to meet protein and fluid needs daily. Pt. verbalized understanding. Handouts given. Pt. Instructed to call with questions.       MEDICAL NUTRITION THERAPY (MNT) - Nutrition Care Plan   (The patient has been educated and given written education material that reinforces the following dietary guidelines for Bariatric Surgery)  Goal:  Patient able to verbalize the following dietary concepts:  3 to 4 ounce portions per meal     6 small meals daily      60 to 80 grams of protein   48 to 64 ounces of fluid daily (water)     Always consume protein item first with all meals   Pt is aware wt loss is pt controlled. Slow Meals - 30-35 chews per bite / Meals 30 - 45 minutes long            The RD / LD reviewed with the patient that the dietary goals of the bariatric patient is to ensure that patient is able to meet established nutritional needs for a Bariatric Surgery  Patient at this time in order to promote healing, prevent significant weight changes, prevent skin breakdown and abnormal lab values, prevent complications, and tolerance to diet and texture of foods. Pt is able to identify proper food choices and needed changes and is able to explain proper food preparation. RD / LD reviewed compliance with assigned diet stages, volume of food and drink consumed, timing of meals, amount of protein and energy intake, daily vitamin and mineral supplementation, identify food cravings and any adverse reactions associated with intake of food and drink. RD / LD uses behavioral tools such as goal setting, MI, and self-monitoring, to reinforce the anatomic and physiologic effects of the surgery, so that the described behaviors become more of a habit not simply a reaction to the altered anatomy. Care Plan:   Rd/Ld Addressed Food Records or 24-Hour Recall  Rd / Ld encouraged patient to exercise at least 30 minutes daily  Rd / Ld instructed patient on how to increase oral protein intake within the diet. Pt. can verbalize he / she will need to consume 60 - 80 grams. Rd / Ld instructed the patient on how to increase the use of protein supplements within the diet. Pt. was instructed on how to increase water intake. Patient will need to consume 48 - 64 oz. of just plain water in addition to 30 oz. of non-caloric beverages. Handouts given to patient  RD / LD encouraged oral intake    RD / LD encouraged pt. to keep food records.       Pt. is able to verbalize diet concepts         Yes - Pt. diet was advanced to the following stage ( See above)     Good - Dietary Compliance

## 2022-12-08 NOTE — PROGRESS NOTES
Meryl Mercado  12/8/2022  Post-Op Follow-up       Meryl Mercado is a 29 y.o. female who weighs (!) 376 lb (170.6 kg) post robotic Ashlie-en-Y Gastric Bypass 11/28/22. No pain, no problems. She is drinking 60 ounces per day and is meeting protein recommendations. Exercise: walking. Weights:  376 lb 12/8/2022  10 days  402 lb 11/17/2022       bypass  392 lb 9/2021              initial    Past Medical History:   Diagnosis Date    Asthma     B12 deficiency 09/21/2018    Class 3 severe obesity due to excess calories with body mass index (BMI) of 60.0 to 69.9 in adult [facility])     GERD without esophagitis     Hypertension     Migraine     PRIYA (obstructive sleep apnea)     sleep apnea 4-11    Prolonged emergence from general anesthesia     Vitamin D deficiency 09/21/2018    Zinc deficiency 09/21/2018     Past Surgical History:   Procedure Laterality Date    CHOLECYSTECTOMY, LAPAROSCOPIC  3/2014 Fredna Barrette    with repair umbilical hernia    LEEP  3/2008    OTHER SURGICAL HISTORY  2011    coils to fallopian tubes    OTHER SURGICAL HISTORY      Tubal essure     ASHLIE-EN-Y GASTRIC BYPASS N/A 11/28/2022    GASTRIC BYPASS ASHLIE-EN-Y LAPAROSCOPIC performed by Annalise Singletary MD at [address] [place] [address]  3/2014 [address] [place] [address] GASTROINTESTINAL ENDOSCOPY N/A 5/2/2018    EGD BIOPSY performed by Annalise Singletary MD at [place] [id number] 9/7/2021    EGD BIOPSY performed by Annalise Singletary MD at [address] [place] [address] Medications  Prior to Visit Medications    Medication Sig Taking?  Authorizing Provider   metoprolol tartrate (LOPRESSOR) 50 MG tablet Take 1 tablet by mouth 2 times daily Yes Mae Aceves MD   sucralfate (CARAFATE) 1 GM tablet Take 0.5 tablets by mouth 4 times daily Yes Annalise Singletary MD   ondansetron (ZOFRAN-ODT) 4 MG disintegrating tablet Take 1 tablet by mouth every 8 hours as needed for Nausea or Vomiting Yes Chriss Mccartney MD   losartan (COZAAR) 100 MG tablet TAKE 1 TABLET BY MOUTH EVERY DAY Yes Bridget Kent MD   guanFACINE (TENEX) 1 MG tablet Take 1 tablet by mouth nightly Yes Bridget Kent MD   magnesium oxide (MAG-OX) 400 MG tablet Take 1 tablet by mouth daily Yes Bridget Kent MD   hydrALAZINE (APRESOLINE) 25 MG tablet Take 1 tablet by mouth in the morning and at bedtime Yes Yoli Aguilar MD   albuterol sulfate  (90 Base) MCG/ACT inhaler Inhale 2 puffs into the lungs every 6 hours as needed for Wheezing Yes Opal Liz MD   sertraline (ZOLOFT) 25 MG tablet Take 1 tablet by mouth daily Yes Bridget Kent MD   cyanocobalamin ([employer] VITAMIN B12) 1000 MCG tablet TAKE 1 TABLET BY MOUTH EVERY DAY Yes Bridget Kent MD   amLODIPine (NORVASC) 10 MG tablet Take 1 tablet by mouth daily Yes Bridget Kent MD   Multiple Vitamins-Minerals (THERAPEUTIC MULTIVITAMIN-MINERALS) tablet Take 1 tablet by mouth daily Yes Cristiano Novak MD       Allergies: Lactose intolerance (gi) and Percocet [oxycodone-acetaminophen]   Social History:   TOBACCO:   reports that she quit smoking about 21 months ago. Her smoking use included cigarettes. She has a 3.00 pack-year smoking history. She has never used smokeless tobacco.  All smokers must join the free smoking cessation program and stop smoking for 3 months before having any Bariatric surgery. ETOH:    reports that she does not currently use alcohol after a past usage of about 2.0 - 3.0 standard drinks per week.   Family History   Problem Relation Age of Onset    Cancer Paternal Grandmother         stomach    Brain Cancer Maternal Grandmother     Hypertension Father     Stroke Father     Heart Attack Father     Diabetes Father     Other Father         gout    Hypertension Mother     Scoliosis Sister     Diabetes Maternal Grandfather     Heart Disease Maternal Grandfather      Review of Systems:  Psychiatric:  depression and anxiety  Respiratory: negative  Cardiovascular: negative  Gastrointestinal: negative  Musculoskeletal:negative  All others reviewed, negative    Physical Exam:   VITALS: Blood pressure 131/78, pulse 74, temperature 97.6 °F (36.4 °C), temperature source Temporal, resp. rate 20, height 5' 8\" (1.727 m), weight (!) 376 lb (170.6 kg), last menstrual period 11/21/2022, not currently breastfeeding. General appearance: alert, appears stated age and cooperative, does ambulate easily  Head: Normocephalic, without obvious abnormality, atraumatic  Eyes: PERRL  Ears/mouth/throat:  Ears clear, mouth normal, throat no redness  Neck: no adenopathy, no JVD, supple, symmetrical, trachea midline and thyroid not enlarged  Lungs: clear to auscultation bilaterally  Heart: regular rate and rhythm  Abdomen: wounds healing well, glue in place. No cellulitis. No pain. Extremities: extremities normal, atraumatic, no cyanosis or edema  Skin: no open wounds    Assessment:    Doing well post gastric bypass. Plan:   Start the MVI and Vit D as instructed. Walk as much as possible but keep your legs elevated when sitting. Continue deep breathing exercizes. Transition to the high protein Pureed-liquid diet. Continue drinking slowly every 10-15 minutes to prevent dehydration. Slowly increase this amount as tolerated. Aim for 60 gm Protein and 90 oz of liquids per day. Slow down if you feel chest pressure, acid or fullness. Repeat labs before the next visit. Make sure the bowel move daily by taking fiber such as Metamucil. Follow up in 4 weeks.     Physician Signature: Electronically signed by Dr. Cathie Cid MD

## 2022-12-21 ENCOUNTER — OFFICE VISIT (OUTPATIENT)
Dept: BARIATRICS/WEIGHT MGMT | Age: 37
End: 2022-12-21
Payer: COMMERCIAL

## 2022-12-21 VITALS
BODY MASS INDEX: 47.09 KG/M2 | WEIGHT: 293 LBS | SYSTOLIC BLOOD PRESSURE: 138 MMHG | DIASTOLIC BLOOD PRESSURE: 93 MMHG | HEIGHT: 66 IN | HEART RATE: 66 BPM | TEMPERATURE: 97.8 F

## 2022-12-21 DIAGNOSIS — E66.01 CLASS 3 SEVERE OBESITY DUE TO EXCESS CALORIES WITHOUT SERIOUS COMORBIDITY WITH BODY MASS INDEX (BMI) OF 60.0 TO 69.9 IN ADULT (HCC): ICD-10-CM

## 2022-12-21 DIAGNOSIS — G47.33 OSA (OBSTRUCTIVE SLEEP APNEA): Primary | ICD-10-CM

## 2022-12-21 PROCEDURE — 3074F SYST BP LT 130 MM HG: CPT | Performed by: INTERNAL MEDICINE

## 2022-12-21 PROCEDURE — 3078F DIAST BP <80 MM HG: CPT | Performed by: INTERNAL MEDICINE

## 2022-12-21 PROCEDURE — 99213 OFFICE O/P EST LOW 20 MIN: CPT | Performed by: INTERNAL MEDICINE

## 2022-12-21 PROCEDURE — 1036F TOBACCO NON-USER: CPT | Performed by: INTERNAL MEDICINE

## 2022-12-21 PROCEDURE — 99211 OFF/OP EST MAY X REQ PHY/QHP: CPT

## 2022-12-21 PROCEDURE — G8484 FLU IMMUNIZE NO ADMIN: HCPCS | Performed by: INTERNAL MEDICINE

## 2022-12-21 PROCEDURE — 1111F DSCHRG MED/CURRENT MED MERGE: CPT | Performed by: INTERNAL MEDICINE

## 2022-12-21 PROCEDURE — G8417 CALC BMI ABV UP PARAM F/U: HCPCS | Performed by: INTERNAL MEDICINE

## 2022-12-21 PROCEDURE — G8428 CUR MEDS NOT DOCUMENT: HCPCS | Performed by: INTERNAL MEDICINE

## 2022-12-21 NOTE — PROGRESS NOTES
CC -   PRIYA, Obesity    BACKGROUND -   Last visit: 09/28/22  First visit: 01/13/21     PRIYA  Diagnosed 11/09/20  Mild  Was followed by Dr. Adriano Monroe; has yet to see his replacement Dr. Jeffrey Sanders  Using APAP 7d/wk, uses <3 hrs for 3d/wk  Excess weight is worsening her underlying airway obstruction    Obesity   Began in late teens  Initial BMI 67.3, Wt 417.2 lbs  HS Grad wt 150 lbs  Lowest   wt  150 lbs  Highest  wt  417 lbs  Pattern of wt gain: grad with rapid increases in preg  Wt change past yr: +30 lbs  Most wt lost: 20 lbs (pre-Bariatric surgery diet at our clinic)  Other diets attempted: Fad diets, ESTEBAN, Keto, Calorie counting    Initial Diet:    Number of meals per day - 1-2    Number of snacks per day - 1    Meal volume - 12\" plate, sometimes seconds    Fast food/convenience store - 2x/week    Restaurants (not fast food) - 0x/week   Sweets - 1d/week   Chips - 0d/week   Crackers/pretzels - 0d/week   Nuts - 0d/week   Peanut Butter - 1-2d/week   Popcorn - 0d/week   Dried fruit - 0d/week   Whole fruit - 5-6d/week (2 serving)   Breakfast cereal - 0-1d/week   Granola/Protein/Energy bar - 0d/week   Sugar sweetened beverages - 4 liters reg soda/day, 1 large DD isabel Iced coffee with extra cream and sugar 4x/wk (350 michael each)    Protein - No supplements   Fiber - No supplements     Exercise:    Gym membership - [employer]    Walking - none    Running - none    Resistance - none    Aerobic class - none    ______________________    [facility] -  Past Medical History:   Diagnosis Date    Asthma     B12 deficiency 09/21/2018    Class 3 severe obesity due to excess calories with body mass index (BMI) of 60.0 to 69.9 in adult [facility])     GERD without esophagitis     Hypertension     Migraine     PRIYA (obstructive sleep apnea)     sleep apnea 4-11    Prolonged emergence from general anesthesia     Vitamin D deficiency 09/21/2018    Zinc deficiency 09/21/2018     Current Outpatient Medications   Medication Sig Dispense Refill metoprolol tartrate (LOPRESSOR) 50 MG tablet Take 1 tablet by mouth 2 times daily 60 tablet 1    sucralfate (CARAFATE) 1 GM tablet Take 0.5 tablets by mouth 4 times daily 120 tablet 1    ondansetron (ZOFRAN-ODT) 4 MG disintegrating tablet Take 1 tablet by mouth every 8 hours as needed for Nausea or Vomiting 15 tablet 0    losartan (COZAAR) 100 MG tablet TAKE 1 TABLET BY MOUTH EVERY DAY 30 tablet 1    guanFACINE (TENEX) 1 MG tablet Take 1 tablet by mouth nightly 90 tablet 1    magnesium oxide (MAG-OX) 400 MG tablet Take 1 tablet by mouth daily 90 tablet 1    hydrALAZINE (APRESOLINE) 25 MG tablet Take 1 tablet by mouth in the morning and at bedtime 90 tablet 3    albuterol sulfate  (90 Base) MCG/ACT inhaler Inhale 2 puffs into the lungs every 6 hours as needed for Wheezing 8.5 each 1    sertraline (ZOLOFT) 25 MG tablet Take 1 tablet by mouth daily 30 tablet 5    cyanocobalamin ([employer] VITAMIN B12) 1000 MCG tablet TAKE 1 TABLET BY MOUTH EVERY DAY 30 tablet 3    amLODIPine (NORVASC) 10 MG tablet Take 1 tablet by mouth daily 90 tablet 1    Multiple Vitamins-Minerals (THERAPEUTIC MULTIVITAMIN-MINERALS) tablet Take 1 tablet by mouth daily       No current facility-administered medications for this visit. PE -  Gen : BP (!) 147/98 (Site: Left Lower Arm, Position: Sitting, Cuff Size: Large Adult)   Pulse 65   Temp 97.8 °F (36.6 °C) (Temporal)   Ht 5' 6\" (1.676 m)   Wt (!) 378 lb 12.8 oz (171.8 kg)   LMP 12/19/2022 (Approximate)   BMI 61.14 kg/m²      WN, WD, NAD  Lung: Nml resp effor  Psych: Normal mood   Full affect  Neuro:  Moves all ext well  ______________________      HISTORY & ASSESSMENT/PLAN -     Problem 1 - PRIYA  HPI - See above Background for description   Using her autopap 6-7d/wk at least 4 hours/night   Daytime drowsiness, past week: 0/10 (was 3-4/10)   Needs to reduce her underlying airway obstruction by decreasing her excess weight   Her PRIYA may be contributing to her hypertension  Assessment  - Controlled  Plan  -   Cont use of autopap  Cont with wt reduction per the plan below    Problem 2  - Obesity   HPI   - See above Background for description   Weight  Date   417.2 lbs 01/13/21   422.8 lbs 02/10/21   423.0 lbs 04/07/21    419.6 lbs 06/03/21   414.0 lbs 07/22/21 Phentermine #1   400.0 lbs 08/25/21 Phentermine #2   395.8 lbs 09/28/21 Phentermine #3   389.0 lbs 11/11/21   386.6 lbs 01/04/22 Started topamax   380.2 lbs 03/23/22 Topamax   389.6 lbs 06/10/22 Stopped topamax prior to 4/27/22 b/c of edginess   399.0 lbs 07/15/22    404.4 lbs 09/28/22 11/28/22 Had a gastric bypass   378.8 lbs 12/21/22     Total weight change to date: 38.4 lbs  DEN = 2350 michael/d = 16,450 michael/wk  Avg daily energy variance:   04/07/21-06/03/21 = - 3.4 lbs /57d = -     209 michael/d deficit   06/03/21-07/22/21 = - 5.6 lbs /49d = -     400 michael/d deficit   07/22/21-08/25/21 = - 14.0 lbs /34d = -1,441 michael/d deficit   08/25/21-09/28/21 = -   4.2 lbs /34d = -   432 michael/d deficit   09/28/21-11/21/21 = -   6.8 lbs /44d = -  540 imchael/d deficit   11/21/21-01/04/22 = -   2.4 lbs /54d = -  155 michael/d deficit  (had Covid and lost taste and smell)   01/04/22-03/23/22 = -   6.4 lbs /78d = -  287 michael/d deficit   03/23/22-06/10/22 = +   9.4 lbs /79d = +416 michael/d excess   06/10/22-07/15/22 = +  9.4 lbs /35d = + 940 michael/d excess   07/15/22-09/27/22 = +  5.4 lbs/74d = +  255 michael/d excess   09/27/22-12/21/22 = - 25.6 lbs/85d = - 1054 cald/d deficit  Wt effect of trouble foods = Restaurant 300 michael/wk + Sweets 200 + SSBs 12,300 = 12,800 michael/wk = 78% DEN = 1825 michael/day = 182 lbs/yr  Initial thought was that eliminating reg soda may produce a sufficient rate of wt loss. However, it did not.   Therefore, discussed the options of a counting-based regimen and a VLCD  She decided on the counting-based program  Update:  Had an RYGB 19/27/18  No complications  Calorie monitoring: counting calories 0d/wk  Sweets: 0d/mo  Salty snacks: 0d/wk  SSB: 4-5 cranberry grape juice  Restaurant food: 0x/month   Contraception -  BTL  Assessment  - Improved; good adherence to her plan; will cont with the post-bariatric surgery per our dieticians; see me back as needed  Plan   -   Rules:  Count every calorie every day  Limit sweets to one day per month  Limit chips/crackers/pretzels/nuts to 100 michael/day  Eliminate all sugar sweetened beverages (including fruit juice)  Limit restaurants (including fast food and food from a convenience store) to one time every two weeks    Targets:  Limit calorie intake to 1700 calories/day  Walk 5 minutes daily  Avoid eating 2 hours within bedtime. Tips:  Do not eat outside of the dining room or the kitchen  Do not eat while watching TV, videos, working on the computer or using a smart phone  Do not eat food out of a multi-serving bag or container.     Other:  Cont in process to have a bariatric surgery      Elevated BP: check BP at home, if remains consistently high, then contact PCP     Return to see me in 6-8 weeks    Justino Mckeon MD  Endocrinology/Obesity  12/21/22

## 2022-12-21 NOTE — PATIENT INSTRUCTIONS
Rules:  Count every calorie every day  Limit sweets to one day per month  Limit chips/crackers/pretzels/nuts to 100 michael/day  Eliminate all sugar sweetened beverages (including fruit juice)  Limit restaurants (including fast food and food from a convenience store) to one time every two weeks    Targets:  Limit calorie intake to 1700 calories/day  Walk 5 minutes daily  Avoid eating 2 hours within bedtime. Tips:  Do not eat outside of the dining room or the kitchen  Do not eat while watching TV, videos, working on the computer or using a smart phone  Do not eat food out of a multi-serving bag or container.     Other:  Cont in process to have a bariatric surgery      Elevated BP: check BP at home, if remains consistently high, then contact PCP     See me back as needed

## 2023-01-06 ENCOUNTER — OFFICE VISIT (OUTPATIENT)
Dept: BARIATRICS/WEIGHT MGMT | Age: 38
End: 2023-01-06

## 2023-01-06 ENCOUNTER — INITIAL CONSULT (OUTPATIENT)
Dept: BARIATRICS/WEIGHT MGMT | Age: 38
End: 2023-01-06

## 2023-01-06 VITALS — HEIGHT: 68 IN | WEIGHT: 293 LBS | BODY MASS INDEX: 44.41 KG/M2

## 2023-01-06 VITALS
DIASTOLIC BLOOD PRESSURE: 98 MMHG | BODY MASS INDEX: 44.41 KG/M2 | SYSTOLIC BLOOD PRESSURE: 167 MMHG | TEMPERATURE: 97.2 F | HEART RATE: 69 BPM | WEIGHT: 293 LBS | HEIGHT: 68 IN | RESPIRATION RATE: 20 BRPM

## 2023-01-06 DIAGNOSIS — Z71.3 DIETARY COUNSELING: Primary | ICD-10-CM

## 2023-01-06 DIAGNOSIS — E66.01 MORBID OBESITY DUE TO EXCESS CALORIES (HCC): ICD-10-CM

## 2023-01-06 DIAGNOSIS — K91.2 MALNUTRITION FOLLOWING GASTROINTESTINAL SURGERY: Primary | ICD-10-CM

## 2023-01-06 PROCEDURE — 99999 PR OFFICE/OUTPT VISIT,PROCEDURE ONLY: CPT

## 2023-01-06 PROCEDURE — 99024 POSTOP FOLLOW-UP VISIT: CPT | Performed by: NURSE PRACTITIONER

## 2023-01-06 NOTE — PATIENT INSTRUCTIONS
Please continue to take your vitamin and mineral supplements as instructed. If you received a blood work prescription today for laboratory monitoring due prior to your next routine follow-up visit, please have this blood work obtained 10 to 14 days prior to your next visit. It is important to fast for 12 hours prior to routine weight loss surgery blood work, EXCEPT for drinking water, to ensure accuracy of results. Please report nausea, vomiting, abdominal pain, or any other problems you experience to your surgeon. For problems related to weight loss surgery, it is best to go to [address] [place] [address] Emergency Department and have your surgeon paged.

## 2023-01-06 NOTE — PROGRESS NOTES
Allie Arreola [place]  1/6/2023  [address]     Gwendolyn-en- Y Gastric Bypass  6 week  Post-Operative Follow-up     Driss Kitchen is a 40 y.o. female who is 6 weeks post Laparoscopic Gwendolyn-en-Y Gastric Bypass surgery. Reports that she is doing good. She has been eating soft foods, tolerating ok. She is not having swallowing difficulty. Patient reports nausea and vomiting when she eats eggs. Patient denies gastric reflux symptoms. Bowel movements are normal per patient, stool is light in color. Patient is compliant most of the time with the iron supplement and multivitamins and calcium + Vit D. She is meeting fluid recommendations of at least 64 ounces per day and is meeting protein recommendations. She  is not exercising: no regular exercise. Patient is not taking fiber supplements. Weight=(!) 364 lb (165.1 kg)  Today's weight represents a total weight loss of 38 pounds since surgery with 0 pounds regained since the lowest weight. Prior to Admission medications    Medication Sig Start Date End Date Taking?  Authorizing Provider   metoprolol tartrate (LOPRESSOR) 50 MG tablet Take 1 tablet by mouth 2 times daily 11/18/22  Yes Deanna Mendez MD   sucralfate (CARAFATE) 1 GM tablet Take 0.5 tablets by mouth 4 times daily 11/17/22  Yes Ethan Green MD   ondansetron (ZOFRAN-ODT) 4 MG disintegrating tablet Take 1 tablet by mouth every 8 hours as needed for Nausea or Vomiting 11/17/22  Yes Ethan Green MD   losartan (COZAAR) 100 MG tablet TAKE 1 TABLET BY MOUTH EVERY DAY 10/17/22  Yes Deanna Mendez MD   guanFACINE (TENEX) 1 MG tablet Take 1 tablet by mouth nightly 9/22/22  Yes Deanna Mendez MD   magnesium oxide (MAG-OX) 400 MG tablet Take 1 tablet by mouth daily 9/22/22  Yes Deanna Mendez MD   hydrALAZINE (APRESOLINE) 25 MG tablet Take 1 tablet by mouth in the morning and at bedtime 7/22/22  Yes Ethan Logan MD   albuterol sulfate  (90 Base) MCG/ACT inhaler Inhale 2 puffs into the lungs every 6 hours as needed for Wheezing 5/31/22  Yes Gerber Gaston MD   sertraline (ZOLOFT) 25 MG tablet Take 1 tablet by mouth daily 12/29/21  Yes Corky Sheikh MD   cyanocobalamin ([employer] VITAMIN B12) 1000 MCG tablet TAKE 1 TABLET BY MOUTH EVERY DAY 12/29/21  Yes Corky Sheikh MD   amLODIPine (NORVASC) 10 MG tablet Take 1 tablet by mouth daily 7/26/21  Yes Corky Sheikh MD   Multiple Vitamins-Minerals (THERAPEUTIC MULTIVITAMIN-MINERALS) tablet Take 1 tablet by mouth daily   Yes Historical Provider, MD        Allergies   Allergen Reactions    Lactose Intolerance (Gi) Diarrhea     Milk and ice cream    Percocet [Oxycodone-Acetaminophen] Nausea And Vomiting           Past Medical History:   Diagnosis Date    Asthma     B12 deficiency 09/21/2018    Class 3 severe obesity due to excess calories with body mass index (BMI) of 60.0 to 69.9 in [facility])     GERD without esophagitis     Hypertension     Migraine     PRIYA (obstructive sleep apnea)     sleep apnea 4-11    Prolonged emergence from general anesthesia     Vitamin D deficiency 09/21/2018    Zinc deficiency 09/21/2018       Past Surgical History:   Procedure Laterality Date    CHOLECYSTECTOMY, LAPAROSCOPIC  3/2014 [place] [facility]    with repair umbilical hernia    LEEP  3/2008    OTHER SURGICAL HISTORY  2011    coils to fallopian tubes    OTHER SURGICAL HISTORY      Tubal essure     ASHLIE-EN-Y GASTRIC BYPASS N/A 11/28/2022    GASTRIC BYPASS ASHLIE-EN-Y LAPAROSCOPIC performed by Noy Worthington MD at [facility]  3/2014 [facility] ENDOSCOPY N/A 5/2/2018    EGD BIOPSY performed by Noy Worthington MD at [address] [facility] 9/7/2021    EGD BIOPSY performed by Noy Worthington MD at [place] [address]       Current Outpatient Medications   Medication Sig Dispense Refill    metoprolol tartrate (LOPRESSOR) 50 MG tablet Take 1 tablet by mouth 2 times daily 60 tablet 1    sucralfate (CARAFATE) 1 GM tablet Take 0.5 tablets by mouth 4 times daily 120 tablet 1    ondansetron (ZOFRAN-ODT) 4 MG disintegrating tablet Take 1 tablet by mouth every 8 hours as needed for Nausea or Vomiting 15 tablet 0    losartan (COZAAR) 100 MG tablet TAKE 1 TABLET BY MOUTH EVERY DAY 30 tablet 1    guanFACINE (TENEX) 1 MG tablet Take 1 tablet by mouth nightly 90 tablet 1    magnesium oxide (MAG-OX) 400 MG tablet Take 1 tablet by mouth daily 90 tablet 1    hydrALAZINE (APRESOLINE) 25 MG tablet Take 1 tablet by mouth in the morning and at bedtime 90 tablet 3    albuterol sulfate  (90 Base) MCG/ACT inhaler Inhale 2 puffs into the lungs every 6 hours as needed for Wheezing 8.5 each 1    sertraline (ZOLOFT) 25 MG tablet Take 1 tablet by mouth daily 30 tablet 5    cyanocobalamin ([employer]S VITAMIN B12) 1000 MCG tablet TAKE 1 TABLET BY MOUTH EVERY DAY 30 tablet 3    amLODIPine (NORVASC) 10 MG tablet Take 1 tablet by mouth daily 90 tablet 1    Multiple Vitamins-Minerals (THERAPEUTIC MULTIVITAMIN-MINERALS) tablet Take 1 tablet by mouth daily       No current facility-administered medications for this visit.        Allergies   Allergen Reactions    Lactose Intolerance (Gi) Diarrhea     Milk and ice cream    Percocet [Oxycodone-Acetaminophen] Nausea And Vomiting       Family History   Problem Relation Age of Onset    Cancer Paternal Grandmother         stomach    Brain Cancer Maternal Grandmother     Hypertension Father     Stroke Father     Heart Attack Father     Diabetes Father     Other Father         gout    Hypertension Mother     Scoliosis Sister     Diabetes Maternal Grandfather     Heart Disease Maternal Grandfather        Social History     Socioeconomic History    Marital status: Single     Spouse name: Not on file    Number of children: Not on file    Years of education: Not on file    Highest education level: Not on file Occupational History    Not on file   Tobacco Use    Smoking status: Former     Packs/day: 0.20     Years: 15.00     Pack years: 3.00     Types: Cigarettes     Quit date: 2021     Years since quittin.9    Smokeless tobacco: Never    Tobacco comments:     has cut down to couple cigs a daily    Vaping Use    Vaping Use: Never used   Substance and Sexual Activity    Alcohol use: Not Currently     Alcohol/week: 2.0 - 3.0 standard drinks     Types: 2 - 3 Shots of liquor per week     Comment: occasionally    Drug use: No    Sexual activity: Yes     Partners: Male   Other Topics Concern    Not on file   Social History Narrative    Not on file     Social Determinants of Health     Financial Resource Strain: Low Risk     Difficulty of Paying Living Expenses: Not hard at all   Food Insecurity: No Food Insecurity    Worried About Running Out of Food in the Last Year: Never true    [address] in the Last Year: Never true   Transportation Needs: No Transportation Needs    Lack of Transportation (Medical): No    Lack of Transportation (Non-Medical): No   Physical Activity: Not on file   Stress: Not on file   Social Connections: Not on file   Intimate Partner Violence: Not on file   Housing Stability: Not on file       A complete 10 system review was performed and are otherwise negative unless mentioned in the above HPI. Specific negatives are listed below but may not include all those reviewed.     General ROS: negative obtundation, AMS  ENT ROS: negative rhinorrhea, epistaxis  Allergy and Immunology ROS: negative itchy/watery eyes or nasal congestion  Hematological and Lymphatic ROS: negative spontaneous bleeding or bruising  Endocrine ROS: negative  lethargy, mood swings, palpitations or polydipsia/polyuria  Respiratory ROS: negative sputum changes, stridor, tachypnea or wheezing  Cardiovascular ROS: negative for - loss of consciousness, murmur or orthopnea  Gastrointestinal ROS: negative for - hematochezia or hematemesis  Genito-Urinary ROS: negative for -  genital discharge or hematuria  Musculoskeletal ROS: negative for - focal weakness, gangrene  Psych/Neuro ROS: negative for - visual or auditory hallucinations, suicidal ideation        Physical exam:   BP (!) 167/98 (Site: Left Lower Arm, Position: Sitting, Cuff Size: Large Adult)   Pulse 69   Temp 97.2 °F (36.2 °C) (Temporal)   Resp 20   Ht 5' 8\" (1.727 m)   Wt (!) 364 lb (165.1 kg)   LMP 12/19/2022 (Approximate)   BMI 55.35 kg/m²    General appearance: alert, appears stated age and cooperative  Head: Normocephalic, without obvious abnormality, atraumatic  Neck: no adenopathy, no carotid bruit, no JVD, supple, symmetrical, trachea midline and thyroid not enlarged, symmetric, no tenderness/mass/nodules  Lungs: clear to auscultation bilaterally  Heart: regular rate and rhythm  Abdomen: soft, non-tender; bowel sounds normal; no masses,  no organomegaly  Extremities: extremities normal, atraumatic, no cyanosis or edema    Assessment: Post Gwendolyn-en- Y Gastric Bypass. She does not complain of GERD,  does not have sleep apnea,  does not have diabetes,  does have hypertension off medical treatment. Cholesterol and triglycerides are normal.    1. Malnutrition following gastrointestinal surgery    - CBC; Future  - Comprehensive Metabolic Panel; Future  - Ferritin; Future  - Triglyceride; Future  - Cholesterol, Total; Future  - Zinc; Future  - Vitamin B12; Future  - Folate; Future  - Vitamin B1; Future  - Prealbumin; Future      Plan:  Continue to eat a high protein, low calorie diet, eat small portions very slowly and chew well before swallowing. Drink plenty of water and fluids. Make sure to use fiber to keep the bowels regular. Try to exercise 7 days per week, maintain daily food and activity record. Always notify the clinic if you have any medical problems. Follow up in 6 weeks.        Physician Signature: Electronically signed by SHARI Malone CNP

## 2023-01-06 NOTE — PROGRESS NOTES
Patient is 6 wk LRYGB. Water intake is around 96 oz daily. Protein through shakes and foods. Vitamin intake is good. Bowel movements are good. Dietary.

## 2023-01-06 NOTE — PROGRESS NOTES
Medical Nutrition Therapy (MNT) Assessment     Pt. Name: Sukh Rendon   QGDO:[birth year]  F/U Appt: Sx Type 6 wk rygb      Ht 5' 8\" (1.727 m)   Wt (!) 364 lb (165.1 kg)   LMP 12/19/2022 (Approximate)   BMI 55.35 kg/m²  Height: 5' 8\" (1.727 m) Weight: (!) 364 lb (165.1 kg)   IBW:ideal body weight   155 lbs % EBWL: 15%     Wt Readings from Last 3 Encounters:   01/06/23 (!) 364 lb (165.1 kg)   01/06/23 (!) 364 lb (165.1 kg)   12/21/22 (!) 378 lb 12.8 oz (171.8 kg)                     Protein supplements:   Daily Protein needs (based on 1.0 gram per kg of IBW)  70-80  grams       Subjective:                    MNT ADVANCE TO:     Bariatric soft diet stage 1 - Lab band with MVI, Calcium & Protein Supplements          [facility] Bowel Protocol:  no - Diarrhea  No - Constipation  How much plain water are you drinking daily 6 bottles             How many meals a day 5-6 / Portions Sizes of Meals are 2-3 oz          Labs: none for this katarina    Supplements: Bariatric Advantage Multi-Vitamin 1 tablet 2 times Daily, Bariatric Advantage 29 mgs Iron 1 tablet Daily, Bariatric Advanatge Calcium Lozenges 1 tablet 3 times Daily , Dry Vitamin D3 5,000iu every day     Estimated Daily Nutritional Needs: Based on Bariatric procedure for Wt. Loss  Energy: Will be calculated at Maintenance Stage    Protein: 60 - 80 gms Daily    MNT Plan and Additional Education: RD/LD reviewed Bariatric Soft Diet incorporating in Raw Fruits, Raw Vegetables, Red Meat, and Rice. Encouraged pt to meet protein and fluid needs daily. Handouts given. Pt. verbalized understanding. Pt instructed to call with questions.      MEDICAL NUTRITION THERAPY (MNT) - Nutrition Care Plan   (The patient has been educated and given written education material that reinforces the following dietary guidelines for Bariatric Surgery)  Goal:  Patient able to verbalize the following dietary concepts:  3 to 4 ounce portions per meal     6 small meals daily      60 to 80 grams of protein   48 to 64 ounces of fluid daily (water)     Always consume protein item first with all meals   Pt is aware wt loss is pt controlled. Slow Meals - 30-35 chews per bite / Meals 30 - 45 minutes long            The RD / LD reviewed with the patient that the dietary goals of the bariatric patient is to ensure that patient is able to meet established nutritional needs for a Bariatric Surgery  Patient at this time in order to promote healing, prevent significant weight changes, prevent skin breakdown and abnormal lab values, prevent complications, and tolerance to diet and texture of foods. Pt is able to identify proper food choices and needed changes and is able to explain proper food preparation. RD / LD reviewed compliance with assigned diet stages, volume of food and drink consumed, timing of meals, amount of protein and energy intake, daily vitamin and mineral supplementation, identify food cravings and any adverse reactions associated with intake of food and drink. RD / LD uses behavioral tools such as goal setting, MI, and self-monitoring, to reinforce the anatomic and physiologic effects of the surgery, so that the described behaviors become more of a habit not simply a reaction to the altered anatomy. Care Plan:   Rd/Ld Addressed Food Records or 24-Hour Recall  Rd / Ld encouraged patient to exercise at least 30 minutes daily  Rd / Ld instructed patient on how to increase oral protein intake within the diet. Pt. can verbalize he / she will need to consume 60 - 80 grams. Rd / Ld instructed the patient on how to increase the use of protein supplements within the diet. Pt. was instructed on how to increase water intake. Patient will need to consume 48 - 64 oz. of just plain water in addition to 30 oz. of non-caloric beverages. Handouts given to patient  RD / LD encouraged oral intake    RD / LD encouraged pt. to keep food records.       Pt. is able to verbalize diet concepts         Yes - Pt. diet was advanced to the following stage ( See above)     Fair - Dietary Compliance

## 2023-01-18 ENCOUNTER — OFFICE VISIT (OUTPATIENT)
Dept: FAMILY MEDICINE CLINIC | Age: 38
End: 2023-01-18
Payer: COMMERCIAL

## 2023-01-18 VITALS
HEART RATE: 71 BPM | OXYGEN SATURATION: 96 % | HEIGHT: 68 IN | BODY MASS INDEX: 44.41 KG/M2 | WEIGHT: 293 LBS | SYSTOLIC BLOOD PRESSURE: 169 MMHG | DIASTOLIC BLOOD PRESSURE: 109 MMHG | RESPIRATION RATE: 18 BRPM | TEMPERATURE: 97.5 F

## 2023-01-18 DIAGNOSIS — I10 ESSENTIAL HYPERTENSION: ICD-10-CM

## 2023-01-18 DIAGNOSIS — B07.0 PLANTAR WART: ICD-10-CM

## 2023-01-18 DIAGNOSIS — Z98.84 GASTRIC BYPASS STATUS FOR OBESITY: Primary | ICD-10-CM

## 2023-01-18 PROCEDURE — 99213 OFFICE O/P EST LOW 20 MIN: CPT | Performed by: FAMILY MEDICINE

## 2023-01-18 PROCEDURE — G8428 CUR MEDS NOT DOCUMENT: HCPCS | Performed by: FAMILY MEDICINE

## 2023-01-18 PROCEDURE — 1036F TOBACCO NON-USER: CPT | Performed by: FAMILY MEDICINE

## 2023-01-18 PROCEDURE — G8484 FLU IMMUNIZE NO ADMIN: HCPCS | Performed by: FAMILY MEDICINE

## 2023-01-18 PROCEDURE — 3074F SYST BP LT 130 MM HG: CPT | Performed by: FAMILY MEDICINE

## 2023-01-18 PROCEDURE — G8417 CALC BMI ABV UP PARAM F/U: HCPCS | Performed by: FAMILY MEDICINE

## 2023-01-18 PROCEDURE — 99212 OFFICE O/P EST SF 10 MIN: CPT | Performed by: FAMILY MEDICINE

## 2023-01-18 PROCEDURE — 3078F DIAST BP <80 MM HG: CPT | Performed by: FAMILY MEDICINE

## 2023-01-18 RX ORDER — HYDROCHLOROTHIAZIDE 12.5 MG/1
12.5 CAPSULE, GELATIN COATED ORAL DAILY
Qty: 30 CAPSULE | Refills: 3 | Status: SHIPPED | OUTPATIENT
Start: 2023-01-18

## 2023-01-18 ASSESSMENT — PATIENT HEALTH QUESTIONNAIRE - PHQ9
SUM OF ALL RESPONSES TO PHQ QUESTIONS 1-9: 0
7. TROUBLE CONCENTRATING ON THINGS, SUCH AS READING THE NEWSPAPER OR WATCHING TELEVISION: 0
SUM OF ALL RESPONSES TO PHQ9 QUESTIONS 1 & 2: 0
9. THOUGHTS THAT YOU WOULD BE BETTER OFF DEAD, OR OF HURTING YOURSELF: 0
8. MOVING OR SPEAKING SO SLOWLY THAT OTHER PEOPLE COULD HAVE NOTICED. OR THE OPPOSITE, BEING SO FIGETY OR RESTLESS THAT YOU HAVE BEEN MOVING AROUND A LOT MORE THAN USUAL: 0
5. POOR APPETITE OR OVEREATING: 0
10. IF YOU CHECKED OFF ANY PROBLEMS, HOW DIFFICULT HAVE THESE PROBLEMS MADE IT FOR YOU TO DO YOUR WORK, TAKE CARE OF THINGS AT HOME, OR GET ALONG WITH OTHER PEOPLE: 0
4. FEELING TIRED OR HAVING LITTLE ENERGY: 0
1. LITTLE INTEREST OR PLEASURE IN DOING THINGS: 0
SUM OF ALL RESPONSES TO PHQ QUESTIONS 1-9: 0
2. FEELING DOWN, DEPRESSED OR HOPELESS: 0
3. TROUBLE FALLING OR STAYING ASLEEP: 0
6. FEELING BAD ABOUT YOURSELF - OR THAT YOU ARE A FAILURE OR HAVE LET YOURSELF OR YOUR FAMILY DOWN: 0

## 2023-01-18 ASSESSMENT — ENCOUNTER SYMPTOMS
NAUSEA: 0
DIARRHEA: 0
COUGH: 0
WHEEZING: 0
ABDOMINAL PAIN: 0
SHORTNESS OF BREATH: 0
VOMITING: 0

## 2023-01-18 NOTE — PATIENT INSTRUCTIONS
It was nice to see you   Restart the hctz 12.5mg   Get bmp(labwork) in 1 week. Follow up in 1 month.

## 2023-01-18 NOTE — PROGRESS NOTES
[facility]  [address] [facility], [address]  Cesar Sloan MD     Patient: aRdha Rachel  YOB: 1985  Visit Date: 1/18/23    Alon Lozano is a 40y.o. year old female here today for   Chief Complaint   Patient presents with    Hypertension     1 Month Follow Up    Other     Foot callus. HPI  Patient is a 40year old female here for follow up of hypertension . Heena Li Had gastric bypass surgery and has lost 50 pounds so far. Has been having instances of vomiting since her surgery. Cannot handle certain foods  Thinks that is why her blood pressure was high. They discontinued the hctz after the surgery because they were worried about dehydration. Hypertension - bp at home has been 130-160/. Highest waqs 180/120 afte rvomiting. Sob has resolved since leaving the hospital.     Had a normal echo but was treated with lasix . Concern for heart failure but no issues since. Has something on the  bottom of 5th digit on left foot. Tried dropps on it. Dr. Mary Kate Hassan. Is turning black. Review of Systems   Respiratory:  Negative for cough, shortness of breath and wheezing. Cardiovascular:  Positive for leg swelling. Negative for chest pain and palpitations. Gastrointestinal:  Negative for abdominal pain, diarrhea, nausea and vomiting. Musculoskeletal:  Positive for arthralgias. Neurological:  Negative for dizziness, light-headedness and headaches. Psychiatric/Behavioral:  Negative for dysphoric mood. The patient is not nervous/anxious.       Current Outpatient Medications on File Prior to Visit   Medication Sig Dispense Refill    metoprolol tartrate (LOPRESSOR) 50 MG tablet Take 1 tablet by mouth 2 times daily 60 tablet 1    sucralfate (CARAFATE) 1 GM tablet Take 0.5 tablets by mouth 4 times daily 120 tablet 1    ondansetron (ZOFRAN-ODT) 4 MG disintegrating tablet Take 1 tablet by mouth every 8 hours as needed for Nausea or Vomiting 15 tablet 0    losartan (COZAAR) 100 MG tablet TAKE 1 TABLET BY MOUTH EVERY DAY 30 tablet 1    guanFACINE (TENEX) 1 MG tablet Take 1 tablet by mouth nightly 90 tablet 1    magnesium oxide (MAG-OX) 400 MG tablet Take 1 tablet by mouth daily 90 tablet 1    hydrALAZINE (APRESOLINE) 25 MG tablet Take 1 tablet by mouth in the morning and at bedtime 90 tablet 3    albuterol sulfate  (90 Base) MCG/ACT inhaler Inhale 2 puffs into the lungs every 6 hours as needed for Wheezing 8.5 each 1    sertraline (ZOLOFT) 25 MG tablet Take 1 tablet by mouth daily 30 tablet 5    cyanocobalamin ([employer]S VITAMIN B12) 1000 MCG tablet TAKE 1 TABLET BY MOUTH EVERY DAY 30 tablet 3    amLODIPine (NORVASC) 10 MG tablet Take 1 tablet by mouth daily 90 tablet 1    Multiple Vitamins-Minerals (THERAPEUTIC MULTIVITAMIN-MINERALS) tablet Take 1 tablet by mouth daily       No current facility-administered medications on file prior to visit. Allergies   Allergen Reactions    Lactose Intolerance (Gi) Diarrhea     Milk and ice cream    Percocet [Oxycodone-Acetaminophen] Nausea And Vomiting       Past medical, surgical, socialand/or family history reviewed, updated as needed, and are non-contributory (unless otherwise stated). Medications, allergies, and problem list also reviewed and updated as needed in patient's record. Wt Readings from Last 3 Encounters:   01/24/23 (!) 362 lb (164.2 kg)   01/18/23 (!) 364 lb (165.1 kg)   01/06/23 (!) 364 lb (165.1 kg)                   BP (!) 169/109 (Site: Right Lower Arm, Position: Sitting, Cuff Size: Medium Adult)   Pulse 71   Temp 97.5 °F (36.4 °C) (Temporal)   Resp 18   Ht 5' 8\" (1.727 m)   Wt (!) 364 lb (165.1 kg)   LMP 12/19/2022 (Approximate)   SpO2 96%   BMI 55.35 kg/m²       Physical Exam  Vitals and nursing note reviewed. Constitutional:       General: She is not in acute distress. Appearance: She is well-developed. She is not diaphoretic.    HENT:      Head: Normocephalic and atraumatic. Cardiovascular:      Rate and Rhythm: Normal rate and regular rhythm. Heart sounds: Normal heart sounds. No murmur heard. Pulmonary:      Effort: Pulmonary effort is normal. No respiratory distress. Breath sounds: Normal breath sounds. No wheezing or rales. Abdominal:      General: Bowel sounds are normal. There is no distension. Palpations: Abdomen is soft. Tenderness: There is no abdominal tenderness. There is no guarding. Musculoskeletal:         General: Swelling (trace edema) present. Normal range of motion. Skin:     Comments: Wart on bottom of 5th digit. Psychiatric:         Behavior: Behavior normal.     Results for orders placed or performed in visit on 12/05/22   Comprehensive Metabolic Panel   Result Value Ref Range    Sodium 141 132 - 146 mmol/L    Potassium 4.1 3.5 - 5.0 mmol/L    Chloride 103 98 - 107 mmol/L    CO2 24 22 - 29 mmol/L    Anion Gap 14 7 - 16 mmol/L    Glucose 104 (H) 74 - 99 mg/dL    BUN 16 6 - 20 mg/dL    Creatinine 0.9 0.5 - 1.0 mg/dL    Est, Glom Filt Rate >60 >=60 mL/min/1.73    Calcium 8.8 8.6 - 10.2 mg/dL    Total Protein 6.6 6.4 - 8.3 g/dL    Albumin 3.8 3.5 - 5.2 g/dL    Total Bilirubin 0.3 0.0 - 1.2 mg/dL    Alkaline Phosphatase 67 35 - 104 U/L    ALT 25 0 - 32 U/L    AST 19 0 - 31 U/L   CBC   Result Value Ref Range    WBC 6.5 4.5 - 11.5 E9/L    RBC 4.68 3.50 - 5.50 E12/L    Hemoglobin 11.3 (L) 11.5 - 15.5 g/dL    Hematocrit 37.5 34.0 - 48.0 %    MCV 80.1 80.0 - 99.9 fL    MCH 24.1 (L) 26.0 - 35.0 pg    MCHC 30.1 (L) 32.0 - 34.5 %    RDW 17.5 (H) 11.5 - 15.0 fL    Platelets 567 550 - 077 E9/L    MPV 12.0 7.0 - 12.0 fL       ASSESSMENT/PLAN  [facility] was seen today for hypertension and other. Diagnoses and all orders for this visit:    Gastric bypass   - Patient doing well . Having some vomiting when she eats too much or the wrong foods. Has appropriate follow up. Essential hypertension  -     Basic Metabolic Panel;  Future  - Restart hctz 12.5mg daily   -  Recheck in 1 month. Bmp in 1 week. Plantar wart  -     [place], [place], Podiatry, Mayola Fleischer    Other orders  -     hydroCHLOROthiazide (MICROZIDE) 12.5 MG capsule; Take 1 capsule by mouth daily          Phone/MyChart follow up if tests abnormal.    Return in about 1 week (around 1/25/2023). or sooner if necessary. I have reviewed myfindings and recommendations with Vernon Holland. Lester Found.  Anaid Fox MD, M.D

## 2023-01-24 ENCOUNTER — OFFICE VISIT (OUTPATIENT)
Dept: PODIATRY | Age: 38
End: 2023-01-24
Payer: COMMERCIAL

## 2023-01-24 VITALS — HEIGHT: 67 IN | BODY MASS INDEX: 45.99 KG/M2 | WEIGHT: 293 LBS

## 2023-01-24 DIAGNOSIS — D23.72 BENIGN NEOPLASM OF SKIN OF LEFT FOOT: Primary | ICD-10-CM

## 2023-01-24 DIAGNOSIS — M79.675 PAIN OF TOE OF LEFT FOOT: ICD-10-CM

## 2023-01-24 PROCEDURE — G8417 CALC BMI ABV UP PARAM F/U: HCPCS | Performed by: PODIATRIST

## 2023-01-24 PROCEDURE — G8484 FLU IMMUNIZE NO ADMIN: HCPCS | Performed by: PODIATRIST

## 2023-01-24 PROCEDURE — 99243 OFF/OP CNSLTJ NEW/EST LOW 30: CPT | Performed by: PODIATRIST

## 2023-01-24 PROCEDURE — G8427 DOCREV CUR MEDS BY ELIG CLIN: HCPCS | Performed by: PODIATRIST

## 2023-01-24 NOTE — PROGRESS NOTES
23     [place]    : 1985 Sex: female   Age: 40 y.o. Patient was referred by: Salena Walker MD  Patient's PCP/Provider is:  Pedro Charlton. MD [place]    Subjective:    Patient seen today for evaluation regarding painful soft tissue neoplasm left fifth toe. Chief Complaint   Patient presents with    Toe Pain     5th toe pain       HPI: Has been applying topical OTC corn remover pads to the area with modest improvement in symptoms. Patient was concerned due to some increased darkening of the area and increased thickness present. Patient is in no acute distress. She denies any nausea, vomiting, fever, chills. No other additional abnormalities noted. ROS:  Const: Positives and pertinent negatives as per HPI. Musculo: Denies symptoms other than stated above. Neuro: Denies symptoms other than stated above. Skin: Denies symptoms other than stated above.     Current Medications:    Current Outpatient Medications:     hydroCHLOROthiazide (MICROZIDE) 12.5 MG capsule, Take 1 capsule by mouth daily, Disp: 30 capsule, Rfl: 3    metoprolol tartrate (LOPRESSOR) 50 MG tablet, Take 1 tablet by mouth 2 times daily, Disp: 60 tablet, Rfl: 1    sucralfate (CARAFATE) 1 GM tablet, Take 0.5 tablets by mouth 4 times daily, Disp: 120 tablet, Rfl: 1    ondansetron (ZOFRAN-ODT) 4 MG disintegrating tablet, Take 1 tablet by mouth every 8 hours as needed for Nausea or Vomiting, Disp: 15 tablet, Rfl: 0    losartan (COZAAR) 100 MG tablet, TAKE 1 TABLET BY MOUTH EVERY DAY, Disp: 30 tablet, Rfl: 1    guanFACINE (TENEX) 1 MG tablet, Take 1 tablet by mouth nightly, Disp: 90 tablet, Rfl: 1    magnesium oxide (MAG-OX) 400 MG tablet, Take 1 tablet by mouth daily, Disp: 90 tablet, Rfl: 1    hydrALAZINE (APRESOLINE) 25 MG tablet, Take 1 tablet by mouth in the morning and at bedtime, Disp: 90 tablet, Rfl: 3    albuterol sulfate  (90 Base) MCG/ACT inhaler, Inhale 2 puffs into the lungs every 6 hours as needed for Wheezing, Disp: 8.5 each, Rfl: 1    sertraline (ZOLOFT) 25 MG tablet, Take 1 tablet by mouth daily, Disp: 30 tablet, Rfl: 5    cyanocobalamin ([employer]S VITAMIN B12) 1000 MCG tablet, TAKE 1 TABLET BY MOUTH EVERY DAY, Disp: 30 tablet, Rfl: 3    amLODIPine (NORVASC) 10 MG tablet, Take 1 tablet by mouth daily, Disp: 90 tablet, Rfl: 1    Multiple Vitamins-Minerals (THERAPEUTIC MULTIVITAMIN-MINERALS) tablet, Take 1 tablet by mouth daily, Disp: , Rfl:     Allergies:   Allergies   Allergen Reactions    Lactose Intolerance (Gi) Diarrhea     Milk and ice cream    Percocet [Oxycodone-Acetaminophen] Nausea And Vomiting       Vitals:    01/24/23 1035   Weight: (!) 362 lb (164.2 kg)   Height: 5' 7\" (1.702 m)        Past Medical History:   Diagnosis Date    Asthma     B12 deficiency 09/21/2018    Class 3 severe obesity due to excess calories with body mass index (BMI) of 60.0 to 69.9 in [facility])     GERD without esophagitis     Hypertension     Migraine     PRIYA (obstructive sleep apnea)     sleep apnea 4-11    Prolonged emergence from general anesthesia     Vitamin D deficiency 09/21/2018    Zinc deficiency 09/21/2018     Family History   Problem Relation Age of Onset    Cancer Paternal Grandmother         stomach    Brain Cancer Maternal Grandmother     Hypertension Father     Stroke Father     Heart Attack Father     Diabetes Father     Other Father         gout    Hypertension Mother     Scoliosis Sister     Diabetes Maternal Grandfather     Heart Disease Maternal Grandfather      Past Surgical History:   Procedure Laterality Date    CHOLECYSTECTOMY, LAPAROSCOPIC  3/2014 Meghan Raydle    with repair umbilical hernia    LEEP  3/2008    OTHER SURGICAL HISTORY  2011    coils to fallopian tubes    OTHER SURGICAL HISTORY      Tubal essure     ASHLIE-EN-Y GASTRIC BYPASS N/A 11/28/2022    GASTRIC BYPASS ASHLIE-EN-Y LAPAROSCOPIC performed by Wilber Guevara MD at [facility] UMBILICAL HERNIA REPAIR  3/2014 Benja    UPPER GASTROINTESTINAL ENDOSCOPY N/A 2018    EGD BIOPSY performed by Chen Humphreys MD at [address] [place] [address] N/A 2021    EGD BIOPSY performed by Chen Humphreys MD at [address] History     Tobacco Use    Smoking status: Former     Packs/day: 0.20     Years: 15.00     Pack years: 3.00     Types: Cigarettes     Quit date: 2021     Years since quittin.9    Smokeless tobacco: Never    Tobacco comments:     has cut down to couple cigs a daily    Vaping Use    Vaping Use: Never used   Substance Use Topics    Alcohol use: Not Currently     Alcohol/week: 2.0 - 3.0 standard drinks     Types: 2 - 3 Shots of liquor per week     Comment: occasionally    Drug use: No           Diagnostic studies:    No results found. Procedures:    None    Exam:  VASCULAR: Pedal pulses palpable left foot. Capillary refill time brisk digits 1 through 5 left foot  NEUROLOGICAL: Epicritic sensations intact digital regions left foot. DERMATOLOGICAL: Neoplasm noted plantar aspect left fifth toe measuring approximately 0.3 cm in diameter. Tenderness noted to direct palpation. No signs of infection noted left fifth toe. No maceration webspaces noted left foot. MUSCULOSKELETAL: Adequate range of motion digital regions left foot    Plan Per Assessment  Angela Barboza was seen today for toe pain. 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 We did utilize topical Salinocaine ointment to the area which patient was dispensed and advised on daily application. Patient will be followed up at a later date for continued evaluation and care, until issues are resolved.       Seen By:    Yvette Howard DPM    Electronically signed by Marichuy Arroyo DPM on 1/24/2023 at 10:48 AM      This note was created using voice recognition software. The note was reviewed however may contain grammatical errors.

## 2023-01-24 NOTE — LETTER
[facility] [address]  [address]  Phone: [phone]  Fax: [phone]    Waurika Hazel Farmer  [id number]   1985 1/24/2023      Dear Dr. Lluvia Parker,    I would like to thank you for the kind referral of Yolanda Farmer. She presented to the office today for evaluation regarding painful soft tissue neoplasm left fifth toe. We did discuss topical treatment options for care. Patient was given samples of the topical medication and other additional dressing materials to be applied daily. We will have continued follow-up until issues are resolved. If you should have any questions concerning her visit today, please do not hesitate to contact me.     Sincerely,    Nico Reynoso DPM

## 2023-01-24 NOTE — PROGRESS NOTES
Christy Doherty is here today as a new patient for an evaluation of the bottom of the 5th toe to the left foot. She thought she had a corn to the toe, but realized it might be a plantar wart. She applied Dr. Isabella Spangler to the area including the small pad. The pain stopped, however the area turned black. PCP is Dr. Enmanuel Loera, last seen 01/19/2023.

## 2023-01-31 ENCOUNTER — OFFICE VISIT (OUTPATIENT)
Dept: PODIATRY | Age: 38
End: 2023-01-31
Payer: COMMERCIAL

## 2023-01-31 VITALS — BODY MASS INDEX: 45.99 KG/M2 | HEIGHT: 67 IN | WEIGHT: 293 LBS

## 2023-01-31 DIAGNOSIS — M79.675 PAIN OF TOE OF LEFT FOOT: ICD-10-CM

## 2023-01-31 DIAGNOSIS — L85.3 XEROSIS CUTIS: ICD-10-CM

## 2023-01-31 DIAGNOSIS — D23.72 BENIGN NEOPLASM OF SKIN OF LEFT FOOT: Primary | ICD-10-CM

## 2023-01-31 DIAGNOSIS — I10 ESSENTIAL HYPERTENSION: ICD-10-CM

## 2023-01-31 LAB
ANION GAP SERPL CALCULATED.3IONS-SCNC: 14 MMOL/L (ref 7–16)
BUN BLDV-MCNC: 12 MG/DL (ref 6–20)
CALCIUM SERPL-MCNC: 9 MG/DL (ref 8.6–10.2)
CHLORIDE BLD-SCNC: 102 MMOL/L (ref 98–107)
CO2: 24 MMOL/L (ref 22–29)
CREAT SERPL-MCNC: 1 MG/DL (ref 0.5–1)
GFR SERPL CREATININE-BSD FRML MDRD: >60 ML/MIN/1.73
GLUCOSE BLD-MCNC: 98 MG/DL (ref 74–99)
POTASSIUM SERPL-SCNC: 3.9 MMOL/L (ref 3.5–5)
SODIUM BLD-SCNC: 140 MMOL/L (ref 132–146)

## 2023-01-31 PROCEDURE — G8427 DOCREV CUR MEDS BY ELIG CLIN: HCPCS | Performed by: PODIATRIST

## 2023-01-31 PROCEDURE — G8484 FLU IMMUNIZE NO ADMIN: HCPCS | Performed by: PODIATRIST

## 2023-01-31 PROCEDURE — G8417 CALC BMI ABV UP PARAM F/U: HCPCS | Performed by: PODIATRIST

## 2023-01-31 PROCEDURE — 1036F TOBACCO NON-USER: CPT | Performed by: PODIATRIST

## 2023-01-31 PROCEDURE — 99213 OFFICE O/P EST LOW 20 MIN: CPT | Performed by: PODIATRIST

## 2023-01-31 RX ORDER — AMMONIUM LACTATE 12 G/100G
CREAM TOPICAL
Qty: 385 G | Refills: 4 | Status: SHIPPED | OUTPATIENT
Start: 2023-01-31

## 2023-01-31 NOTE — PROGRESS NOTES
Patient is here today to follow up on a wart to the left 5th toe. She has been applying the ointment prescribed last visit and the area is better. PCP is Dr. Juan Crowley, last seen 01/18/2023.

## 2023-01-31 NOTE — PROGRESS NOTES
23     Barbie Donahue    : 1985   Sex: female    Age: 40 y.o. Patient's PCP/Provider is:  Margarita Mcadams. MD [place]    Subjective:  Patient is seen today for follow-up regarding continued evaluation regarding neoplasm left fifth toe. Patient still having issues to her right heel region due to dryness. No other additional abnormalities noted. Chief Complaint   Patient presents with    Toe Pain     Left toe pain       ROS:  Const: Positives and pertinent negatives as per HPI. Musculo: Denies symptoms other than stated above. Neuro: Denies symptoms other than stated above. Skin: Denies symptoms other than stated above. Current Medications:    Current Outpatient Medications:     ammonium lactate (LAC-HYDRIN) 12 % cream, Apply topically as needed. , Disp: 385 g, Rfl: 4    hydroCHLOROthiazide (MICROZIDE) 12.5 MG capsule, Take 1 capsule by mouth daily, Disp: 30 capsule, Rfl: 3    metoprolol tartrate (LOPRESSOR) 50 MG tablet, Take 1 tablet by mouth 2 times daily, Disp: 60 tablet, Rfl: 1    sucralfate (CARAFATE) 1 GM tablet, Take 0.5 tablets by mouth 4 times daily, Disp: 120 tablet, Rfl: 1    ondansetron (ZOFRAN-ODT) 4 MG disintegrating tablet, Take 1 tablet by mouth every 8 hours as needed for Nausea or Vomiting, Disp: 15 tablet, Rfl: 0    losartan (COZAAR) 100 MG tablet, TAKE 1 TABLET BY MOUTH EVERY DAY, Disp: 30 tablet, Rfl: 1    guanFACINE (TENEX) 1 MG tablet, Take 1 tablet by mouth nightly, Disp: 90 tablet, Rfl: 1    magnesium oxide (MAG-OX) 400 MG tablet, Take 1 tablet by mouth daily, Disp: 90 tablet, Rfl: 1    hydrALAZINE (APRESOLINE) 25 MG tablet, Take 1 tablet by mouth in the morning and at bedtime, Disp: 90 tablet, Rfl: 3    albuterol sulfate  (90 Base) MCG/ACT inhaler, Inhale 2 puffs into the lungs every 6 hours as needed for Wheezing, Disp: 8.5 each, Rfl: 1    sertraline (ZOLOFT) 25 MG tablet, Take 1 tablet by mouth daily, Disp: 30 tablet, Rfl: 5    cyanocobalamin ([employer]S VITAMIN B12) 1000 MCG tablet, TAKE 1 TABLET BY MOUTH EVERY DAY, Disp: 30 tablet, Rfl: 3    amLODIPine (NORVASC) 10 MG tablet, Take 1 tablet by mouth daily, Disp: 90 tablet, Rfl: 1    Multiple Vitamins-Minerals (THERAPEUTIC MULTIVITAMIN-MINERALS) tablet, Take 1 tablet by mouth daily, Disp: , Rfl:     Allergies: Allergies   Allergen Reactions    Lactose Intolerance (Gi) Diarrhea     Milk and ice cream    Percocet [Oxycodone-Acetaminophen] Nausea And Vomiting       Vitals:    01/31/23 0959   Weight: (!) 348 lb (157.9 kg)   Height: 5' 7\" (1.702 m)       Exam:  Neurovascular status unchanged. Neoplasm site improved plantar aspect left fifth toe. No signs of infection noted left foot. No maceration webspaces noted left foot. Diagnostic Studies:     No results found. Procedures:    None    Plan Per Assessment  Yoan was seen today for toe pain. 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 Evaluation and management  Prescription given today for topical Lac-Hydrin lotion to be applied to some xerotic regions to both feet. Additional medication was applied to the left fifth toe region which patient is to continue on a daily basis. Patient will be followed up at a later date until issues are resolved. Seen By:    Ana Boateng DPM    Electronically signed by Ana Boateng DPM on 1/31/2023 at 10:25 AM    This note was created using voice recognition software. The note was reviewed however may contain grammatical errors.

## 2023-02-07 ENCOUNTER — OFFICE VISIT (OUTPATIENT)
Dept: PODIATRY | Age: 38
End: 2023-02-07
Payer: COMMERCIAL

## 2023-02-07 VITALS — BODY MASS INDEX: 45.99 KG/M2 | HEIGHT: 67 IN | WEIGHT: 293 LBS

## 2023-02-07 DIAGNOSIS — D23.72 BENIGN NEOPLASM OF SKIN OF LEFT FOOT: Primary | ICD-10-CM

## 2023-02-07 PROCEDURE — G8484 FLU IMMUNIZE NO ADMIN: HCPCS | Performed by: PODIATRIST

## 2023-02-07 PROCEDURE — G8427 DOCREV CUR MEDS BY ELIG CLIN: HCPCS | Performed by: PODIATRIST

## 2023-02-07 PROCEDURE — 1036F TOBACCO NON-USER: CPT | Performed by: PODIATRIST

## 2023-02-07 PROCEDURE — G8417 CALC BMI ABV UP PARAM F/U: HCPCS | Performed by: PODIATRIST

## 2023-02-07 PROCEDURE — 99213 OFFICE O/P EST LOW 20 MIN: CPT | Performed by: PODIATRIST

## 2023-02-07 NOTE — PROGRESS NOTES
Patient is here today for a follow up to a left 5th toe wart. She states the medication applied last week did help dissolve the wart some. She denies pain to the toe. PCP is Dr. Tahir Flood, last seen 01/18/2023.

## 2023-02-07 NOTE — PROGRESS NOTES
23     [facility]    : 1985   Sex: female    Age: 40 y.o. Patient's PCP/Provider is:  Jodi Villalpando MD    Subjective:  Patient is seen today for follow-up regarding continued care benign neoplasm left fifth toe. She has been applying the topical medication as instructed without issues noted. She has noticed improvement with her every day ambulatory activities. No other additional abnormalities noted. Chief Complaint   Patient presents with    Toe Pain     Left toe pain       ROS:  Const: Positives and pertinent negatives as per HPI. Musculo: Denies symptoms other than stated above. Neuro: Denies symptoms other than stated above. Skin: Denies symptoms other than stated above. Current Medications:    Current Outpatient Medications:     ammonium lactate (LAC-HYDRIN) 12 % cream, Apply topically as needed. , Disp: 385 g, Rfl: 4    hydroCHLOROthiazide (MICROZIDE) 12.5 MG capsule, Take 1 capsule by mouth daily, Disp: 30 capsule, Rfl: 3    metoprolol tartrate (LOPRESSOR) 50 MG tablet, Take 1 tablet by mouth 2 times daily, Disp: 60 tablet, Rfl: 1    sucralfate (CARAFATE) 1 GM tablet, Take 0.5 tablets by mouth 4 times daily, Disp: 120 tablet, Rfl: 1    ondansetron (ZOFRAN-ODT) 4 MG disintegrating tablet, Take 1 tablet by mouth every 8 hours as needed for Nausea or Vomiting, Disp: 15 tablet, Rfl: 0    losartan (COZAAR) 100 MG tablet, TAKE 1 TABLET BY MOUTH EVERY DAY, Disp: 30 tablet, Rfl: 1    guanFACINE (TENEX) 1 MG tablet, Take 1 tablet by mouth nightly, Disp: 90 tablet, Rfl: 1    magnesium oxide (MAG-OX) 400 MG tablet, Take 1 tablet by mouth daily, Disp: 90 tablet, Rfl: 1    hydrALAZINE (APRESOLINE) 25 MG tablet, Take 1 tablet by mouth in the morning and at bedtime, Disp: 90 tablet, Rfl: 3    albuterol sulfate  (90 Base) MCG/ACT inhaler, Inhale 2 puffs into the lungs every 6 hours as needed for Wheezing, Disp: 8.5 each, Rfl: 1    sertraline (ZOLOFT) 25 MG tablet, Take 1 tablet by mouth daily, Disp: 30 tablet, Rfl: 5    cyanocobalamin ([employer]S VITAMIN B12) 1000 MCG tablet, TAKE 1 TABLET BY MOUTH EVERY DAY, Disp: 30 tablet, Rfl: 3    amLODIPine (NORVASC) 10 MG tablet, Take 1 tablet by mouth daily, Disp: 90 tablet, Rfl: 1    Multiple Vitamins-Minerals (THERAPEUTIC MULTIVITAMIN-MINERALS) tablet, Take 1 tablet by mouth daily, Disp: , Rfl:     Allergies: Allergies   Allergen Reactions    Lactose Intolerance (Gi) Diarrhea     Milk and ice cream    Percocet [Oxycodone-Acetaminophen] Nausea And Vomiting       Vitals:    02/07/23 1152   Weight: (!) 347 lb (157.4 kg)   Height: 5' 7\" (1.702 m)       Exam:  Neurovascular status unchanged. Neoplasm site resolved plantar aspect left fifth toe. No signs of infection noted digital regions left foot. No maceration webspaces noted left foot. Diagnostic Studies:     No results found. Procedures:    None    Plan Per Assessment  Lara Cramer was seen today for toe pain. 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 Patient will be followed up at a later date if any additional Podiatric issues arise. Seen By:    Vianey Ly DPM    Electronically signed by Vianey Ly DPM on 2/7/2023 at 12:00 PM    This note was created using voice recognition software. The note was reviewed however may contain grammatical errors.

## 2023-02-17 DIAGNOSIS — K91.2 MALNUTRITION FOLLOWING GASTROINTESTINAL SURGERY: ICD-10-CM

## 2023-02-17 LAB
ALBUMIN SERPL-MCNC: 4 G/DL (ref 3.5–5.2)
ALP BLD-CCNC: 84 U/L (ref 35–104)
ALT SERPL-CCNC: 37 U/L (ref 0–32)
ANION GAP SERPL CALCULATED.3IONS-SCNC: 15 MMOL/L (ref 7–16)
AST SERPL-CCNC: 49 U/L (ref 0–31)
BILIRUB SERPL-MCNC: 0.8 MG/DL (ref 0–1.2)
BUN BLDV-MCNC: 9 MG/DL (ref 6–20)
CALCIUM SERPL-MCNC: 9.1 MG/DL (ref 8.6–10.2)
CHLORIDE BLD-SCNC: 104 MMOL/L (ref 98–107)
CHOLESTEROL, TOTAL: 188 MG/DL (ref 0–199)
CO2: 21 MMOL/L (ref 22–29)
CREAT SERPL-MCNC: 0.8 MG/DL (ref 0.5–1)
FERRITIN: 25 NG/ML
FOLATE: 4.7 NG/ML (ref 4.8–24.2)
GFR SERPL CREATININE-BSD FRML MDRD: >60 ML/MIN/1.73
GLUCOSE BLD-MCNC: 98 MG/DL (ref 74–99)
HCT VFR BLD CALC: 39.2 % (ref 34–48)
HEMOGLOBIN: 12 G/DL (ref 11.5–15.5)
MCH RBC QN AUTO: 23.2 PG (ref 26–35)
MCHC RBC AUTO-ENTMCNC: 30.6 % (ref 32–34.5)
MCV RBC AUTO: 75.7 FL (ref 80–99.9)
PDW BLD-RTO: 17.2 FL (ref 11.5–15)
PLATELET # BLD: 279 E9/L (ref 130–450)
PMV BLD AUTO: 12.3 FL (ref 7–12)
POTASSIUM SERPL-SCNC: 4.1 MMOL/L (ref 3.5–5)
PREALBUMIN: 20 MG/DL (ref 20–40)
RBC # BLD: 5.18 E12/L (ref 3.5–5.5)
SODIUM BLD-SCNC: 140 MMOL/L (ref 132–146)
TOTAL PROTEIN: 6.6 G/DL (ref 6.4–8.3)
TRIGL SERPL-MCNC: 215 MG/DL (ref 0–149)
VITAMIN B-12: 431 PG/ML (ref 211–946)
WBC # BLD: 7.4 E9/L (ref 4.5–11.5)

## 2023-02-20 ENCOUNTER — OFFICE VISIT (OUTPATIENT)
Dept: FAMILY MEDICINE CLINIC | Age: 38
End: 2023-02-20
Payer: COMMERCIAL

## 2023-02-20 VITALS
DIASTOLIC BLOOD PRESSURE: 88 MMHG | OXYGEN SATURATION: 97 % | HEART RATE: 80 BPM | HEIGHT: 67 IN | SYSTOLIC BLOOD PRESSURE: 133 MMHG | WEIGHT: 293 LBS | RESPIRATION RATE: 18 BRPM | BODY MASS INDEX: 45.99 KG/M2 | TEMPERATURE: 97.9 F

## 2023-02-20 DIAGNOSIS — I10 ESSENTIAL HYPERTENSION: Primary | ICD-10-CM

## 2023-02-20 DIAGNOSIS — J01.90 ACUTE BACTERIAL SINUSITIS: ICD-10-CM

## 2023-02-20 DIAGNOSIS — E53.8 FOLIC ACID DEFICIENCY: ICD-10-CM

## 2023-02-20 DIAGNOSIS — B96.89 ACUTE BACTERIAL SINUSITIS: ICD-10-CM

## 2023-02-20 PROCEDURE — 1036F TOBACCO NON-USER: CPT | Performed by: FAMILY MEDICINE

## 2023-02-20 PROCEDURE — 99213 OFFICE O/P EST LOW 20 MIN: CPT | Performed by: FAMILY MEDICINE

## 2023-02-20 PROCEDURE — G8427 DOCREV CUR MEDS BY ELIG CLIN: HCPCS | Performed by: FAMILY MEDICINE

## 2023-02-20 PROCEDURE — G8484 FLU IMMUNIZE NO ADMIN: HCPCS | Performed by: FAMILY MEDICINE

## 2023-02-20 PROCEDURE — 3075F SYST BP GE 130 - 139MM HG: CPT | Performed by: FAMILY MEDICINE

## 2023-02-20 PROCEDURE — G8417 CALC BMI ABV UP PARAM F/U: HCPCS | Performed by: FAMILY MEDICINE

## 2023-02-20 PROCEDURE — 99212 OFFICE O/P EST SF 10 MIN: CPT | Performed by: FAMILY MEDICINE

## 2023-02-20 PROCEDURE — 3079F DIAST BP 80-89 MM HG: CPT | Performed by: FAMILY MEDICINE

## 2023-02-20 RX ORDER — AMOXICILLIN AND CLAVULANATE POTASSIUM 875; 125 MG/1; MG/1
1 TABLET, FILM COATED ORAL 2 TIMES DAILY
Qty: 14 TABLET | Refills: 0 | Status: SHIPPED | OUTPATIENT
Start: 2023-02-20 | End: 2023-02-27

## 2023-02-20 RX ORDER — FOLIC ACID 1 MG/1
1 TABLET ORAL DAILY
Qty: 30 TABLET | Refills: 1 | Status: SHIPPED | OUTPATIENT
Start: 2023-02-20

## 2023-02-20 ASSESSMENT — ENCOUNTER SYMPTOMS
VOMITING: 0
ABDOMINAL PAIN: 0
WHEEZING: 0
NAUSEA: 0
DIARRHEA: 0
SHORTNESS OF BREATH: 1
COUGH: 1

## 2023-02-20 NOTE — PROGRESS NOTES
[facility]  [address] [place] [address]   [phone]  Sheryl Villalpando MD     Patient: Heena Nogueira  YOB: 1985  Visit Date: 2/20/23    Heena is a 37 y.o. year old female here today for   Chief Complaint   Patient presents with    Hypertension     F/u    Congestion     cough    Other     Review lab results       HPI  Patient is a 37 year old female here for follow up of hypertension and cold symptoms x 10 days.     Hypertension - takes hydralazine twice a day, guanfacine, losartan, and amlodipine, hctz. No chest pain , sob.     Has some dumping syndrome occasionally that makes her feel lightheaded.     Cold symptoms - 10 days. Last Thursday . Throat started hurting. Woke up Friday with facial pressure, chest congestion. Had body aches . Coughing a lot. Mucous is yellow. Has taken 2 COVID tests that were negative. Last two days is improving. Was using albuterol multiple times a day. The last two days has not needed it as much. Had a fever a week ago none since then. Shortness of breath improved. Has pressure around her eyes and behind her nose. Worse when she lies on her left side.     Review of Systems   Respiratory:  Positive for cough and shortness of breath. Negative for wheezing.    Cardiovascular:  Positive for leg swelling. Negative for chest pain and palpitations.   Gastrointestinal:  Negative for abdominal pain, diarrhea, nausea and vomiting.   Neurological:  Negative for dizziness, light-headedness and headaches.   Psychiatric/Behavioral:  Negative for dysphoric mood. The patient is not nervous/anxious.      Current Outpatient Medications on File Prior to Visit   Medication Sig Dispense Refill    ammonium lactate (LAC-HYDRIN) 12 % cream Apply topically as needed. 385 g 4    hydroCHLOROthiazide (MICROZIDE) 12.5 MG capsule Take 1 capsule by mouth daily 30 capsule 3    metoprolol tartrate (LOPRESSOR) 50 MG tablet Take 1 tablet by mouth 2 times daily 60 tablet 1     sucralfate (CARAFATE) 1 GM tablet Take 0.5 tablets by mouth 4 times daily 120 tablet 1    ondansetron (ZOFRAN-ODT) 4 MG disintegrating tablet Take 1 tablet by mouth every 8 hours as needed for Nausea or Vomiting 15 tablet 0    losartan (COZAAR) 100 MG tablet TAKE 1 TABLET BY MOUTH EVERY DAY 30 tablet 1    guanFACINE (TENEX) 1 MG tablet Take 1 tablet by mouth nightly 90 tablet 1    magnesium oxide (MAG-OX) 400 MG tablet Take 1 tablet by mouth daily 90 tablet 1    hydrALAZINE (APRESOLINE) 25 MG tablet Take 1 tablet by mouth in the morning and at bedtime 90 tablet 3    albuterol sulfate  (90 Base) MCG/ACT inhaler Inhale 2 puffs into the lungs every 6 hours as needed for Wheezing 8.5 each 1    sertraline (ZOLOFT) 25 MG tablet Take 1 tablet by mouth daily 30 tablet 5    cyanocobalamin ([employer]S VITAMIN B12) 1000 MCG tablet TAKE 1 TABLET BY MOUTH EVERY DAY 30 tablet 3    amLODIPine (NORVASC) 10 MG tablet Take 1 tablet by mouth daily 90 tablet 1    Multiple Vitamins-Minerals (THERAPEUTIC MULTIVITAMIN-MINERALS) tablet Take 1 tablet by mouth daily       No current facility-administered medications on file prior to visit. Allergies   Allergen Reactions    Lactose Intolerance (Gi) Diarrhea     Milk and ice cream    Percocet [Oxycodone-Acetaminophen] Nausea And Vomiting       Past medical, surgical, socialand/or family history reviewed, updated as needed, and are non-contributory (unless otherwise stated). Medications, allergies, and problem list also reviewed and updated as needed in patient's record.     Wt Readings from Last 3 Encounters:   02/20/23 (!) 335 lb (152 kg)   02/07/23 (!) 347 lb (157.4 kg)   01/31/23 (!) 348 lb (157.9 kg)                   /88 (Site: Left Lower Arm, Position: Sitting, Cuff Size: Large Adult)   Pulse 80   Temp 97.9 °F (36.6 °C) (Temporal)   Resp 18   Ht 5' 7\" (1.702 m)   Wt (!) 335 lb (152 kg)   LMP 01/25/2023   SpO2 97%   BMI 52.47 kg/m²       Physical Exam  Vitals and nursing note reviewed. Constitutional:       General: She is not in acute distress. Appearance: She is well-developed. She is not diaphoretic. HENT:      Head: Normocephalic and atraumatic. Right Ear: Tympanic membrane normal.      Left Ear: Tympanic membrane normal.      Nose: Congestion and rhinorrhea present. Mouth/Throat:      Mouth: Mucous membranes are moist.   Cardiovascular:      Rate and Rhythm: Normal rate and regular rhythm. Heart sounds: Normal heart sounds. No murmur heard. Pulmonary:      Effort: Pulmonary effort is normal. No respiratory distress. Breath sounds: Normal breath sounds. No wheezing or rales. Abdominal:      General: Bowel sounds are normal. There is no distension. Palpations: Abdomen is soft. Tenderness: There is no abdominal tenderness. There is no guarding. Musculoskeletal:         General: No swelling. Normal range of motion.    Psychiatric:         Behavior: Behavior normal.     Results for orders placed or performed in visit on 02/17/23   Prealbumin   Result Value Ref Range    Prealbumin 20 20 - 40 mg/dL   Folate   Result Value Ref Range    Folate 4.7 (L) 4.8 - 24.2 ng/mL   Vitamin B12   Result Value Ref Range    Vitamin B-12 431 211 - 946 pg/mL   Cholesterol, Total   Result Value Ref Range    Cholesterol, Total 188 0 - 199 mg/dL   Triglyceride   Result Value Ref Range    Triglycerides 215 (H) 0 - 149 mg/dL   Ferritin   Result Value Ref Range    Ferritin 25 ng/mL   Comprehensive Metabolic Panel   Result Value Ref Range    Sodium 140 132 - 146 mmol/L    Potassium 4.1 3.5 - 5.0 mmol/L    Chloride 104 98 - 107 mmol/L    CO2 21 (L) 22 - 29 mmol/L    Anion Gap 15 7 - 16 mmol/L    Glucose 98 74 - 99 mg/dL    BUN 9 6 - 20 mg/dL    Creatinine 0.8 0.5 - 1.0 mg/dL    Est, Glom Filt Rate >60 >=60 mL/min/1.73    Calcium 9.1 8.6 - 10.2 mg/dL    Total Protein 6.6 6.4 - 8.3 g/dL    Albumin 4.0 3.5 - 5.2 g/dL    Total Bilirubin 0.8 0.0 - 1.2 mg/dL    Alkaline Phosphatase 84 35 - 104 U/L    ALT 37 (H) 0 - 32 U/L    AST 49 (H) 0 - 31 U/L   CBC   Result Value Ref Range    WBC 7.4 4.5 - 11.5 E9/L    RBC 5.18 3.50 - 5.50 E12/L    Hemoglobin 12.0 11.5 - 15.5 g/dL    Hematocrit 39.2 34.0 - 48.0 %    MCV 75.7 (L) 80.0 - 99.9 fL    MCH 23.2 (L) 26.0 - 35.0 pg    MCHC 30.6 (L) 32.0 - 34.5 %    RDW 17.2 (H) 11.5 - 15.0 fL    Platelets 439 634 - 533 E9/L    MPV 12.3 (H) 7.0 - 12.0 fL       ASSESSMENT/PLAN  Chase Contreras was seen today for hypertension, congestion and other. Diagnoses and all orders for this visit:    Essential hypertension   - Continue current meds. May need to discontinue bp meds if patient has hypotension. Educated on symptoms of hypotension. Folic acid deficiency  -     folic acid (FOLVITE) 1 MG tablet; Take 1 tablet by mouth daily    Acute bacterial sinusitis  -     amoxicillin-clavulanate (AUGMENTIN) 875-125 MG per tablet; Take 1 tablet by mouth 2 times daily for 7 days          Phone/MyChart follow up if tests abnormal.    Return in about 3 months (around 5/20/2023). or sooner if necessary. I have reviewed myfindings and recommendations with Nazaninliam Annmitali. Glynn Gipson.  Sarah Gonzalez MD, M.D

## 2023-02-20 NOTE — PATIENT INSTRUCTIONS
Check your blood pressure every couple days  If you are feeling lightheaded check your blood pressure   Take the augmentin twice a day for a week   Let me know if your blood pressure goes below 120/70   Follow up in 3 months or sooner as needed.

## 2023-02-21 LAB — ZINC: 85.5 UG/DL (ref 60–120)

## 2023-02-23 ENCOUNTER — OFFICE VISIT (OUTPATIENT)
Dept: BARIATRICS/WEIGHT MGMT | Age: 38
End: 2023-02-23
Payer: COMMERCIAL

## 2023-02-23 ENCOUNTER — INITIAL CONSULT (OUTPATIENT)
Dept: BARIATRICS/WEIGHT MGMT | Age: 38
End: 2023-02-23

## 2023-02-23 VITALS
HEIGHT: 68 IN | SYSTOLIC BLOOD PRESSURE: 144 MMHG | RESPIRATION RATE: 20 BRPM | WEIGHT: 293 LBS | DIASTOLIC BLOOD PRESSURE: 86 MMHG | HEART RATE: 51 BPM | BODY MASS INDEX: 44.41 KG/M2 | TEMPERATURE: 97.7 F

## 2023-02-23 VITALS — BODY MASS INDEX: 44.41 KG/M2 | WEIGHT: 293 LBS | HEIGHT: 68 IN

## 2023-02-23 DIAGNOSIS — E66.01 MORBID OBESITY DUE TO EXCESS CALORIES (HCC): ICD-10-CM

## 2023-02-23 DIAGNOSIS — Z71.3 DIETARY COUNSELING: Primary | ICD-10-CM

## 2023-02-23 DIAGNOSIS — K91.2 MALNUTRITION FOLLOWING GASTROINTESTINAL SURGERY: Primary | ICD-10-CM

## 2023-02-23 LAB — VITAMIN B1 WHOLE BLOOD: 73 NMOL/L (ref 70–180)

## 2023-02-23 PROCEDURE — 3079F DIAST BP 80-89 MM HG: CPT | Performed by: SURGERY

## 2023-02-23 PROCEDURE — 99999 PR OFFICE/OUTPT VISIT,PROCEDURE ONLY: CPT

## 2023-02-23 PROCEDURE — G8417 CALC BMI ABV UP PARAM F/U: HCPCS | Performed by: SURGERY

## 2023-02-23 PROCEDURE — 1036F TOBACCO NON-USER: CPT | Performed by: SURGERY

## 2023-02-23 PROCEDURE — 99024 POSTOP FOLLOW-UP VISIT: CPT | Performed by: SURGERY

## 2023-02-23 PROCEDURE — G8484 FLU IMMUNIZE NO ADMIN: HCPCS | Performed by: SURGERY

## 2023-02-23 PROCEDURE — G8427 DOCREV CUR MEDS BY ELIG CLIN: HCPCS | Performed by: SURGERY

## 2023-02-23 PROCEDURE — 99212 OFFICE O/P EST SF 10 MIN: CPT

## 2023-02-23 PROCEDURE — 3077F SYST BP >= 140 MM HG: CPT | Performed by: SURGERY

## 2023-02-23 NOTE — PATIENT INSTRUCTIONS
Please continue to take your vitamin and mineral supplements as instructed. If you received a blood work prescription today for laboratory monitoring due prior to your next routine follow-up visit, please have this blood work obtained 10 to 14 days prior to your next visit. It is important to fast for 12 hours prior to routine weight loss surgery blood work, EXCEPT for drinking water, to ensure accuracy of results. Please report nausea, vomiting, abdominal pain, or any other problems you experience to your surgeon. For problems related to weight loss surgery, it is best to go to [address] [place] [address] Emergency Department and have your surgeon paged.

## 2023-02-23 NOTE — PROGRESS NOTES
Yessy Lawrence  2023  Bariatric office visit       Yessy Lawrence is a 40 y.o. female who weighs (!) 335 lb (152 kg) post robotic Ashlie-en-Y Gastric Bypass 22. Has a URI, now on antibiotics, stopped taking all the vitamins. No ulcers or acid reflux. She is drinking 60 ounces per day and is meeting protein recommendations. Exercise: walking. Weights:  335 lb 2023  3 mths  376 lb 2022  10 days  402 lb 2022       bypass  392 lb 2021              initial    Past Medical History:   Diagnosis Date    Asthma     B12 deficiency 2018    Class 3 severe obesity due to excess calories with body mass index (BMI) of 60.0 to 69.9 in adult [facility])     GERD without esophagitis     Hypertension     Migraine     PRIYA (obstructive sleep apnea)     sleep apnea 4-11    Prolonged emergence from general anesthesia     Vitamin D deficiency 2018    Zinc deficiency 2018     Past Surgical History:   Procedure Laterality Date    CHOLECYSTECTOMY, LAPAROSCOPIC  3/2014 Zaynab Doe    with repair umbilical hernia    LEEP  3/2008    OTHER SURGICAL HISTORY      coils to fallopian tubes    OTHER SURGICAL HISTORY      Tubal essure     ASHLIE-EN-Y GASTRIC BYPASS N/A 2022    GASTRIC BYPASS ASHLIE-EN-Y LAPAROSCOPIC performed by Stafford Apgar, MD at [address] [place] [address]  3/2014 [address] [place] [address] GASTROINTESTINAL ENDOSCOPY N/A 2018    EGD BIOPSY performed by Stafford Apgar, MD at [address] [place] [address] 2021    EGD BIOPSY performed by Stafford Apgar, MD at [facility] Medications  Prior to Visit Medications    Medication Sig Taking?  Authorizing Provider   folic acid (FOLVITE) 1 MG tablet Take 1 tablet by mouth daily Yes Connie Luong MD   amoxicillin-clavulanate (AUGMENTIN) 875-125 MG per tablet Take 1 tablet by mouth 2 times daily for 7 days Yes Sheryl [employer], MD   ammonium lactate (LAC-HYDRIN) 12 % cream Apply topically as needed. Yes Mriyam Mariscal DPM   hydroCHLOROthiazide (MICROZIDE) 12.5 MG capsule Take 1 capsule by mouth daily Yes Moi Pozo MD   metoprolol tartrate (LOPRESSOR) 50 MG tablet Take 1 tablet by mouth 2 times daily Yes Moi Pozo MD   losartan (COZAAR) 100 MG tablet TAKE 1 TABLET BY MOUTH EVERY DAY Yes Moi Pozo MD   guanFACINE (TENEX) 1 MG tablet Take 1 tablet by mouth nightly Yes Moi Pozo MD   magnesium oxide (MAG-OX) 400 MG tablet Take 1 tablet by mouth daily Yes Moi Pozo MD   hydrALAZINE (APRESOLINE) 25 MG tablet Take 1 tablet by mouth in the morning and at bedtime Yes Oma Morales MD   albuterol sulfate  (90 Base) MCG/ACT inhaler Inhale 2 puffs into the lungs every 6 hours as needed for Wheezing Yes Joaquín Navarro MD   sertraline (ZOLOFT) 25 MG tablet Take 1 tablet by mouth daily Yes Moi Pozo MD   cyanocobalamin ([employer] VITAMIN B12) 1000 MCG tablet TAKE 1 TABLET BY MOUTH EVERY DAY Yes Moi Pozo MD   amLODIPine (NORVASC) 10 MG tablet Take 1 tablet by mouth daily Yes Moi Pozo MD   Multiple Vitamins-Minerals (THERAPEUTIC MULTIVITAMIN-MINERALS) tablet Take 1 tablet by mouth daily Yes Historical Provider, MD       Allergies: Lactose intolerance (gi) and Percocet [oxycodone-acetaminophen]   Social History:   TOBACCO:   reports that she quit smoking about 2 years ago. Her smoking use included cigarettes. She has a 3.00 pack-year smoking history. She has never used smokeless tobacco.  All smokers must join the free smoking cessation program and stop smoking for 3 months before having any Bariatric surgery. ETOH:    reports that she does not currently use alcohol after a past usage of about 2.0 - 3.0 standard drinks per week.   Family History   Problem Relation Age of Onset    Cancer Paternal Grandmother         stomach    Brain Cancer Maternal Grandmother     Hypertension Father     Stroke Father     Heart Attack Father     Diabetes Father     Other Father         gout    Hypertension Mother     Scoliosis Sister     Diabetes Maternal Grandfather     Heart Disease Maternal Grandfather      Review of Systems:  Psychiatric:  depression and anxiety  Respiratory: negative  Cardiovascular: negative  Gastrointestinal: negative  Musculoskeletal:negative  All others reviewed, negative    Physical Exam:   VITALS: Blood pressure (!) 144/86, pulse 51, temperature 97.7 °F (36.5 °C), temperature source Temporal, resp. rate 20, height 5' 8\" (1.727 m), weight (!) 335 lb (152 kg), last menstrual period 01/25/2023, not currently breastfeeding. General appearance: alert, appears stated age and cooperative, does ambulate easily  Head: Normocephalic, without obvious abnormality, atraumatic  Eyes: PERRL  Ears/mouth/throat:  Ears clear, mouth normal, throat no redness  Neck: no adenopathy, no JVD, supple, symmetrical, trachea midline and thyroid not enlarged  Lungs: clear to auscultation bilaterally  Heart: regular rate and rhythm  Abdomen: soft, non tender  Extremities: extremities normal, atraumatic, no cyanosis or edema  Skin: no open wounds    Assessment:    URI, whole family sick  Low folate level, now back on MVI    Plan:   Aim for 60 gm Protein, 600 calories, 30 g fiber and 90 oz of liquids per day. Slow down if you feel chest pressure, acid or fullness. Repeat labs before the next visit. Make sure the bowel move daily by taking fiber such as Metamucil. Follow up in 12 weeks.     Physician Signature: Electronically signed by Dr. Cornelius Levine MD

## 2023-02-23 NOTE — PROGRESS NOTES
Medical Nutrition Therapy (MNT) Assessment     Pt. Name: Antolin Anguiano   Date:2/23/2023  F/U Appt: Sx Type 3 mos      Ht 5' 8\" (1.727 m)   Wt (!) 335 lb (152 kg)   LMP 01/25/2023   BMI 50.94 kg/m²  Height: 5' 8\" (1.727 m) Weight: (!) 335 lb (152 kg)   IBW:ideal body weight   155 lbs  % EBWL: 27%     Wt Readings from Last 3 Encounters:   02/23/23 (!) 335 lb (152 kg)   02/23/23 (!) 335 lb (152 kg)   02/20/23 (!) 335 lb (152 kg)                     Protein supplements:   Daily Protein needs (based on 1.0 gram per kg of IBW)  70-80 grams   Nectar protein used prn. Subjective:                    MNT ADVANCE TO:     Bariatric soft diet introducing raw fruit, raw vegetables, rice, protein bars, multivitamin, calcium, protein supplements          [address] [place] Bowel Protocol:  no - Diarrhea  No - Constipation  How much plain water are you drinking daily 5-6 bottles daily             How many meals a day 5 / Portions Sizes of Meals are 4 oz or less         2/17/23 Labs: ALT 37H, AST 49H, Folate 4.7L, triglycerides 215H. Note:  Pt has low folate. Pt said her PCP ordered folic acid and pt is currently taking this. Pt's pcp to manage this issue and any follow up folate labs to check level. Supplements: Bariatric Advantage Multi-Vitamin 1 tablet 2 times Daily, Bariatric Advantage 29 mgs Iron 1 tablet Daily, Bariatric Advanatge Calcium Lozenges 1 tablet 3 times Daily , Dry Vitamin D3 5,000iu every day      Estimated Daily Nutritional Needs: Based on Bariatric procedure for Wt. Loss  Energy: Will be calculated at Maintenance Stage    Protein: 60 - 80 gms Daily    MNT Plan and Additional Education: RD/LD reviewed Bariatric Soft Diet incorporating in Raw Fruits, Raw Vegetables, Red Meat, and Rice. Encouraged pt to meet protein and fluid needs daily. Handouts given. Pt. verbalized understanding. Pt instructed to call with questions.      MEDICAL NUTRITION THERAPY (MNT) - Nutrition Care Plan   (The patient has been educated and given written education material that reinforces the following dietary guidelines for Bariatric Surgery)  Goal:  Patient able to verbalize the following dietary concepts:  3 to 4 ounce portions per meal     6 small meals daily      60 to 80 grams of protein   48 to 64 ounces of fluid daily (water)     Always consume protein item first with all meals   Pt is aware wt loss is pt controlled. Slow Meals - 30-35 chews per bite / Meals 30 - 45 minutes long            The RD / LD reviewed with the patient that the dietary goals of the bariatric patient is to ensure that patient is able to meet established nutritional needs for a Bariatric Surgery  Patient at this time in order to promote healing, prevent significant weight changes, prevent skin breakdown and abnormal lab values, prevent complications, and tolerance to diet and texture of foods. Pt is able to identify proper food choices and needed changes and is able to explain proper food preparation. RD / LD reviewed compliance with assigned diet stages, volume of food and drink consumed, timing of meals, amount of protein and energy intake, daily vitamin and mineral supplementation, identify food cravings and any adverse reactions associated with intake of food and drink. RD / LD uses behavioral tools such as goal setting, MI, and self-monitoring, to reinforce the anatomic and physiologic effects of the surgery, so that the described behaviors become more of a habit not simply a reaction to the altered anatomy. Care Plan:   Rd/Ld Addressed Food Records or 24-Hour Recall  Rd / Ld encouraged patient to exercise at least 30 minutes daily  Rd / Ld instructed patient on how to increase oral protein intake within the diet. Pt. can verbalize he / she will need to consume 60 - 80 grams. Rd / Ld instructed the patient on how to increase the use of protein supplements within the diet.                                          Pt. was instructed on how to increase water intake. Patient will need to consume 48 - 64 oz. of just plain water in addition to 30 oz. of non-caloric beverages. Handouts given to patient  RD / LD encouraged oral intake    RD / LD encouraged pt. to keep food records.       Pt. is able to verbalize diet concepts         Yes - Pt. diet was advanced to the following stage ( See above)     Good - Dietary Compliance

## 2023-02-23 NOTE — PROGRESS NOTES
Patient is 3 mo LRYGB. Water intake is around 5-6 bottles daily. Protein through shakes and foods. Vitamin intake is good. Bowel movements are good. Labs in [facility].

## 2023-03-29 DIAGNOSIS — E53.8 FOLIC ACID DEFICIENCY: Primary | ICD-10-CM

## 2023-04-25 DIAGNOSIS — E53.8 FOLIC ACID DEFICIENCY: ICD-10-CM

## 2023-04-25 RX ORDER — FOLIC ACID 1 MG/1
TABLET ORAL
Qty: 30 TABLET | Refills: 1 | Status: SHIPPED | OUTPATIENT
Start: 2023-04-25

## 2023-04-25 NOTE — TELEPHONE ENCOUNTER
Last Appointment:  2/20/2023  Future Appointments  5/22/2023  1:00 PM    MD Carolina Barrera [facility]  5/23/2023  10:30 AM   SHARI Basurto - * Surg Weight         [facility]

## 2023-05-22 ENCOUNTER — OFFICE VISIT (OUTPATIENT)
Dept: FAMILY MEDICINE CLINIC | Age: 38
End: 2023-05-22
Payer: COMMERCIAL

## 2023-05-22 ENCOUNTER — HOSPITAL ENCOUNTER (OUTPATIENT)
Age: 38
Discharge: HOME | End: 2023-05-22
Payer: COMMERCIAL

## 2023-05-22 VITALS
HEIGHT: 68 IN | TEMPERATURE: 97.6 F | RESPIRATION RATE: 20 BRPM | DIASTOLIC BLOOD PRESSURE: 96 MMHG | SYSTOLIC BLOOD PRESSURE: 162 MMHG | WEIGHT: 293 LBS | BODY MASS INDEX: 44.41 KG/M2 | OXYGEN SATURATION: 94 % | HEART RATE: 63 BPM

## 2023-05-22 DIAGNOSIS — K91.2 MALNUTRITION FOLLOWING GASTROINTESTINAL SURGERY: ICD-10-CM

## 2023-05-22 DIAGNOSIS — I10 ESSENTIAL HYPERTENSION: Primary | ICD-10-CM

## 2023-05-22 DIAGNOSIS — B07.0 PLANTAR WART: ICD-10-CM

## 2023-05-22 LAB
ALBUMIN SERPL-MCNC: 3.8 G/DL (ref 3.5–5.2)
ALP SERPL-CCNC: 88 U/L (ref 35–104)
ALT SERPL-CCNC: 13 U/L (ref 0–32)
ANION GAP SERPL CALCULATED.3IONS-SCNC: 13 MMOL/L (ref 7–16)
AST SERPL-CCNC: 15 U/L (ref 0–31)
BILIRUB SERPL-MCNC: 0.7 MG/DL (ref 0–1.2)
BUN SERPL-MCNC: 12 MG/DL (ref 6–20)
CALCIUM SERPL-MCNC: 9.2 MG/DL (ref 8.6–10.2)
CHLORIDE SERPL-SCNC: 107 MMOL/L (ref 98–107)
CO2 SERPL-SCNC: 22 MMOL/L (ref 22–29)
CREAT SERPL-MCNC: 0.8 MG/DL (ref 0.5–1)
ERYTHROCYTE [DISTWIDTH] IN BLOOD BY AUTOMATED COUNT: 17 FL (ref 11.5–15)
FERRITIN SERPL-MCNC: 13 NG/ML
FOLATE SERPL-MCNC: 6 NG/ML (ref 4.8–24.2)
GLUCOSE SERPL-MCNC: 96 MG/DL (ref 74–99)
HCT VFR BLD AUTO: 33 % (ref 34–48)
HGB BLD-MCNC: 9.9 G/DL (ref 11.5–15.5)
MCH RBC QN AUTO: 23.2 PG (ref 26–35)
MCHC RBC AUTO-ENTMCNC: 30 % (ref 32–34.5)
MCV RBC AUTO: 77.3 FL (ref 80–99.9)
PLATELET # BLD AUTO: 306 E9/L (ref 130–450)
PMV BLD AUTO: 11.2 FL (ref 7–12)
POTASSIUM SERPL-SCNC: 4.4 MMOL/L (ref 3.5–5)
PROT SERPL-MCNC: 6.5 G/DL (ref 6.4–8.3)
RBC # BLD AUTO: 4.27 E12/L (ref 3.5–5.5)
SODIUM SERPL-SCNC: 142 MMOL/L (ref 132–146)
VIT B12 SERPL-MCNC: 224 PG/ML (ref 211–946)
VITAMIN D 25-HYDROXY: 25 NG/ML (ref 30–100)
WBC # BLD: 9.5 E9/L (ref 4.5–11.5)

## 2023-05-22 PROCEDURE — 3078F DIAST BP <80 MM HG: CPT | Performed by: FAMILY MEDICINE

## 2023-05-22 PROCEDURE — 82607 VITAMIN B-12: CPT

## 2023-05-22 PROCEDURE — 1036F TOBACCO NON-USER: CPT | Performed by: FAMILY MEDICINE

## 2023-05-22 PROCEDURE — 82306 VITAMIN D 25 HYDROXY: CPT

## 2023-05-22 PROCEDURE — 99213 OFFICE O/P EST LOW 20 MIN: CPT | Performed by: FAMILY MEDICINE

## 2023-05-22 PROCEDURE — 82746 ASSAY OF FOLIC ACID SERUM: CPT

## 2023-05-22 PROCEDURE — 80053 COMPREHEN METABOLIC PANEL: CPT

## 2023-05-22 PROCEDURE — G8428 CUR MEDS NOT DOCUMENT: HCPCS | Performed by: FAMILY MEDICINE

## 2023-05-22 PROCEDURE — 84630 ASSAY OF ZINC: CPT

## 2023-05-22 PROCEDURE — 36415 COLL VENOUS BLD VENIPUNCTURE: CPT

## 2023-05-22 PROCEDURE — 3074F SYST BP LT 130 MM HG: CPT | Performed by: FAMILY MEDICINE

## 2023-05-22 PROCEDURE — 84425 ASSAY OF VITAMIN B-1: CPT

## 2023-05-22 PROCEDURE — 85027 COMPLETE CBC AUTOMATED: CPT

## 2023-05-22 PROCEDURE — 84590 ASSAY OF VITAMIN A: CPT

## 2023-05-22 PROCEDURE — G8417 CALC BMI ABV UP PARAM F/U: HCPCS | Performed by: FAMILY MEDICINE

## 2023-05-22 PROCEDURE — 82728 ASSAY OF FERRITIN: CPT

## 2023-05-22 RX ORDER — KETOCONAZOLE 20 MG/G
CREAM TOPICAL
Qty: 30 G | Refills: 1 | Status: SHIPPED | OUTPATIENT
Start: 2023-05-22

## 2023-05-22 SDOH — ECONOMIC STABILITY: FOOD INSECURITY: WITHIN THE PAST 12 MONTHS, YOU WORRIED THAT YOUR FOOD WOULD RUN OUT BEFORE YOU GOT MONEY TO BUY MORE.: NEVER TRUE

## 2023-05-22 SDOH — ECONOMIC STABILITY: FOOD INSECURITY: WITHIN THE PAST 12 MONTHS, THE FOOD YOU BOUGHT JUST DIDN'T LAST AND YOU DIDN'T HAVE MONEY TO GET MORE.: NEVER TRUE

## 2023-05-22 SDOH — ECONOMIC STABILITY: INCOME INSECURITY: HOW HARD IS IT FOR YOU TO PAY FOR THE VERY BASICS LIKE FOOD, HOUSING, MEDICAL CARE, AND HEATING?: NOT HARD AT ALL

## 2023-05-22 SDOH — ECONOMIC STABILITY: HOUSING INSECURITY
IN THE LAST 12 MONTHS, WAS THERE A TIME WHEN YOU DID NOT HAVE A STEADY PLACE TO SLEEP OR SLEPT IN A SHELTER (INCLUDING NOW)?: NO

## 2023-05-22 ASSESSMENT — PATIENT HEALTH QUESTIONNAIRE - PHQ9
SUM OF ALL RESPONSES TO PHQ QUESTIONS 1-9: 0
1. LITTLE INTEREST OR PLEASURE IN DOING THINGS: 0
SUM OF ALL RESPONSES TO PHQ9 QUESTIONS 1 & 2: 0
SUM OF ALL RESPONSES TO PHQ QUESTIONS 1-9: 0
SUM OF ALL RESPONSES TO PHQ QUESTIONS 1-9: 0
2. FEELING DOWN, DEPRESSED OR HOPELESS: 0
SUM OF ALL RESPONSES TO PHQ QUESTIONS 1-9: 0

## 2023-05-22 ASSESSMENT — ENCOUNTER SYMPTOMS
NAUSEA: 0
SHORTNESS OF BREATH: 1
ABDOMINAL PAIN: 0
WHEEZING: 0
DIARRHEA: 0
COUGH: 1
VOMITING: 0

## 2023-05-22 ASSESSMENT — LIFESTYLE VARIABLES
HOW MANY STANDARD DRINKS CONTAINING ALCOHOL DO YOU HAVE ON A TYPICAL DAY: PATIENT DOES NOT DRINK
HOW OFTEN DO YOU HAVE A DRINK CONTAINING ALCOHOL: NEVER

## 2023-05-23 ENCOUNTER — OFFICE VISIT (OUTPATIENT)
Dept: BARIATRICS/WEIGHT MGMT | Age: 38
End: 2023-05-23
Payer: COMMERCIAL

## 2023-05-23 ENCOUNTER — INITIAL CONSULT (OUTPATIENT)
Dept: BARIATRICS/WEIGHT MGMT | Age: 38
End: 2023-05-23

## 2023-05-23 VITALS
HEIGHT: 68 IN | HEART RATE: 68 BPM | WEIGHT: 293 LBS | RESPIRATION RATE: 20 BRPM | BODY MASS INDEX: 44.41 KG/M2 | DIASTOLIC BLOOD PRESSURE: 84 MMHG | SYSTOLIC BLOOD PRESSURE: 175 MMHG | TEMPERATURE: 98.1 F

## 2023-05-23 VITALS — BODY MASS INDEX: 44.41 KG/M2 | HEIGHT: 68 IN | WEIGHT: 293 LBS

## 2023-05-23 DIAGNOSIS — Z71.3 DIETARY COUNSELING: Primary | ICD-10-CM

## 2023-05-23 DIAGNOSIS — E66.01 MORBID OBESITY DUE TO EXCESS CALORIES (HCC): ICD-10-CM

## 2023-05-23 DIAGNOSIS — K91.2 MALNUTRITION FOLLOWING GASTROINTESTINAL SURGERY: Primary | ICD-10-CM

## 2023-05-23 DIAGNOSIS — D50.9 IRON DEFICIENCY ANEMIA, UNSPECIFIED IRON DEFICIENCY ANEMIA TYPE: ICD-10-CM

## 2023-05-23 PROCEDURE — G8417 CALC BMI ABV UP PARAM F/U: HCPCS | Performed by: NURSE PRACTITIONER

## 2023-05-23 PROCEDURE — 99213 OFFICE O/P EST LOW 20 MIN: CPT | Performed by: NURSE PRACTITIONER

## 2023-05-23 PROCEDURE — 99212 OFFICE O/P EST SF 10 MIN: CPT

## 2023-05-23 PROCEDURE — G8427 DOCREV CUR MEDS BY ELIG CLIN: HCPCS | Performed by: NURSE PRACTITIONER

## 2023-05-23 PROCEDURE — 1036F TOBACCO NON-USER: CPT | Performed by: NURSE PRACTITIONER

## 2023-05-23 PROCEDURE — 99999 PR OFFICE/OUTPT VISIT,PROCEDURE ONLY: CPT

## 2023-05-23 PROCEDURE — 3079F DIAST BP 80-89 MM HG: CPT | Performed by: NURSE PRACTITIONER

## 2023-05-23 PROCEDURE — 3077F SYST BP >= 140 MM HG: CPT | Performed by: NURSE PRACTITIONER

## 2023-05-23 NOTE — PROGRESS NOTES
Medical Nutrition Therapy (MNT) Assessment     Pt. Name: Sarah Serra   Date:5/23/2023  F/U Appt: Sx Type 6 mos rygb      Ht 5' 8\" (1.727 m)   Wt (!) 319 lb (144.7 kg)   LMP 04/21/2023 (Approximate)   BMI 48.50 kg/m²  Height: 5' 8\" (1.727 m) Weight: (!) 319 lb (144.7 kg)   IBW:ideal body weight   155 lbs % EBWL: 33%     Wt Readings from Last 3 Encounters:   05/23/23 (!) 319 lb (144.7 kg)   05/23/23 (!) 319 lb (144.7 kg)   05/22/23 (!) 320 lb (145.2 kg)                     Protein supplements:   Daily Protein needs (based on 1.0 gram per kg of IBW)  70-80 grams   Uses unflavored pro powder, protein smoothies (SF w/ 26 g/ protein) and meets daily goal with foods    Subjective:                    MNT ADVANCE TO:     Bariatric soft diet introducing raw fruit, raw vegetables, rice, protein bars, multivitamin, calcium, protein supplements          [address] [place] Bowel Protocol:  no - Diarrhea  No - Constipation  How much plain water are you drinking daily 64 oz  up to 100 oz daily             How many meals a day 6 (pt eats very small snacks) / Portions Sizes of Meals are 4 oz         5/22/23 Labs: Hgb 9.9L, Hct 33.0L, vit D 25L, Ferritin 13 (WNL but very low end of normal)  Note:  vit A, vit B1, and zinc results still pending     Supplements: Bariatric Advantage Multi-Vitamin 1 tablet 2 times Daily, Bariatric Advantage 29 mgs Iron 1 tablet Daily, Bariatric Advanatge Calcium Lozenges 1 tablet 3 times Daily , Dry Vitamin D3 5,000iu every day. Pt is aware in order to avoid medical complications from nutritional deficiencies created by WLS pts will have to take the bariatric approved supplements lifelong. List and education provided. Pt verbalized understanding. Note:  Pt admits to sometimes missing supplements. Reminded pt to take all supplements per above and to never miss vit D or iron due to these levels being low. Estimated Daily Nutritional Needs: Based on Bariatric procedure for Wt.  Loss  Energy: Will be

## 2023-05-23 NOTE — PROGRESS NOTES
Shahla Chery  5/23/2023  [address]     Gwendolyn-en- Y Gastric Bypass  6 months Post-Operative Follow-up     Chief Complaint   Patient presents with    Follow Up After Procedure     6 mo LRYGB. Water intake is around 90 oz+ daily. Protein through mostly foods. Vitamin intake is good. Bowel movements are good. Shahla Chery is a 40 y.o. female who is 6 months  post Laparoscopic Gwendolyn-en-Y Gastric Bypass surgery. Reports that she is concerned that her hair is thinning. She is not having swallowing difficulty. Patient denies nausea and vomiting. Patient denies gastric reflux symptoms. Bowel movements are  normal per patient. Patient is not taking fiber supplements. Patient is noncompliant some of the time with the iron supplement and multivitamins and calcium + Vit D. She is meeting fluid recommendations of at least 64 ounces per day and is meeting protein recommendations. She  is exercising:  has been going to the gym 3-4 days week . Weight=(!) 319 lb (144.7 kg)  Today's weight represents a total weight loss of 83 pounds since surgery with 0 pounds regained since the lowest weight. Prior to Admission medications    Medication Sig Start Date End Date Taking? Authorizing Provider   ketoconazole (NIZORAL) 2 % cream Apply topically daily. 5/22/23  Yes Enoc Gaytan MD   folic acid (FOLVITE) 1 MG tablet TAKE 1 TABLET BY MOUTH EVERY DAY 4/25/23  Yes Enoc Gaytan MD   ammonium lactate (LAC-HYDRIN) 12 % cream Apply topically as needed.  1/31/23  Yes Gloria Rider DPM   hydroCHLOROthiazide (MICROZIDE) 12.5 MG capsule Take 1 capsule by mouth daily 1/18/23  Yes Enoc Gaytan MD   metoprolol tartrate (LOPRESSOR) 50 MG tablet Take 1 tablet by mouth 2 times daily 11/18/22  Yes Enoc Gaytan MD   losartan (COZAAR) 100 MG tablet TAKE 1 TABLET BY MOUTH EVERY DAY 10/17/22  Yes Enoc Gaytan MD   guanFACINE (TENEX) 1 MG tablet Take 1 tablet by mouth

## 2023-05-23 NOTE — PATIENT INSTRUCTIONS
Please continue to take your vitamin and mineral supplements as instructed. If you received a blood work prescription today for laboratory monitoring due prior to your next routine follow-up visit, please have this blood work obtained 10 to 14 days prior to your next visit. It is important to fast for 12 hours prior to routine weight loss surgery blood work, EXCEPT for drinking water, to ensure accuracy of results. Please report nausea, vomiting, abdominal pain, or any other problems you experience to your surgeon. For problems related to weight loss surgery, it is best to go to [address] [place] [address] Emergency Department and have your surgeon paged.

## 2023-05-25 LAB — ZINC SERPL-MCNC: 60.9 UG/DL (ref 60–120)

## 2023-05-26 LAB
ANNOTATION COMMENT IMP: NORMAL
RETINYL PALMITATE SERPL-MCNC: <0.02 MG/L (ref 0–0.1)
VIT A SERPL-MCNC: 0.45 MG/L (ref 0.3–1.2)
VIT B1 BLD-MCNC: 99 NMOL/L (ref 70–180)

## 2023-05-31 ENCOUNTER — OFFICE VISIT (OUTPATIENT)
Dept: PODIATRY | Age: 38
End: 2023-05-31
Payer: COMMERCIAL

## 2023-05-31 VITALS — HEIGHT: 67 IN | WEIGHT: 293 LBS | BODY MASS INDEX: 45.99 KG/M2

## 2023-05-31 DIAGNOSIS — M79.675 PAIN OF TOE OF LEFT FOOT: ICD-10-CM

## 2023-05-31 DIAGNOSIS — R26.2 DIFFICULTY WALKING: ICD-10-CM

## 2023-05-31 DIAGNOSIS — D23.72 BENIGN NEOPLASM OF SKIN OF LEFT FOOT: Primary | ICD-10-CM

## 2023-05-31 PROCEDURE — G8427 DOCREV CUR MEDS BY ELIG CLIN: HCPCS | Performed by: PODIATRIST

## 2023-05-31 PROCEDURE — 1036F TOBACCO NON-USER: CPT | Performed by: PODIATRIST

## 2023-05-31 PROCEDURE — 99213 OFFICE O/P EST LOW 20 MIN: CPT | Performed by: PODIATRIST

## 2023-05-31 PROCEDURE — G8417 CALC BMI ABV UP PARAM F/U: HCPCS | Performed by: PODIATRIST

## 2023-05-31 NOTE — PROGRESS NOTES
Patient is here today for an evaluation of the 5th toe on the left foot. She states the plantar wart returned on the bottom of the toe. PCP is Dr. Marissa Garcia, last seen 05/22/2023.

## 2023-05-31 NOTE — PROGRESS NOTES
23     Kay Arana    : 1985   Sex: female    Age: 40 y.o. Patient's PCP/Provider is:  Millie Scott. MD [place]    Subjective:  Patient is seen today for evaluation regarding recurrent neoplasm left fifth toe. Patient stated issues have been going on over the last several weeks and progressively gotten worse. She did have previous topical treatment applied but neoplasm did recur. Patient has been trying to wear appropriate shoe gear but is getting discomfort with certain ambulatory activities. Patient presents today to discuss additional treatment options available. Chief Complaint   Patient presents with    Foot Pain     Plantar wart left foot       ROS:  Const: Positives and pertinent negatives as per HPI. Musculo: Denies symptoms other than stated above. Neuro: Denies symptoms other than stated above. Skin: Denies symptoms other than stated above. Current Medications:    Current Outpatient Medications:     ketoconazole (NIZORAL) 2 % cream, Apply topically daily. , Disp: 30 g, Rfl: 1    folic acid (FOLVITE) 1 MG tablet, TAKE 1 TABLET BY MOUTH EVERY DAY, Disp: 30 tablet, Rfl: 1    ammonium lactate (LAC-HYDRIN) 12 % cream, Apply topically as needed. , Disp: 385 g, Rfl: 4    hydroCHLOROthiazide (MICROZIDE) 12.5 MG capsule, Take 1 capsule by mouth daily, Disp: 30 capsule, Rfl: 3    metoprolol tartrate (LOPRESSOR) 50 MG tablet, Take 1 tablet by mouth 2 times daily, Disp: 60 tablet, Rfl: 1    losartan (COZAAR) 100 MG tablet, TAKE 1 TABLET BY MOUTH EVERY DAY, Disp: 30 tablet, Rfl: 1    guanFACINE (TENEX) 1 MG tablet, Take 1 tablet by mouth nightly, Disp: 90 tablet, Rfl: 1    magnesium oxide (MAG-OX) 400 MG tablet, Take 1 tablet by mouth daily, Disp: 90 tablet, Rfl: 1    hydrALAZINE (APRESOLINE) 25 MG tablet, Take 1 tablet by mouth in the morning and at bedtime, Disp: 90 tablet, Rfl: 3    albuterol sulfate  (90 Base) MCG/ACT inhaler, Inhale 2 puffs into the lungs every 6 hours as

## 2023-06-08 ENCOUNTER — TELEPHONE (OUTPATIENT)
Dept: PODIATRY | Age: 38
End: 2023-06-08

## 2023-06-08 ENCOUNTER — PREP FOR PROCEDURE (OUTPATIENT)
Dept: PODIATRY | Age: 38
End: 2023-06-08

## 2023-06-08 NOTE — TELEPHONE ENCOUNTER
Prior Authorization Form:      DEMOGRAPHICS:                     Patient Name:  Minor Jakob  Patient :  1985            Insurance:  Payor: Shaista Arce / Plan: Trang Khoury / Product Type: *No Product type* /   Insurance ID Number:    Payer/Plan Subscr  Sex Relation Sub. Ins. ID Effective Group Num   1. [employer] - * CÉSAR QUINTANA 1985 Female Self [id number] [address] [facility] [address]         DIAGNOSIS & PROCEDURE:                       Procedure/Operation: excision benign neoplasm left fifth toe           CPT Code: 65378    Diagnosis:  benign neoplasm left fifth toe, pain in toe left foot    ICD10 Code: D23.72, M79.675    Location:  [facility]    Surgeon:  Dr. Maggi Rodriguez INFORMATION:                          Date: 23    Time: TBD              Anesthesia:  MAC/TIVA                                                       Status:  Outpatient        Special Comments:  None.         Electronically signed by Seun Loyd LPN on 6/3/6938 at 9:95 AM

## 2023-07-06 NOTE — PROGRESS NOTES
control, weighing and measuring everything along with slow eating habits.   - Pt is aware he / she needs to drink at a controlled rate and stop when full.   - James Brandon reviewed Bariatric Clear Liquid Diet -  Sample menus provided  - James Brandon reviewed Bariatric Full liquid Diet with timed sample menus for pts to follow post-op. - James Brandon reviewed importance of taking lifelong -  Bariatric MVI, Fe+, Ca+, Vitamin D Supplementation after WLS / LSG pts were also educated on this plus Vitamin B12 supplementation  - James Brandon reviewed post-op f/u appointment schedule with lab draws.  - James Brandon educated pt on post-op deficiencies following wt loss sx and pt can identify signs and symptoms of post-op deficiencies following weight loss sx.    - James Brandon reviewed post-op supplement schedule pt will follow starting 2 weeks post-op. - James Brandon reviewed signs and symptoms of post-op nutrition non-compliance. Pt can identify signs and symptoms that are created by non-compliance such as n/v, dehydration hair loss, diarrhea, constipation, muscle wasting and nutritional deficiencies etc.  - Pt received the Patient Guide To Bariatric Surgery. James Brandon reviewed all information in this education manual. Pt verbalized understanding and had the opportunity to ask any questions during the class.   -Pt verbalized understanding off all the above. Please check all that apply:  Yes - Patient lost 10% of excess body weight prior to surgery  Yes - Patient  is able to verbalize a Bariatric Full Liquid Diet. Yes - Patient is able to verbalize the usage & importance of Protein Supplements. Yes- Patient purchased 3 month supply of vitamins and calcium. YES - Patient is instructed to follow a low-fat pre-op diet from now until surgery date. YES - Patient is instructed to take at least 30 grams of a protein supplement from  now until surgery date in addition to low-fat diet guidelines.   YES - Patient is instructed to consume 64 ounces of water daily from now

## 2023-07-10 RX ORDER — ALBUTEROL SULFATE 90 UG/1
AEROSOL, METERED RESPIRATORY (INHALATION)
Qty: 18 EACH | Refills: 1 | Status: SHIPPED | OUTPATIENT
Start: 2023-07-10

## 2023-07-10 NOTE — TELEPHONE ENCOUNTER
Last Appointment:  Visit date not found  Future Appointments   Date Time Provider [address]   8/24/2023 10:00 AM SHARI Arana - CNP Surg Weight [facility]   8/28/2023  2:40 PM MD Ricarda Nagy [facility]

## 2023-08-22 DIAGNOSIS — D50.9 IRON DEFICIENCY ANEMIA, UNSPECIFIED IRON DEFICIENCY ANEMIA TYPE: ICD-10-CM

## 2023-08-22 LAB
FERRITIN: 10 NG/ML
HCT VFR BLD CALC: 34.7 % (ref 34–48)
HEMOGLOBIN: 9.7 G/DL (ref 11.5–15.5)
MCH RBC QN AUTO: 21.5 PG (ref 26–35)
MCHC RBC AUTO-ENTMCNC: 28 G/DL (ref 32–34.5)
MCV RBC AUTO: 76.8 FL (ref 80–99.9)
PDW BLD-RTO: 17.9 % (ref 11.5–15)
PLATELET # BLD: 347 K/UL (ref 130–450)
PMV BLD AUTO: 11.2 FL (ref 7–12)
RBC # BLD: 4.52 M/UL (ref 3.5–5.5)
WBC # BLD: 8.1 K/UL (ref 4.5–11.5)

## 2023-08-24 ENCOUNTER — OFFICE VISIT (OUTPATIENT)
Dept: BARIATRICS/WEIGHT MGMT | Age: 38
End: 2023-08-24
Payer: COMMERCIAL

## 2023-08-24 VITALS
RESPIRATION RATE: 20 BRPM | DIASTOLIC BLOOD PRESSURE: 103 MMHG | SYSTOLIC BLOOD PRESSURE: 167 MMHG | HEART RATE: 69 BPM | WEIGHT: 293 LBS | BODY MASS INDEX: 44.41 KG/M2 | TEMPERATURE: 97.9 F | HEIGHT: 68 IN

## 2023-08-24 DIAGNOSIS — I10 ESSENTIAL HYPERTENSION: ICD-10-CM

## 2023-08-24 DIAGNOSIS — D50.9 IRON DEFICIENCY ANEMIA, UNSPECIFIED IRON DEFICIENCY ANEMIA TYPE: ICD-10-CM

## 2023-08-24 DIAGNOSIS — K91.2 MALNUTRITION FOLLOWING GASTROINTESTINAL SURGERY: Primary | ICD-10-CM

## 2023-08-24 PROCEDURE — 1036F TOBACCO NON-USER: CPT | Performed by: NURSE PRACTITIONER

## 2023-08-24 PROCEDURE — G8417 CALC BMI ABV UP PARAM F/U: HCPCS | Performed by: NURSE PRACTITIONER

## 2023-08-24 PROCEDURE — 99211 OFF/OP EST MAY X REQ PHY/QHP: CPT

## 2023-08-24 PROCEDURE — 3077F SYST BP >= 140 MM HG: CPT | Performed by: NURSE PRACTITIONER

## 2023-08-24 PROCEDURE — 99213 OFFICE O/P EST LOW 20 MIN: CPT | Performed by: NURSE PRACTITIONER

## 2023-08-24 PROCEDURE — 3080F DIAST BP >= 90 MM HG: CPT | Performed by: NURSE PRACTITIONER

## 2023-08-24 PROCEDURE — G8427 DOCREV CUR MEDS BY ELIG CLIN: HCPCS | Performed by: NURSE PRACTITIONER

## 2023-08-24 NOTE — PROGRESS NOTES
Hung Renteria  8/24/2023  [address]    Gwendolyn-en- Y Gastric Bypass  9 month Post-Operative Follow-up     Chief Complaint   Patient presents with    Follow Up After Procedure     9 mo LRYGB. Water intake is around 96 oz daily. Protein through mostly foods. Vitamin intake is good. Bowel movements are good. Hung Renteria is a 40 y.o. female who is 9 months  post Laparoscopic Gwendolyn-en-Y Gastric Bypass surgery. Reports that she is doing good. She is not having swallowing difficulty. Patient denies nausea and vomiting. Patient denies gastric reflux symptoms. Bowel movements are  normal per patient. Patient is not taking fiber supplements. Patient is compliant most of the time with the iron supplement and multivitamins and calcium + Vit D. She is meeting fluid recommendations of at least 64 ounces per day and is meeting protein recommendations. She  is exercising:  going to the gym, does cardio and weights 3-4 days per week . Weight=294 lb (133.4 kg)  Today's weight represents a total weight loss of 108 pounds since surgery with 0 pounds regained since the lowest weight. Prior to Admission medications    Medication Sig Start Date End Date Taking? Authorizing Provider   VENTOLIN  (90 Base) MCG/ACT inhaler TAKE 2 PUFFS BY MOUTH EVERY 6 HOURS AS NEEDED FOR WHEEZE 7/10/23  Yes Osman Loco MD   metoprolol tartrate (LOPRESSOR) 50 MG tablet Take 1 tablet by mouth 2 times daily 6/12/23  Yes Diana Burdick MD   amLODIPine (NORVASC) 10 MG tablet Take 1 tablet by mouth daily 6/12/23  Yes Diana Burdick MD   ketoconazole (NIZORAL) 2 % cream Apply topically daily. 5/22/23  Yes Osman Loco MD   folic acid (FOLVITE) 1 MG tablet TAKE 1 TABLET BY MOUTH EVERY DAY 4/25/23  Yes Osman Loco MD   ammonium lactate (LAC-HYDRIN) 12 % cream Apply topically as needed.  1/31/23  Yes Denise Leone DPM   hydroCHLOROthiazide (MICROZIDE) 12.5 MG capsule Take 1 capsule

## 2023-08-24 NOTE — PATIENT INSTRUCTIONS

## 2023-08-28 ENCOUNTER — OFFICE VISIT (OUTPATIENT)
Dept: FAMILY MEDICINE CLINIC | Age: 38
End: 2023-08-28
Payer: COMMERCIAL

## 2023-08-28 VITALS
OXYGEN SATURATION: 96 % | SYSTOLIC BLOOD PRESSURE: 139 MMHG | WEIGHT: 293 LBS | HEART RATE: 70 BPM | TEMPERATURE: 98 F | HEIGHT: 68 IN | BODY MASS INDEX: 44.41 KG/M2 | RESPIRATION RATE: 18 BRPM | DIASTOLIC BLOOD PRESSURE: 87 MMHG

## 2023-08-28 DIAGNOSIS — Z98.84 GASTRIC BYPASS STATUS FOR OBESITY: ICD-10-CM

## 2023-08-28 DIAGNOSIS — I10 ESSENTIAL HYPERTENSION: ICD-10-CM

## 2023-08-28 DIAGNOSIS — E53.8 FOLIC ACID DEFICIENCY: ICD-10-CM

## 2023-08-28 DIAGNOSIS — E53.8 B12 DEFICIENCY: ICD-10-CM

## 2023-08-28 DIAGNOSIS — I83.893 VARICOSE VEINS OF BILATERAL LOWER EXTREMITIES WITH OTHER COMPLICATIONS: Primary | ICD-10-CM

## 2023-08-28 PROCEDURE — 3078F DIAST BP <80 MM HG: CPT | Performed by: FAMILY MEDICINE

## 2023-08-28 PROCEDURE — 3074F SYST BP LT 130 MM HG: CPT | Performed by: FAMILY MEDICINE

## 2023-08-28 PROCEDURE — G8417 CALC BMI ABV UP PARAM F/U: HCPCS | Performed by: FAMILY MEDICINE

## 2023-08-28 PROCEDURE — 1036F TOBACCO NON-USER: CPT | Performed by: FAMILY MEDICINE

## 2023-08-28 PROCEDURE — 99213 OFFICE O/P EST LOW 20 MIN: CPT | Performed by: FAMILY MEDICINE

## 2023-08-28 PROCEDURE — G8428 CUR MEDS NOT DOCUMENT: HCPCS | Performed by: FAMILY MEDICINE

## 2023-08-28 RX ORDER — SERTRALINE HYDROCHLORIDE 25 MG/1
25 TABLET, FILM COATED ORAL DAILY
Qty: 30 TABLET | Refills: 5 | Status: SHIPPED | OUTPATIENT
Start: 2023-08-28

## 2023-08-28 RX ORDER — MAGNESIUM OXIDE 400 MG/1
1 TABLET ORAL DAILY
Qty: 30 TABLET | Refills: 3 | Status: SHIPPED | OUTPATIENT
Start: 2023-08-28

## 2023-08-28 RX ORDER — FOLIC ACID 1 MG/1
1 TABLET ORAL DAILY
Qty: 30 TABLET | Refills: 3 | Status: SHIPPED | OUTPATIENT
Start: 2023-08-28

## 2023-08-28 NOTE — PROGRESS NOTES
[facility]  [address] [facility], [facility]  Cary Erazo MD     Patient: Peep Dorantes  YOB: 1985  Visit Date: 8/28/23    Malachi Carroll is a 40y.o. year old female here today for   Chief Complaint   Patient presents with    Hypertension     Follow Up    Rash    Varicose Veins       HPI  Patient is a 40year old female here for follow up of hypertension. Takes her bp meds with no issues. Overall feeling well. Has gotten bariatric surgery 9 months ago. Has a lot more energy. Has lost 100+ pounds. No issues getting medicines. No chest pain or shortness of breath , nausea, vomiting. Rash back left calf. Near her tattoo. for the past two months , welt and recurring rash. Occasionally gets dumping syndrome depending on what she eats. Review of Systems   Respiratory:  Negative for cough, shortness of breath and wheezing. Cardiovascular:  Negative for chest pain, palpitations and leg swelling. Gastrointestinal:  Negative for abdominal pain, diarrhea, nausea and vomiting. Neurological:  Negative for dizziness, light-headedness and headaches. Psychiatric/Behavioral:  Negative for dysphoric mood. The patient is not nervous/anxious. Current Outpatient Medications on File Prior to Visit   Medication Sig Dispense Refill    VENTOLIN  (90 Base) MCG/ACT inhaler TAKE 2 PUFFS BY MOUTH EVERY 6 HOURS AS NEEDED FOR WHEEZE 18 each 1    metoprolol tartrate (LOPRESSOR) 50 MG tablet Take 1 tablet by mouth 2 times daily 60 tablet 1    amLODIPine (NORVASC) 10 MG tablet Take 1 tablet by mouth daily 90 tablet 1    ketoconazole (NIZORAL) 2 % cream Apply topically daily. 30 g 1    ammonium lactate (LAC-HYDRIN) 12 % cream Apply topically as needed.  385 g 4    hydroCHLOROthiazide (MICROZIDE) 12.5 MG capsule Take 1 capsule by mouth daily 30 capsule 3    losartan (COZAAR) 100 MG tablet TAKE 1 TABLET BY MOUTH EVERY DAY 30 tablet 1    guanFACINE (TENEX) 1 MG

## 2023-09-04 ASSESSMENT — ENCOUNTER SYMPTOMS
COUGH: 0
SHORTNESS OF BREATH: 0
WHEEZING: 0
VOMITING: 0
NAUSEA: 0
DIARRHEA: 0
ABDOMINAL PAIN: 0

## 2023-11-07 ENCOUNTER — HOSPITAL ENCOUNTER (EMERGENCY)
Age: 38
Discharge: HOME | End: 2023-11-08
Attending: EMERGENCY MEDICINE
Payer: COMMERCIAL

## 2023-11-07 ENCOUNTER — APPOINTMENT (OUTPATIENT)
Dept: CT IMAGING | Age: 38
End: 2023-11-07
Payer: COMMERCIAL

## 2023-11-07 VITALS
OXYGEN SATURATION: 98 % | TEMPERATURE: 96.9 F | SYSTOLIC BLOOD PRESSURE: 151 MMHG | HEART RATE: 80 BPM | DIASTOLIC BLOOD PRESSURE: 110 MMHG | BODY MASS INDEX: 44.09 KG/M2 | WEIGHT: 290 LBS

## 2023-11-07 DIAGNOSIS — N20.1 URETEROLITHIASIS: Primary | ICD-10-CM

## 2023-11-07 LAB
ANION GAP SERPL CALCULATED.3IONS-SCNC: 9 MMOL/L (ref 7–16)
BACTERIA URNS QL MICRO: ABNORMAL
BASOPHILS # BLD: 0.04 K/UL (ref 0–0.2)
BASOPHILS NFR BLD: 0 % (ref 0–2)
BILIRUB UR QL STRIP: NEGATIVE
BUN SERPL-MCNC: 13 MG/DL (ref 6–20)
CALCIUM SERPL-MCNC: 9 MG/DL (ref 8.6–10.2)
CHLORIDE SERPL-SCNC: 107 MMOL/L (ref 98–107)
CLARITY UR: ABNORMAL
CO2 SERPL-SCNC: 24 MMOL/L (ref 22–29)
COLOR UR: YELLOW
CREAT SERPL-MCNC: 1 MG/DL (ref 0.5–1)
CRYSTALS URNS MICRO: ABNORMAL /HPF
EOSINOPHIL # BLD: 0.19 K/UL (ref 0.05–0.5)
EOSINOPHILS RELATIVE PERCENT: 2 % (ref 0–6)
EPI CELLS #/AREA URNS HPF: ABNORMAL /HPF
ERYTHROCYTE [DISTWIDTH] IN BLOOD BY AUTOMATED COUNT: 17.3 % (ref 11.5–15)
GFR SERPL CREATININE-BSD FRML MDRD: >60 ML/MIN/1.73M2
GLUCOSE SERPL-MCNC: 92 MG/DL (ref 74–99)
GLUCOSE UR STRIP-MCNC: NEGATIVE MG/DL
HCG, URINE, POC: NEGATIVE
HCT VFR BLD AUTO: 30.9 % (ref 34–48)
HGB BLD-MCNC: 9 G/DL (ref 11.5–15.5)
HGB UR QL STRIP.AUTO: ABNORMAL
IMM GRANULOCYTES # BLD AUTO: 0.03 K/UL (ref 0–0.58)
IMM GRANULOCYTES NFR BLD: 0 % (ref 0–5)
KETONES UR STRIP-MCNC: NEGATIVE MG/DL
LEUKOCYTE ESTERASE UR QL STRIP: NEGATIVE
LYMPHOCYTES NFR BLD: 2.9 K/UL (ref 1.5–4)
LYMPHOCYTES RELATIVE PERCENT: 29 % (ref 20–42)
Lab: 678
MCH RBC QN AUTO: 20.5 PG (ref 26–35)
MCHC RBC AUTO-ENTMCNC: 29.1 G/DL (ref 32–34.5)
MCV RBC AUTO: 70.2 FL (ref 80–99.9)
MONOCYTES NFR BLD: 0.71 K/UL (ref 0.1–0.95)
MONOCYTES NFR BLD: 7 % (ref 2–12)
MUCOUS THREADS URNS QL MICRO: PRESENT
NEGATIVE QC PASS/FAIL: NORMAL
NEUTROPHILS NFR BLD: 61 % (ref 43–80)
NEUTS SEG NFR BLD: 6.01 K/UL (ref 1.8–7.3)
NITRITE UR QL STRIP: NEGATIVE
PH UR STRIP: 6 [PH] (ref 5–9)
PLATELET, FLUORESCENCE: 313 K/UL (ref 130–450)
PMV BLD AUTO: 10.8 FL (ref 7–12)
POSITIVE QC PASS/FAIL: NORMAL
POTASSIUM SERPL-SCNC: 4 MMOL/L (ref 3.5–5)
PROT UR STRIP-MCNC: ABNORMAL MG/DL
RBC # BLD AUTO: 4.4 M/UL (ref 3.5–5.5)
RBC #/AREA URNS HPF: ABNORMAL /HPF
SODIUM SERPL-SCNC: 140 MMOL/L (ref 132–146)
SP GR UR STRIP: 1.02 (ref 1–1.03)
UROBILINOGEN UR STRIP-ACNC: 1 EU/DL (ref 0–1)
WBC #/AREA URNS HPF: ABNORMAL /HPF
WBC OTHER # BLD: 9.9 K/UL (ref 4.5–11.5)

## 2023-11-07 PROCEDURE — 2580000003 HC RX 258: Performed by: EMERGENCY MEDICINE

## 2023-11-07 PROCEDURE — 6360000002 HC RX W HCPCS: Performed by: EMERGENCY MEDICINE

## 2023-11-07 PROCEDURE — 85025 COMPLETE CBC W/AUTO DIFF WBC: CPT

## 2023-11-07 PROCEDURE — 96374 THER/PROPH/DIAG INJ IV PUSH: CPT

## 2023-11-07 PROCEDURE — 81001 URINALYSIS AUTO W/SCOPE: CPT

## 2023-11-07 PROCEDURE — 74176 CT ABD & PELVIS W/O CONTRAST: CPT

## 2023-11-07 PROCEDURE — 96375 TX/PRO/DX INJ NEW DRUG ADDON: CPT

## 2023-11-07 PROCEDURE — 80048 BASIC METABOLIC PNL TOTAL CA: CPT

## 2023-11-07 PROCEDURE — 99284 EMERGENCY DEPT VISIT MOD MDM: CPT

## 2023-11-07 RX ORDER — MORPHINE SULFATE 5 MG/ML
5 INJECTION, SOLUTION INTRAMUSCULAR; INTRAVENOUS ONCE
Status: COMPLETED | OUTPATIENT
Start: 2023-11-07 | End: 2023-11-07

## 2023-11-07 RX ORDER — TAMSULOSIN HYDROCHLORIDE 0.4 MG/1
0.4 CAPSULE ORAL DAILY
Qty: 10 CAPSULE | Refills: 0 | Status: SHIPPED | OUTPATIENT
Start: 2023-11-07

## 2023-11-07 RX ORDER — KETOROLAC TROMETHAMINE 10 MG/1
10 TABLET, FILM COATED ORAL EVERY 6 HOURS PRN
Qty: 20 TABLET | Refills: 0 | Status: SHIPPED | OUTPATIENT
Start: 2023-11-07

## 2023-11-07 RX ORDER — KETOROLAC TROMETHAMINE 30 MG/ML
30 INJECTION, SOLUTION INTRAMUSCULAR; INTRAVENOUS ONCE
Status: COMPLETED | OUTPATIENT
Start: 2023-11-07 | End: 2023-11-07

## 2023-11-07 RX ORDER — 0.9 % SODIUM CHLORIDE 0.9 %
1000 INTRAVENOUS SOLUTION INTRAVENOUS ONCE
Status: COMPLETED | OUTPATIENT
Start: 2023-11-07 | End: 2023-11-07

## 2023-11-07 RX ORDER — ONDANSETRON 4 MG/1
4 TABLET, ORALLY DISINTEGRATING ORAL 3 TIMES DAILY PRN
Qty: 21 TABLET | Refills: 0 | Status: SHIPPED | OUTPATIENT
Start: 2023-11-07

## 2023-11-07 RX ORDER — HYDROCODONE BITARTRATE AND ACETAMINOPHEN 5; 325 MG/1; MG/1
1 TABLET ORAL ONCE
Status: COMPLETED | OUTPATIENT
Start: 2023-11-08 | End: 2023-11-08

## 2023-11-07 RX ADMIN — MORPHINE SULFATE 5 MG: 5 INJECTION, SOLUTION INTRAMUSCULAR; INTRAVENOUS at 23:26

## 2023-11-07 RX ADMIN — KETOROLAC TROMETHAMINE 30 MG: 30 INJECTION, SOLUTION INTRAMUSCULAR; INTRAVENOUS at 21:19

## 2023-11-07 RX ADMIN — SODIUM CHLORIDE 1000 ML: 9 INJECTION, SOLUTION INTRAVENOUS at 21:19

## 2023-11-07 ASSESSMENT — ENCOUNTER SYMPTOMS
VOMITING: 0
SORE THROAT: 0
EYE PAIN: 0
NAUSEA: 0
DIARRHEA: 0
BACK PAIN: 0
ABDOMINAL PAIN: 1
WHEEZING: 0
ABDOMINAL DISTENTION: 0
EYE DISCHARGE: 0
SINUS PRESSURE: 0
COUGH: 0
EYE REDNESS: 0
SHORTNESS OF BREATH: 0

## 2023-11-07 ASSESSMENT — PAIN SCALES - GENERAL
PAINLEVEL_OUTOF10: 4
PAINLEVEL_OUTOF10: 8

## 2023-11-08 PROCEDURE — 6370000000 HC RX 637 (ALT 250 FOR IP): Performed by: EMERGENCY MEDICINE

## 2023-11-08 RX ADMIN — HYDROCODONE BITARTRATE AND ACETAMINOPHEN 1 TABLET: 5; 325 TABLET ORAL at 00:08

## 2023-11-08 ASSESSMENT — PAIN SCALES - GENERAL: PAINLEVEL_OUTOF10: 9

## 2023-11-08 NOTE — ED PROVIDER NOTES
Patient is a 39 y/o female who presents to the ED with left flank pain. Patient states that she was lying in bed tonight when she had onset of pain in her left flank. She states that she went to go to the bathroom and had increased pain. She reports difficulty urinating and burning with urination. She denies any gross hematuria. She denies any fever. Currently, her pain is 4/10 which increases with movement. The pain does radiate into her lower abdomen. Review of Systems   Constitutional:  Negative for chills and fever. HENT:  Negative for ear pain, sinus pressure and sore throat. Eyes:  Negative for pain, discharge and redness. Respiratory:  Negative for cough, shortness of breath and wheezing. Cardiovascular:  Negative for chest pain. Gastrointestinal:  Positive for abdominal pain. Negative for abdominal distention, diarrhea, nausea and vomiting. Genitourinary:  Positive for difficulty urinating, dysuria and flank pain. Negative for frequency. Musculoskeletal:  Negative for arthralgias and back pain. Skin:  Negative for rash and wound. Neurological:  Negative for weakness and headaches. Hematological:  Negative for adenopathy. All other systems reviewed and are negative. Physical Exam  Vitals and nursing note reviewed. Constitutional:       General: She is not in acute distress. Appearance: She is obese. HENT:      Head: Normocephalic and atraumatic. Right Ear: External ear normal.      Left Ear: External ear normal.      Nose: Nose normal.      Mouth/Throat:      Mouth: Mucous membranes are moist.   Eyes:      Conjunctiva/sclera: Conjunctivae normal.      Pupils: Pupils are equal, round, and reactive to light. Cardiovascular:      Rate and Rhythm: Normal rate and regular rhythm. Heart sounds: No murmur heard. Pulmonary:      Effort: Pulmonary effort is normal. No respiratory distress. Breath sounds: Normal breath sounds. No stridor.  No wheezing,

## 2023-11-09 ENCOUNTER — PREP FOR PROCEDURE (OUTPATIENT)
Dept: UROLOGY | Age: 38
End: 2023-11-09

## 2023-11-09 RX ORDER — SODIUM CHLORIDE 0.9 % (FLUSH) 0.9 %
5-40 SYRINGE (ML) INJECTION EVERY 12 HOURS SCHEDULED
Status: CANCELLED | OUTPATIENT
Start: 2023-11-09

## 2023-11-09 RX ORDER — SODIUM CHLORIDE 9 MG/ML
INJECTION, SOLUTION INTRAVENOUS CONTINUOUS
Status: CANCELLED | OUTPATIENT
Start: 2023-11-09

## 2023-11-09 RX ORDER — SODIUM CHLORIDE 0.9 % (FLUSH) 0.9 %
5-40 SYRINGE (ML) INJECTION PRN
Status: CANCELLED | OUTPATIENT
Start: 2023-11-09

## 2023-11-09 RX ORDER — SODIUM CHLORIDE 9 MG/ML
INJECTION, SOLUTION INTRAVENOUS PRN
Status: CANCELLED | OUTPATIENT
Start: 2023-11-09

## 2023-11-09 NOTE — PROGRESS NOTES
[address] [employer] PRE-ADMISSION TESTING INSTRUCTIONS    The Preadmission Testing patient is instructed accordingly using the following criteria (check applicable):    ARRIVAL INSTRUCTIONS:  [x] Parking the day of Surgery is located in the Main Entrance lot. Upon entering the door, make an immediate right to the surgery reception desk    [x] Bring photo ID and insurance card    [x] Bring in a copy of Living will or Durable Power of  papers. [x] Please be sure to arrange for responsible adult to provide transportation to and from the hospital    [x] Please arrange for responsible adult to be with you for the 24 hour period post procedure due to having anesthesia    [x] If you awake am of surgery not feeling well or have temperature >100 please call [phone]    GENERAL INSTRUCTIONS:    [x] Nothing by mouth after midnight, including gum, candy, mints or water    [x] You may brush your teeth, but do not swallow any water    [x] Take medications as instructed with 1-2 oz of water    [x] Stop herbal supplements and vitamins 5 days prior to procedure    [] Follow preop dosing of blood thinners per physician instructions    [] Take 1/2 dose of evening insulin, but no insulin after midnight    [] No oral diabetic medications after midnight    [] If diabetic and have low blood sugar or feel symptomatic, take 1-2oz apple juice only    [x] Bring inhalers day of surgery    [] Bring C-PAP/ Bi-Pap day of surgery    [x] Bring urine specimen day of surgery    [x] Shower or bath with soap, lather and rinse well, AM of Surgery, no lotion, powders or creams to surgical site    [] Follow bowel prep as instructed per surgeon    [x] No tobacco products within 24 hours of surgery     [x] No alcohol or illegal drug use within 24 hours of surgery.     [x] Jewelry, body piercing's, eyeglasses, contact lenses and dentures are not permitted into surgery (bring cases)      [x] Please do not wear any nail polish, make up or hair products on the day of surgery    [x] You can expect a call the business day prior to procedure to notify you if your arrival time changes    [x] If you receive a survey after surgery we would greatly appreciate your comments    [] Parent/guardian of a minor must accompany their child and remain on the premises  the entire time they are under our care     [] Pediatric patients may bring favorite toy, blanket or comfort item with them    [] A caregiver or family member must remain with the patient during their stay if they are mentally handicapped, have dementia, disoriented or unable to use a call light or would be a safety concern if left unattended    [x] Please notify surgeon if you develop any illness between now and time of surgery (cold, cough, sore throat, fever, nausea, vomiting) or any signs of infections  including skin, wounds, and dental.    [x]  The Outpatient Pharmacy is available to fill your prescription here on your day of surgery, ask your preop nurse for details    [] Other instructions    EDUCATIONAL MATERIALS PROVIDED:    [] PAT Preoperative Education Packet/Booklet     [] Medication List    [] Transfusion bracelet applied with instructions    [] Shower with soap, lather and rinse well, and use CHG wipes provided the evening before surgery as instructed    [] Incentive spirometer with instructions

## 2023-11-10 ENCOUNTER — HOSPITAL ENCOUNTER (OUTPATIENT)
Dept: GENERAL RADIOLOGY | Age: 38
Setting detail: OUTPATIENT SURGERY
End: 2023-11-10
Attending: STUDENT IN AN ORGANIZED HEALTH CARE EDUCATION/TRAINING PROGRAM
Payer: COMMERCIAL

## 2023-11-10 ENCOUNTER — ANESTHESIA (OUTPATIENT)
Dept: OPERATING ROOM | Age: 38
End: 2023-11-10
Payer: COMMERCIAL

## 2023-11-10 ENCOUNTER — HOSPITAL ENCOUNTER (OUTPATIENT)
Age: 38
Setting detail: OUTPATIENT SURGERY
Discharge: HOME | End: 2023-11-10
Attending: STUDENT IN AN ORGANIZED HEALTH CARE EDUCATION/TRAINING PROGRAM | Admitting: STUDENT IN AN ORGANIZED HEALTH CARE EDUCATION/TRAINING PROGRAM
Payer: COMMERCIAL

## 2023-11-10 ENCOUNTER — ANESTHESIA EVENT (OUTPATIENT)
Dept: OPERATING ROOM | Age: 38
End: 2023-11-10
Payer: COMMERCIAL

## 2023-11-10 VITALS
RESPIRATION RATE: 16 BRPM | WEIGHT: 290 LBS | SYSTOLIC BLOOD PRESSURE: 128 MMHG | HEIGHT: 67 IN | HEART RATE: 62 BPM | BODY MASS INDEX: 45.52 KG/M2 | TEMPERATURE: 97 F | OXYGEN SATURATION: 95 % | DIASTOLIC BLOOD PRESSURE: 62 MMHG

## 2023-11-10 DIAGNOSIS — G89.18 POST-OP PAIN: Primary | ICD-10-CM

## 2023-11-10 DIAGNOSIS — N13.2 HYDRONEPHROSIS WITH RENAL AND URETERAL CALCULUS OBSTRUCTION: ICD-10-CM

## 2023-11-10 DIAGNOSIS — R52 PAIN: ICD-10-CM

## 2023-11-10 LAB
HCG, URINE, POC: NEGATIVE
Lab: NORMAL
NEGATIVE QC PASS/FAIL: NORMAL
POSITIVE QC PASS/FAIL: NORMAL

## 2023-11-10 PROCEDURE — 2709999900 HC NON-CHARGEABLE SUPPLY: Performed by: STUDENT IN AN ORGANIZED HEALTH CARE EDUCATION/TRAINING PROGRAM

## 2023-11-10 PROCEDURE — 82365 CALCULUS SPECTROSCOPY: CPT

## 2023-11-10 PROCEDURE — 74420 UROGRAPHY RTRGR +-KUB: CPT

## 2023-11-10 PROCEDURE — 6360000002 HC RX W HCPCS

## 2023-11-10 PROCEDURE — 3600000003 HC SURGERY LEVEL 3 BASE: Performed by: STUDENT IN AN ORGANIZED HEALTH CARE EDUCATION/TRAINING PROGRAM

## 2023-11-10 PROCEDURE — 6360000002 HC RX W HCPCS: Performed by: NURSE PRACTITIONER

## 2023-11-10 PROCEDURE — 2580000003 HC RX 258: Performed by: NURSE PRACTITIONER

## 2023-11-10 PROCEDURE — C1769 GUIDE WIRE: HCPCS | Performed by: STUDENT IN AN ORGANIZED HEALTH CARE EDUCATION/TRAINING PROGRAM

## 2023-11-10 PROCEDURE — 7100000010 HC PHASE II RECOVERY - FIRST 15 MIN: Performed by: STUDENT IN AN ORGANIZED HEALTH CARE EDUCATION/TRAINING PROGRAM

## 2023-11-10 PROCEDURE — 3600000013 HC SURGERY LEVEL 3 ADDTL 15MIN: Performed by: STUDENT IN AN ORGANIZED HEALTH CARE EDUCATION/TRAINING PROGRAM

## 2023-11-10 PROCEDURE — C2617 STENT, NON-COR, TEM W/O DEL: HCPCS | Performed by: STUDENT IN AN ORGANIZED HEALTH CARE EDUCATION/TRAINING PROGRAM

## 2023-11-10 PROCEDURE — 3700000000 HC ANESTHESIA ATTENDED CARE: Performed by: STUDENT IN AN ORGANIZED HEALTH CARE EDUCATION/TRAINING PROGRAM

## 2023-11-10 PROCEDURE — 2720000010 HC SURG SUPPLY STERILE: Performed by: STUDENT IN AN ORGANIZED HEALTH CARE EDUCATION/TRAINING PROGRAM

## 2023-11-10 PROCEDURE — C1758 CATHETER, URETERAL: HCPCS | Performed by: STUDENT IN AN ORGANIZED HEALTH CARE EDUCATION/TRAINING PROGRAM

## 2023-11-10 PROCEDURE — 3700000001 HC ADD 15 MINUTES (ANESTHESIA): Performed by: STUDENT IN AN ORGANIZED HEALTH CARE EDUCATION/TRAINING PROGRAM

## 2023-11-10 PROCEDURE — 7100000011 HC PHASE II RECOVERY - ADDTL 15 MIN: Performed by: STUDENT IN AN ORGANIZED HEALTH CARE EDUCATION/TRAINING PROGRAM

## 2023-11-10 DEVICE — URETERAL STENT
Type: IMPLANTABLE DEVICE | Site: URETER | Status: FUNCTIONAL
Brand: POLARIS™ ULTRA

## 2023-11-10 RX ORDER — SODIUM CHLORIDE 9 MG/ML
INJECTION, SOLUTION INTRAVENOUS PRN
Status: DISCONTINUED | OUTPATIENT
Start: 2023-11-10 | End: 2023-11-10 | Stop reason: HOSPADM

## 2023-11-10 RX ORDER — SODIUM CHLORIDE 9 MG/ML
INJECTION, SOLUTION INTRAVENOUS CONTINUOUS
Status: DISCONTINUED | OUTPATIENT
Start: 2023-11-10 | End: 2023-11-10 | Stop reason: HOSPADM

## 2023-11-10 RX ORDER — FENTANYL CITRATE 50 UG/ML
INJECTION, SOLUTION INTRAMUSCULAR; INTRAVENOUS PRN
Status: DISCONTINUED | OUTPATIENT
Start: 2023-11-10 | End: 2023-11-10 | Stop reason: SDUPTHER

## 2023-11-10 RX ORDER — SODIUM CHLORIDE 0.9 % (FLUSH) 0.9 %
5-40 SYRINGE (ML) INJECTION EVERY 12 HOURS SCHEDULED
Status: DISCONTINUED | OUTPATIENT
Start: 2023-11-10 | End: 2023-11-10 | Stop reason: HOSPADM

## 2023-11-10 RX ORDER — OXYCODONE HYDROCHLORIDE AND ACETAMINOPHEN 5; 325 MG/1; MG/1
1 TABLET ORAL EVERY 6 HOURS PRN
Qty: 12 TABLET | Refills: 0 | Status: SHIPPED | OUTPATIENT
Start: 2023-11-10 | End: 2023-11-10 | Stop reason: HOSPADM

## 2023-11-10 RX ORDER — HYDROCODONE BITARTRATE AND ACETAMINOPHEN 5; 325 MG/1; MG/1
1 TABLET ORAL EVERY 6 HOURS PRN
Qty: 12 TABLET | Refills: 0 | Status: SHIPPED | OUTPATIENT
Start: 2023-11-10 | End: 2023-11-13

## 2023-11-10 RX ORDER — TRAMADOL HYDROCHLORIDE 50 MG/1
100 TABLET ORAL PRN
Status: DISCONTINUED | OUTPATIENT
Start: 2023-11-10 | End: 2023-11-10 | Stop reason: HOSPADM

## 2023-11-10 RX ORDER — PROPOFOL 10 MG/ML
INJECTION, EMULSION INTRAVENOUS PRN
Status: DISCONTINUED | OUTPATIENT
Start: 2023-11-10 | End: 2023-11-10 | Stop reason: SDUPTHER

## 2023-11-10 RX ORDER — SODIUM CHLORIDE 0.9 % (FLUSH) 0.9 %
5-40 SYRINGE (ML) INJECTION PRN
Status: DISCONTINUED | OUTPATIENT
Start: 2023-11-10 | End: 2023-11-10 | Stop reason: HOSPADM

## 2023-11-10 RX ORDER — TRAMADOL HYDROCHLORIDE 50 MG/1
50 TABLET ORAL PRN
Status: DISCONTINUED | OUTPATIENT
Start: 2023-11-10 | End: 2023-11-10 | Stop reason: HOSPADM

## 2023-11-10 RX ORDER — MIDAZOLAM HYDROCHLORIDE 1 MG/ML
INJECTION INTRAMUSCULAR; INTRAVENOUS PRN
Status: DISCONTINUED | OUTPATIENT
Start: 2023-11-10 | End: 2023-11-10 | Stop reason: SDUPTHER

## 2023-11-10 RX ADMIN — MIDAZOLAM 2 MG: 1 INJECTION INTRAMUSCULAR; INTRAVENOUS at 11:57

## 2023-11-10 RX ADMIN — PROPOFOL 50 MG: 10 INJECTION, EMULSION INTRAVENOUS at 12:15

## 2023-11-10 RX ADMIN — SODIUM CHLORIDE: 9 INJECTION, SOLUTION INTRAVENOUS at 11:45

## 2023-11-10 RX ADMIN — FENTANYL CITRATE 50 MCG: 50 INJECTION, SOLUTION INTRAMUSCULAR; INTRAVENOUS at 12:06

## 2023-11-10 RX ADMIN — WATER 3000 MG: 1 INJECTION INTRAMUSCULAR; INTRAVENOUS; SUBCUTANEOUS at 12:18

## 2023-11-10 RX ADMIN — FENTANYL CITRATE 50 MCG: 50 INJECTION, SOLUTION INTRAMUSCULAR; INTRAVENOUS at 12:27

## 2023-11-10 RX ADMIN — MIDAZOLAM 2 MG: 1 INJECTION INTRAMUSCULAR; INTRAVENOUS at 12:18

## 2023-11-10 RX ADMIN — PROPOFOL 150 MCG/KG/MIN: 10 INJECTION, EMULSION INTRAVENOUS at 12:16

## 2023-11-10 ASSESSMENT — PAIN DESCRIPTION - DESCRIPTORS
DESCRIPTORS: BURNING;DISCOMFORT
DESCRIPTORS: DISCOMFORT;BURNING
DESCRIPTORS: DISCOMFORT;BURNING

## 2023-11-10 ASSESSMENT — PAIN DESCRIPTION - LOCATION
LOCATION: FLANK

## 2023-11-10 ASSESSMENT — PAIN SCALES - GENERAL
PAINLEVEL_OUTOF10: 2
PAINLEVEL_OUTOF10: 0

## 2023-11-10 ASSESSMENT — PAIN DESCRIPTION - FREQUENCY
FREQUENCY: CONTINUOUS

## 2023-11-10 ASSESSMENT — PAIN DESCRIPTION - ORIENTATION
ORIENTATION: LEFT

## 2023-11-10 ASSESSMENT — PAIN - FUNCTIONAL ASSESSMENT
PAIN_FUNCTIONAL_ASSESSMENT: ACTIVITIES ARE NOT PREVENTED

## 2023-11-10 ASSESSMENT — PAIN DESCRIPTION - PAIN TYPE
TYPE: SURGICAL PAIN

## 2023-11-10 ASSESSMENT — PAIN DESCRIPTION - ONSET
ONSET: ON-GOING

## 2023-11-10 NOTE — ANESTHESIA POSTPROCEDURE EVALUATION
Department of Anesthesiology  Postprocedure Note    Patient: Anahi Brock  MRN: [id number]  YOB: 1985  Date of evaluation: 11/10/2023      Procedure Summary     Date: 11/10/23 Room / Location: [facility] OR 06 / [facility]    Anesthesia Start: 1157 Anesthesia Stop:     Procedure: BILATERAL RETROGRADE PYELOGRAM LEFT URETEROSCOPY WITH LASER LITHOTRIPSY, STONE BASKET EXTRACTION, LEFT J STENT EXCHANGE (Left: Ureter) Diagnosis:       Hydronephrosis with renal and ureteral calculus obstruction      (Hydronephrosis with renal and ureteral calculus obstruction [N13.2])    Surgeons: Samanhta Collins MD Responsible Provider: Radames Kehr, MD    Anesthesia Type: MAC ASA Status: 3          Anesthesia Type: No value filed.     Nathanael Phase I: Nathanael Score: 10    Nathanael Phase II:        Anesthesia Post Evaluation    Patient location during evaluation: PACU  Patient participation: complete - patient participated  Level of consciousness: awake  Airway patency: patent  Nausea & Vomiting: no nausea and no vomiting  Complications: no  Cardiovascular status: hemodynamically stable  Respiratory status: acceptable  Hydration status: euvolemic  Pain management: adequate

## 2023-11-10 NOTE — DISCHARGE INSTRUCTIONS
Remove stent on Wednesday morning. Laser Lithotripsy    These are general instructions for you to follow after your surgery. Your doctor or a member of his or her staff will outline any additional or special instructions that pertain to you. What is a Laser Lithotripsy? A laser lithotripsy is a method for using lasers to remove stones that are located in the urinary tract. This could include stones in the bladder, kidneys, ureters, or urethra. A small flexible camera called a ureteroscope is inserted into the urethra and up the ureter until it reaches the location of the kidney stone. The doctor can then use a laser to break up a kidney stone and a small basket to remove small stones or stone fragments. A ureteral stent is often left in place after the procedure to help with healing. What are the advantages of laser lithotripsy? Laser lithotripsy is a minimally invasive procedure, and does not involve any incisions. It has been shown to be effective no matter the size, location, and/ or hardness of the stone. It also reduces the risk of steinstrasse (where several stones fragments line up in the ureter and block urine flow). How long does surgery take? Surgery will take 1-2 hours to complete. Will I have to stay in the hospital? No. This is an outpatient procedure, so you should be able to go home the same day that the procedure is performed. Are there risks with a laser lithotripsy procedure? There are risks with any surgical procedure. Risks of laser lithotripsy in particular include pain, blood in the urine, difficulty urinating, injury to the bladder or ureters when removing the stone, and infection. General anesthesia also has the risk of breathing problems, heart attack and stroke in patients who are high risk. Home Going Instructions: Activity: You are encouraged to walk every day, increasing the distance each day.  You may go up and down steps, but please rest when you

## 2023-11-10 NOTE — ANESTHESIA PRE PROCEDURE
Department of Anesthesiology  Preprocedure Note       Name:  Devante Weber   Age:  40 y.o.  :  1985                                          MRN:  [id number]         Date:  11/10/2023      Surgeon: Elder Raymundo):  Dang Mike MD    Procedure: Procedure(s):  CYSTOSCOPY RETROGRADE PYELOGRAM URETEROSCOPY J STENT LASER LITHOTRIPSY LEFT    Medications prior to admission:   Prior to Admission medications    Medication Sig Start Date End Date Taking? Authorizing Provider   ketorolac (TORADOL) 10 MG tablet Take 1 tablet by mouth every 6 hours as needed for Pain 23   Myles Cisneros DO   tamsulosin (FLOMAX) 0.4 MG capsule Take 1 capsule by mouth daily 23   yMles Cisneros DO   ondansetron (ZOFRAN-ODT) 4 MG disintegrating tablet Take 1 tablet by mouth 3 times daily as needed for Nausea or Vomiting 23   Myles Cisneros DO   cyanocobalamin ([employer] VITAMIN B12) 1000 MCG tablet TAKE 1 TABLET BY MOUTH EVERY DAY 23   Vickie Villalpando MD   sertraline (ZOLOFT) 25 MG tablet Take 1 tablet by mouth daily  Patient taking differently: Take 1 tablet by mouth every morning 23   Vickie Villalpando MD   folic acid (FOLVITE) 1 MG tablet Take 1 tablet by mouth daily 23   Vickie Villalpando MD   magnesium oxide (MAG-OX) 400 MG tablet Take 1 tablet by mouth daily 23   Vickie Villalpando MD   VENTOLIN  (90 Base) MCG/ACT inhaler TAKE 2 PUFFS BY MOUTH EVERY 6 HOURS AS NEEDED FOR WHEEZE 7/10/23   Vickie Villalpando MD   metoprolol tartrate (LOPRESSOR) 50 MG tablet Take 1 tablet by mouth 2 times daily 23   Shawn Rich MD   amLODIPine (NORVASC) 10 MG tablet Take 1 tablet by mouth daily  Patient taking differently: Take 1 tablet by mouth every morning 23   Shawn Rich MD   ketoconazole (NIZORAL) 2 % cream Apply topically daily. 23   Vickie Villalpando MD   ammonium lactate (LAC-HYDRIN) 12 % cream Apply topically as needed.  23   Pratik Matthews DPM

## 2023-11-10 NOTE — OP NOTE
Operative Note      Patient: Mary Harding  YOB: 1985  MRN: [id number]    Date of Procedure: 11/10/2023    Pre-Op Diagnosis Codes: * Hydronephrosis with renal and ureteral calculus obstruction [N13.2]    Post-Op Diagnosis: Same       Procedure(s):  BILATERAL RETROGRADE PYELOGRAM LEFT URETEROSCOPY WITH LASER LITHOTRIPSY, STONE BASKET EXTRACTION, LEFT J STENT EXCHANGE    Surgeon(s):  Babar Lora MD    Assistant:   * No surgical staff found *    Anesthesia: Monitor Anesthesia Care    Estimated Blood Loss (mL): Minimal    Complications: None    Specimens:   ID Type Source Tests Collected by Time Destination   A : LEFT RENAL AND URETERAL STONE FOR CHEMICAL ANALYSIS Stone (Calculus) [address]  Babar Lora MD 11/10/2023 1215        Implants:  Implant Name Type Inv. Item Serial No.  Lot No. LRB No. Used Action   STENT URET 6FR L26CM PERCFLX HYDR+ DBL PGTL THRD 2 - [id number]  STENT URET 6FR L26CM PERCFLX HYDR+ DBL PGTL THRD 2  [employer] [facility]- [id number] Left 1 Implanted         Drains:   Ureteral Drain/Stent 11/10/23 Left Ureter (Active)       Findings: see op note        Detailed Description of Procedure:       INDICATIONS FOR PROCEDURE:  The patient has a left  ureteral calculus and is having severe pain. The patient presents for ureteroscopy, laser lithotripsy, understands the risks, benefits and alternatives of the procedure, signed informed consent, and agreed to proceed. DESCRIPTION OF PROCEDURE: The patient was brought into the operating room and placed under anesthesia in the dorsal lithotomy position. The patient was prepped and draped in a sterile fashion. A 21-Albanian cystoscope with a 30-degree lens was passed into the bladder. The entire urethra was examined and found to be within normal limits. 30- degree endoscopy was utilized to inspect the entire bladder mucosal surface.  There was no evidence of tumors, calculi, diverticula, or other

## 2023-11-10 NOTE — H&P
Oskar Downey is a 40 y.o. female with left ureteral calculus. Here for Left J Valencia. No new changes   Please see paper h andp for full details. Past Medical History:   Diagnosis Date    Anxiety     Asthma     B12 deficiency 09/21/2018    Class 3 severe obesity due to excess calories with body mass index (BMI) of 60.0 to 69.9 in adult [facility])     GERD without esophagitis     no meds at present as of 11/9/2023    Hypertension     Kidney stone     Migraine     PRIYA (obstructive sleep apnea)     sleep apnea 4-11    Prolonged emergence from general anesthesia     Vitamin D deficiency 09/21/2018    Zinc deficiency 09/21/2018       Past Surgical History:   Procedure Laterality Date    CHOLECYSTECTOMY, LAPAROSCOPIC  3/2014 Nataliia Lucy    with repair umbilical hernia    LEEP  3/2008    OTHER SURGICAL HISTORY  2011    coils to fallopian tubes    OTHER SURGICAL HISTORY      Tubal essure     ASHLIE-EN-Y GASTRIC BYPASS N/A 11/28/2022    GASTRIC BYPASS ASHLIE-EN-Y LAPAROSCOPIC performed by Adam Rooney MD at [address] [place] [address]  3/2014 [facility] GASTROINTESTINAL ENDOSCOPY N/A 5/2/2018    EGD BIOPSY performed by Adam Rooney MD at [address] [place] [address] N/A 9/7/2021    EGD BIOPSY performed by Adam Rooney MD at [facility] History   Problem Relation Age of Onset    Cancer Paternal Grandmother         stomach    Brain Cancer Maternal Grandmother     Hypertension Father     Stroke Father     Heart Attack Father     Diabetes Father     Other Father         gout    Hypertension Mother     Scoliosis Sister     Diabetes Maternal Grandfather     Heart Disease Maternal Grandfather        Prior to Admission medications    Medication Sig Start Date End Date Taking?  Authorizing Provider   ketorolac (TORADOL) 10 MG tablet Take 1 tablet by mouth every 6 hours as needed for Pain 11/7/23   Blayne

## 2023-11-13 DIAGNOSIS — K91.2 MALNUTRITION FOLLOWING GASTROINTESTINAL SURGERY: ICD-10-CM

## 2023-11-13 LAB
FERRITIN: 8 NG/ML
FOLATE: 5.7 NG/ML (ref 4.8–24.2)
HCT VFR BLD CALC: 30.4 % (ref 34–48)
HEMOGLOBIN: 8.7 G/DL (ref 11.5–15.5)
MCH RBC QN AUTO: 20.4 PG (ref 26–35)
MCHC RBC AUTO-ENTMCNC: 28.6 G/DL (ref 32–34.5)
MCV RBC AUTO: 71.2 FL (ref 80–99.9)
PDW BLD-RTO: 17.7 % (ref 11.5–15)
PLATELET, FLUORESCENCE: 281 K/UL (ref 130–450)
PMV BLD AUTO: 12.1 FL (ref 7–12)
PREALBUMIN: 16 MG/DL (ref 20–40)
RBC # BLD: 4.27 M/UL (ref 3.5–5.5)
VITAMIN B-12: 220 PG/ML (ref 211–946)
VITAMIN D 25-HYDROXY: 23.4 NG/ML (ref 30–100)
WBC # BLD: 5.9 K/UL (ref 4.5–11.5)

## 2023-11-15 ENCOUNTER — HOSPITAL ENCOUNTER (EMERGENCY)
Age: 38
Discharge: HOME | End: 2023-11-15
Attending: EMERGENCY MEDICINE
Payer: COMMERCIAL

## 2023-11-15 ENCOUNTER — APPOINTMENT (OUTPATIENT)
Dept: CT IMAGING | Age: 38
End: 2023-11-15
Payer: COMMERCIAL

## 2023-11-15 VITALS
HEART RATE: 74 BPM | DIASTOLIC BLOOD PRESSURE: 54 MMHG | WEIGHT: 289.9 LBS | SYSTOLIC BLOOD PRESSURE: 126 MMHG | RESPIRATION RATE: 20 BRPM | BODY MASS INDEX: 45.5 KG/M2 | TEMPERATURE: 98.4 F | OXYGEN SATURATION: 99 % | HEIGHT: 67 IN

## 2023-11-15 DIAGNOSIS — N12 PYELONEPHRITIS OF LEFT KIDNEY: ICD-10-CM

## 2023-11-15 DIAGNOSIS — N39.0 URINARY TRACT INFECTION WITHOUT HEMATURIA, SITE UNSPECIFIED: Primary | ICD-10-CM

## 2023-11-15 LAB
ALBUMIN SERPL-MCNC: 3.8 G/DL (ref 3.5–5.2)
ALP SERPL-CCNC: 106 U/L (ref 35–104)
ALT SERPL-CCNC: 9 U/L (ref 0–32)
ANION GAP SERPL CALCULATED.3IONS-SCNC: 10 MMOL/L (ref 7–16)
AST SERPL-CCNC: 11 U/L (ref 0–31)
BACTERIA URNS QL MICRO: ABNORMAL
BASOPHILS # BLD: 0 K/UL (ref 0–0.2)
BASOPHILS NFR BLD: 0 % (ref 0–2)
BILIRUB SERPL-MCNC: 0.8 MG/DL (ref 0–1.2)
BILIRUB UR QL STRIP: NEGATIVE
BUN SERPL-MCNC: 12 MG/DL (ref 6–20)
CALCIUM SERPL-MCNC: 8.8 MG/DL (ref 8.6–10.2)
CHLORIDE SERPL-SCNC: 103 MMOL/L (ref 98–107)
CLARITY UR: ABNORMAL
CO2 SERPL-SCNC: 24 MMOL/L (ref 22–29)
COLOR UR: YELLOW
CREAT SERPL-MCNC: 1 MG/DL (ref 0.5–1)
EOSINOPHIL # BLD: 0 K/UL (ref 0.05–0.5)
EOSINOPHILS RELATIVE PERCENT: 0 % (ref 0–6)
EPI CELLS #/AREA URNS HPF: ABNORMAL /HPF
ERYTHROCYTE [DISTWIDTH] IN BLOOD BY AUTOMATED COUNT: 17.3 % (ref 11.5–15)
GFR SERPL CREATININE-BSD FRML MDRD: >60 ML/MIN/1.73M2
GLUCOSE SERPL-MCNC: 124 MG/DL (ref 74–99)
GLUCOSE UR STRIP-MCNC: NEGATIVE MG/DL
HCG, URINE, POC: NEGATIVE
HCT VFR BLD AUTO: 30.3 % (ref 34–48)
HGB BLD-MCNC: 9.1 G/DL (ref 11.5–15.5)
HGB UR QL STRIP.AUTO: ABNORMAL
KETONES UR STRIP-MCNC: NEGATIVE MG/DL
LACTATE BLDV-SCNC: 1.1 MMOL/L (ref 0.5–2.2)
LACTATE BLDV-SCNC: 2.5 MMOL/L (ref 0.5–2.2)
LEUKOCYTE ESTERASE UR QL STRIP: ABNORMAL
LYMPHOCYTES NFR BLD: 0.35 K/UL (ref 1.5–4)
LYMPHOCYTES RELATIVE PERCENT: 3 % (ref 20–42)
Lab: NORMAL
MCH RBC QN AUTO: 21.2 PG (ref 26–35)
MCHC RBC AUTO-ENTMCNC: 30 G/DL (ref 32–34.5)
MCV RBC AUTO: 70.5 FL (ref 80–99.9)
MONOCYTES NFR BLD: 0.35 K/UL (ref 0.1–0.95)
MONOCYTES NFR BLD: 3 % (ref 2–12)
NEGATIVE QC PASS/FAIL: NORMAL
NEUTROPHILS NFR BLD: 95 % (ref 43–80)
NEUTS SEG NFR BLD: 12.8 K/UL (ref 1.8–7.3)
NITRITE UR QL STRIP: NEGATIVE
PH UR STRIP: 6 [PH] (ref 5–9)
PLATELET # BLD AUTO: 278 K/UL (ref 130–450)
PMV BLD AUTO: 10.9 FL (ref 7–12)
POSITIVE QC PASS/FAIL: NORMAL
POTASSIUM SERPL-SCNC: 4 MMOL/L (ref 3.5–5)
PROT SERPL-MCNC: 6.7 G/DL (ref 6.4–8.3)
PROT UR STRIP-MCNC: NEGATIVE MG/DL
RBC # BLD AUTO: 4.3 M/UL (ref 3.5–5.5)
RBC # BLD: ABNORMAL 10*6/UL
RBC # BLD: ABNORMAL 10*6/UL
RBC #/AREA URNS HPF: ABNORMAL /HPF
SODIUM SERPL-SCNC: 137 MMOL/L (ref 132–146)
SP GR UR STRIP: 1.01 (ref 1–1.03)
UROBILINOGEN UR STRIP-ACNC: 0.2 EU/DL (ref 0–1)
WBC #/AREA URNS HPF: ABNORMAL /HPF
WBC OTHER # BLD: 13.5 K/UL (ref 4.5–11.5)

## 2023-11-15 PROCEDURE — 96374 THER/PROPH/DIAG INJ IV PUSH: CPT

## 2023-11-15 PROCEDURE — 99284 EMERGENCY DEPT VISIT MOD MDM: CPT

## 2023-11-15 PROCEDURE — 80053 COMPREHEN METABOLIC PANEL: CPT

## 2023-11-15 PROCEDURE — 74176 CT ABD & PELVIS W/O CONTRAST: CPT

## 2023-11-15 PROCEDURE — 83605 ASSAY OF LACTIC ACID: CPT

## 2023-11-15 PROCEDURE — 6370000000 HC RX 637 (ALT 250 FOR IP)

## 2023-11-15 PROCEDURE — 85025 COMPLETE CBC W/AUTO DIFF WBC: CPT

## 2023-11-15 PROCEDURE — 81001 URINALYSIS AUTO W/SCOPE: CPT

## 2023-11-15 PROCEDURE — 6360000002 HC RX W HCPCS

## 2023-11-15 PROCEDURE — 2580000003 HC RX 258

## 2023-11-15 RX ORDER — KETOROLAC TROMETHAMINE 15 MG/ML
15 INJECTION, SOLUTION INTRAMUSCULAR; INTRAVENOUS ONCE
Status: COMPLETED | OUTPATIENT
Start: 2023-11-15 | End: 2023-11-15

## 2023-11-15 RX ORDER — CEFDINIR 300 MG/1
300 CAPSULE ORAL ONCE
Status: COMPLETED | OUTPATIENT
Start: 2023-11-15 | End: 2023-11-15

## 2023-11-15 RX ORDER — CEFDINIR 300 MG/1
300 CAPSULE ORAL 2 TIMES DAILY
Qty: 20 CAPSULE | Refills: 0 | Status: SHIPPED | OUTPATIENT
Start: 2023-11-15 | End: 2023-11-25

## 2023-11-15 RX ORDER — ACETAMINOPHEN 325 MG/1
650 TABLET ORAL ONCE
Status: COMPLETED | OUTPATIENT
Start: 2023-11-15 | End: 2023-11-15

## 2023-11-15 RX ORDER — 0.9 % SODIUM CHLORIDE 0.9 %
1000 INTRAVENOUS SOLUTION INTRAVENOUS ONCE
Status: COMPLETED | OUTPATIENT
Start: 2023-11-15 | End: 2023-11-15

## 2023-11-15 RX ADMIN — ACETAMINOPHEN 650 MG: 325 TABLET ORAL at 14:09

## 2023-11-15 RX ADMIN — CEFDINIR 300 MG: 300 CAPSULE ORAL at 14:08

## 2023-11-15 RX ADMIN — KETOROLAC TROMETHAMINE 15 MG: 15 INJECTION, SOLUTION INTRAMUSCULAR; INTRAVENOUS at 12:01

## 2023-11-15 RX ADMIN — SODIUM CHLORIDE 1000 ML: 9 INJECTION, SOLUTION INTRAVENOUS at 07:33

## 2023-11-15 ASSESSMENT — PAIN DESCRIPTION - FREQUENCY: FREQUENCY: CONTINUOUS

## 2023-11-15 ASSESSMENT — PAIN SCALES - GENERAL
PAINLEVEL_OUTOF10: 4
PAINLEVEL_OUTOF10: 6
PAINLEVEL_OUTOF10: 8

## 2023-11-15 ASSESSMENT — PAIN DESCRIPTION - PAIN TYPE: TYPE: ACUTE PAIN

## 2023-11-15 ASSESSMENT — PAIN DESCRIPTION - LOCATION
LOCATION: FLANK

## 2023-11-15 ASSESSMENT — PAIN - FUNCTIONAL ASSESSMENT
PAIN_FUNCTIONAL_ASSESSMENT: 0-10
PAIN_FUNCTIONAL_ASSESSMENT: 0-10

## 2023-11-15 ASSESSMENT — PAIN DESCRIPTION - DESCRIPTORS: DESCRIPTORS: CRAMPING

## 2023-11-15 ASSESSMENT — PAIN DESCRIPTION - ORIENTATION
ORIENTATION: LEFT
ORIENTATION: LEFT

## 2023-11-15 ASSESSMENT — LIFESTYLE VARIABLES: HOW MANY STANDARD DRINKS CONTAINING ALCOHOL DO YOU HAVE ON A TYPICAL DAY: PATIENT DOES NOT DRINK

## 2023-11-15 NOTE — DISCHARGE INSTRUCTIONS
Please take antibiotics as prescribed. Please follow-up with your urologist in the next week.   Please return to the emergency department if symptoms worsen

## 2023-11-16 LAB
COPPER: 116 UG/DL (ref 80–155)
ZINC: 64.2 UG/DL (ref 60–120)

## 2023-11-17 ENCOUNTER — APPOINTMENT (OUTPATIENT)
Dept: GENERAL RADIOLOGY | Age: 38
DRG: 463 | End: 2023-11-17
Payer: COMMERCIAL

## 2023-11-17 ENCOUNTER — APPOINTMENT (OUTPATIENT)
Dept: CT IMAGING | Age: 38
DRG: 463 | End: 2023-11-17
Payer: COMMERCIAL

## 2023-11-17 ENCOUNTER — HOSPITAL ENCOUNTER (INPATIENT)
Age: 38
LOS: 1 days | Discharge: HOME | DRG: 463 | End: 2023-11-19
Attending: EMERGENCY MEDICINE | Admitting: STUDENT IN AN ORGANIZED HEALTH CARE EDUCATION/TRAINING PROGRAM
Payer: COMMERCIAL

## 2023-11-17 DIAGNOSIS — E87.6 HYPOKALEMIA: ICD-10-CM

## 2023-11-17 DIAGNOSIS — N17.9 AKI (ACUTE KIDNEY INJURY) (HCC): ICD-10-CM

## 2023-11-17 DIAGNOSIS — N12 PYELONEPHRITIS: Primary | ICD-10-CM

## 2023-11-17 LAB
ALBUMIN SERPL-MCNC: 3.3 G/DL (ref 3.5–5.2)
ALBUMIN SERPL-MCNC: 4 G/DL (ref 3.5–5.2)
ALP BLD-CCNC: 107 U/L (ref 35–104)
ALP SERPL-CCNC: 90 U/L (ref 35–104)
ALT SERPL-CCNC: 10 U/L (ref 0–32)
ALT SERPL-CCNC: 9 U/L (ref 0–32)
ANION GAP SERPL CALCULATED.3IONS-SCNC: 13 MMOL/L (ref 7–16)
ANION GAP SERPL CALCULATED.3IONS-SCNC: 20 MMOL/L (ref 7–16)
AST SERPL-CCNC: 14 U/L (ref 0–31)
AST SERPL-CCNC: 8 U/L (ref 0–31)
BACTERIA URNS QL MICRO: ABNORMAL
BASOPHILS # BLD: 0 K/UL (ref 0–0.2)
BASOPHILS NFR BLD: 0 % (ref 0–2)
BILIRUB SERPL-MCNC: 0.5 MG/DL (ref 0–1.2)
BILIRUB SERPL-MCNC: 0.7 MG/DL (ref 0–1.2)
BILIRUB UR QL STRIP: NEGATIVE
BUN BLDV-MCNC: 17 MG/DL (ref 6–20)
BUN SERPL-MCNC: 29 MG/DL (ref 6–20)
CALCIUM SERPL-MCNC: 8.9 MG/DL (ref 8.6–10.2)
CALCIUM SERPL-MCNC: 9.5 MG/DL (ref 8.6–10.2)
CHLORIDE BLD-SCNC: 109 MMOL/L (ref 98–107)
CHLORIDE SERPL-SCNC: 100 MMOL/L (ref 98–107)
CHOLESTEROL: 168 MG/DL
CLARITY UR: ABNORMAL
CO2 SERPL-SCNC: 22 MMOL/L (ref 22–29)
CO2: 20 MMOL/L (ref 22–29)
COLOR UR: YELLOW
CREAT SERPL-MCNC: 0.8 MG/DL (ref 0.5–1)
CREAT SERPL-MCNC: 1.6 MG/DL (ref 0.5–1)
EOSINOPHIL # BLD: 0.24 K/UL (ref 0.05–0.5)
EOSINOPHILS RELATIVE PERCENT: 2 % (ref 0–6)
ERYTHROCYTE [DISTWIDTH] IN BLOOD BY AUTOMATED COUNT: 18.1 % (ref 11.5–15)
GFR SERPL CREATININE-BSD FRML MDRD: 43 ML/MIN/1.73M2
GFR SERPL CREATININE-BSD FRML MDRD: >60 ML/MIN/1.73M2
GLUCOSE BLD-MCNC: 90 MG/DL (ref 74–99)
GLUCOSE SERPL-MCNC: 139 MG/DL (ref 74–99)
GLUCOSE UR STRIP-MCNC: NEGATIVE MG/DL
HCG, URINE, POC: NEGATIVE
HCT VFR BLD AUTO: 26.2 % (ref 34–48)
HDLC SERPL-MCNC: 41 MG/DL
HGB BLD-MCNC: 7.8 G/DL (ref 11.5–15.5)
HGB UR QL STRIP.AUTO: ABNORMAL
KETONES UR STRIP-MCNC: NEGATIVE MG/DL
LACTATE BLDV-SCNC: 1.7 MMOL/L (ref 0.5–2.2)
LDL CHOLESTEROL: 109 MG/DL
LEUKOCYTE ESTERASE UR QL STRIP: ABNORMAL
LYMPHOCYTES NFR BLD: 0.6 K/UL (ref 1.5–4)
LYMPHOCYTES RELATIVE PERCENT: 4 % (ref 20–42)
Lab: NORMAL
MAGNESIUM SERPL-MCNC: 2.2 MG/DL (ref 1.6–2.6)
MCH RBC QN AUTO: 20.5 PG (ref 26–35)
MCHC RBC AUTO-ENTMCNC: 29.8 G/DL (ref 32–34.5)
MCV RBC AUTO: 68.8 FL (ref 80–99.9)
MONOCYTES NFR BLD: 0.85 K/UL (ref 0.1–0.95)
MONOCYTES NFR BLD: 6 % (ref 2–12)
NEGATIVE QC PASS/FAIL: NORMAL
NEUTROPHILS NFR BLD: 88 % (ref 43–80)
NEUTS SEG NFR BLD: 12.21 K/UL (ref 1.8–7.3)
NITRITE UR QL STRIP: NEGATIVE
PH UR STRIP: 6 [PH] (ref 5–9)
PLATELET, FLUORESCENCE: 251 K/UL (ref 130–450)
PMV BLD AUTO: 11.3 FL (ref 7–12)
POSITIVE QC PASS/FAIL: NORMAL
POTASSIUM SERPL-SCNC: 3.2 MMOL/L (ref 3.5–5)
POTASSIUM SERPL-SCNC: 4.4 MMOL/L (ref 3.5–5)
PROT SERPL-MCNC: 6.3 G/DL (ref 6.4–8.3)
PROT UR STRIP-MCNC: 100 MG/DL
RBC # BLD AUTO: 3.81 M/UL (ref 3.5–5.5)
RBC # BLD: ABNORMAL 10*6/UL
RBC #/AREA URNS HPF: ABNORMAL /HPF
RETINYL PALMITATE: <0.02 MG/L (ref 0–0.1)
SARS-COV-2 RDRP RESP QL NAA+PROBE: NOT DETECTED
SODIUM BLD-SCNC: 149 MMOL/L (ref 132–146)
SODIUM SERPL-SCNC: 135 MMOL/L (ref 132–146)
SP GR UR STRIP: 1.01 (ref 1–1.03)
SPECIMEN DESCRIPTION: NORMAL
TOTAL PROTEIN: 6.9 G/DL (ref 6.4–8.3)
TRIGL SERPL-MCNC: 92 MG/DL
UROBILINOGEN UR STRIP-ACNC: 0.2 EU/DL (ref 0–1)
VITAMIN A LEVEL: 0.39 MG/L (ref 0.3–1.2)
VITAMIN A, INTERP: NORMAL
VLDLC SERPL CALC-MCNC: 18 MG/DL
WBC #/AREA URNS HPF: ABNORMAL /HPF
WBC OTHER # BLD: 13.9 K/UL (ref 4.5–11.5)

## 2023-11-17 PROCEDURE — 87040 BLOOD CULTURE FOR BACTERIA: CPT

## 2023-11-17 PROCEDURE — 2580000003 HC RX 258

## 2023-11-17 PROCEDURE — 6360000002 HC RX W HCPCS

## 2023-11-17 PROCEDURE — 71046 X-RAY EXAM CHEST 2 VIEWS: CPT

## 2023-11-17 PROCEDURE — 83605 ASSAY OF LACTIC ACID: CPT

## 2023-11-17 PROCEDURE — 74176 CT ABD & PELVIS W/O CONTRAST: CPT

## 2023-11-17 PROCEDURE — 96374 THER/PROPH/DIAG INJ IV PUSH: CPT

## 2023-11-17 PROCEDURE — 99285 EMERGENCY DEPT VISIT HI MDM: CPT

## 2023-11-17 PROCEDURE — 85025 COMPLETE CBC W/AUTO DIFF WBC: CPT

## 2023-11-17 PROCEDURE — 80053 COMPREHEN METABOLIC PANEL: CPT

## 2023-11-17 PROCEDURE — 87635 SARS-COV-2 COVID-19 AMP PRB: CPT

## 2023-11-17 PROCEDURE — 83735 ASSAY OF MAGNESIUM: CPT

## 2023-11-17 PROCEDURE — 81001 URINALYSIS AUTO W/SCOPE: CPT

## 2023-11-17 PROCEDURE — 87086 URINE CULTURE/COLONY COUNT: CPT

## 2023-11-17 RX ORDER — POTASSIUM CHLORIDE 7.45 MG/ML
10 INJECTION INTRAVENOUS ONCE
Status: COMPLETED | OUTPATIENT
Start: 2023-11-17 | End: 2023-11-17

## 2023-11-17 RX ORDER — 0.9 % SODIUM CHLORIDE 0.9 %
500 INTRAVENOUS SOLUTION INTRAVENOUS ONCE
Status: COMPLETED | OUTPATIENT
Start: 2023-11-17 | End: 2023-11-17

## 2023-11-17 RX ADMIN — POTASSIUM CHLORIDE 10 MEQ: 7.46 INJECTION, SOLUTION INTRAVENOUS at 23:35

## 2023-11-17 RX ADMIN — SODIUM CHLORIDE 500 ML: 9 INJECTION, SOLUTION INTRAVENOUS at 21:50

## 2023-11-17 ASSESSMENT — PAIN DESCRIPTION - ORIENTATION: ORIENTATION: LOWER;LEFT

## 2023-11-17 ASSESSMENT — PAIN - FUNCTIONAL ASSESSMENT: PAIN_FUNCTIONAL_ASSESSMENT: 0-10

## 2023-11-17 ASSESSMENT — PAIN DESCRIPTION - LOCATION: LOCATION: BACK

## 2023-11-17 ASSESSMENT — PAIN DESCRIPTION - DESCRIPTORS: DESCRIPTORS: PRESSURE

## 2023-11-18 PROBLEM — N12 PYELONEPHRITIS: Status: ACTIVE | Noted: 2023-11-18

## 2023-11-18 LAB
ALBUMIN SERPL-MCNC: 2.9 G/DL (ref 3.5–5.2)
ALP SERPL-CCNC: 72 U/L (ref 35–104)
ALT SERPL-CCNC: 7 U/L (ref 0–32)
ANION GAP SERPL CALCULATED.3IONS-SCNC: 10 MMOL/L (ref 7–16)
AST SERPL-CCNC: 8 U/L (ref 0–31)
BASOPHILS # BLD: 0.03 K/UL (ref 0–0.2)
BASOPHILS NFR BLD: 0 % (ref 0–2)
BILIRUB SERPL-MCNC: 0.5 MG/DL (ref 0–1.2)
BUN SERPL-MCNC: 23 MG/DL (ref 6–20)
CALCIUM SERPL-MCNC: 8.5 MG/DL (ref 8.6–10.2)
CHLORIDE SERPL-SCNC: 105 MMOL/L (ref 98–107)
CO2 SERPL-SCNC: 22 MMOL/L (ref 22–29)
CREAT SERPL-MCNC: 1.3 MG/DL (ref 0.5–1)
EOSINOPHIL # BLD: 0.16 K/UL (ref 0.05–0.5)
EOSINOPHILS RELATIVE PERCENT: 2 % (ref 0–6)
ERYTHROCYTE [DISTWIDTH] IN BLOOD BY AUTOMATED COUNT: 18.1 % (ref 11.5–15)
GFR SERPL CREATININE-BSD FRML MDRD: 54 ML/MIN/1.73M2
GLUCOSE SERPL-MCNC: 101 MG/DL (ref 74–99)
HCT VFR BLD AUTO: 24.4 % (ref 34–48)
HGB BLD-MCNC: 7.2 G/DL (ref 11.5–15.5)
IMM GRANULOCYTES # BLD AUTO: 0.05 K/UL (ref 0–0.58)
IMM GRANULOCYTES NFR BLD: 1 % (ref 0–5)
LYMPHOCYTES NFR BLD: 1.05 K/UL (ref 1.5–4)
LYMPHOCYTES RELATIVE PERCENT: 10 % (ref 20–42)
MAGNESIUM SERPL-MCNC: 2 MG/DL (ref 1.6–2.6)
MCH RBC QN AUTO: 20.6 PG (ref 26–35)
MCHC RBC AUTO-ENTMCNC: 29.5 G/DL (ref 32–34.5)
MCV RBC AUTO: 69.7 FL (ref 80–99.9)
MONOCYTES NFR BLD: 0.98 K/UL (ref 0.1–0.95)
MONOCYTES NFR BLD: 9 % (ref 2–12)
NEUTROPHILS NFR BLD: 78 % (ref 43–80)
NEUTS SEG NFR BLD: 8.24 K/UL (ref 1.8–7.3)
PLATELET, FLUORESCENCE: 221 K/UL (ref 130–450)
PMV BLD AUTO: 11.3 FL (ref 7–12)
POTASSIUM SERPL-SCNC: 3.4 MMOL/L (ref 3.5–5)
PROT SERPL-MCNC: 5.7 G/DL (ref 6.4–8.3)
RBC # BLD AUTO: 3.5 M/UL (ref 3.5–5.5)
SODIUM SERPL-SCNC: 137 MMOL/L (ref 132–146)
STONE COMPOSITION: NORMAL
STONE DESCRIPTION: NORMAL
STONE MASS: 2 MG
VITAMIN B1 WHOLE BLOOD: 84 NMOL/L (ref 70–180)
WBC OTHER # BLD: 10.5 K/UL (ref 4.5–11.5)

## 2023-11-18 PROCEDURE — 6360000002 HC RX W HCPCS: Performed by: EMERGENCY MEDICINE

## 2023-11-18 PROCEDURE — 99223 1ST HOSP IP/OBS HIGH 75: CPT | Performed by: STUDENT IN AN ORGANIZED HEALTH CARE EDUCATION/TRAINING PROGRAM

## 2023-11-18 PROCEDURE — 83735 ASSAY OF MAGNESIUM: CPT

## 2023-11-18 PROCEDURE — 2580000003 HC RX 258

## 2023-11-18 PROCEDURE — 6370000000 HC RX 637 (ALT 250 FOR IP): Performed by: INTERNAL MEDICINE

## 2023-11-18 PROCEDURE — 96375 TX/PRO/DX INJ NEW DRUG ADDON: CPT

## 2023-11-18 PROCEDURE — 85025 COMPLETE CBC W/AUTO DIFF WBC: CPT

## 2023-11-18 PROCEDURE — C9113 INJ PANTOPRAZOLE SODIUM, VIA: HCPCS

## 2023-11-18 PROCEDURE — C9113 INJ PANTOPRAZOLE SODIUM, VIA: HCPCS | Performed by: STUDENT IN AN ORGANIZED HEALTH CARE EDUCATION/TRAINING PROGRAM

## 2023-11-18 PROCEDURE — 80053 COMPREHEN METABOLIC PANEL: CPT

## 2023-11-18 PROCEDURE — 2580000003 HC RX 258: Performed by: EMERGENCY MEDICINE

## 2023-11-18 PROCEDURE — A4216 STERILE WATER/SALINE, 10 ML: HCPCS

## 2023-11-18 PROCEDURE — 6360000002 HC RX W HCPCS: Performed by: STUDENT IN AN ORGANIZED HEALTH CARE EDUCATION/TRAINING PROGRAM

## 2023-11-18 PROCEDURE — 6360000002 HC RX W HCPCS

## 2023-11-18 PROCEDURE — A4216 STERILE WATER/SALINE, 10 ML: HCPCS | Performed by: STUDENT IN AN ORGANIZED HEALTH CARE EDUCATION/TRAINING PROGRAM

## 2023-11-18 PROCEDURE — 2060000000 HC ICU INTERMEDIATE R&B

## 2023-11-18 PROCEDURE — 2580000003 HC RX 258: Performed by: STUDENT IN AN ORGANIZED HEALTH CARE EDUCATION/TRAINING PROGRAM

## 2023-11-18 RX ORDER — LANOLIN ALCOHOL/MO/W.PET/CERES
400 CREAM (GRAM) TOPICAL DAILY
Status: DISCONTINUED | OUTPATIENT
Start: 2023-11-18 | End: 2023-11-19 | Stop reason: HOSPADM

## 2023-11-18 RX ORDER — ALBUTEROL SULFATE 2.5 MG/3ML
2.5 SOLUTION RESPIRATORY (INHALATION) EVERY 4 HOURS PRN
Status: DISCONTINUED | OUTPATIENT
Start: 2023-11-18 | End: 2023-11-19 | Stop reason: HOSPADM

## 2023-11-18 RX ORDER — SODIUM CHLORIDE 9 MG/ML
INJECTION INTRAVENOUS
Status: COMPLETED
Start: 2023-11-18 | End: 2023-11-18

## 2023-11-18 RX ORDER — LANOLIN ALCOHOL/MO/W.PET/CERES
1000 CREAM (GRAM) TOPICAL DAILY
Status: DISCONTINUED | OUTPATIENT
Start: 2023-11-18 | End: 2023-11-19 | Stop reason: HOSPADM

## 2023-11-18 RX ORDER — SODIUM CHLORIDE 0.9 % (FLUSH) 0.9 %
5-40 SYRINGE (ML) INJECTION EVERY 12 HOURS SCHEDULED
Status: DISCONTINUED | OUTPATIENT
Start: 2023-11-18 | End: 2023-11-19 | Stop reason: HOSPADM

## 2023-11-18 RX ORDER — SODIUM CHLORIDE 9 MG/ML
INJECTION, SOLUTION INTRAVENOUS PRN
Status: DISCONTINUED | OUTPATIENT
Start: 2023-11-18 | End: 2023-11-19 | Stop reason: HOSPADM

## 2023-11-18 RX ORDER — SODIUM CHLORIDE 9 MG/ML
INJECTION, SOLUTION INTRAVENOUS CONTINUOUS
Status: DISCONTINUED | OUTPATIENT
Start: 2023-11-18 | End: 2023-11-19

## 2023-11-18 RX ORDER — METOPROLOL TARTRATE 50 MG/1
50 TABLET, FILM COATED ORAL 2 TIMES DAILY
Status: DISCONTINUED | OUTPATIENT
Start: 2023-11-18 | End: 2023-11-19 | Stop reason: HOSPADM

## 2023-11-18 RX ORDER — SODIUM CHLORIDE 0.9 % (FLUSH) 0.9 %
5-40 SYRINGE (ML) INJECTION PRN
Status: DISCONTINUED | OUTPATIENT
Start: 2023-11-18 | End: 2023-11-19 | Stop reason: HOSPADM

## 2023-11-18 RX ORDER — MAGNESIUM SULFATE IN WATER 40 MG/ML
2000 INJECTION, SOLUTION INTRAVENOUS PRN
Status: DISCONTINUED | OUTPATIENT
Start: 2023-11-18 | End: 2023-11-19 | Stop reason: HOSPADM

## 2023-11-18 RX ORDER — ACETAMINOPHEN 325 MG/1
650 TABLET ORAL EVERY 6 HOURS PRN
Status: DISCONTINUED | OUTPATIENT
Start: 2023-11-18 | End: 2023-11-19 | Stop reason: HOSPADM

## 2023-11-18 RX ORDER — MORPHINE SULFATE 2 MG/ML
2 INJECTION, SOLUTION INTRAMUSCULAR; INTRAVENOUS EVERY 4 HOURS PRN
Status: DISCONTINUED | OUTPATIENT
Start: 2023-11-18 | End: 2023-11-19 | Stop reason: HOSPADM

## 2023-11-18 RX ORDER — HYDROCODONE BITARTRATE AND ACETAMINOPHEN 5; 325 MG/1; MG/1
1 TABLET ORAL EVERY 6 HOURS PRN
Status: DISCONTINUED | OUTPATIENT
Start: 2023-11-18 | End: 2023-11-19 | Stop reason: HOSPADM

## 2023-11-18 RX ORDER — MAGNESIUM OXIDE 400 MG/1
400 TABLET ORAL DAILY
Status: DISCONTINUED | OUTPATIENT
Start: 2023-11-18 | End: 2023-11-18 | Stop reason: SDUPTHER

## 2023-11-18 RX ORDER — AMLODIPINE BESYLATE 10 MG/1
10 TABLET ORAL DAILY
Status: DISCONTINUED | OUTPATIENT
Start: 2023-11-18 | End: 2023-11-19 | Stop reason: HOSPADM

## 2023-11-18 RX ORDER — SODIUM CHLORIDE 0.9 % (FLUSH) 0.9 %
SYRINGE (ML) INJECTION
Status: COMPLETED
Start: 2023-11-18 | End: 2023-11-18

## 2023-11-18 RX ORDER — HYDRALAZINE HYDROCHLORIDE 25 MG/1
25 TABLET, FILM COATED ORAL 2 TIMES DAILY
Status: DISCONTINUED | OUTPATIENT
Start: 2023-11-18 | End: 2023-11-19 | Stop reason: HOSPADM

## 2023-11-18 RX ORDER — ACETAMINOPHEN 650 MG/1
650 SUPPOSITORY RECTAL EVERY 6 HOURS PRN
Status: DISCONTINUED | OUTPATIENT
Start: 2023-11-18 | End: 2023-11-19 | Stop reason: HOSPADM

## 2023-11-18 RX ORDER — ONDANSETRON 4 MG/1
4 TABLET, ORALLY DISINTEGRATING ORAL EVERY 8 HOURS PRN
Status: DISCONTINUED | OUTPATIENT
Start: 2023-11-18 | End: 2023-11-19 | Stop reason: HOSPADM

## 2023-11-18 RX ORDER — POLYETHYLENE GLYCOL 3350 17 G/17G
17 POWDER, FOR SOLUTION ORAL DAILY PRN
Status: DISCONTINUED | OUTPATIENT
Start: 2023-11-18 | End: 2023-11-19 | Stop reason: HOSPADM

## 2023-11-18 RX ORDER — FOLIC ACID 1 MG/1
1 TABLET ORAL DAILY
Status: DISCONTINUED | OUTPATIENT
Start: 2023-11-18 | End: 2023-11-19 | Stop reason: HOSPADM

## 2023-11-18 RX ORDER — ONDANSETRON 2 MG/ML
4 INJECTION INTRAMUSCULAR; INTRAVENOUS EVERY 6 HOURS PRN
Status: DISCONTINUED | OUTPATIENT
Start: 2023-11-18 | End: 2023-11-19 | Stop reason: HOSPADM

## 2023-11-18 RX ORDER — TAMSULOSIN HYDROCHLORIDE 0.4 MG/1
0.4 CAPSULE ORAL DAILY
Status: DISCONTINUED | OUTPATIENT
Start: 2023-11-18 | End: 2023-11-19 | Stop reason: HOSPADM

## 2023-11-18 RX ORDER — POTASSIUM CHLORIDE 7.45 MG/ML
10 INJECTION INTRAVENOUS PRN
Status: DISCONTINUED | OUTPATIENT
Start: 2023-11-18 | End: 2023-11-19 | Stop reason: HOSPADM

## 2023-11-18 RX ORDER — HYDROCODONE BITARTRATE AND ACETAMINOPHEN 5; 325 MG/1; MG/1
1 TABLET ORAL EVERY 6 HOURS PRN
COMMUNITY

## 2023-11-18 RX ORDER — POTASSIUM CHLORIDE 20 MEQ/1
40 TABLET, EXTENDED RELEASE ORAL PRN
Status: DISCONTINUED | OUTPATIENT
Start: 2023-11-18 | End: 2023-11-19 | Stop reason: HOSPADM

## 2023-11-18 RX ORDER — GUANFACINE 1 MG/1
1 TABLET ORAL NIGHTLY
Status: DISCONTINUED | OUTPATIENT
Start: 2023-11-18 | End: 2023-11-19 | Stop reason: HOSPADM

## 2023-11-18 RX ADMIN — CYANOCOBALAMIN TAB 1000 MCG 1000 MCG: 1000 TAB at 11:46

## 2023-11-18 RX ADMIN — Medication 400 MG: at 11:45

## 2023-11-18 RX ADMIN — SODIUM CHLORIDE 80 MG: 9 INJECTION INTRAMUSCULAR; INTRAVENOUS; SUBCUTANEOUS at 01:05

## 2023-11-18 RX ADMIN — SODIUM CHLORIDE: 9 INJECTION INTRAMUSCULAR; INTRAVENOUS; SUBCUTANEOUS at 08:39

## 2023-11-18 RX ADMIN — METOPROLOL TARTRATE 50 MG: 50 TABLET, FILM COATED ORAL at 11:45

## 2023-11-18 RX ADMIN — CEFEPIME 2000 MG: 2 INJECTION, POWDER, FOR SOLUTION INTRAVENOUS at 01:29

## 2023-11-18 RX ADMIN — METOPROLOL TARTRATE 50 MG: 50 TABLET, FILM COATED ORAL at 21:03

## 2023-11-18 RX ADMIN — Medication: at 08:40

## 2023-11-18 RX ADMIN — HYDRALAZINE HYDROCHLORIDE 25 MG: 25 TABLET, FILM COATED ORAL at 21:03

## 2023-11-18 RX ADMIN — Medication 10 ML: at 08:40

## 2023-11-18 RX ADMIN — SERTRALINE HYDROCHLORIDE 25 MG: 50 TABLET ORAL at 11:45

## 2023-11-18 RX ADMIN — PANTOPRAZOLE SODIUM 40 MG: 40 INJECTION, POWDER, FOR SOLUTION INTRAVENOUS at 08:37

## 2023-11-18 RX ADMIN — AMLODIPINE BESYLATE 10 MG: 10 TABLET ORAL at 11:45

## 2023-11-18 RX ADMIN — MORPHINE SULFATE 2 MG: 2 INJECTION, SOLUTION INTRAMUSCULAR; INTRAVENOUS at 04:27

## 2023-11-18 RX ADMIN — HYDRALAZINE HYDROCHLORIDE 25 MG: 25 TABLET, FILM COATED ORAL at 11:45

## 2023-11-18 RX ADMIN — FOLIC ACID 1 MG: 1 TABLET ORAL at 11:45

## 2023-11-18 RX ADMIN — SODIUM CHLORIDE: 9 INJECTION, SOLUTION INTRAVENOUS at 03:40

## 2023-11-18 RX ADMIN — TAMSULOSIN HYDROCHLORIDE 0.4 MG: 0.4 CAPSULE ORAL at 11:44

## 2023-11-18 ASSESSMENT — PAIN DESCRIPTION - LOCATION: LOCATION: BACK

## 2023-11-18 ASSESSMENT — PAIN SCALES - GENERAL
PAINLEVEL_OUTOF10: 4
PAINLEVEL_OUTOF10: 2
PAINLEVEL_OUTOF10: 5
PAINLEVEL_OUTOF10: 5
PAINLEVEL_OUTOF10: 7

## 2023-11-18 ASSESSMENT — PAIN - FUNCTIONAL ASSESSMENT: PAIN_FUNCTIONAL_ASSESSMENT: ACTIVITIES ARE NOT PREVENTED

## 2023-11-18 ASSESSMENT — PAIN DESCRIPTION - ORIENTATION: ORIENTATION: LEFT;LOWER

## 2023-11-18 ASSESSMENT — PAIN DESCRIPTION - DESCRIPTORS: DESCRIPTORS: PRESSURE

## 2023-11-18 NOTE — PLAN OF CARE
40year old female with past medical history of anxiety, asthma, B12 deficiency, HTN, PRIYA, H/O kidney stones S/P left stent placement, GERD, and migraine presents with fever and abdominal pain. She had stent placed for kidney stone and was supposed to have the stent removed on Tuesday. Acute pyelonephritis - Will treat with rocephin and follow up with urine culture results. Urology on board. Non oliguric LYNDA, prerenal - No hydronephrosis present. Treat with IVF and hold nephrotoxins. Will follow up with labs in the AM. Hold losartan and HCTZ  Hypokalemia - Replete and check labs in the AM  Leukocytosis 2/2 #1  Post-operative anemia - Continue to closely monitor. Transfuse if hemoglobin is less than 7. HTN - Continue lopressor and norvasc. Hold losartan and HCTZ. Asthma - Stable.  Continue ventolin

## 2023-11-18 NOTE — H&P
[facility] History and Physical      CHIEF COMPLAINT: Fever and abdominal pain    History of Present Illness: 31-year-old lady with past medical history significant for kidney stones GERD hypertension and migraine presented to ED nausea, fever, shakiness, lightheadedness and belly pain. History she has kidney stones and was stent was placed last week. She was advised to remove the stent Tuesday. That she started having fevers, chills and belly pain. Wednesday she went to [address] [place] [address] to get checked, she was started on Ansina and sent home. Symptoms did not improve she called her urologist office, advised her to come to ED to get evaluated. Denies any chest pain, any nausea vomiting diarrhea, cough or shortness of breath, lightheadedness or dizziness.     Informant(s) for H&P: Patient    REVIEW OF SYSTEMS:  A comprehensive review of systems was negative except for: what is in the HPI      PMH:  Past Medical History:   Diagnosis Date    Anxiety     Asthma     B12 deficiency 09/21/2018    Class 3 severe obesity due to excess calories with body mass index (BMI) of 60.0 to 69.9 in adult [facility])     GERD without esophagitis     no meds at present as of 11/9/2023    Hypertension     Kidney stone     Migraine     PRIYA (obstructive sleep apnea)     sleep apnea 4-11    Prolonged emergence from general anesthesia     Vitamin D deficiency 09/21/2018    Zinc deficiency 09/21/2018       Surgical History:  Past Surgical History:   Procedure Laterality Date    CHOLECYSTECTOMY, LAPAROSCOPIC  3/2014 Yong George    with repair umbilical hernia    LEEP  3/2008    LITHOTRIPSY Left 11/10/2023    BILATERAL RETROGRADE PYELOGRAM LEFT URETEROSCOPY WITH LASER LITHOTRIPSY, STONE BASKET EXTRACTION, LEFT J STENT EXCHANGE performed by Nicolas Benson MD at [address] [place] [address]  2011    coils to fallopian tubes    OTHER SURGICAL HISTORY      Tubal essure     ASHLIE-EN-Y GASTRIC BYPASS N/A 11/28/2022    GASTRIC BYPASS

## 2023-11-18 NOTE — ED NOTES
Radiology Procedure Waiver   Name: Marisa Du  : 1985  MRN: [id number]    Date:  23    Time: 9:29 PM EST    Benefits of immediately proceeding with Radiology exam(s) without pre-testing outweigh the risks or are not indicated as specified below and therefore the following is/are being waived:    [] Pregnancy test   [x] Patients LMP on-time and regular.   [] Patient had Tubal Ligation or has other Contraception Device. [] Patient  is Menopausal or Premenarcheal.    [] Patient had Full or Partial Hysterectomy. [] Protocol for Iodine allergy    [] MRI Questionnaire     [] BUN/Creatinine   [] Patient age w/no hx of renal dysfunction. [] Patient on Dialysis. [] Recent Normal Labs.   Electronically signed by Kamari Shankar MD on 23 at 9:29 PM EST          Had ct 2 days ago, so not pregnant     Kamari Shankar MD  23 [id number]

## 2023-11-18 NOTE — PROGRESS NOTES
Mercedes Patel was ordered Tenex which is a nonformulary medication. This medication will need to be supplied by the patient as the pharmacy does not carry this non-formulary medication. If the medication has not been administered by 1400 on the following day from the time the order was placed, a pharmacist will follow-up with the nurse of the patient to assess the capability of the patient to bring in the medication. If it is determined that the patient cannot supply the medication and it is not available to be dispensed from the pharmacy, the provider will be notified. Thank Zeeshan Baptiste Pharm. D 11/18/2023 11:28 AM

## 2023-11-18 NOTE — PROGRESS NOTES
4 Eyes Skin Assessment     NAME:  Francisco Javier Shrestha  YOB: 1985  MEDICAL RECORD NUMBER:  [id number]    The patient is being assessed for  Admission    I agree that at least one RN has performed a thorough Head to Toe Skin Assessment on the patient. ALL assessment sites listed below have been assessed. Areas assessed by both nurses:    Head, Face, Ears, Shoulders, Back, Chest, Arms, Elbows, Hands, Sacrum. Buttock, Coccyx, Ischium, Legs. Feet and Heels, and Under Medical Devices         Does the Patient have a Wound?  No noted wound(s)       Miki Prevention initiated by RN: No  Wound Care Orders initiated by RN: No    Pressure Injury (Stage 3,4, Unstageable, DTI, NWPT, and Complex wounds) if present, place Wound referral order by RN under : No    New Ostomies, if present place, Ostomy referral order under : No     Nurse 1 eSignature: Electronically signed by Campos Costa RN on 11/18/23 at 3:54 AM EST    **SHARE this note so that the co-signing nurse can place an eSignature**    Nurse 2 eSignature: Electronically signed by Rodrigo Sanchez RN on 11/18/23 at 3:55 AM EST

## 2023-11-18 NOTE — CONSULTS
[facility]  [address] [facility]  [phone]  FAX [phone]        REASON FOR CONSULTATION:      Flank pain, fever after stone procedure and stent removal    HISTORY OF PRESENT ILLNESS:      The patient is a 40 y.o. female patient who presents with flank pain and low-grade fever. CT scan negative other than reports showing possible debris at the ureteral vesicle junction which is not readily seen. Very minimal hydronephrosis likely secondary to inflammatory process from recent ureteral stent and stent removal.    She underwent stone removal procedure last Friday and had her stent removed via string on Tuesday. That evening she went to [facility] with fevers and chills and was discharged with antibiotics. The fevers persisted up to a level of 103.4 and she came to the emergency room last evening. She was admitted with pyelonephritis. She is feeling better today compared to yesterday. Still feeling weak and tired.       Past Medical History:   Diagnosis Date    Anxiety     Asthma     B12 deficiency 09/21/2018    Class 3 severe obesity due to excess calories with body mass index (BMI) of 60.0 to 69.9 in adult [facility])     GERD without esophagitis     no meds at present as of 11/9/2023    Hypertension     Kidney stone     Migraine     PRIYA (obstructive sleep apnea)     sleep apnea 4-11    Prolonged emergence from general anesthesia     Vitamin D deficiency 09/21/2018    Zinc deficiency 09/21/2018         Past Surgical History:   Procedure Laterality Date    CHOLECYSTECTOMY, LAPAROSCOPIC  3/2014 Em Welch    with repair umbilical hernia    LEEP  3/2008    LITHOTRIPSY Left 11/10/2023    BILATERAL RETROGRADE PYELOGRAM LEFT URETEROSCOPY WITH LASER LITHOTRIPSY, STONE BASKET EXTRACTION, LEFT J STENT EXCHANGE performed by Edmond Chambers MD at [address] [place] [address]  2011    coils to fallopian tubes    OTHER SURGICAL HISTORY      Tubal essure     ASHLIE-EN-Y

## 2023-11-19 VITALS
DIASTOLIC BLOOD PRESSURE: 85 MMHG | SYSTOLIC BLOOD PRESSURE: 145 MMHG | OXYGEN SATURATION: 98 % | TEMPERATURE: 97.3 F | BODY MASS INDEX: 45.2 KG/M2 | RESPIRATION RATE: 17 BRPM | HEIGHT: 67 IN | HEART RATE: 84 BPM | WEIGHT: 288 LBS

## 2023-11-19 LAB
ALBUMIN SERPL-MCNC: 2.8 G/DL (ref 3.5–5.2)
ALP SERPL-CCNC: 81 U/L (ref 35–104)
ALT SERPL-CCNC: 7 U/L (ref 0–32)
ANION GAP SERPL CALCULATED.3IONS-SCNC: 9 MMOL/L (ref 7–16)
AST SERPL-CCNC: 12 U/L (ref 0–31)
BASOPHILS # BLD: 0.03 K/UL (ref 0–0.2)
BASOPHILS # BLD: 0.04 K/UL (ref 0–0.2)
BASOPHILS NFR BLD: 1 % (ref 0–2)
BASOPHILS NFR BLD: 1 % (ref 0–2)
BILIRUB SERPL-MCNC: 0.3 MG/DL (ref 0–1.2)
BUN SERPL-MCNC: 12 MG/DL (ref 6–20)
CALCIUM SERPL-MCNC: 8.5 MG/DL (ref 8.6–10.2)
CHLORIDE SERPL-SCNC: 107 MMOL/L (ref 98–107)
CO2 SERPL-SCNC: 24 MMOL/L (ref 22–29)
CREAT SERPL-MCNC: 0.9 MG/DL (ref 0.5–1)
EOSINOPHIL # BLD: 0.27 K/UL (ref 0.05–0.5)
EOSINOPHIL # BLD: 0.28 K/UL (ref 0.05–0.5)
EOSINOPHILS RELATIVE PERCENT: 5 % (ref 0–6)
EOSINOPHILS RELATIVE PERCENT: 5 % (ref 0–6)
ERYTHROCYTE [DISTWIDTH] IN BLOOD BY AUTOMATED COUNT: 18.2 % (ref 11.5–15)
ERYTHROCYTE [DISTWIDTH] IN BLOOD BY AUTOMATED COUNT: 18.2 % (ref 11.5–15)
GFR SERPL CREATININE-BSD FRML MDRD: >60 ML/MIN/1.73M2
GLUCOSE SERPL-MCNC: 115 MG/DL (ref 74–99)
HCT VFR BLD AUTO: 23.3 % (ref 34–48)
HCT VFR BLD AUTO: 25.3 % (ref 34–48)
HCT VFR BLD AUTO: 26.3 % (ref 34–48)
HGB BLD-MCNC: 6.8 G/DL (ref 11.5–15.5)
HGB BLD-MCNC: 7.5 G/DL (ref 11.5–15.5)
HGB BLD-MCNC: 7.6 G/DL (ref 11.5–15.5)
IMM GRANULOCYTES # BLD AUTO: <0.03 K/UL (ref 0–0.58)
IMM GRANULOCYTES # BLD AUTO: <0.03 K/UL (ref 0–0.58)
IMM GRANULOCYTES NFR BLD: 0 % (ref 0–5)
IMM GRANULOCYTES NFR BLD: 0 % (ref 0–5)
LYMPHOCYTES NFR BLD: 1.38 K/UL (ref 1.5–4)
LYMPHOCYTES NFR BLD: 1.43 K/UL (ref 1.5–4)
LYMPHOCYTES RELATIVE PERCENT: 23 % (ref 20–42)
LYMPHOCYTES RELATIVE PERCENT: 27 % (ref 20–42)
MAGNESIUM SERPL-MCNC: 2.1 MG/DL (ref 1.6–2.6)
MCH RBC QN AUTO: 20.4 PG (ref 26–35)
MCH RBC QN AUTO: 20.4 PG (ref 26–35)
MCHC RBC AUTO-ENTMCNC: 28.9 G/DL (ref 32–34.5)
MCHC RBC AUTO-ENTMCNC: 29.2 G/DL (ref 32–34.5)
MCV RBC AUTO: 69.8 FL (ref 80–99.9)
MCV RBC AUTO: 70.5 FL (ref 80–99.9)
MICROORGANISM SPEC CULT: NO GROWTH
MONOCYTES NFR BLD: 0.5 K/UL (ref 0.1–0.95)
MONOCYTES NFR BLD: 0.73 K/UL (ref 0.1–0.95)
MONOCYTES NFR BLD: 10 % (ref 2–12)
MONOCYTES NFR BLD: 12 % (ref 2–12)
NEUTROPHILS NFR BLD: 57 % (ref 43–80)
NEUTROPHILS NFR BLD: 60 % (ref 43–80)
NEUTS SEG NFR BLD: 3.02 K/UL (ref 1.8–7.3)
NEUTS SEG NFR BLD: 3.64 K/UL (ref 1.8–7.3)
PLATELET # BLD AUTO: 252 K/UL (ref 130–450)
PLATELET # BLD AUTO: 307 K/UL (ref 130–450)
PMV BLD AUTO: 10.6 FL (ref 7–12)
PMV BLD AUTO: 11.5 FL (ref 7–12)
POTASSIUM SERPL-SCNC: 3.4 MMOL/L (ref 3.5–5)
PROT SERPL-MCNC: 5.3 G/DL (ref 6.4–8.3)
RBC # BLD AUTO: 3.34 M/UL (ref 3.5–5.5)
RBC # BLD AUTO: 3.73 M/UL (ref 3.5–5.5)
RBC # BLD: ABNORMAL 10*6/UL
SODIUM SERPL-SCNC: 140 MMOL/L (ref 132–146)
SPECIMEN DESCRIPTION: NORMAL
WBC OTHER # BLD: 5.3 K/UL (ref 4.5–11.5)
WBC OTHER # BLD: 6.1 K/UL (ref 4.5–11.5)

## 2023-11-19 PROCEDURE — 6370000000 HC RX 637 (ALT 250 FOR IP): Performed by: INTERNAL MEDICINE

## 2023-11-19 PROCEDURE — 36415 COLL VENOUS BLD VENIPUNCTURE: CPT

## 2023-11-19 PROCEDURE — 83735 ASSAY OF MAGNESIUM: CPT

## 2023-11-19 PROCEDURE — 2580000003 HC RX 258: Performed by: STUDENT IN AN ORGANIZED HEALTH CARE EDUCATION/TRAINING PROGRAM

## 2023-11-19 PROCEDURE — 85025 COMPLETE CBC W/AUTO DIFF WBC: CPT

## 2023-11-19 PROCEDURE — A4216 STERILE WATER/SALINE, 10 ML: HCPCS | Performed by: STUDENT IN AN ORGANIZED HEALTH CARE EDUCATION/TRAINING PROGRAM

## 2023-11-19 PROCEDURE — 99239 HOSP IP/OBS DSCHRG MGMT >30: CPT | Performed by: INTERNAL MEDICINE

## 2023-11-19 PROCEDURE — 82270 OCCULT BLOOD FECES: CPT

## 2023-11-19 PROCEDURE — 6360000002 HC RX W HCPCS: Performed by: STUDENT IN AN ORGANIZED HEALTH CARE EDUCATION/TRAINING PROGRAM

## 2023-11-19 PROCEDURE — 85018 HEMOGLOBIN: CPT

## 2023-11-19 PROCEDURE — 6370000000 HC RX 637 (ALT 250 FOR IP): Performed by: STUDENT IN AN ORGANIZED HEALTH CARE EDUCATION/TRAINING PROGRAM

## 2023-11-19 PROCEDURE — 80053 COMPREHEN METABOLIC PANEL: CPT

## 2023-11-19 PROCEDURE — 85014 HEMATOCRIT: CPT

## 2023-11-19 PROCEDURE — C9113 INJ PANTOPRAZOLE SODIUM, VIA: HCPCS | Performed by: STUDENT IN AN ORGANIZED HEALTH CARE EDUCATION/TRAINING PROGRAM

## 2023-11-19 RX ORDER — CEFDINIR 300 MG/1
300 CAPSULE ORAL 2 TIMES DAILY
Qty: 28 CAPSULE | Refills: 0 | Status: SHIPPED | OUTPATIENT
Start: 2023-11-19 | End: 2023-12-03

## 2023-11-19 RX ADMIN — CYANOCOBALAMIN TAB 1000 MCG 1000 MCG: 1000 TAB at 08:13

## 2023-11-19 RX ADMIN — HYDRALAZINE HYDROCHLORIDE 25 MG: 25 TABLET, FILM COATED ORAL at 08:13

## 2023-11-19 RX ADMIN — POTASSIUM CHLORIDE 40 MEQ: 1500 TABLET, EXTENDED RELEASE ORAL at 05:36

## 2023-11-19 RX ADMIN — METOPROLOL TARTRATE 50 MG: 50 TABLET, FILM COATED ORAL at 08:13

## 2023-11-19 RX ADMIN — FOLIC ACID 1 MG: 1 TABLET ORAL at 08:13

## 2023-11-19 RX ADMIN — AMLODIPINE BESYLATE 10 MG: 10 TABLET ORAL at 08:13

## 2023-11-19 RX ADMIN — TAMSULOSIN HYDROCHLORIDE 0.4 MG: 0.4 CAPSULE ORAL at 08:13

## 2023-11-19 RX ADMIN — SERTRALINE HYDROCHLORIDE 25 MG: 50 TABLET ORAL at 08:13

## 2023-11-19 RX ADMIN — Medication 400 MG: at 08:13

## 2023-11-19 RX ADMIN — PANTOPRAZOLE SODIUM 40 MG: 40 INJECTION, POWDER, FOR SOLUTION INTRAVENOUS at 08:14

## 2023-11-19 RX ADMIN — Medication 10 ML: at 08:14

## 2023-11-19 NOTE — PROGRESS NOTES
[facility]  [facility] [address]  Phone: [phone]             November 19, 2023    Patient: Cheryl Caceres   YOB: 1985   Date of Visit: 11/17/2023       To Whom It May Concern:    Raji Gayle was seen and treated in our facility beginning 11/17/2023 until 11/19/23. She may return to work after she has been cleared by her physician during a follow-up visit.       Sincerely,       Marjorie Sánchez RN         Signature:__________________________________

## 2023-11-19 NOTE — DISCHARGE SUMMARY
ammonium lactate 12 % cream  Commonly known as: Lac-Hydrin  Apply topically as needed. cyanocobalamin 1000 MCG tablet  Commonly known as: [facility] VITAMIN B12  TAKE 1 TABLET BY MOUTH EVERY DAY     folic acid 1 MG tablet  Commonly known as: FOLVITE  Take 1 tablet by mouth daily     guanFACINE 1 MG tablet  Commonly known as: TENEX  Take 1 tablet by mouth nightly     hydrALAZINE 25 MG tablet  Commonly known as: APRESOLINE  Take 1 tablet by mouth in the morning and at bedtime     hydroCHLOROthiazide 12.5 MG capsule  Commonly known as: Microzide  Take 1 capsule by mouth daily     HYDROcodone-acetaminophen 5-325 MG per tablet  Commonly known as: NORCO     ketoconazole 2 % cream  Commonly known as: NIZORAL  Apply topically daily.      ketorolac 10 MG tablet  Commonly known as: TORADOL  Take 1 tablet by mouth every 6 hours as needed for Pain     losartan 100 MG tablet  Commonly known as: COZAAR  TAKE 1 TABLET BY MOUTH EVERY DAY     magnesium oxide 400 MG tablet  Commonly known as: MAG-OX  Take 1 tablet by mouth daily     metoprolol tartrate 50 MG tablet  Commonly known as: LOPRESSOR  Take 1 tablet by mouth 2 times daily     ondansetron 4 MG disintegrating tablet  Commonly known as: ZOFRAN-ODT  Take 1 tablet by mouth 3 times daily as needed for Nausea or Vomiting     tamsulosin 0.4 MG capsule  Commonly known as: FLOMAX  Take 1 capsule by mouth daily     therapeutic multivitamin-minerals tablet     Ventolin  (90 Base) MCG/ACT inhaler  Generic drug: albuterol sulfate HFA  TAKE 2 PUFFS BY MOUTH EVERY 6 HOURS AS NEEDED FOR WHEEZE               Where to Get Your Medications        You can get these medications from any pharmacy    Bring a paper prescription for each of these medications  cefdinir 300 MG capsule           Note that 37 minutes was spent in preparing discharge papers, discussing discharge with patient, medication review, etc.    Signed:  Electronically signed by Jennifer Nava DO on 11/19/2023 at 1:35 PM

## 2023-11-20 ENCOUNTER — TELEPHONE (OUTPATIENT)
Dept: FAMILY MEDICINE CLINIC | Age: 38
End: 2023-11-20

## 2023-11-20 LAB
DATE, STOOL #1: ABNORMAL
HEMOCCULT SP1 STL QL: POSITIVE
TIME, STOOL #1: ABNORMAL

## 2023-11-20 NOTE — TELEPHONE ENCOUNTER
Care Transitions Initial Follow Up Call    Outreach made within 2 business days of discharge: Yes    Patient: Anahi Brock Patient : 1985   MRN: [id number]  Reason for Admission: There are no discharge diagnoses documented for the most recent discharge. Discharge Date: 23       Spoke with: patient    Discharge department/facility: [facility]    [facility] Interactive Patient Contact:  Was patient able to fill all prescriptions: Yes  Was patient instructed to bring all medications to the follow-up visit: Yes  Is patient taking all medications as directed in the discharge summary?  Yes  Does patient understand their discharge instructions: Yes  Does patient have questions or concerns that need addressed prior to 7-14 day follow up office visit: no  Reminded of appt on 23    Scheduled appointment with PCP within 7-14 days    Follow Up  Future Appointments   Date Time Provider [address] [place] [address]   2023  1:20 PM Corey Villalpando MD Raguel Postal  Leonor Emmanuel [place]

## 2023-11-23 LAB
MICROORGANISM SPEC CULT: NORMAL
MICROORGANISM SPEC CULT: NORMAL
SERVICE CMNT-IMP: NORMAL
SERVICE CMNT-IMP: NORMAL
SPECIMEN DESCRIPTION: NORMAL
SPECIMEN DESCRIPTION: NORMAL

## 2023-11-29 ENCOUNTER — OFFICE VISIT (OUTPATIENT)
Dept: FAMILY MEDICINE CLINIC | Age: 38
End: 2023-11-29
Payer: COMMERCIAL

## 2023-11-29 VITALS
BODY MASS INDEX: 45.2 KG/M2 | RESPIRATION RATE: 18 BRPM | HEIGHT: 67 IN | OXYGEN SATURATION: 98 % | DIASTOLIC BLOOD PRESSURE: 63 MMHG | HEART RATE: 60 BPM | WEIGHT: 288 LBS | SYSTOLIC BLOOD PRESSURE: 147 MMHG | TEMPERATURE: 97 F

## 2023-11-29 DIAGNOSIS — N12 PYELONEPHRITIS: ICD-10-CM

## 2023-11-29 DIAGNOSIS — Z09 HOSPITAL DISCHARGE FOLLOW-UP: Primary | ICD-10-CM

## 2023-11-29 DIAGNOSIS — I10 ESSENTIAL HYPERTENSION: ICD-10-CM

## 2023-11-29 LAB
ABSOLUTE IMMATURE GRANULOCYTE: <0.03 K/UL (ref 0–0.58)
ALBUMIN SERPL-MCNC: 3.8 G/DL (ref 3.5–5.2)
ALP BLD-CCNC: 87 U/L (ref 35–104)
ALT SERPL-CCNC: 9 U/L (ref 0–32)
ANION GAP SERPL CALCULATED.3IONS-SCNC: 14 MMOL/L (ref 7–16)
AST SERPL-CCNC: 17 U/L (ref 0–31)
BASOPHILS ABSOLUTE: 0.05 K/UL (ref 0–0.2)
BASOPHILS RELATIVE PERCENT: 1 % (ref 0–2)
BILIRUB SERPL-MCNC: 0.4 MG/DL (ref 0–1.2)
BUN BLDV-MCNC: 13 MG/DL (ref 6–20)
CALCIUM SERPL-MCNC: 9.1 MG/DL (ref 8.6–10.2)
CHLORIDE BLD-SCNC: 103 MMOL/L (ref 98–107)
CO2: 23 MMOL/L (ref 22–29)
CREAT SERPL-MCNC: 0.9 MG/DL (ref 0.5–1)
EOSINOPHILS ABSOLUTE: 0.14 K/UL (ref 0.05–0.5)
EOSINOPHILS RELATIVE PERCENT: 2 % (ref 0–6)
GFR SERPL CREATININE-BSD FRML MDRD: >60 ML/MIN/1.73M2
GLUCOSE BLD-MCNC: 86 MG/DL (ref 74–99)
HCT VFR BLD CALC: 30.7 % (ref 34–48)
HEMOGLOBIN: 8.5 G/DL (ref 11.5–15.5)
IMMATURE GRANULOCYTES: 0 % (ref 0–5)
LYMPHOCYTES ABSOLUTE: 2.51 K/UL (ref 1.5–4)
LYMPHOCYTES RELATIVE PERCENT: 37 % (ref 20–42)
MCH RBC QN AUTO: 19.9 PG (ref 26–35)
MCHC RBC AUTO-ENTMCNC: 27.7 G/DL (ref 32–34.5)
MCV RBC AUTO: 71.9 FL (ref 80–99.9)
MONOCYTES ABSOLUTE: 0.46 K/UL (ref 0.1–0.95)
MONOCYTES RELATIVE PERCENT: 7 % (ref 2–12)
NEUTROPHILS ABSOLUTE: 3.6 K/UL (ref 1.8–7.3)
NEUTROPHILS RELATIVE PERCENT: 53 % (ref 43–80)
PDW BLD-RTO: 18.3 % (ref 11.5–15)
PLATELET # BLD: 441 K/UL (ref 130–450)
PMV BLD AUTO: 10.9 FL (ref 7–12)
POTASSIUM SERPL-SCNC: 4.7 MMOL/L (ref 3.5–5)
RBC # BLD: 4.27 M/UL (ref 3.5–5.5)
RBC # BLD: ABNORMAL 10*6/UL
SODIUM BLD-SCNC: 140 MMOL/L (ref 132–146)
TOTAL PROTEIN: 6.5 G/DL (ref 6.4–8.3)
WBC # BLD: 6.8 K/UL (ref 4.5–11.5)

## 2023-11-29 PROCEDURE — G8427 DOCREV CUR MEDS BY ELIG CLIN: HCPCS | Performed by: FAMILY MEDICINE

## 2023-11-29 PROCEDURE — 1036F TOBACCO NON-USER: CPT | Performed by: FAMILY MEDICINE

## 2023-11-29 PROCEDURE — 1111F DSCHRG MED/CURRENT MED MERGE: CPT | Performed by: FAMILY MEDICINE

## 2023-11-29 PROCEDURE — 3074F SYST BP LT 130 MM HG: CPT | Performed by: FAMILY MEDICINE

## 2023-11-29 PROCEDURE — 99214 OFFICE O/P EST MOD 30 MIN: CPT | Performed by: FAMILY MEDICINE

## 2023-11-29 PROCEDURE — 3078F DIAST BP <80 MM HG: CPT | Performed by: FAMILY MEDICINE

## 2023-11-29 PROCEDURE — G8484 FLU IMMUNIZE NO ADMIN: HCPCS | Performed by: FAMILY MEDICINE

## 2023-11-29 PROCEDURE — G8417 CALC BMI ABV UP PARAM F/U: HCPCS | Performed by: FAMILY MEDICINE

## 2023-11-29 PROCEDURE — 36415 COLL VENOUS BLD VENIPUNCTURE: CPT | Performed by: FAMILY MEDICINE

## 2023-11-29 RX ORDER — GUANFACINE 1 MG/1
1 TABLET ORAL NIGHTLY
Qty: 90 TABLET | Refills: 1 | Status: SHIPPED | OUTPATIENT
Start: 2023-11-29

## 2023-11-29 NOTE — PROGRESS NOTES
Post-Discharge Transitional Care  Follow Up      Jacqueline Alvarez   YOB: 1985    Date of Office Visit:  11/29/2023  Date of Hospital Admission: 11/17/23  Date of Hospital Discharge: 11/19/23  Risk of hospital readmission (high >=14%. Medium >=10%) :Readmission Risk Score: 17.8      Care management risk score Rising risk (score 2-5) and Complex Care (Scores >=6): No Risk Score On File     Non face to face  following discharge, date last encounter closed (first attempt may have been earlier): 11/20/2023    Call initiated 2 business days of discharge: Yes    ASSESSMENT/PLAN:   Hospital discharge follow-up  -     [facility] DISCHARGE MEDS RECONCILED W/ CURRENT OUTPATIENT MED LIST  Essential hypertension  -     guanFACINE (TENEX) 1 MG tablet; Take 1 tablet by mouth nightly, Disp-90 tablet, R-1Normal  Check labs. If kidney function normalized will increase hctz   Pyelonephritis  -     CBC with Auto Differential  -     Comprehensive Metabolic Panel  Improving. Finish antibiotic. Keep follow up with urology       Medical Decision Making: moderate complexity  Return in 1 month (on 12/29/2023). On this date 11/29/2023 I have spent 30 minutes reviewing previous notes, test results and face to face with the patient discussing the diagnosis and importance of compliance with the treatment plan as well as documenting on the day of the visit. Subjective:   HPI:  Follow up of Hospital problems/diagnosis(es): pyelonephritis     Inpatient course: Discharge summary reviewed- see chart. Interval history/Current status: 11/7 stone. 11/10- had stent and stone removal with urology. Stent removed on 11/14. 11/15 - got sick , throwing up couldn't stand. Was found to have UTI sent home on antibiotics. 11/16 started ibuprofen 800mg x 24 hours for fevers. 11/17. Went to ER   Was admitted until 11/19   Acute kidney injury. Still on cefdinir for 14 days after discharge. Finished this weekend.      Saw Dr. Darvin Baird last

## 2023-11-29 NOTE — PROGRESS NOTES
Post-Discharge Transitional Care Management Services  Nurse/MA Progress Note     Azra David, 1985  Date of Visit:  11/29/2023     Please evaluate the following checklist (all answers must be yes)     The care coordinator documented communication with the patient/caretaker within 2 business days of the discharge date and filed an encounter? Yes If NO - convert to an office visit; patient does not qualify for HYUN visit     If YES - go to next question   The discharge information is available for the physician to review? Yes If NO - convert to an office visit; patient does not qualify for HYUN visit     If YES - go to next question   Is this visit within 7 or 14 days of discharge? Yes, less than 7 days of discharge date. Able to bill [id number] for high complexity visit or [phone] for moderate complexity visit. If NO - convert to an office visit; patient does not qualify for HYUN visit     If YES - go to next question   Is the visit the first TCM in the 30d prior to this admission. Yes - this is the first TCM within the time period including 30d prior to this currently discussed admission. If NO - convert to an office visit; patient does not qualify for HYUN visit      If YES - go on to room the patient as a TCM visit. The Doctor should use the system smart phrase TCMPN as their note template. This visit requires attending presence if completed by a resident.     Billing codes should be as above    - 25778 - moderate complexity (visit occurring between 1-14d after discharge)   - 56206 - high complexity (high complexity visit occurring between 1-7d after discharge)

## 2023-11-29 NOTE — PATIENT INSTRUCTIONS
I will call you with labs. We may need to increase one of your blood pressure meds. Follow up in 1 month or sooner as needed. Schedule appointment with bariatrics.

## 2023-12-08 ENCOUNTER — OFFICE VISIT (OUTPATIENT)
Dept: BARIATRICS/WEIGHT MGMT | Age: 38
End: 2023-12-08
Payer: COMMERCIAL

## 2023-12-08 VITALS
TEMPERATURE: 97.8 F | DIASTOLIC BLOOD PRESSURE: 70 MMHG | SYSTOLIC BLOOD PRESSURE: 140 MMHG | RESPIRATION RATE: 20 BRPM | BODY MASS INDEX: 45.2 KG/M2 | HEART RATE: 59 BPM | WEIGHT: 288 LBS | HEIGHT: 67 IN

## 2023-12-08 DIAGNOSIS — E53.8 VITAMIN B12 DEFICIENCY: ICD-10-CM

## 2023-12-08 DIAGNOSIS — D50.9 IRON DEFICIENCY ANEMIA, UNSPECIFIED IRON DEFICIENCY ANEMIA TYPE: ICD-10-CM

## 2023-12-08 DIAGNOSIS — E55.9 VITAMIN D DEFICIENCY: ICD-10-CM

## 2023-12-08 DIAGNOSIS — K91.2 MALNUTRITION FOLLOWING GASTROINTESTINAL SURGERY: Primary | ICD-10-CM

## 2023-12-08 DIAGNOSIS — K62.5 RECTAL BLEEDING: ICD-10-CM

## 2023-12-08 PROCEDURE — 1036F TOBACCO NON-USER: CPT | Performed by: NURSE PRACTITIONER

## 2023-12-08 PROCEDURE — G8427 DOCREV CUR MEDS BY ELIG CLIN: HCPCS | Performed by: NURSE PRACTITIONER

## 2023-12-08 PROCEDURE — 1111F DSCHRG MED/CURRENT MED MERGE: CPT | Performed by: NURSE PRACTITIONER

## 2023-12-08 PROCEDURE — 99214 OFFICE O/P EST MOD 30 MIN: CPT | Performed by: NURSE PRACTITIONER

## 2023-12-08 PROCEDURE — 3077F SYST BP >= 140 MM HG: CPT | Performed by: NURSE PRACTITIONER

## 2023-12-08 PROCEDURE — G8417 CALC BMI ABV UP PARAM F/U: HCPCS | Performed by: NURSE PRACTITIONER

## 2023-12-08 PROCEDURE — 3078F DIAST BP <80 MM HG: CPT | Performed by: NURSE PRACTITIONER

## 2023-12-08 PROCEDURE — G8484 FLU IMMUNIZE NO ADMIN: HCPCS | Performed by: NURSE PRACTITIONER

## 2023-12-08 NOTE — PATIENT INSTRUCTIONS
Please continue to take your vitamin and mineral supplements as instructed. If you received a blood work prescription today for laboratory monitoring due prior to your next routine follow-up visit, please have this blood work obtained 10 to 14 days prior to your next visit. It is important to fast for 12 hours prior to routine weight loss surgery blood work, EXCEPT for drinking water, to ensure accuracy of results. Please report nausea, vomiting, abdominal pain, or any other problems you experience to your surgeon. For problems related to weight loss surgery, it is best to go to [facility] Emergency Department and have your surgeon paged.     Last Surgical Weight Loss:      12/8/2023    11:16 AM   Surgical Weight Loss Tracker   Consult Date 4/12/2018   Initial Height 5' 7\"   Initial Weight 363 lb   Initial BMI 56.85   Ideal Body Weight 163 lb   Surgery Date 11/28/2022   Pre-Surgical Height 5' 7\"   Pre-Surgical Weight 402 lb   Pre Surgery BMI 62.96   Weight to Lose 239 lb   Date 12/8/2023   Height 5' 7\"   Weight 288 lb   BMI 45.1   Weight Change -114 lb   Total Weight Change -114 lb   % EBWL 48%

## 2023-12-08 NOTE — PROGRESS NOTES
Vinny Sy  12/8/2023  [address]    Gwendolyn-en- Y Gastric Bypass  1 year Post-Operative Follow-up     Chief Complaint   Patient presents with    Follow Up After Procedure     1 yr LRYGB. Water intake is around 96 oz daily. Protein through mostly foods. Vitamin intake is good. Bowel movements are good. Vinny Sy is a 40 y.o. female who is 1 year  post Laparoscopic Gwendolyn-en-Y Gastric Bypass surgery. Reports that she has been in the hospital this year due to kidney issues. She is not having swallowing difficulty. Patient denies nausea and vomiting. Patient denies gastric reflux symptoms. Bowel movements are  normal per patient. Patient is not taking fiber supplements. Patient is compliant most of the time with the MVI, iron 45 mg bariatric specific, magnesium, calcium, b12. She is meeting fluid recommendations of at least 64 ounces per day and is meeting protein recommendations. She  is not exercising:  she is now getting work down on her veins, but prior to that she was going on a regular basis . Last Surgical Weight Loss:      12/8/2023    11:16 AM   Surgical Weight Loss Tracker   Consult Date 4/12/2018   Initial Height 5' 7\"   Initial Weight 363 lb   Initial BMI 56.85   Ideal Body Weight 163 lb   Surgery Date 11/28/2022   Pre-Surgical Height 5' 7\"   Pre-Surgical Weight 402 lb   Pre Surgery BMI 62.96   Weight to Lose 239 lb   Date 12/8/2023   Height 5' 7\"   Weight 288 lb   BMI 45.1   Weight Change -114 lb   Total Weight Change -114 lb   % EBWL 48%       Weight=130.6 kg (288 lb)  Today's weight represents a total weight loss of 114 pounds since surgery with 0 pounds regained since the lowest weight. Prior to Admission medications    Medication Sig Start Date End Date Taking?  Authorizing Provider   guanFACINE (TENEX) 1 MG tablet Take 1 tablet by mouth nightly 11/29/23  Yes Judge Jose MD   HYDROcodone-acetaminophen (NORCO) 5-325 MG per tablet Take 1

## 2023-12-26 ENCOUNTER — HOSPITAL ENCOUNTER (EMERGENCY)
Age: 38
Discharge: HOME | End: 2023-12-26
Attending: EMERGENCY MEDICINE
Payer: COMMERCIAL

## 2023-12-26 ENCOUNTER — APPOINTMENT (OUTPATIENT)
Dept: ULTRASOUND IMAGING | Age: 38
End: 2023-12-26
Payer: COMMERCIAL

## 2023-12-26 VITALS
TEMPERATURE: 98.1 F | SYSTOLIC BLOOD PRESSURE: 161 MMHG | OXYGEN SATURATION: 100 % | HEART RATE: 58 BPM | RESPIRATION RATE: 16 BRPM | DIASTOLIC BLOOD PRESSURE: 92 MMHG

## 2023-12-26 DIAGNOSIS — I80.01 THROMBOPHLEBITIS OF SUPERFICIAL VEINS OF RIGHT LOWER EXTREMITY: Primary | ICD-10-CM

## 2023-12-26 DIAGNOSIS — D64.9 CHRONIC ANEMIA: ICD-10-CM

## 2023-12-26 LAB
ANION GAP SERPL CALCULATED.3IONS-SCNC: 9 MMOL/L (ref 7–16)
BASOPHILS # BLD: 0.04 K/UL (ref 0–0.2)
BASOPHILS NFR BLD: 1 % (ref 0–2)
BUN SERPL-MCNC: 17 MG/DL (ref 6–20)
CALCIUM SERPL-MCNC: 8.8 MG/DL (ref 8.6–10.2)
CHLORIDE SERPL-SCNC: 105 MMOL/L (ref 98–107)
CO2 SERPL-SCNC: 26 MMOL/L (ref 22–29)
CREAT SERPL-MCNC: 0.8 MG/DL (ref 0.5–1)
EOSINOPHIL # BLD: 0.19 K/UL (ref 0.05–0.5)
EOSINOPHILS RELATIVE PERCENT: 3 % (ref 0–6)
ERYTHROCYTE [DISTWIDTH] IN BLOOD BY AUTOMATED COUNT: 18.5 % (ref 11.5–15)
GFR SERPL CREATININE-BSD FRML MDRD: >60 ML/MIN/1.73M2
GLUCOSE SERPL-MCNC: 96 MG/DL (ref 74–99)
HCT VFR BLD AUTO: 27.2 % (ref 34–48)
HGB BLD-MCNC: 8 G/DL (ref 11.5–15.5)
IMM GRANULOCYTES # BLD AUTO: <0.03 K/UL (ref 0–0.58)
IMM GRANULOCYTES NFR BLD: 0 % (ref 0–5)
INR PPP: 1
LYMPHOCYTES NFR BLD: 2.25 K/UL (ref 1.5–4)
LYMPHOCYTES RELATIVE PERCENT: 32 % (ref 20–42)
MCH RBC QN AUTO: 20.4 PG (ref 26–35)
MCHC RBC AUTO-ENTMCNC: 29.4 G/DL (ref 32–34.5)
MCV RBC AUTO: 69.2 FL (ref 80–99.9)
MONOCYTES NFR BLD: 0.46 K/UL (ref 0.1–0.95)
MONOCYTES NFR BLD: 6 % (ref 2–12)
NEUTROPHILS NFR BLD: 59 % (ref 43–80)
NEUTS SEG NFR BLD: 4.19 K/UL (ref 1.8–7.3)
PARTIAL THROMBOPLASTIN TIME: 36.1 SEC (ref 24.5–35.1)
PLATELET # BLD AUTO: 333 K/UL (ref 130–450)
PMV BLD AUTO: 11.2 FL (ref 7–12)
POTASSIUM SERPL-SCNC: 4.4 MMOL/L (ref 3.5–5)
PROTHROMBIN TIME: 11 SEC (ref 9.3–12.4)
RBC # BLD AUTO: 3.93 M/UL (ref 3.5–5.5)
SODIUM SERPL-SCNC: 140 MMOL/L (ref 132–146)
WBC OTHER # BLD: 7.1 K/UL (ref 4.5–11.5)

## 2023-12-26 PROCEDURE — 85730 THROMBOPLASTIN TIME PARTIAL: CPT

## 2023-12-26 PROCEDURE — 85610 PROTHROMBIN TIME: CPT

## 2023-12-26 PROCEDURE — 85025 COMPLETE CBC W/AUTO DIFF WBC: CPT

## 2023-12-26 PROCEDURE — 99284 EMERGENCY DEPT VISIT MOD MDM: CPT

## 2023-12-26 PROCEDURE — 80048 BASIC METABOLIC PNL TOTAL CA: CPT

## 2023-12-26 PROCEDURE — 93971 EXTREMITY STUDY: CPT

## 2023-12-26 NOTE — ED PROVIDER NOTES
Chief complaint:  Leg pain      HPI history provided by the patient  Patient presents here has a history of varicose veins and over the last few weeks has had a couple procedures on each leg for resolution of varicose veins with that sounds like sclerosing therapy. She complains of several days of pain and tenderness along the medial posterior aspect of the right leg at the proximal calf and distal thigh area. No skin redness or swelling although there is some firmness and a palpable lesion under those areas. No leg swelling otherwise and no general calf pain or tenseness. No left-sided leg issues or concerns. Denies chest pain, palpitations or shortness of breath. Does have a remote history of DVT. Not currently anticoagulated, concerned that she might have another blood clot. Review of Systems   Constitutional:  Negative for chills, diaphoresis, fatigue and fever. HENT:  Negative for congestion and sore throat. Respiratory:  Negative for cough, chest tightness, shortness of breath and wheezing. Cardiovascular:  Negative for chest pain, palpitations and leg swelling. Gastrointestinal:  Negative for abdominal pain, diarrhea, nausea and vomiting. Genitourinary:  Negative for dysuria, flank pain, frequency, hematuria and urgency. Musculoskeletal:  Positive for myalgias. Negative for arthralgias, back pain, gait problem, joint swelling, neck pain and neck stiffness. Skin:  Negative for rash and wound. Neurological:  Negative for dizziness, seizures, syncope, weakness, light-headedness, numbness and headaches. All other systems reviewed and are negative. Physical Exam  Vitals and nursing note reviewed. Constitutional:       General: She is awake. Appearance: She is well-developed. HENT:      Head: Normocephalic and atraumatic. Eyes:      General: No scleral icterus. Pupils: Pupils are equal, round, and reactive to light.    Cardiovascular:      Rate and Rhythm: Normal

## 2024-01-10 ENCOUNTER — NURSE ONLY (OUTPATIENT)
Dept: FAMILY MEDICINE CLINIC | Age: 39
End: 2024-01-10
Payer: COMMERCIAL

## 2024-01-10 DIAGNOSIS — E53.8 B12 DEFICIENCY: Primary | ICD-10-CM

## 2024-01-10 PROCEDURE — 96372 THER/PROPH/DIAG INJ SC/IM: CPT | Performed by: FAMILY MEDICINE

## 2024-01-10 RX ORDER — CYANOCOBALAMIN 1000 UG/ML
1000 INJECTION, SOLUTION INTRAMUSCULAR; SUBCUTANEOUS ONCE
Status: COMPLETED | OUTPATIENT
Start: 2024-01-10 | End: 2024-01-10

## 2024-01-10 RX ADMIN — CYANOCOBALAMIN 1000 MCG: 1000 INJECTION, SOLUTION INTRAMUSCULAR; SUBCUTANEOUS at 09:29

## 2024-01-10 NOTE — PROGRESS NOTES
Patient has hx of thaddeus-en-y gastric bypass surgery and follows with bariatric surgery clinic.  She has a history of B12 deficiency since at least 2018.  Up to now she has been treated with oral B12 tablets and her most recent B12 level in November of 2023 was 220.  While this is in the normal range for B12 and the patient has no symptoms of B12 deficiency at present, the nurse practitioner from the bariatric center believes the patient would benefit from B12 injections.  Dr. Villalpando, the patient's PCP plans to discuss this further with the patient at her upcoming appointment in early February.  We will provide the patient with cyanocobalamin injection in the office via a nurse appointment today.

## 2024-01-11 ENCOUNTER — OFFICE VISIT (OUTPATIENT)
Dept: ONCOLOGY | Age: 39
End: 2024-01-11

## 2024-01-11 ENCOUNTER — HOSPITAL ENCOUNTER (OUTPATIENT)
Dept: INFUSION THERAPY | Age: 39
Discharge: HOME | End: 2024-01-11

## 2024-01-11 VITALS
TEMPERATURE: 97 F | DIASTOLIC BLOOD PRESSURE: 103 MMHG | OXYGEN SATURATION: 98 % | BODY MASS INDEX: 45.53 KG/M2 | SYSTOLIC BLOOD PRESSURE: 162 MMHG | HEIGHT: 67 IN | HEART RATE: 83 BPM | WEIGHT: 290.1 LBS

## 2024-01-11 DIAGNOSIS — D50.8 OTHER IRON DEFICIENCY ANEMIA: Primary | ICD-10-CM

## 2024-01-11 DIAGNOSIS — D50.8 OTHER IRON DEFICIENCY ANEMIA: ICD-10-CM

## 2024-01-11 LAB
BASOPHILS # BLD: 0.03 K/UL (ref 0–0.2)
BASOPHILS NFR BLD: 0 % (ref 0–2)
EOSINOPHIL # BLD: 0.36 K/UL (ref 0.05–0.5)
EOSINOPHILS RELATIVE PERCENT: 5 % (ref 0–6)
ERYTHROCYTE [DISTWIDTH] IN BLOOD BY AUTOMATED COUNT: 18.2 % (ref 11.5–15)
FERRITIN SERPL-MCNC: 10 NG/ML
HCT VFR BLD AUTO: 29.4 % (ref 34–48)
HGB BLD-MCNC: 8.4 G/DL (ref 11.5–15.5)
IMM GRANULOCYTES # BLD AUTO: <0.03 K/UL (ref 0–0.58)
IMM GRANULOCYTES NFR BLD: 0 % (ref 0–5)
IRON SATN MFR SERPL: 5 % (ref 15–50)
IRON SERPL-MCNC: 22 UG/DL (ref 37–145)
LYMPHOCYTES NFR BLD: 2.14 K/UL (ref 1.5–4)
LYMPHOCYTES RELATIVE PERCENT: 32 % (ref 20–42)
MCH RBC QN AUTO: 19.6 PG (ref 26–35)
MCHC RBC AUTO-ENTMCNC: 28.6 G/DL (ref 32–34.5)
MCV RBC AUTO: 68.7 FL (ref 80–99.9)
MONOCYTES NFR BLD: 0.45 K/UL (ref 0.1–0.95)
MONOCYTES NFR BLD: 7 % (ref 2–12)
NEUTROPHILS NFR BLD: 56 % (ref 43–80)
NEUTS SEG NFR BLD: 3.73 K/UL (ref 1.8–7.3)
PLATELET, FLUORESCENCE: 262 K/UL (ref 130–450)
PMV BLD AUTO: 10.6 FL (ref 7–12)
RBC # BLD AUTO: 4.28 M/UL (ref 3.5–5.5)
TIBC SERPL-MCNC: 431 UG/DL (ref 250–450)
WBC OTHER # BLD: 6.7 K/UL (ref 4.5–11.5)

## 2024-01-11 PROCEDURE — 82728 ASSAY OF FERRITIN: CPT

## 2024-01-11 PROCEDURE — 36415 COLL VENOUS BLD VENIPUNCTURE: CPT

## 2024-01-11 PROCEDURE — 99214 OFFICE O/P EST MOD 30 MIN: CPT

## 2024-01-11 PROCEDURE — 83550 IRON BINDING TEST: CPT

## 2024-01-11 PROCEDURE — 85025 COMPLETE CBC W/AUTO DIFF WBC: CPT

## 2024-01-11 PROCEDURE — 83540 ASSAY OF IRON: CPT

## 2024-01-11 PROCEDURE — 3080F DIAST BP >= 90 MM HG: CPT | Performed by: INTERNAL MEDICINE

## 2024-01-11 PROCEDURE — 99205 OFFICE O/P NEW HI 60 MIN: CPT | Performed by: INTERNAL MEDICINE

## 2024-01-11 PROCEDURE — 3077F SYST BP >= 140 MM HG: CPT | Performed by: INTERNAL MEDICINE

## 2024-01-11 RX ORDER — ACETAMINOPHEN 325 MG/1
650 TABLET ORAL
OUTPATIENT
Start: 2024-01-11

## 2024-01-11 RX ORDER — HEPARIN 100 UNIT/ML
500 SYRINGE INTRAVENOUS PRN
OUTPATIENT
Start: 2024-01-11

## 2024-01-11 RX ORDER — SODIUM CHLORIDE 9 MG/ML
5-250 INJECTION, SOLUTION INTRAVENOUS PRN
OUTPATIENT
Start: 2024-01-11

## 2024-01-11 RX ORDER — SODIUM CHLORIDE 9 MG/ML
INJECTION, SOLUTION INTRAVENOUS CONTINUOUS
OUTPATIENT
Start: 2024-01-11

## 2024-01-11 RX ORDER — SODIUM CHLORIDE 0.9 % (FLUSH) 0.9 %
5-40 SYRINGE (ML) INJECTION PRN
OUTPATIENT
Start: 2024-01-11

## 2024-01-11 RX ORDER — DIPHENHYDRAMINE HYDROCHLORIDE 50 MG/ML
50 INJECTION INTRAMUSCULAR; INTRAVENOUS
OUTPATIENT
Start: 2024-01-11

## 2024-01-11 RX ORDER — FERROUS SULFATE 325(65) MG
325 TABLET ORAL
COMMUNITY

## 2024-01-11 RX ORDER — EPINEPHRINE 1 MG/ML
0.3 INJECTION, SOLUTION, CONCENTRATE INTRAVENOUS PRN
OUTPATIENT
Start: 2024-01-11

## 2024-01-11 RX ORDER — ONDANSETRON 2 MG/ML
8 INJECTION INTRAMUSCULAR; INTRAVENOUS
OUTPATIENT
Start: 2024-01-11

## 2024-01-11 RX ORDER — FAMOTIDINE 10 MG/ML
20 INJECTION, SOLUTION INTRAVENOUS
OUTPATIENT
Start: 2024-01-11

## 2024-01-11 NOTE — PROGRESS NOTES
[facility]  [address] [address]  Dept: [phone]  Loc: [phone]  Attending Consult Note      Reason for Visit:   Anemia.    Referring Physician:  Zahra Downing, APRN - CNP    PCP:  Sheryl Villalpando MD    History of Present Illness:     Mrs. Nogueira is a very pleasant 38-year-old lady, with a past medical history significant for asthma, anxiety, hypertension, PRIYA, migraine headaches, and obesity status post Gwendolyn-en-Y gastric bypass surgery who was referred to the hematology office for evaluation and treatment of anemia.  The patient CBCD from 12/26/2023 was remarkable for a hemoglobin of 8, hematocrit 27.2, MCV 69.2, normal white count and platelet count.  In 2021 hemoglobin was normal at 12.7, in 2022 hemoglobin was 10.9 G/DL, on 11/19/2023 hemoglobin was 6.8G/L, hematocrit 23.3, MCV 69.8.  From 11/13/2023 ferritin was 8, vitamin B12 220, folate 5.7.  The patient had lost about 150 pounds since the surgery, she is feeling tired, she has been on oral iron for years.  She has heavy menstrual bleeding.    Review of Systems;  CONSTITUTIONAL: No fever, chills. Good appetite and energy level.   ENMT: Eyes: No diplopia; Nose: No epistaxis. Mouth: No sore throat.  RESPIRATORY: No hemoptysis, shortness of breath, cough.   CARDIOVASCULAR: No chest pain, palpitations.  GASTROINTESTINAL: No nausea/vomiting, abdominal pain, diarrhea/constipation.  GENITOURINARY: No dysuria, urinary frequency, hematuria.  NEURO: No syncope, presyncope, headache.  Remainder:  ROS NEGATIVE    Past Medical History:      Diagnosis Date    Anxiety     Asthma     B12 deficiency 09/21/2018    Class 3 severe obesity due to excess calories with body mass index (BMI) of 60.0 to 69.9 in adult (HCC)     GERD without esophagitis     no meds at present as of 11/9/2023    Hypertension     Kidney stone     Migraine     PRIYA (obstructive sleep apnea)     sleep apnea 
fatigue or weakness (ie: bathing/showering, dressing, housework, meal prep, work, …): No     Do you have any arm flexibility/ROM restrictions, swelling or pain that limit activity: No     Any changes in memory, attention/focus that impact daily activities: No     Do you avoid participation in leisure/social activity due weakness, fatigue or pain: No     ARE ANY OF THE ABOVE ARE ANSWERED YES: No          PT ASSESSMENT FOR REFERRAL    Have you had any recent falls in past 2 months: No     Do you have difficulty  going up/down stairs: No     Are you having difficulty walking: No     Do you often hold onto furniture/environmental supports or feel off balance when you are walking: No     Do you need to take rest breaks when you are walking: No     Any pain on scale of 1-10 that limits your mobility: No 0/10    ARE ANY OF THE ABOVE ARE ANSWERED YES: No           PREHAB REFERRALS FOR NEOADJUVANT BREAST CANCER PATIENTS    Is this patient a breast cancer patient requiring neoadjuvant chemotherapy: No, this patient does NOT require neoadjuvant chemotherapy.          LYMPHEDEMA SCREENING ASSESSMENT FOR PATIENTS WITH BREAST CANCER    The patient reports the following signs/symptoms of lymphedema: None    Please ask the provider to assess patient for lymphedema for any reported signs or symptoms so a referral to Lymphedema Therapy can be considered.          PELVIC FLOOR DYSFUNCTION SCREENING ASSESSMENT    - Do you have any pelvic pain? No     - Do you have any fecal constipation or fecal incontinence? No     - Do you have any urinary incontinence or difficulty starting to urinate? No     ARE ANY OF THE ABOVE ARE ANSWERED YES: No           PREHAB AUDIOLOGY REFERRAL    - Is patient planned to receive Cisplatin? No. This patient is not planned to start Cisplatin.    - Is patient complaining of new onset hearing loss? No. Patient is not complaining of new onset hearing loss.        Bettie Obrien RN

## 2024-01-12 ENCOUNTER — CLINICAL DOCUMENTATION (OUTPATIENT)
Facility: HOSPITAL | Age: 39
End: 2024-01-12

## 2024-01-12 NOTE — PROGRESS NOTES
Spoke to patient regarding self pay.  Patient states that she doesn't know why her insurance is rejecting and she is going to call them to try to get it straightened out.  Patient states she will call and update us.

## 2024-03-02 NOTE — PROGRESS NOTES
[address] [facility]  [phone]   Chloe Edmond MD     Patient: Reny Chauhan  YOB: 1985  Visit Date: 5/26/21    Nataliai Mccarthy is a 30y.o. year old female here today for   Chief Complaint   Patient presents with    Hypertension       HPI  Patient is a 30year old female here for hypertension follow up . Earlier this morning had headaches, shakiness and nausea but these have all resolved. Has been taking losartan, hctz, metoprolol, amlodipine, guanfacine, magnesium. Has not missed any doses. Currently anxious but no other symptoms. Does have some leg swelling for the past week . bp at home 160/98. Yesterday 170/110 yesterday. Asthma has been good. No shortness of breath. Has not needed albuterol inhaler . Not on inhaler. Review of Systems   Constitutional: Positive for fatigue. Negative for chills and fever. Respiratory: Negative for cough, shortness of breath and wheezing. Cardiovascular: Negative for chest pain, palpitations and leg swelling. Gastrointestinal: Negative for abdominal pain, diarrhea, nausea and vomiting. Musculoskeletal: Positive for arthralgias. Neurological: Negative for weakness, numbness and headaches.        Current Outpatient Medications on File Prior to Visit   Medication Sig Dispense Refill    diclofenac sodium (VOLTAREN) 1 % GEL APPLY 4 GRAMS 4 TIMES DAILY AS NEEDED FOR PAIN      magnesium oxide (MAG-OX) 400 MG tablet Take 1 tablet by mouth daily      magnesium oxide (MAG-OX) 400 MG tablet       hydroCHLOROthiazide (HYDRODIURIL) 50 MG tablet       losartan (COZAAR) 50 MG tablet TAKE 1 TABLET BY MOUTH EVERY DAY 30 tablet 3    metoprolol succinate (TOPROL XL) 25 MG extended release tablet Take 1 tablet by mouth daily 30 tablet 1    guanFACINE (TENEX) 1 MG tablet TAKE 1 TABLET BY MOUTH EVERY NIGHT      Raspberry Ketones 100 MG CAPS Take 1 tablet by mouth daily      vitamin B-12 (CYANOCOBALAMIN) 1000 MCG tablet Take 1 tablet by mouth daily 30 tablet 3    sertraline (ZOLOFT) 25 MG tablet Take 1 tablet by mouth daily 30 tablet 5    hydroCHLOROthiazide (HYDRODIURIL) 25 MG tablet TAKE 1 TABLET BY MOUTH EVERY DAY AT NIGHT (Patient taking differently: 50 mg ) 30 tablet 3    amLODIPine (NORVASC) 5 MG tablet Take 5 mg by mouth daily      Elastic Bandages & Supports (KNEE BRACE/CUSHION/L-XL) MISC Wear daily for knee pain 1 each 0    albuterol sulfate HFA (PROAIR HFA) 108 (90 Base) MCG/ACT inhaler Inhale 2 puffs into the lungs every 6 hours as needed for Wheezing 1 Inhaler 1    Aspirin-Acetaminophen-Caffeine (EXCEDRIN MIGRAINE PO) Take by mouth daily      Multiple Vitamins-Minerals (THERAPEUTIC MULTIVITAMIN-MINERALS) tablet Take 1 tablet by mouth daily       No current facility-administered medications on file prior to visit. No Known Allergies    Past medical, surgical, socialand/or family history reviewed, updated as needed, and are non-contributory (unless otherwise stated). Medications, allergies, and problem list also reviewed and updated as needed in patient's record. Wt Readings from Last 3 Encounters:   05/26/21 (!) 418 lb (189.6 kg)   04/27/21 (!) 416 lb 3.2 oz (188.8 kg)   04/21/21 (!) 424 lb (192.3 kg)                   BP (!) 144/92   Pulse 92   Temp 96.7 °F (35.9 °C)   Resp 20   Ht 5' 7\" (1.702 m)   Wt (!) 418 lb (189.6 kg)   SpO2 97%   BMI 65.47 kg/m²        Physical Exam  Vitals and nursing note reviewed. Constitutional:       General: She is not in acute distress. Appearance: She is well-developed. She is not diaphoretic. HENT:      Head: Normocephalic and atraumatic. Cardiovascular:      Rate and Rhythm: Normal rate and regular rhythm. Heart sounds: Normal heart sounds. No murmur heard. Pulmonary:      Effort: Pulmonary effort is normal. No respiratory distress. Breath sounds: Normal breath sounds. No wheezing or rales.    Abdominal: General: Bowel sounds are normal. There is no distension. Palpations: Abdomen is soft. Tenderness: There is no abdominal tenderness. There is no guarding. Musculoskeletal:         General: Swelling (trace edema) present. Normal range of motion. Psychiatric:         Behavior: Behavior normal.       Results for orders placed or performed in visit on 05/11/21   Phosphorus   Result Value Ref Range    Phosphorus 3.4 2.5 - 4.5 mg/dL       ASSESSMENT/PLAN  Elyse Lemus was seen today for hypertension. Diagnoses and all orders for this visit:    Essential hypertension  -     Basic Metabolic Panel; Future  -     MAGNESIUM; Future  Uncontrolled. Discussed with Dr. Ayana Lawson. He will plan 24 hour blood pressure monitor. Check labs and adjust meds as needed. Mild intermittent asthma, unspecified whether complicated   - Controlled. No recent symptoms or albuterol use           Phone/MyChart follow up if tests abnormal.    Return in about 2 weeks (around 6/9/2021). or sooner if necessary. I have reviewed myfindings and recommendations with Elyse Lemus. Percy Simmons.  Harris Ho MD, M.D fall

## 2024-03-07 ENCOUNTER — HOSPITAL ENCOUNTER (OUTPATIENT)
Dept: INFUSION THERAPY | Age: 39
Discharge: HOME | End: 2024-03-07

## 2024-03-07 DIAGNOSIS — D50.8 OTHER IRON DEFICIENCY ANEMIA: Primary | ICD-10-CM

## 2024-05-13 ENCOUNTER — TELEPHONE (OUTPATIENT)
Dept: BARIATRICS/WEIGHT MGMT | Age: 39
End: 2024-05-13

## (undated) DEVICE — KENDALL 450 SERIES MONITORING FOAM ELECTRODE - RECTANGULAR SHAPE ( 3/PK): Brand: KENDALL

## (undated) DEVICE — MEDI-VAC NON-CONDUCTIVE SUCTION TUBING: Brand: CARDINAL HEALTH

## (undated) DEVICE — 6 X 9  1.75MIL 4-WALL LABGUARD: Brand: MINIGRIP COMMERCIAL LLC

## (undated) DEVICE — TROCAR: Brand: KII FIOS FIRST ENTRY

## (undated) DEVICE — 1.5 FR, 120 CM, NITI TIPLESS STONE BASKET: Brand: HALO

## (undated) DEVICE — APPLICATOR MEDICATED 26 CC SOLUTION HI LT ORNG CHLORAPREP

## (undated) DEVICE — SOLUTION IRRIG 3000ML 0.9% SOD CHL USP UROMATIC PLAS CONT

## (undated) DEVICE — SOLUTION IRRIG 250ML STRL H2O PLAS POUR BTL USP

## (undated) DEVICE — LAPAROSCOPIC SCISSORS: Brand: EPIX LAPAROSCOPIC SCISSORS

## (undated) DEVICE — PUMP SUC IRR TBNG L10FT W/ HNDPC ASSEMB STRYKEFLOW 2

## (undated) DEVICE — SYRINGE 20ML LL S/C 50

## (undated) DEVICE — IRON INTERN

## (undated) DEVICE — TROCAR: Brand: KII SLEEVE

## (undated) DEVICE — GLOVE ORANGE PI 7 1/2   MSG9075

## (undated) DEVICE — COVER,LIGHT HANDLE,FLX,1/PK: Brand: MEDLINE INDUSTRIES, INC.

## (undated) DEVICE — KIT BEDSIDE REVITAL OX 500ML

## (undated) DEVICE — Device: Brand: DEFENDO VALVE AND CONNECTOR KIT

## (undated) DEVICE — NDL CNTR 40CT FM MAG: Brand: MEDLINE INDUSTRIES, INC.

## (undated) DEVICE — INSUFFLATION NEEDLE TO ESTABLISH PNEUMOPERITONEUM.: Brand: INSUFFLATION NEEDLE

## (undated) DEVICE — STAPLER SKIN L440MM 32MM LNG 12 FIRING B FRM PWR + GRIPPING

## (undated) DEVICE — SET ENDO INSTR LAPAROSCOPIC INCISIONAL

## (undated) DEVICE — MEDI-VAC YANKAUER SUCTION HANDLE W/BULBOUS TIP: Brand: CARDINAL HEALTH

## (undated) DEVICE — ELECTRODE PT RET AD L9FT HI MOIST COND ADH HYDRGEL CORDED

## (undated) DEVICE — GUIDEWIRE ENDOSCP L150CM DIA0.035IN TIP 3CM PTFE NIT

## (undated) DEVICE — FORCEPS BX L240CM JAW DIA2.8MM L CAP W/ NDL MIC MESH TOOTH

## (undated) DEVICE — [HIGH FLOW INSUFFLATOR,  DO NOT USE IF PACKAGE IS DAMAGED,  KEEP DRY,  KEEP AWAY FROM SUNLIGHT,  PROTECT FROM HEAT AND RADIOACTIVE SOURCES.]: Brand: PNEUMOSURE

## (undated) DEVICE — MASK,FACE,MAXFLUIDPROTECT,SHIELD/ERLPS: Brand: MEDLINE

## (undated) DEVICE — SPONGE GZ 4IN 4IN 4 PLY N WVN AVANT

## (undated) DEVICE — RELOAD STPL L60MM H1.5-3.6MM REG TISS BLU GRIPPING SURF B

## (undated) DEVICE — SOLUTION IRRIG 3000ML STRL H2O USP UROMATIC PLAS CONT

## (undated) DEVICE — CYSTO PACK: Brand: MEDLINE INDUSTRIES, INC.

## (undated) DEVICE — GOWN ISOLATN REG YEL M WT MULTIPLY SIDETIE LEV 2

## (undated) DEVICE — SUTURE VLOC 90 2/0 VL 6 GS-22 VLOCM2105

## (undated) DEVICE — SOLUTION IRRIG 1000ML STRL H2O USP PLAS POUR BTL

## (undated) DEVICE — HOSE CONN FOR WST MGMT SYS NEPTUNE 2

## (undated) DEVICE — DOUBLE BASIN SET: Brand: MEDLINE INDUSTRIES, INC.

## (undated) DEVICE — GUIDEWIRE URO L150CM DIA0.035IN STD NIT HYDRPHLC STR TIP

## (undated) DEVICE — SOLUTION SCRB 4OZ 4% CHG H2O AIDED FOR PREOPERATIVE SKIN

## (undated) DEVICE — COVER,TABLE,44X90,STERILE: Brand: MEDLINE

## (undated) DEVICE — CONTAINER SPEC COLL 960ML POLYPR TRIANG GRAD INTAKE/OUTPUT

## (undated) DEVICE — SUTURE V-LOC 180 SZ 0 L9IN ABSRB GRN GS-21 L37MM 1/2 CIR VLOCL0346

## (undated) DEVICE — SUTURE STRATAFIX SPRL SZ 2 0 L5IN ABSRB VLT MH L36MM 1 2 CIR SXPD2B407

## (undated) DEVICE — TUBING, SUCTION, 1/4" X 10', STRAIGHT: Brand: MEDLINE

## (undated) DEVICE — PACK SURG LAP CHOLE CUSTOM

## (undated) DEVICE — AIRLIFE™ ADULT OXYGEN MASK VINYL, UNDER THE CHIN STYLE, 3 IN 1 MASK WITH SAFETY VENT, WITH 7 FEET (2.1 M) CRUSH RESISTANT OXYGEN TUBING: Brand: AIRLIFE™

## (undated) DEVICE — TUBING, SUCTION, 3/16" X 12', STRAIGHT: Brand: MEDLINE

## (undated) DEVICE — SONICISION CORDLESS ULTRALSONIC DISSECTOR  WITH CURVED JAW 48 CM

## (undated) DEVICE — NEEDLE SPNL 22GA L3.5IN BLK HUB S STL REG WALL FIT STYL W/

## (undated) DEVICE — GAUZE,SPONGE,4"X4",8PLY,STRL,LF,10/TRAY: Brand: MEDLINE

## (undated) DEVICE — LUBRICANT SURG JELLY ST BACTER TUBE 4.25OZ

## (undated) DEVICE — EXTRA LONG 45 DEGREE LENS

## (undated) DEVICE — BLOCK BITE 60FR CAREGUARD

## (undated) DEVICE — GOWN,SIRUS,FABRNF,2XL,18/CS: Brand: MEDLINE

## (undated) DEVICE — RELOAD STPL L60MM H1-2.6MM MESENTERY THN TISS WHT 6 ROW

## (undated) DEVICE — CATHETER URET 5FR L70CM OPN END SGL LUMN INJ HUB FLEXIMA

## (undated) DEVICE — CLOTH SURG PREP PREOPERATIVE CHLORHEXIDINE GLUC 2% READYPREP

## (undated) DEVICE — GLOVE ORANGE PI 8   MSG9080

## (undated) DEVICE — GOWN,SIRUS,NONRNF,SETINSLV,XL,20/CS: Brand: MEDLINE

## (undated) DEVICE — LASER FIBER FLEXIVA PULSE ID 365

## (undated) DEVICE — PMI PTFE COATED LAPAROSCOPIC WIRE L-HOOK 44 CM: Brand: PMI

## (undated) DEVICE — VALVE SUCTION AIR H2O HYDR H2O JET CONN STRL ORCA POD + DISP

## (undated) DEVICE — BAG DRNGE COMB PK

## (undated) DEVICE — TOWEL,OR,DSP,ST,BLUE,STD,6/PK,12PK/CS: Brand: MEDLINE

## (undated) DEVICE — SOLUTION IRRIG 1000ML 0.9% SOD CHL USP POUR PLAS BTL

## (undated) DEVICE — MARKER,SKIN,WI/RULER AND LABELS: Brand: MEDLINE

## (undated) DEVICE — 4-PORT MANIFOLD: Brand: NEPTUNE 2

## (undated) DEVICE — PITCHER PT 1200ML W HNDL CSR WRP